# Patient Record
Sex: FEMALE | Race: WHITE | NOT HISPANIC OR LATINO | Employment: FULL TIME | URBAN - METROPOLITAN AREA
[De-identification: names, ages, dates, MRNs, and addresses within clinical notes are randomized per-mention and may not be internally consistent; named-entity substitution may affect disease eponyms.]

---

## 2017-03-07 ENCOUNTER — HOSPITAL ENCOUNTER (OUTPATIENT)
Dept: ULTRASOUND IMAGING | Facility: HOSPITAL | Age: 57
Discharge: HOME | End: 2017-03-07
Attending: OBSTETRICS & GYNECOLOGY
Payer: COMMERCIAL

## 2017-03-07 DIAGNOSIS — I10 ESSENTIAL (PRIMARY) HYPERTENSION: ICD-10-CM

## 2017-03-07 DIAGNOSIS — N95.0 POSTMENOPAUSAL BLEEDING: ICD-10-CM

## 2017-03-07 DIAGNOSIS — M19.90 OSTEOARTHRITIS: ICD-10-CM

## 2017-03-07 PROCEDURE — 76856 US EXAM PELVIC COMPLETE: CPT

## 2017-03-07 PROCEDURE — 76830 TRANSVAGINAL US NON-OB: CPT

## 2017-03-30 ENCOUNTER — ALLSCRIPTS OFFICE VISIT (OUTPATIENT)
Dept: OTHER | Facility: OTHER | Age: 57
End: 2017-03-30

## 2017-03-30 DIAGNOSIS — Z01.419 ENCOUNTER FOR GYNECOLOGICAL EXAMINATION WITHOUT ABNORMAL FINDING: ICD-10-CM

## 2017-04-03 LAB
HPV 18 (HISTORICAL): NOT DETECTED
HPV HIGH RISK 16/18 (HISTORICAL): NOT DETECTED
HPV16 (HISTORICAL): NOT DETECTED
PAP (HISTORICAL): NORMAL

## 2018-01-12 VITALS
WEIGHT: 293 LBS | DIASTOLIC BLOOD PRESSURE: 70 MMHG | HEIGHT: 68 IN | BODY MASS INDEX: 44.41 KG/M2 | SYSTOLIC BLOOD PRESSURE: 132 MMHG

## 2018-06-14 ENCOUNTER — ANNUAL EXAM (OUTPATIENT)
Dept: OBGYN CLINIC | Facility: CLINIC | Age: 58
End: 2018-06-14
Payer: COMMERCIAL

## 2018-06-14 VITALS
HEIGHT: 68 IN | BODY MASS INDEX: 41.52 KG/M2 | DIASTOLIC BLOOD PRESSURE: 76 MMHG | SYSTOLIC BLOOD PRESSURE: 148 MMHG | WEIGHT: 274 LBS

## 2018-06-14 DIAGNOSIS — Z12.39 SCREENING FOR MALIGNANT NEOPLASM OF BREAST: ICD-10-CM

## 2018-06-14 DIAGNOSIS — Z11.59 NEED FOR HEPATITIS C SCREENING TEST: ICD-10-CM

## 2018-06-14 DIAGNOSIS — Z12.4 PAP SMEAR FOR CERVICAL CANCER SCREENING: ICD-10-CM

## 2018-06-14 DIAGNOSIS — Z01.419 ENCOUNTER FOR ANNUAL ROUTINE GYNECOLOGICAL EXAMINATION: Primary | ICD-10-CM

## 2018-06-14 DIAGNOSIS — R93.89 THICKENED ENDOMETRIUM: ICD-10-CM

## 2018-06-14 PROCEDURE — S0610 ANNUAL GYNECOLOGICAL EXAMINA: HCPCS | Performed by: OBSTETRICS & GYNECOLOGY

## 2018-06-14 RX ORDER — BUSPIRONE HYDROCHLORIDE 5 MG/1
5 TABLET ORAL
COMMUNITY
End: 2019-03-01

## 2018-06-14 NOTE — PROGRESS NOTES
Assessment/Plan:    Normal APE  Due for mammogram  Repeat pelvic US  For endometrial stripe        Problem List Items Addressed This Visit     Encounter for annual routine gynecological examination - Primary    Thickened endometrium    Relevant Orders    US pelvis complete w transvaginal    Pap smear for cervical cancer screening      Other Visit Diagnoses     Need for hepatitis C screening test        Screening for malignant neoplasm of breast        Relevant Orders    Mammo screening bilateral w 3d & cad            Subjective:      Patient ID: Susan Smith is a 61 y o  female  Here for annual gyn exam-overall well-no vaginal bleeding or discharge-bowels and bladder normal, does have occasional urinary leakage but is usually just a small amount and due to increased abdominal pressure/Aelzgakz-sb-bt-date with colonoscopy but and she may be due this year, needs mammogram, has blood work planned with primary care physician  We did discuss repeating her pelvic ultrasound, she had had a thickened endometrial stripe last year and an endometrial sampling with D and C had showed hyperplasia with small focus of both simple and complex hyperplasia felt to be consistent with a polyp  We discussed at the time of proceeding with the further surgery but Laure Grady wanted to follow-up instead with ultrasound  She has had no bleeding or other complaints since that time        The following portions of the patient's history were reviewed and updated as appropriate:   She  has a past medical history of Arthritis; Depression; and Hypertension  She   Patient Active Problem List    Diagnosis Date Noted    Encounter for annual routine gynecological examination 2018    Thickened endometrium 2018    Pap smear for cervical cancer screening 2018    Hypertension 2016    Depression 2016    Osteoarthritis 2016     She  has a past surgical history that includes Joint replacement;   section; Neuroplasty / transposition median nerve at carpal tunnel; pr hysteroscopy,w/endo bx (N/A, 3/21/2016); and Replacement total knee (Right)  Her family history includes Heart failure in her father; Hypertension in her father and mother; Lymphoma in her father  She  reports that she has never smoked  She does not have any smokeless tobacco history on file  She reports that she does not drink alcohol or use drugs  Current Outpatient Prescriptions   Medication Sig Dispense Refill    busPIRone (BUSPAR) 5 mg tablet Take 5 mg by mouth daily at bedtime   citalopram (CeleXA) 20 mg tablet Take 20 mg by mouth daily at bedtime   metoprolol tartrate (LOPRESSOR) 25 mg tablet Take 25 mg by mouth daily at bedtime  Pt unsure of dose      busPIRone (BUSPAR) 5 mg tablet Take 5 mg by mouth       No current facility-administered medications for this visit  Current Outpatient Prescriptions on File Prior to Visit   Medication Sig    busPIRone (BUSPAR) 5 mg tablet Take 5 mg by mouth daily at bedtime   citalopram (CeleXA) 20 mg tablet Take 20 mg by mouth daily at bedtime   metoprolol tartrate (LOPRESSOR) 25 mg tablet Take 25 mg by mouth daily at bedtime  Pt unsure of dose     No current facility-administered medications on file prior to visit  She is allergic to penicillins; zithromax [azithromycin]; and griseofulvin       Review of Systems   Constitutional: Positive for fatigue  Negative for chills, fever and unexpected weight change  HENT: Negative for dental problem, sinus pain and sinus pressure  Eyes: Positive for visual disturbance  Respiratory: Negative for cough, shortness of breath and wheezing  Cardiovascular: Negative for chest pain and leg swelling  Gastrointestinal: Negative for constipation, diarrhea, nausea and vomiting  Endocrine: Positive for heat intolerance  Genitourinary: Negative for menstrual problem, pelvic pain and urgency     Musculoskeletal: Positive for joint swelling  Negative for back pain  Arthritis     Allergic/Immunologic: Positive for environmental allergies  Neurological: Negative for dizziness and headaches  Psychiatric/Behavioral: The patient is nervous/anxious  Objective:      LMP  (LMP Unknown)          Physical Exam   Constitutional: She is oriented to person, place, and time  She appears well-developed  No distress  Obese white female    HENT:   Head: Normocephalic and atraumatic  Neck: Normal range of motion  No thyromegaly present  Cardiovascular: Normal rate, regular rhythm and normal heart sounds  Pulmonary/Chest: Effort normal and breath sounds normal  Right breast exhibits no inverted nipple, no mass, no nipple discharge, no skin change and no tenderness  Left breast exhibits no inverted nipple, no mass, no nipple discharge, no skin change and no tenderness  Breasts are symmetrical    Abdominal: Soft  She exhibits no mass  There is no tenderness  There is no rebound and no guarding  Genitourinary: Rectum normal, vagina normal and uterus normal  Rectal exam shows no mass, no tenderness, anal tone normal and guaiac negative stool  No breast swelling, tenderness or discharge  Uterus is not enlarged and not tender  Cervix exhibits no discharge and no friability  Right adnexum displays no mass, no tenderness and no fullness  Left adnexum displays no mass, no tenderness and no fullness  Neurological: She is alert and oriented to person, place, and time  Psychiatric: She has a normal mood and affect   Her behavior is normal

## 2018-06-14 NOTE — PATIENT INSTRUCTIONS
Thank you for enrolling in [facility]  Please follow the instructions below to securely access your online medical record  [employer] allows you to send messages to your doctor, view your test results, renew your prescriptions, schedule appointments, and more  [facility] uses Single Sign on (SSO) Technology for you to log in and access our [address]  [email or web address], including [employer]  No more remembering multiple user names and passwords! We are going to guide you through, step by step, to help you set up your [employer] account which will provide access to your [employer] account  How Do I Sign Up? 1  In your Internet browser, go to [email or web address]       2  Click on the [facility] account and then click Dont have an                 Account? Create one now      3  Enter your demographic information and chose a user name (email address) and password  Think of one that is secure and easy to remember  Enter a Referral code if you have one (this is not your [employer] code ) Accept the Terms and Conditions and the Privacy Policy  4  Select your security questions that you will use to reset your password should you forget it  Click Submit  5  Enter your [employer] Activation Code exactly as it appears below  You will not need to use this code after you have completed the sign-up process  If you do not sign up before the expiration date, you must request a new code  [employer] Activation Code: [email or web address]  Expires: 6/28/2018  5:57 PM    6  Confirm your email address  An email confirmation was sent to you  Please open that email and click Confirm your Email   You should then be redirected to our [employer] Single sign on page, where you will log on with the user name and password you created! Proceed to the [employer] Icon to view your personal health information          Additional Information  If you have questions, you can e-mail [email or web address] or call [phone] to talk to our customer support staff  Remember, MyChart is NOT to be used for urgent needs  For medical emergencies, dial 911

## 2018-06-19 LAB
HPV HR 12 DNA CVX QL NAA+PROBE: NOT DETECTED
HPV16 DNA SPEC QL NAA+PROBE: NOT DETECTED
HPV18 DNA SPEC QL NAA+PROBE: NOT DETECTED
THIN PREP CVX: NORMAL

## 2018-10-11 ENCOUNTER — HOSPITAL ENCOUNTER (OUTPATIENT)
Dept: MAMMOGRAPHY | Facility: HOSPITAL | Age: 58
Discharge: HOME | End: 2018-10-11
Attending: OBSTETRICS & GYNECOLOGY
Payer: COMMERCIAL

## 2018-10-11 ENCOUNTER — HOSPITAL ENCOUNTER (OUTPATIENT)
Dept: ULTRASOUND IMAGING | Facility: HOSPITAL | Age: 58
Discharge: HOME | End: 2018-10-11
Attending: OBSTETRICS & GYNECOLOGY
Payer: COMMERCIAL

## 2018-10-11 DIAGNOSIS — Z12.39 SCREENING FOR MALIGNANT NEOPLASM OF BREAST: ICD-10-CM

## 2018-10-11 DIAGNOSIS — R93.89 THICKENED ENDOMETRIUM: ICD-10-CM

## 2018-10-11 PROCEDURE — 76830 TRANSVAGINAL US NON-OB: CPT

## 2018-10-11 PROCEDURE — 77067 SCR MAMMO BI INCL CAD: CPT

## 2018-10-11 PROCEDURE — 76856 US EXAM PELVIC COMPLETE: CPT

## 2018-10-15 ENCOUNTER — TELEPHONE (OUTPATIENT)
Dept: OBGYN CLINIC | Facility: CLINIC | Age: 58
End: 2018-10-15

## 2019-03-01 ENCOUNTER — HOSPITAL ENCOUNTER (OUTPATIENT)
Dept: ULTRASOUND IMAGING | Facility: HOSPITAL | Age: 59
Discharge: HOME | End: 2019-03-01
Attending: INTERNAL MEDICINE
Payer: COMMERCIAL

## 2019-03-01 ENCOUNTER — TRANSCRIBE ORDERS (OUTPATIENT)
Dept: ADMINISTRATIVE | Facility: HOSPITAL | Age: 59
End: 2019-03-01

## 2019-03-01 ENCOUNTER — HOSPITAL ENCOUNTER (INPATIENT)
Facility: HOSPITAL | Age: 59
LOS: 2 days | Discharge: HOME | DRG: 418 | End: 2019-03-03
Attending: EMERGENCY MEDICINE | Admitting: SURGERY
Payer: COMMERCIAL

## 2019-03-01 DIAGNOSIS — K85.10 GALLSTONE PANCREATITIS: Primary | ICD-10-CM

## 2019-03-01 DIAGNOSIS — R10.13 EPIGASTRIC PAIN: Primary | ICD-10-CM

## 2019-03-01 DIAGNOSIS — K85.90 PANCREATITIS: ICD-10-CM

## 2019-03-01 DIAGNOSIS — R10.13 EPIGASTRIC PAIN: ICD-10-CM

## 2019-03-01 DIAGNOSIS — K85.10 ACUTE GALLSTONE PANCREATITIS: ICD-10-CM

## 2019-03-01 DIAGNOSIS — R79.89 ELEVATED LFTS: ICD-10-CM

## 2019-03-01 DIAGNOSIS — K83.8 COMMON BILE DUCT DILATION: ICD-10-CM

## 2019-03-01 LAB
ALBUMIN SERPL BCP-MCNC: 3.7 G/DL (ref 3.5–5)
ALP SERPL-CCNC: 370 U/L (ref 46–116)
ALT SERPL W P-5'-P-CCNC: 1190 U/L (ref 12–78)
ANION GAP SERPL CALCULATED.3IONS-SCNC: 9 MMOL/L (ref 4–13)
AST SERPL W P-5'-P-CCNC: 533 U/L (ref 5–45)
BACTERIA UR QL AUTO: ABNORMAL /HPF
BASOPHILS # BLD AUTO: 0.03 THOUSANDS/ΜL (ref 0–0.1)
BASOPHILS NFR BLD AUTO: 1 % (ref 0–1)
BILIRUB SERPL-MCNC: 3.1 MG/DL (ref 0.2–1)
BILIRUB UR QL STRIP: NEGATIVE
BUN SERPL-MCNC: 13 MG/DL (ref 5–25)
CALCIUM SERPL-MCNC: 9.3 MG/DL (ref 8.3–10.1)
CHLORIDE SERPL-SCNC: 105 MMOL/L (ref 100–108)
CLARITY UR: CLEAR
CO2 SERPL-SCNC: 27 MMOL/L (ref 21–32)
COLOR UR: YELLOW
CREAT SERPL-MCNC: 0.95 MG/DL (ref 0.6–1.3)
EOSINOPHIL # BLD AUTO: 0.18 THOUSAND/ΜL (ref 0–0.61)
EOSINOPHIL NFR BLD AUTO: 3 % (ref 0–6)
ERYTHROCYTE [DISTWIDTH] IN BLOOD BY AUTOMATED COUNT: 14.4 % (ref 11.6–15.1)
GFR SERPL CREATININE-BSD FRML MDRD: 66 ML/MIN/1.73SQ M
GLUCOSE SERPL-MCNC: 99 MG/DL (ref 65–140)
GLUCOSE UR STRIP-MCNC: NEGATIVE MG/DL
HCT VFR BLD AUTO: 46.3 % (ref 34.8–46.1)
HGB BLD-MCNC: 14.2 G/DL (ref 11.5–15.4)
HGB UR QL STRIP.AUTO: ABNORMAL
IMM GRANULOCYTES # BLD AUTO: 0.02 THOUSAND/UL (ref 0–0.2)
IMM GRANULOCYTES NFR BLD AUTO: 0 % (ref 0–2)
KETONES UR STRIP-MCNC: NEGATIVE MG/DL
LEUKOCYTE ESTERASE UR QL STRIP: ABNORMAL
LIPASE SERPL-CCNC: ABNORMAL U/L (ref 73–393)
LYMPHOCYTES # BLD AUTO: 1.42 THOUSANDS/ΜL (ref 0.6–4.47)
LYMPHOCYTES NFR BLD AUTO: 26 % (ref 14–44)
MCH RBC QN AUTO: 27.3 PG (ref 26.8–34.3)
MCHC RBC AUTO-ENTMCNC: 30.7 G/DL (ref 31.4–37.4)
MCV RBC AUTO: 89 FL (ref 82–98)
MONOCYTES # BLD AUTO: 0.3 THOUSAND/ΜL (ref 0.17–1.22)
MONOCYTES NFR BLD AUTO: 5 % (ref 4–12)
NEUTROPHILS # BLD AUTO: 3.57 THOUSANDS/ΜL (ref 1.85–7.62)
NEUTS SEG NFR BLD AUTO: 65 % (ref 43–75)
NITRITE UR QL STRIP: NEGATIVE
NON-SQ EPI CELLS URNS QL MICRO: ABNORMAL /HPF
NRBC BLD AUTO-RTO: 0 /100 WBCS
PH UR STRIP.AUTO: 5 [PH] (ref 4.5–8)
PLATELET # BLD AUTO: 190 THOUSANDS/UL (ref 149–390)
PMV BLD AUTO: 11.1 FL (ref 8.9–12.7)
POTASSIUM SERPL-SCNC: 3.8 MMOL/L (ref 3.5–5.3)
PROT SERPL-MCNC: 7.2 G/DL (ref 6.4–8.2)
PROT UR STRIP-MCNC: NEGATIVE MG/DL
RBC # BLD AUTO: 5.21 MILLION/UL (ref 3.81–5.12)
RBC #/AREA URNS AUTO: ABNORMAL /HPF
SODIUM SERPL-SCNC: 141 MMOL/L (ref 136–145)
SP GR UR STRIP.AUTO: 1.02 (ref 1–1.03)
UROBILINOGEN UR QL STRIP.AUTO: 0.2 E.U./DL
WBC # BLD AUTO: 5.52 THOUSAND/UL (ref 4.31–10.16)
WBC #/AREA URNS AUTO: ABNORMAL /HPF

## 2019-03-01 PROCEDURE — 85025 COMPLETE CBC W/AUTO DIFF WBC: CPT | Performed by: EMERGENCY MEDICINE

## 2019-03-01 PROCEDURE — 99285 EMERGENCY DEPT VISIT HI MDM: CPT

## 2019-03-01 PROCEDURE — 96361 HYDRATE IV INFUSION ADD-ON: CPT

## 2019-03-01 PROCEDURE — 81003 URINALYSIS AUTO W/O SCOPE: CPT

## 2019-03-01 PROCEDURE — 36415 COLL VENOUS BLD VENIPUNCTURE: CPT | Performed by: EMERGENCY MEDICINE

## 2019-03-01 PROCEDURE — 81001 URINALYSIS AUTO W/SCOPE: CPT

## 2019-03-01 PROCEDURE — 83690 ASSAY OF LIPASE: CPT | Performed by: EMERGENCY MEDICINE

## 2019-03-01 PROCEDURE — 87086 URINE CULTURE/COLONY COUNT: CPT

## 2019-03-01 PROCEDURE — 76700 US EXAM ABDOM COMPLETE: CPT

## 2019-03-01 PROCEDURE — 96360 HYDRATION IV INFUSION INIT: CPT

## 2019-03-01 PROCEDURE — 80053 COMPREHEN METABOLIC PANEL: CPT | Performed by: EMERGENCY MEDICINE

## 2019-03-01 RX ORDER — ONDANSETRON 2 MG/ML
4 INJECTION INTRAMUSCULAR; INTRAVENOUS EVERY 6 HOURS PRN
Status: DISCONTINUED | OUTPATIENT
Start: 2019-03-01 | End: 2019-03-03 | Stop reason: HOSPADM

## 2019-03-01 RX ORDER — HYDROMORPHONE HCL/PF 1 MG/ML
1 SYRINGE (ML) INJECTION EVERY 4 HOURS PRN
Status: DISCONTINUED | OUTPATIENT
Start: 2019-03-01 | End: 2019-03-03 | Stop reason: HOSPADM

## 2019-03-01 RX ORDER — BUSPIRONE HYDROCHLORIDE 5 MG/1
5 TABLET ORAL
Status: DISCONTINUED | OUTPATIENT
Start: 2019-03-01 | End: 2019-03-03 | Stop reason: HOSPADM

## 2019-03-01 RX ORDER — CITALOPRAM 20 MG/1
20 TABLET ORAL
Status: DISCONTINUED | OUTPATIENT
Start: 2019-03-01 | End: 2019-03-03 | Stop reason: HOSPADM

## 2019-03-01 RX ORDER — SODIUM CHLORIDE, SODIUM LACTATE, POTASSIUM CHLORIDE, CALCIUM CHLORIDE 600; 310; 30; 20 MG/100ML; MG/100ML; MG/100ML; MG/100ML
125 INJECTION, SOLUTION INTRAVENOUS CONTINUOUS
Status: DISCONTINUED | OUTPATIENT
Start: 2019-03-01 | End: 2019-03-02

## 2019-03-01 RX ORDER — HYDROMORPHONE HCL/PF 1 MG/ML
0.5 SYRINGE (ML) INJECTION
Status: DISCONTINUED | OUTPATIENT
Start: 2019-03-01 | End: 2019-03-03 | Stop reason: HOSPADM

## 2019-03-01 RX ORDER — ACETAMINOPHEN 325 MG/1
650 TABLET ORAL EVERY 6 HOURS PRN
Status: DISCONTINUED | OUTPATIENT
Start: 2019-03-01 | End: 2019-03-03 | Stop reason: HOSPADM

## 2019-03-01 RX ORDER — HYDROMORPHONE HCL/PF 1 MG/ML
0.5 SYRINGE (ML) INJECTION EVERY 4 HOURS PRN
Status: DISCONTINUED | OUTPATIENT
Start: 2019-03-01 | End: 2019-03-03 | Stop reason: HOSPADM

## 2019-03-01 RX ADMIN — SODIUM CHLORIDE, SODIUM LACTATE, POTASSIUM CHLORIDE, AND CALCIUM CHLORIDE 125 ML/HR: .6; .31; .03; .02 INJECTION, SOLUTION INTRAVENOUS at 22:11

## 2019-03-01 RX ADMIN — CITALOPRAM HYDROBROMIDE 20 MG: 20 TABLET ORAL at 22:09

## 2019-03-01 RX ADMIN — SODIUM CHLORIDE 1000 ML: 0.9 INJECTION, SOLUTION INTRAVENOUS at 19:52

## 2019-03-01 RX ADMIN — BUSPIRONE HYDROCHLORIDE 5 MG: 5 TABLET ORAL at 22:09

## 2019-03-01 RX ADMIN — METOPROLOL TARTRATE 25 MG: 25 TABLET, FILM COATED ORAL at 22:09

## 2019-03-01 RX ADMIN — ACETAMINOPHEN 650 MG: 325 TABLET, FILM COATED ORAL at 20:53

## 2019-03-01 RX ADMIN — SODIUM CHLORIDE 1000 ML: 0.9 INJECTION, SOLUTION INTRAVENOUS at 18:43

## 2019-03-01 NOTE — ED PROVIDER NOTES
History  Chief Complaint   Patient presents with    Abdominal Pain     Pt  c/o abdominal pain with nausea and indigestion one week and had u/s performed today and was referred for futher evaluation  History provided by:  Patient   used: No    Abdominal Pain   Associated symptoms: nausea and vomiting    Associated symptoms: no chest pain, no chills, no cough, no diarrhea, no dysuria, no fever, no shortness of breath and no sore throat      Patient is a 50-year-old female presenting to emergency department with abdominal pain  Epigastric  Started a week ago which are generalized now localized to the epigastric area  Had outpatient ultrasound done which showed stones and sludge in gallbladder with enlarged common bile duct  Nausea vomiting  No fevers or chills  No chest pain  No shortness of breath  Having normal bowel movements  MDM will check LFTs, lipase, CBC, will likely need admission to hospital      Prior to Admission Medications   Prescriptions Last Dose Informant Patient Reported? Taking?   busPIRone (BUSPAR) 5 mg tablet   Yes Yes   Sig: Take 5 mg by mouth daily at bedtime  citalopram (CeleXA) 20 mg tablet   Yes Yes   Sig: Take 20 mg by mouth daily at bedtime  metoprolol tartrate (LOPRESSOR) 25 mg tablet   Yes Yes   Sig: Take 25 mg by mouth daily at bedtime   Pt unsure of dose      Facility-Administered Medications: None       Past Medical History:   Diagnosis Date    Arthritis     Depression     Hypertension        Past Surgical History:   Procedure Laterality Date     SECTION       SECTION      JOINT REPLACEMENT      NEUROPLASTY / TRANSPOSITION MEDIAN NERVE AT CARPAL TUNNEL      [facility] HYSTEROSCOPY,W/ENDO BX N/A 3/21/2016    Procedure: FRACTIONAL DILATATION AND CURETTAGE (D&C) WITH HYSTEROSOCPY;  Surgeon: Jossy Cunha MD;  Location: [facility];  Service: Gynecology    REPLACEMENT TOTAL KNEE Right        Family History   Problem Relation Age of Onset    Hypertension Mother     Hypertension Father     Heart failure Father     Lymphoma Father      I have reviewed and agree with the history as documented  Social History     Tobacco Use    Smoking status: Never Smoker   Substance Use Topics    Alcohol use: No    Drug use: No        Review of Systems   Constitutional: Negative for chills, diaphoresis and fever  HENT: Negative for congestion and sore throat  Respiratory: Negative for cough, shortness of breath, wheezing and stridor  Cardiovascular: Negative for chest pain, palpitations and leg swelling  Gastrointestinal: Positive for abdominal pain, nausea and vomiting  Negative for blood in stool and diarrhea  Genitourinary: Negative for dysuria, frequency and urgency  Musculoskeletal: Negative for neck pain and neck stiffness  Skin: Negative for pallor and rash  Neurological: Negative for dizziness, syncope, weakness, light-headedness and headaches  All other systems reviewed and are negative  Physical Exam  Physical Exam   Constitutional: She is oriented to person, place, and time  She appears well-developed and well-nourished  HENT:   Head: Normocephalic and atraumatic  Eyes: Pupils are equal, round, and reactive to light  Neck: Neck supple  Cardiovascular: Normal rate, regular rhythm, normal heart sounds and intact distal pulses  Pulmonary/Chest: Effort normal and breath sounds normal  No respiratory distress  Abdominal: Soft  Bowel sounds are normal  There is tenderness  Epigastric tenderness   Neurological: She is alert and oriented to person, place, and time  Skin: Skin is warm and dry  She is not diaphoretic  No cyanosis or erythema  Vitals reviewed        Vital Signs  ED Triage Vitals   Temperature Pulse Respirations Blood Pressure SpO2   03/01/19 1806 03/01/19 1806 03/01/19 1806 03/01/19 1806 03/01/19 1806   97 9 °F (36 6 °C) 75 (!) 28 (!) 187/109 97 %      Temp Source Heart Rate Source Patient Position - Orthostatic VS BP Location FiO2 (%)   03/01/19 1806 03/01/19 2029 03/01/19 2029 03/01/19 2029 --   Oral Monitor Sitting Left arm       Pain Score       03/01/19 1806       No Pain           Vitals:    03/02/19 0700 03/02/19 1443 03/02/19 2101 03/02/19 2159   BP: 148/78 132/78  167/82   Pulse: 79 79 82 72   Patient Position - Orthostatic VS: Lying Lying  Lying       Visual Acuity      ED Medications  Medications   ondansetron (ZOFRAN) injection 4 mg (has no administration in time range)   enoxaparin (LOVENOX) subcutaneous injection 40 mg (0 mg Subcutaneous Hold 3/2/19 0836)   HYDROmorphone (DILAUDID) injection 1 mg (has no administration in time range)   HYDROmorphone (DILAUDID) injection 0 5 mg (has no administration in time range)   HYDROmorphone (DILAUDID) injection 0 5 mg (has no administration in time range)   acetaminophen (TYLENOL) tablet 650 mg (650 mg Oral Given 3/2/19 2103)   citalopram (CeleXA) tablet 20 mg (20 mg Oral Given 3/2/19 2101)   metoprolol tartrate (LOPRESSOR) tablet 25 mg (25 mg Oral Given 3/2/19 2101)   busPIRone (BUSPAR) tablet 5 mg (5 mg Oral Given 3/2/19 2101)   dextrose 5 % and sodium chloride 0 45 % with KCl 20 mEq/L infusion (75 mL/hr Intravenous New Bag 3/2/19 1513)   sodium chloride 0 9 % bolus 1,000 mL (0 mL Intravenous Stopped 3/1/19 1951)   sodium chloride 0 9 % bolus 1,000 mL (1,000 mL Intravenous New Bag 3/1/19 1952)   ketorolac (TORADOL) injection 15 mg (15 mg Intravenous Given 3/2/19 1348)       Diagnostic Studies  Results Reviewed     Procedure Component Value Units Date/Time    Urine culture [[id number]] Collected:  03/01/19 1831    Lab Status:  Preliminary result Specimen:  Urine, Clean Catch Updated:  03/02/19 1136     Urine Culture Culture too young- will reincubate    Lipase [[id number]]  (Abnormal) Collected:  03/02/19 0449    Lab Status:  Final result Specimen:  Blood from Arm, Left Updated:  03/02/19 0609     Lipase 3,244 u/L     Comprehensive metabolic panel [[id number]]  (Abnormal) Collected:  03/02/19 0449    Lab Status:  Final result Specimen:  Blood from Arm, Left Updated:  03/02/19 8818     Sodium 142 mmol/L      Potassium 4 0 mmol/L      Chloride 109 mmol/L      CO2 25 mmol/L      ANION GAP 8 mmol/L      BUN 12 mg/dL      Creatinine 0 84 mg/dL      Glucose 89 mg/dL      Calcium 8 5 mg/dL       U/L       U/L      Alkaline Phosphatase 307 U/L      Total Protein 6 1 g/dL      Albumin 3 1 g/dL      Total Bilirubin 2 60 mg/dL      eGFR 76 ml/min/1 73sq m     Narrative:       National Kidney Disease Education Program recommendations are as follows:  GFR calculation is accurate only with a steady state creatinine  Chronic Kidney disease less than 60 ml/min/1 73 sq  meters  Kidney failure less than 15 ml/min/1 73 sq  meters      CBC and differential [[id number]]  (Abnormal) Collected:  03/02/19 0449    Lab Status:  Final result Specimen:  Blood from Arm, Left Updated:  03/02/19 0514     WBC 4 64 Thousand/uL      RBC 4 58 Million/uL      Hemoglobin 12 7 g/dL      Hematocrit 41 4 %      MCV 90 fL      MCH 27 7 pg      MCHC 30 7 g/dL      RDW 14 2 %      MPV 11 2 fL      Platelets 142 Thousands/uL      nRBC 0 /100 WBCs      Neutrophils Relative 53 %      Immat GRANS % 0 %      Lymphocytes Relative 35 %      Monocytes Relative 5 %      Eosinophils Relative 6 %      Basophils Relative 1 %      Neutrophils Absolute 2 46 Thousands/µL      Immature Grans Absolute 0 01 Thousand/uL      Lymphocytes Absolute 1 61 Thousands/µL      Monocytes Absolute 0 25 Thousand/µL      Eosinophils Absolute 0 28 Thousand/µL      Basophils Absolute 0 03 Thousands/µL     Comprehensive metabolic panel [[id number]]  (Abnormal) Collected:  03/01/19 1843    Lab Status:  Final result Specimen:  Blood from Arm, Right Updated:  03/01/19 1942     Sodium 141 mmol/L      Potassium 3 8 mmol/L      Chloride 105 mmol/L      CO2 27 mmol/L      ANION GAP 9 mmol/L      BUN 13 mg/dL      Creatinine 0 95 mg/dL      Glucose 99 mg/dL      Calcium 9 3 mg/dL       U/L      ALT 1,190 U/L      Alkaline Phosphatase 370 U/L      Total Protein 7 2 g/dL      Albumin 3 7 g/dL      Total Bilirubin 3 10 mg/dL      eGFR 66 ml/min/1 73sq m     Narrative:       National Kidney Disease Education Program recommendations are as follows:  GFR calculation is accurate only with a steady state creatinine  Chronic Kidney disease less than 60 ml/min/1 73 sq  meters  Kidney failure less than 15 ml/min/1 73 sq  meters      Lipase [[id number]]  (Abnormal) Collected:  03/01/19 1843    Lab Status:  Final result Specimen:  Blood from Arm, Right Updated:  03/01/19 1942     Lipase 12,308 u/L     Urine Microscopic [[id number]]  (Abnormal) Collected:  03/01/19 1831    Lab Status:  Final result Specimen:  Urine, Clean Catch Updated:  03/01/19 1857     RBC, UA 0-5 /hpf      WBC, UA 10-20 /hpf      Epithelial Cells Occasional /hpf      Bacteria, UA Occasional /hpf     CBC and differential [[id number]]  (Abnormal) Collected:  03/01/19 1842    Lab Status:  Final result Specimen:  Blood from Arm, Right Updated:  03/01/19 1851     WBC 5 52 Thousand/uL      RBC 5 21 Million/uL      Hemoglobin 14 2 g/dL      Hematocrit 46 3 %      MCV 89 fL      MCH 27 3 pg      MCHC 30 7 g/dL      RDW 14 4 %      MPV 11 1 fL      Platelets 546 Thousands/uL      nRBC 0 /100 WBCs      Neutrophils Relative 65 %      Immat GRANS % 0 %      Lymphocytes Relative 26 %      Monocytes Relative 5 %      Eosinophils Relative 3 %      Basophils Relative 1 %      Neutrophils Absolute 3 57 Thousands/µL      Immature Grans Absolute 0 02 Thousand/uL      Lymphocytes Absolute 1 42 Thousands/µL      Monocytes Absolute 0 30 Thousand/µL      Eosinophils Absolute 0 18 Thousand/µL      Basophils Absolute 0 03 Thousands/µL     ED Urine Macroscopic [[id number]]  (Abnormal) Collected:  03/01/19 1831    Lab Status:  Final result Specimen:  Urine Updated:  03/01/19 1828     Color, UA Yellow     Clarity, UA Clear     pH, UA 5 0     Leukocytes, UA Trace     Nitrite, UA Negative     Protein, UA Negative mg/dl      Glucose, UA Negative mg/dl      Ketones, UA Negative mg/dl      Urobilinogen, UA 0 2 E U /dl      Bilirubin, UA Negative     Blood, UA Trace     Specific [place], UA 1 020    Narrative:       CLINITEK RESULT                 MRI abdomen wo contrast and mrcp   Final Result by Davis Mercado MD (03/02 1235)      1  Cholelithiasis with gallbladder wall thickening  No pericholecystic inflammation  2   No biliary ductal dilatation or choledocholithiasis  3   Hepatomegaly  Workstation performed: [id number]                    Procedures  Procedures       Phone Contacts  ED Phone Contact    ED Course  ED Course as of Mar 02 2224   Fri Mar 01, 2019   2006 Elevated lipase, elevated LFTs, elevated T bili  Likely from a passed stone  Potential still stuck in duct  Will discuss with surgery  Will need admission to hospital   Will need likely MRCP or ERCP  MDM    Disposition  Final diagnoses:   Pancreatitis   Elevated LFTs   Common bile duct dilation     Time reflects when diagnosis was documented in both MDM as applicable and the Disposition within this note     Time User Action Codes Description Comment    3/1/2019  8:12 PM [address], [address] [K85 10] Gallstone pancreatitis     3/1/2019  8:18 PM Masha Ellison Add [K85 90] Pancreatitis     3/1/2019  8:18 PM Masha Ellison Add [R94 5] Elevated LFTs     3/1/2019  8:19 PM Masha Ellison Add [K83 8] Common bile duct dilation       ED Disposition     ED Disposition Condition Date/Time Comment    Admit Stable Fri Mar 1, 2019  8:18 PM Case was discussed with surgery AP and the patient's admission status was agreed to be Admission Status: inpatient status to the service of Dr Kait Llanos           Follow-up Information    None         Current Discharge Medication List      CONTINUE these medications which have NOT CHANGED Details   busPIRone (BUSPAR) 5 mg tablet Take 5 mg by mouth daily at bedtime  citalopram (CeleXA) 20 mg tablet Take 20 mg by mouth daily at bedtime  metoprolol tartrate (LOPRESSOR) 25 mg tablet Take 25 mg by mouth daily at bedtime  Pt unsure of dose           No discharge procedures on file      ED Provider  Electronically Signed by           Mp Leonard MD  03/02/19 6721

## 2019-03-02 ENCOUNTER — APPOINTMENT (INPATIENT)
Dept: MRI IMAGING | Facility: HOSPITAL | Age: 59
DRG: 418 | End: 2019-03-02
Payer: COMMERCIAL

## 2019-03-02 PROBLEM — K85.10 GALLSTONE PANCREATITIS: Status: ACTIVE | Noted: 2019-03-01

## 2019-03-02 PROBLEM — K85.10 ACUTE GALLSTONE PANCREATITIS: Status: ACTIVE | Noted: 2019-03-02

## 2019-03-02 LAB
ALBUMIN SERPL BCP-MCNC: 3.1 G/DL (ref 3.5–5)
ALP SERPL-CCNC: 307 U/L (ref 46–116)
ALT SERPL W P-5'-P-CCNC: 850 U/L (ref 12–78)
ANION GAP SERPL CALCULATED.3IONS-SCNC: 8 MMOL/L (ref 4–13)
AST SERPL W P-5'-P-CCNC: 326 U/L (ref 5–45)
BASOPHILS # BLD AUTO: 0.03 THOUSANDS/ΜL (ref 0–0.1)
BASOPHILS NFR BLD AUTO: 1 % (ref 0–1)
BILIRUB SERPL-MCNC: 2.6 MG/DL (ref 0.2–1)
BUN SERPL-MCNC: 12 MG/DL (ref 5–25)
CALCIUM SERPL-MCNC: 8.5 MG/DL (ref 8.3–10.1)
CHLORIDE SERPL-SCNC: 109 MMOL/L (ref 100–108)
CO2 SERPL-SCNC: 25 MMOL/L (ref 21–32)
CREAT SERPL-MCNC: 0.84 MG/DL (ref 0.6–1.3)
EOSINOPHIL # BLD AUTO: 0.28 THOUSAND/ΜL (ref 0–0.61)
EOSINOPHIL NFR BLD AUTO: 6 % (ref 0–6)
ERYTHROCYTE [DISTWIDTH] IN BLOOD BY AUTOMATED COUNT: 14.2 % (ref 11.6–15.1)
GFR SERPL CREATININE-BSD FRML MDRD: 76 ML/MIN/1.73SQ M
GLUCOSE SERPL-MCNC: 89 MG/DL (ref 65–140)
HCT VFR BLD AUTO: 41.4 % (ref 34.8–46.1)
HGB BLD-MCNC: 12.7 G/DL (ref 11.5–15.4)
IMM GRANULOCYTES # BLD AUTO: 0.01 THOUSAND/UL (ref 0–0.2)
IMM GRANULOCYTES NFR BLD AUTO: 0 % (ref 0–2)
LIPASE SERPL-CCNC: 3244 U/L (ref 73–393)
LYMPHOCYTES # BLD AUTO: 1.61 THOUSANDS/ΜL (ref 0.6–4.47)
LYMPHOCYTES NFR BLD AUTO: 35 % (ref 14–44)
MCH RBC QN AUTO: 27.7 PG (ref 26.8–34.3)
MCHC RBC AUTO-ENTMCNC: 30.7 G/DL (ref 31.4–37.4)
MCV RBC AUTO: 90 FL (ref 82–98)
MONOCYTES # BLD AUTO: 0.25 THOUSAND/ΜL (ref 0.17–1.22)
MONOCYTES NFR BLD AUTO: 5 % (ref 4–12)
NEUTROPHILS # BLD AUTO: 2.46 THOUSANDS/ΜL (ref 1.85–7.62)
NEUTS SEG NFR BLD AUTO: 53 % (ref 43–75)
NRBC BLD AUTO-RTO: 0 /100 WBCS
PLATELET # BLD AUTO: 154 THOUSANDS/UL (ref 149–390)
PMV BLD AUTO: 11.2 FL (ref 8.9–12.7)
POTASSIUM SERPL-SCNC: 4 MMOL/L (ref 3.5–5.3)
PROT SERPL-MCNC: 6.1 G/DL (ref 6.4–8.2)
RBC # BLD AUTO: 4.58 MILLION/UL (ref 3.81–5.12)
SODIUM SERPL-SCNC: 142 MMOL/L (ref 136–145)
WBC # BLD AUTO: 4.64 THOUSAND/UL (ref 4.31–10.16)

## 2019-03-02 PROCEDURE — 80053 COMPREHEN METABOLIC PANEL: CPT | Performed by: PHYSICIAN ASSISTANT

## 2019-03-02 PROCEDURE — 99254 IP/OBS CNSLTJ NEW/EST MOD 60: CPT | Performed by: INTERNAL MEDICINE

## 2019-03-02 PROCEDURE — 76377 3D RENDER W/INTRP POSTPROCES: CPT

## 2019-03-02 PROCEDURE — 83690 ASSAY OF LIPASE: CPT | Performed by: PHYSICIAN ASSISTANT

## 2019-03-02 PROCEDURE — 74181 MRI ABDOMEN W/O CONTRAST: CPT

## 2019-03-02 PROCEDURE — 85025 COMPLETE CBC W/AUTO DIFF WBC: CPT | Performed by: PHYSICIAN ASSISTANT

## 2019-03-02 RX ORDER — SODIUM CHLORIDE 9 MG/ML
125 INJECTION, SOLUTION INTRAVENOUS CONTINUOUS
Status: DISCONTINUED | OUTPATIENT
Start: 2019-03-03 | End: 2019-03-02

## 2019-03-02 RX ORDER — KETOROLAC TROMETHAMINE 30 MG/ML
15 INJECTION, SOLUTION INTRAMUSCULAR; INTRAVENOUS ONCE
Status: COMPLETED | OUTPATIENT
Start: 2019-03-02 | End: 2019-03-02

## 2019-03-02 RX ORDER — DEXTROSE, SODIUM CHLORIDE, AND POTASSIUM CHLORIDE 5; .45; .15 G/100ML; G/100ML; G/100ML
75 INJECTION INTRAVENOUS CONTINUOUS
Status: DISCONTINUED | OUTPATIENT
Start: 2019-03-02 | End: 2019-03-03 | Stop reason: HOSPADM

## 2019-03-02 RX ADMIN — BUSPIRONE HYDROCHLORIDE 5 MG: 5 TABLET ORAL at 21:01

## 2019-03-02 RX ADMIN — METOPROLOL TARTRATE 25 MG: 25 TABLET, FILM COATED ORAL at 21:01

## 2019-03-02 RX ADMIN — KETOROLAC TROMETHAMINE 15 MG: 30 INJECTION, SOLUTION INTRAMUSCULAR at 13:48

## 2019-03-02 RX ADMIN — DEXTROSE, SODIUM CHLORIDE, AND POTASSIUM CHLORIDE 75 ML/HR: 5; .45; .15 INJECTION INTRAVENOUS at 15:13

## 2019-03-02 RX ADMIN — CITALOPRAM HYDROBROMIDE 20 MG: 20 TABLET ORAL at 21:01

## 2019-03-02 RX ADMIN — SODIUM CHLORIDE, SODIUM LACTATE, POTASSIUM CHLORIDE, AND CALCIUM CHLORIDE 125 ML/HR: .6; .31; .03; .02 INJECTION, SOLUTION INTRAVENOUS at 08:30

## 2019-03-02 RX ADMIN — ACETAMINOPHEN 650 MG: 325 TABLET, FILM COATED ORAL at 12:26

## 2019-03-02 RX ADMIN — ACETAMINOPHEN 650 MG: 325 TABLET, FILM COATED ORAL at 21:03

## 2019-03-02 NOTE — H&P
History and Physical -General Surgery  Darron Thomason 60 y o  female MRN: [id number]  Unit/Bed#: ED 05 Encounter: [id number]        Reason for Consult / Principal Problem: abdominal pain    HPI: Bella Lozano is a 60y o  year old female who presents with  1 week of abdominal pain  She states the pain is mostly in her epigastric area and it got progressively worse since last night  She did not know she had gallstones  She had some associated nausea and vomiting  Upon coming to the emergency room she was found to have gallstone pancreatitis with a dilated common bile duct so we are being consulted for admission and treatment  Review of Systems   Constitutional: Positive for appetite change  HENT: Negative  Eyes: Negative  Respiratory: Negative  Cardiovascular: Negative  Gastrointestinal: Positive for abdominal pain and nausea  Endocrine: Negative  Genitourinary: Negative  Musculoskeletal: Negative  Skin: Negative  Allergic/Immunologic: Negative  Neurological: Negative  Hematological: Negative  Psychiatric/Behavioral: Negative          Historical Information   Past Medical History:   Diagnosis Date    Arthritis     Depression     Hypertension      Past Surgical History:   Procedure Laterality Date     SECTION      JOINT REPLACEMENT      NEUROPLASTY / TRANSPOSITION MEDIAN NERVE AT CARPAL TUNNEL      [place] HYSTEROSCOPY,W/ENDO BX N/A 3/21/2016    Procedure: FRACTIONAL DILATATION AND CURETTAGE (D&C) WITH HYSTEROSOCPY;  Surgeon: Mike Salas MD;  [facility];  Service: Gynecology    REPLACEMENT TOTAL KNEE Right      Social History   Social History     Substance and Sexual Activity   Alcohol Use No     Social History     Substance and Sexual Activity   Drug Use No     Social History     Tobacco Use   Smoking Status Never Smoker     Family History   Problem Relation Age of Onset    Hypertension Mother     Hypertension Father     Heart failure Father  Lymphoma Father        Meds/Allergies       (Not in a hospital admission)  Current Facility-Administered Medications   Medication Dose Route Frequency    acetaminophen (TYLENOL) tablet 650 mg  650 mg Oral Q6H PRN    busPIRone (BUSPAR) tablet 5 mg  5 mg Oral HS    citalopram (CeleXA) tablet 20 mg  20 mg Oral HS    [START ON 3/2/2019] enoxaparin (LOVENOX) subcutaneous injection 40 mg  40 mg Subcutaneous Daily    HYDROmorphone (DILAUDID) injection 0 5 mg  0 5 mg Intravenous Q4H PRN    HYDROmorphone (DILAUDID) injection 0 5 mg  0 5 mg Intravenous Q3H PRN    HYDROmorphone (DILAUDID) injection 1 mg  1 mg Intravenous Q4H PRN    lactated ringers infusion  125 mL/hr Intravenous Continuous    metoprolol tartrate (LOPRESSOR) tablet 25 mg  25 mg Oral HS    ondansetron (ZOFRAN) injection 4 mg  4 mg Intravenous Q6H PRN    sodium chloride 0 9 % bolus 1,000 mL  1,000 mL Intravenous Once       Allergies   Allergen Reactions    Penicillins      Obtained from pre-op orders sent to RX    Zithromax [Azithromycin]      Obtained from Pre-op orders sent to RX    Griseofulvin Rash       Objective     Blood pressure (!) 187/109, pulse 75, temperature 97 9 °F (36 6 °C), temperature source Oral, resp  rate (!) 28, weight 127 kg (280 lb), SpO2 97 %  Intake/Output Summary (Last 24 hours) at 3/1/2019 2019  Last data filed at 3/1/2019 1951  Gross per 24 hour   Intake 1000 ml   Output    Net 1000 ml       PHYSICAL EXAM  General appearance: alert and oriented, in no acute distress and alert  Skin: Skin color, texture, turgor normal  No rashes or lesions  Head: Normocephalic, without obvious abnormality, atraumatic  Heart: regular rate and rhythm, S1, S2 normal, no murmur, click, rub or gallop  Lungs: clear to auscultation bilaterally  Abdomen: epigastric tenderness to palp   No rebound tenderness or guarding   Back: negative  Rectal: deferred  Neurological: normal without focal findings, mental status, speech normal, alert and oriented x3, YUSEF and reflexes normal and symmetric    Lab Results:   Admission on 03/01/2019   Component Date Value    WBC 03/01/2019 5 52     RBC 03/01/2019 5 21*    Hemoglobin 03/01/2019 14 2     Hematocrit 03/01/2019 46 3*    MCV 03/01/2019 89     MCH 03/01/2019 27 3     MCHC 03/01/2019 30 7*    RDW 03/01/2019 14 4     MPV 03/01/2019 11 1     Platelets 77/92/4024 190     nRBC 03/01/2019 0     Neutrophils Relative 03/01/2019 65     Immat GRANS % 03/01/2019 0     Lymphocytes Relative 03/01/2019 26     Monocytes Relative 03/01/2019 5     Eosinophils Relative 03/01/2019 3     Basophils Relative 03/01/2019 1     Neutrophils Absolute 03/01/2019 3 57     Immature Grans Absolute 03/01/2019 0 02     Lymphocytes Absolute 03/01/2019 1 42     Monocytes Absolute 03/01/2019 0 30     Eosinophils Absolute 03/01/2019 0 18     Basophils Absolute 03/01/2019 0 03     Sodium 03/01/2019 141     Potassium 03/01/2019 3 8     Chloride 03/01/2019 105     CO2 03/01/2019 27     ANION GAP 03/01/2019 9     BUN 03/01/2019 13     Creatinine 03/01/2019 0 95     Glucose 03/01/2019 99     Calcium 03/01/2019 9 3     AST 03/01/2019 533*    ALT 03/01/2019 1,190*    Alkaline Phosphatase 03/01/2019 370*    Total Protein 03/01/2019 7 2     Albumin 03/01/2019 3 7     Total Bilirubin 03/01/2019 3 10*    eGFR 03/01/2019 66     Lipase 03/01/2019 12,308*    Color, UA 03/01/2019 Yellow     Clarity, UA 03/01/2019 Clear     pH, UA 03/01/2019 5 0     Leukocytes, UA 03/01/2019 Trace*    Nitrite, UA 03/01/2019 Negative     Protein, UA 03/01/2019 Negative     Glucose, UA 03/01/2019 Negative     Ketones, UA 03/01/2019 Negative     Urobilinogen, UA 03/01/2019 0 2     Bilirubin, UA 03/01/2019 Negative     Blood, UA 03/01/2019 Trace*    Specific [place], UA 03/01/2019 1 020     RBC, UA 03/01/2019 0-5     WBC, UA 03/01/2019 10-20*    Epithelial Cells 03/01/2019 Occasional     Bacteria, UA 03/01/2019 Occasional Imaging Studies: I have personally reviewed pertinent reports  Exam is somewhat suboptimal due to patient body habitus  1   Scattered sludge and stones in the gallbladder  Gallbladder is otherwise unremarkable  2  Common bile duct is prominent at 8 6 mm in diameter  No findings for choledocholithiasis  Recommend further evaluation with MRCP or ERCP      The study was marked in EPIC for significant notification  ASSESSMENT/PLAN:  Problem List     Hypertension    Depression    Osteoarthritis    Encounter for annual routine gynecological examination    Thickened endometrium    Pap smear for cervical cancer screening          58-year-old female with gallstone pancreatitis  Admit to surgery  IV hydration  MRCP  GI consult  Pain control  DVT prophylaxis  Check a m  Labs  Counseling / Coordination of Care  Total time spent today  30 minutes  Greater than 50% of total time was spent with the patient and / or family counseling and / or coordination of care

## 2019-03-02 NOTE — CONSULTS
Consultation -  [facility]  Janet Thomason 60 y o  female MRN: [id number]  Unit/Bed#: -01 Encounter: [id number]        Inpatient consult to gastroenterology  Consult performed by: Saurabh Kaur PA-C  Consult ordered by: Ingrid Soto PA-C      Reason for Consult / Principal Problem: pancreatitis elevated lfts    ASSESSMENT AND PLAN:      Acute biliary pancreatitis with elevated LFTs  -patient now asymptomatic with improvement in her LFTs and lipase  U/S shows stones and sludge in the gallbladder with cbd dilation  -she possibly passed a stone, follow up MRCP that is ordered  -continue aggressive IV hydration would recommend at least 200cc/hr  -okay to trial clear liquids from a GI standpoint  -eventual cholecystectomy  ______________________________________________________________________    HPI:  Rodrigo is a 59 y/o female with a history of knee/hip replacement who presents with upper abdominal pain, nausea x 1 week  Having regular bowel mvoements, no fever or chills  Found to have acute pancreatitis with elevated lipase > 12k with elevated lfts and ultrasound with scattered stones and sludge in the gallbladder, CBD dilation to 8 6mm no choledocholithiasis visualized  She denies alcohol consumption or new medications  She states she no longer has pain    REVIEW OF SYSTEMS:  CONSTITUTIONAL: Denies any fever, chills, rigors, and weight loss  HEENT: No earache or tinnitus  Denies hearing loss or visual disturbances  CARDIOVASCULAR: No chest pain or palpitations  RESPIRATORY: Denies any cough, hemoptysis, shortness of breath or dyspnea on exertion  GASTROINTESTINAL: As noted in the History of Present Illness  GENITOURINARY: No problems with urination  NEUROLOGIC: No dizziness or vertigo, denies headaches  MUSCULOSKELETAL: Denies any muscle or joint pain  SKIN: Denies skin rashes or itching  ENDOCRINE: Denies excessive thirst cold    PSYCHOSOCIAL: Denies depression or anxiety       Historical Information   Past Medical History:   Diagnosis Date    Arthritis     Depression     Hypertension      Past Surgical History:   Procedure Laterality Date     SECTION       SECTION      JOINT REPLACEMENT      NEUROPLASTY / TRANSPOSITION MEDIAN NERVE AT CARPAL TUNNEL      [place] HYSTEROSCOPY,W/ENDO BX N/A 3/21/2016    Procedure: FRACTIONAL DILATATION AND CURETTAGE (D&C) WITH HYSTEROSOCPY;  Surgeon: Adalberto Lees MD;  [facility];  Service: Gynecology    REPLACEMENT TOTAL KNEE Right      Social History   Social History     Substance and Sexual Activity   Alcohol Use No     Social History     Substance and Sexual Activity   Drug Use No     Social History     Tobacco Use   Smoking Status Never Smoker     Family History   Problem Relation Age of Onset    Hypertension Mother     Hypertension Father     Heart failure Father     Lymphoma Father        Meds/Allergies     Medications Prior to Admission   Medication    busPIRone (BUSPAR) 5 mg tablet    citalopram (CeleXA) 20 mg tablet    metoprolol tartrate (LOPRESSOR) 25 mg tablet     Current Facility-Administered Medications   Medication Dose Route Frequency    acetaminophen (TYLENOL) tablet 650 mg  650 mg Oral Q6H PRN    busPIRone (BUSPAR) tablet 5 mg  5 mg Oral HS    citalopram (CeleXA) tablet 20 mg  20 mg Oral HS    enoxaparin (LOVENOX) subcutaneous injection 40 mg  40 mg Subcutaneous Daily    HYDROmorphone (DILAUDID) injection 0 5 mg  0 5 mg Intravenous Q4H PRN    HYDROmorphone (DILAUDID) injection 0 5 mg  0 5 mg Intravenous Q3H PRN    HYDROmorphone (DILAUDID) injection 1 mg  1 mg Intravenous Q4H PRN    lactated ringers infusion  125 mL/hr Intravenous Continuous    metoprolol tartrate (LOPRESSOR) tablet 25 mg  25 mg Oral HS    ondansetron (ZOFRAN) injection 4 mg  4 mg Intravenous Q6H PRN       Allergies   Allergen Reactions    Penicillins      Obtained from pre-op orders sent to RX    Zithromax [Azithromycin]      Obtained from Pre-op orders sent to RX    Griseofulvin Rash     Objective     Blood pressure 148/78, pulse 79, temperature 98 2 °F (36 8 °C), temperature source Oral, resp  rate 18, height 5' 8" (1 727 m), weight 127 kg (281 lb 1 4 oz), SpO2 95 %  Body mass index is 42 74 kg/m²      Intake/Output Summary (Last 24 hours) at 3/2/2019 1134  Last data filed at 3/2/2019 0911  Gross per 24 hour   Intake 3000 ml   Output 1300 ml   Net 1700 ml     PHYSICAL EXAM:    General Appearance:   Alert, cooperative, no distress   HEENT:   Normocephalic, atraumatic, anicteric      Neck:  Supple, symmetrical, trachea midline   Lungs:   Clear to auscultation bilaterally   Heart[de-identified]   Regular rate and rhythm   Abdomen:   Soft, non-tender, non-distended; normal bowel sounds   Genitalia:   Deferred    Rectal:   Deferred    Extremities:  No cyanosis, clubbing or edema    Pulses:  2+ and symmetric all extremities    Skin:  No jaundice, rashes, or lesions    Lymph nodes:  No palpable cervical lymphadenopathy        Lab Results:   Admission on 03/01/2019   Component Date Value    WBC 03/01/2019 5 52     RBC 03/01/2019 5 21*    Hemoglobin 03/01/2019 14 2     Hematocrit 03/01/2019 46 3*    MCV 03/01/2019 89     MCH 03/01/2019 27 3     MCHC 03/01/2019 30 7*    RDW 03/01/2019 14 4     MPV 03/01/2019 11 1     Platelets 15/86/4042 190     nRBC 03/01/2019 0     Neutrophils Relative 03/01/2019 65     Immat GRANS % 03/01/2019 0     Lymphocytes Relative 03/01/2019 26     Monocytes Relative 03/01/2019 5     Eosinophils Relative 03/01/2019 3     Basophils Relative 03/01/2019 1     Neutrophils Absolute 03/01/2019 3 57     Immature Grans Absolute 03/01/2019 0 02     Lymphocytes Absolute 03/01/2019 1 42     Monocytes Absolute 03/01/2019 0 30     Eosinophils Absolute 03/01/2019 0 18     Basophils Absolute 03/01/2019 0 03     Sodium 03/01/2019 141     Potassium 03/01/2019 3 8     Chloride 03/01/2019 105     CO2 03/01/2019 27     ANION GAP 03/01/2019 9     BUN 03/01/2019 13     Creatinine 03/01/2019 0 95     Glucose 03/01/2019 99     Calcium 03/01/2019 9 3     AST 03/01/2019 533*    ALT 03/01/2019 1,190*    Alkaline Phosphatase 03/01/2019 370*    Total Protein 03/01/2019 7 2     Albumin 03/01/2019 3 7     Total Bilirubin 03/01/2019 3 10*    eGFR 03/01/2019 66     Lipase 03/01/2019 12,308*    Color, UA 03/01/2019 Yellow     Clarity, UA 03/01/2019 Clear     pH, UA 03/01/2019 5 0     Leukocytes, UA 03/01/2019 Trace*    Nitrite, UA 03/01/2019 Negative     Protein, UA 03/01/2019 Negative     Glucose, UA 03/01/2019 Negative     Ketones, UA 03/01/2019 Negative     Urobilinogen, UA 03/01/2019 0 2     Bilirubin, UA 03/01/2019 Negative     Blood, UA 03/01/2019 Trace*    Specific [place], UA 03/01/2019 1 020     RBC, UA 03/01/2019 0-5     WBC, UA 03/01/2019 10-20*    Epithelial Cells 03/01/2019 Occasional     Bacteria, UA 03/01/2019 Occasional     Sodium 03/02/2019 142     Potassium 03/02/2019 4 0     Chloride 03/02/2019 109*    CO2 03/02/2019 25     ANION GAP 03/02/2019 8     BUN 03/02/2019 12     Creatinine 03/02/2019 0 84     Glucose 03/02/2019 89     Calcium 03/02/2019 8 5     AST 03/02/2019 326*    ALT 03/02/2019 850*    Alkaline Phosphatase 03/02/2019 307*    Total Protein 03/02/2019 6 1*    Albumin 03/02/2019 3 1*    Total Bilirubin 03/02/2019 2 60*    eGFR 03/02/2019 76     WBC 03/02/2019 4 64     RBC 03/02/2019 4 58     Hemoglobin 03/02/2019 12 7     Hematocrit 03/02/2019 41 4     MCV 03/02/2019 90     MCH 03/02/2019 27 7     MCHC 03/02/2019 30 7*    RDW 03/02/2019 14 2     MPV 03/02/2019 11 2     Platelets 14/79/6952 154     nRBC 03/02/2019 0     Neutrophils Relative 03/02/2019 53     Immat GRANS % 03/02/2019 0     Lymphocytes Relative 03/02/2019 35     Monocytes Relative 03/02/2019 5     Eosinophils Relative 03/02/2019 6     Basophils Relative 03/02/2019 1     Neutrophils Absolute 03/02/2019 2 46     Immature Grans Absolute 03/02/2019 0 01     Lymphocytes Absolute 03/02/2019 1 61     Monocytes Absolute 03/02/2019 0 25     Eosinophils Absolute 03/02/2019 0 28     Basophils Absolute 03/02/2019 0 03     Lipase 03/02/2019 3,244*       Imaging Studies: I have personally reviewed pertinent imaging studies

## 2019-03-02 NOTE — PLAN OF CARE
Problem: GASTROINTESTINAL - ADULT  Goal: Minimal or absence of nausea and/or vomiting  Description  INTERVENTIONS:  - Administer IV fluids as ordered to ensure adequate hydration  - Maintain NPO status until nausea and vomiting are resolved  - Nasogastric tube as ordered  - Administer ordered antiemetic medications as needed  - Provide nonpharmacologic comfort measures as appropriate  - Advance diet as tolerated, if ordered  - Nutrition services referral to assist patient with adequate nutrition and appropriate food choices  Outcome: Progressing  Goal: Maintains or returns to baseline bowel function  Description  INTERVENTIONS:  - Assess bowel function  - Encourage oral fluids to ensure adequate hydration  - Administer IV fluids as ordered to ensure adequate hydration  - Administer ordered medications as needed  - Encourage mobilization and activity  - Nutrition services referral to assist patient with appropriate food choices  Outcome: Progressing  Goal: Maintains adequate nutritional intake  Description  INTERVENTIONS:  - Monitor percentage of each meal consumed  - Identify factors contributing to decreased intake, treat as appropriate  - Assist with meals as needed  - Monitor I&O, WT and lab values  - Obtain nutrition services referral as needed  Outcome: Progressing     Problem: METABOLIC, FLUID AND ELECTROLYTES - ADULT  Goal: Electrolytes maintained within normal limits  Description  INTERVENTIONS:  - Monitor labs and assess patient for signs and symptoms of electrolyte imbalances  - Administer electrolyte replacement as ordered  - Monitor response to electrolyte replacements, including repeat lab results as appropriate  - Instruct patient on fluid and nutrition as appropriate  Outcome: Progressing  Goal: Fluid balance maintained  Description  INTERVENTIONS:  - Monitor labs and assess for signs and symptoms of volume excess or deficit  - Monitor I/O and WT  - Instruct patient on fluid and nutrition as appropriate  Outcome: Progressing  Goal: Glucose maintained within target range  Description  INTERVENTIONS:  - Monitor Blood Glucose as ordered  - Assess for signs and symptoms of hyperglycemia and hypoglycemia  - Administer ordered medications to maintain glucose within target range  - Assess nutritional intake and initiate nutrition service referral as needed  Outcome: Progressing     Problem: PAIN - ADULT  Goal: Verbalizes/displays adequate comfort level or baseline comfort level  Description  Interventions:  - Encourage patient to monitor pain and request assistance  - Assess pain using appropriate pain scale  - Administer analgesics based on type and severity of pain and evaluate response  - Implement non-pharmacological measures as appropriate and evaluate response  - Consider cultural and social influences on pain and pain management  - Notify physician/advanced practitioner if interventions unsuccessful or patient reports new pain  Outcome: Progressing

## 2019-03-02 NOTE — PROGRESS NOTES
Progress Note - General Surgery   Sandhya Thomason 60 y o  female MRN: [id number]  Unit/Bed#: -[facility] Encounter: [id number]    Assessment:    57yoF with biliary pancreatitis  clnical picture consistent with passed stone     -asymptomatic today   -soft, non tender  -LFTs down trending      Plan:    -ok for clears today   -MRI ordered  If MRI not obtainable secondary to orthopaedic hardware, will do lap cholecystectomy with IOC likely tomorrow   -antibiotics not indicated     Subjective/Objective     Chief Complaint:     Subjective:       Objective:     Vitals: Blood pressure 148/78, pulse 79, temperature 98 2 °F (36 8 °C), temperature source Oral, resp  rate 18, height 5' 8" (1 727 m), weight 127 kg (281 lb 1 4 oz), SpO2 95 %  ,Body mass index is 42 74 kg/m²  I/O       02/28 0701 - 03/01 0700 03/01 0701 - 03/02 0700 03/02 0701 - 03/03 0700    P  O   0 0    I V  (mL/kg)  1000 (7 9) 1000 (7 9)    IV Piggyback  1000     Total Intake(mL/kg)  2000 (15 7) 1000 (7 9)    Urine (mL/kg/hr)  700 600 (1 6)    Total Output  700 600    Net  +1300 +400                 Physical Exam:     Alert  Soft, non tender  Non distended  Sinus rhythm  Breath sounds b/l     Lab, Imaging and other studies:   CBC with diff:   Lab Results   Component Value Date    WBC 4 64 03/02/2019    HGB 12 7 03/02/2019    HCT 41 4 03/02/2019    MCV 90 03/02/2019     03/02/2019    MCH 27 7 03/02/2019    MCHC 30 7 (L) 03/02/2019    RDW 14 2 03/02/2019    MPV 11 2 03/02/2019    NRBC 0 03/02/2019   , BMP/CMP:   Lab Results   Component Value Date    SODIUM 142 03/02/2019    K 4 0 03/02/2019     (H) 03/02/2019    CO2 25 03/02/2019    BUN 12 03/02/2019    CREATININE 0 84 03/02/2019    CALCIUM 8 5 03/02/2019     (H) 03/02/2019     (H) 03/02/2019    ALKPHOS 307 (H) 03/02/2019    EGFR 76 03/02/2019   , Magnesium: No components found for: MAG, Coags: No results found for: PT, PTT, INR, Blood Culture: No results found for: BLOODCX, Urine Culture: No results found for: URINECX, Wound Culure: No results found for: WOUNDCULT  VTE Pharmacologic Prophylaxis: Enoxaparin (Lovenox)  VTE Mechanical Prophylaxis: sequential compression device and foot pump applied

## 2019-03-02 NOTE — UTILIZATION REVIEW
Notification of Inpatient Admission/Inpatient Authorization Request  This is a Notification of Inpatient Admission/Request for Inpatient Authorization for our facility [address] [facility]  Be advised that this patient was admitted to our facility under Inpatient Status  Please contact the Utilization Review Department where the patient is receiving care services for additional admission information  Place of Service Code: 24   Place of Service Name: Inpatient Hospital  Presentation Date & Time: 3/1/2019  6:11 PM  Inpatient Admission Date & Time: 3/1/19 2012  Discharge Date & Time: No discharge date for patient encounter  Discharge Disposition (if discharged): Home/Self Care  Attending Physician: Md Ector Ramirez M D  Specialty- General Surgery  [facility] ID- [id number]  [address] [place] [address], [place] [address]  Phone 1: [phone]  Fax: [phone]    Admission Orders (From admission, onward)    Ordered        03/01/19 2016  Inpatient Admission  Once     Order ID Start Status   [id number] 03/01/19 2012 Completed                Facility: [facility]  Utilization Review Department  Phone: [phone]; Fax [phone]  [email or web address]  ATTENTION: Please call with any questions or concerns to [phone]  and carefully listen to the prompts so that you are directed to the right person  Send all requests for admission clinical reviews, approved or denied determinations and any other requests to fax [phone]   All voicemails are confidential

## 2019-03-03 ENCOUNTER — ANESTHESIA (INPATIENT)
Dept: PERIOP | Facility: HOSPITAL | Age: 59
DRG: 418 | End: 2019-03-03
Payer: COMMERCIAL

## 2019-03-03 ENCOUNTER — ANESTHESIA EVENT (INPATIENT)
Dept: PERIOP | Facility: HOSPITAL | Age: 59
DRG: 418 | End: 2019-03-03
Payer: COMMERCIAL

## 2019-03-03 VITALS
SYSTOLIC BLOOD PRESSURE: 152 MMHG | BODY MASS INDEX: 42.59 KG/M2 | WEIGHT: 281 LBS | HEIGHT: 68 IN | DIASTOLIC BLOOD PRESSURE: 82 MMHG | HEART RATE: 85 BPM | TEMPERATURE: 97.7 F | RESPIRATION RATE: 16 BRPM | OXYGEN SATURATION: 97 %

## 2019-03-03 LAB
ALBUMIN SERPL BCP-MCNC: 3.1 G/DL (ref 3.5–5)
ALP SERPL-CCNC: 269 U/L (ref 46–116)
ALT SERPL W P-5'-P-CCNC: 615 U/L (ref 12–78)
ANION GAP SERPL CALCULATED.3IONS-SCNC: 8 MMOL/L (ref 4–13)
AST SERPL W P-5'-P-CCNC: 169 U/L (ref 5–45)
ATRIAL RATE: 74 BPM
BACTERIA UR CULT: NORMAL
BASOPHILS # BLD AUTO: 0.03 THOUSANDS/ΜL (ref 0–0.1)
BASOPHILS NFR BLD AUTO: 1 % (ref 0–1)
BILIRUB SERPL-MCNC: 1.6 MG/DL (ref 0.2–1)
BUN SERPL-MCNC: 9 MG/DL (ref 5–25)
CALCIUM SERPL-MCNC: 8.6 MG/DL (ref 8.3–10.1)
CHLORIDE SERPL-SCNC: 108 MMOL/L (ref 100–108)
CO2 SERPL-SCNC: 26 MMOL/L (ref 21–32)
CREAT SERPL-MCNC: 0.78 MG/DL (ref 0.6–1.3)
EOSINOPHIL # BLD AUTO: 0.19 THOUSAND/ΜL (ref 0–0.61)
EOSINOPHIL NFR BLD AUTO: 5 % (ref 0–6)
ERYTHROCYTE [DISTWIDTH] IN BLOOD BY AUTOMATED COUNT: 14 % (ref 11.6–15.1)
GFR SERPL CREATININE-BSD FRML MDRD: 83 ML/MIN/1.73SQ M
GLUCOSE SERPL-MCNC: 114 MG/DL (ref 65–140)
HCT VFR BLD AUTO: 41 % (ref 34.8–46.1)
HGB BLD-MCNC: 12.7 G/DL (ref 11.5–15.4)
IMM GRANULOCYTES # BLD AUTO: 0.02 THOUSAND/UL (ref 0–0.2)
IMM GRANULOCYTES NFR BLD AUTO: 1 % (ref 0–2)
LYMPHOCYTES # BLD AUTO: 1.47 THOUSANDS/ΜL (ref 0.6–4.47)
LYMPHOCYTES NFR BLD AUTO: 36 % (ref 14–44)
MCH RBC QN AUTO: 27.7 PG (ref 26.8–34.3)
MCHC RBC AUTO-ENTMCNC: 31 G/DL (ref 31.4–37.4)
MCV RBC AUTO: 90 FL (ref 82–98)
MONOCYTES # BLD AUTO: 0.22 THOUSAND/ΜL (ref 0.17–1.22)
MONOCYTES NFR BLD AUTO: 5 % (ref 4–12)
NEUTROPHILS # BLD AUTO: 2.15 THOUSANDS/ΜL (ref 1.85–7.62)
NEUTS SEG NFR BLD AUTO: 52 % (ref 43–75)
NRBC BLD AUTO-RTO: 0 /100 WBCS
P AXIS: -2 DEGREES
PLATELET # BLD AUTO: 160 THOUSANDS/UL (ref 149–390)
PMV BLD AUTO: 11.3 FL (ref 8.9–12.7)
POTASSIUM SERPL-SCNC: 4.1 MMOL/L (ref 3.5–5.3)
PR INTERVAL: 146 MS
PROT SERPL-MCNC: 6.2 G/DL (ref 6.4–8.2)
QRS AXIS: 38 DEGREES
QRSD INTERVAL: 86 MS
QT INTERVAL: 394 MS
QTC INTERVAL: 437 MS
RBC # BLD AUTO: 4.58 MILLION/UL (ref 3.81–5.12)
SODIUM SERPL-SCNC: 142 MMOL/L (ref 136–145)
T WAVE AXIS: 61 DEGREES
VENTRICULAR RATE: 74 BPM
WBC # BLD AUTO: 4.08 THOUSAND/UL (ref 4.31–10.16)

## 2019-03-03 PROCEDURE — 0WQF4ZZ REPAIR ABDOMINAL WALL, PERCUTANEOUS ENDOSCOPIC APPROACH: ICD-10-PCS | Performed by: SURGERY

## 2019-03-03 PROCEDURE — 93005 ELECTROCARDIOGRAM TRACING: CPT

## 2019-03-03 PROCEDURE — 93010 ELECTROCARDIOGRAM REPORT: CPT | Performed by: INTERNAL MEDICINE

## 2019-03-03 PROCEDURE — 88304 TISSUE EXAM BY PATHOLOGIST: CPT | Performed by: PATHOLOGY

## 2019-03-03 PROCEDURE — 80053 COMPREHEN METABOLIC PANEL: CPT | Performed by: SURGERY

## 2019-03-03 PROCEDURE — 0FT44ZZ RESECTION OF GALLBLADDER, PERCUTANEOUS ENDOSCOPIC APPROACH: ICD-10-PCS | Performed by: SURGERY

## 2019-03-03 PROCEDURE — 85025 COMPLETE CBC W/AUTO DIFF WBC: CPT | Performed by: SURGERY

## 2019-03-03 RX ORDER — PROPOFOL 10 MG/ML
INJECTION, EMULSION INTRAVENOUS AS NEEDED
Status: DISCONTINUED | OUTPATIENT
Start: 2019-03-03 | End: 2019-03-03 | Stop reason: SURG

## 2019-03-03 RX ORDER — SODIUM CHLORIDE 9 MG/ML
125 INJECTION, SOLUTION INTRAVENOUS CONTINUOUS
Status: DISCONTINUED | OUTPATIENT
Start: 2019-03-03 | End: 2019-03-03 | Stop reason: HOSPADM

## 2019-03-03 RX ORDER — BUPIVACAINE HYDROCHLORIDE AND EPINEPHRINE 5; 5 MG/ML; UG/ML
INJECTION, SOLUTION PERINEURAL AS NEEDED
Status: DISCONTINUED | OUTPATIENT
Start: 2019-03-03 | End: 2019-03-03 | Stop reason: HOSPADM

## 2019-03-03 RX ORDER — MAGNESIUM HYDROXIDE 1200 MG/15ML
LIQUID ORAL AS NEEDED
Status: DISCONTINUED | OUTPATIENT
Start: 2019-03-03 | End: 2019-03-03 | Stop reason: HOSPADM

## 2019-03-03 RX ORDER — SUCCINYLCHOLINE/SOD CL,ISO/PF 100 MG/5ML
SYRINGE (ML) INTRAVENOUS AS NEEDED
Status: DISCONTINUED | OUTPATIENT
Start: 2019-03-03 | End: 2019-03-03 | Stop reason: SURG

## 2019-03-03 RX ORDER — SODIUM CHLORIDE 9 MG/ML
INJECTION, SOLUTION INTRAVENOUS AS NEEDED
Status: DISCONTINUED | OUTPATIENT
Start: 2019-03-03 | End: 2019-03-03 | Stop reason: HOSPADM

## 2019-03-03 RX ORDER — OXYCODONE HYDROCHLORIDE AND ACETAMINOPHEN 5; 325 MG/1; MG/1
1 TABLET ORAL EVERY 4 HOURS PRN
Qty: 15 TABLET | Refills: 0
Start: 2019-03-03 | End: 2019-03-13

## 2019-03-03 RX ORDER — FENTANYL CITRATE 50 UG/ML
INJECTION, SOLUTION INTRAMUSCULAR; INTRAVENOUS AS NEEDED
Status: DISCONTINUED | OUTPATIENT
Start: 2019-03-03 | End: 2019-03-03 | Stop reason: SURG

## 2019-03-03 RX ORDER — ONDANSETRON 2 MG/ML
4 INJECTION INTRAMUSCULAR; INTRAVENOUS ONCE AS NEEDED
Status: COMPLETED | OUTPATIENT
Start: 2019-03-03 | End: 2019-03-03

## 2019-03-03 RX ORDER — FENTANYL CITRATE/PF 50 MCG/ML
50 SYRINGE (ML) INJECTION
Status: DISCONTINUED | OUTPATIENT
Start: 2019-03-03 | End: 2019-03-03 | Stop reason: HOSPADM

## 2019-03-03 RX ORDER — METOCLOPRAMIDE HYDROCHLORIDE 5 MG/ML
10 INJECTION INTRAMUSCULAR; INTRAVENOUS ONCE
Status: COMPLETED | OUTPATIENT
Start: 2019-03-03 | End: 2019-03-03

## 2019-03-03 RX ORDER — CLINDAMYCIN PHOSPHATE 900 MG/50ML
900 INJECTION INTRAVENOUS
Status: DISCONTINUED | OUTPATIENT
Start: 2019-03-03 | End: 2019-03-03 | Stop reason: HOSPADM

## 2019-03-03 RX ORDER — SODIUM CHLORIDE 9 MG/ML
INJECTION, SOLUTION INTRAVENOUS CONTINUOUS PRN
Status: DISCONTINUED | OUTPATIENT
Start: 2019-03-03 | End: 2019-03-03 | Stop reason: SURG

## 2019-03-03 RX ADMIN — DEXAMETHASONE SODIUM PHOSPHATE 4 MG: 10 INJECTION INTRAMUSCULAR; INTRAVENOUS at 09:41

## 2019-03-03 RX ADMIN — Medication 140 MG: at 09:36

## 2019-03-03 RX ADMIN — PROPOFOL 200 MG: 10 INJECTION, EMULSION INTRAVENOUS at 09:36

## 2019-03-03 RX ADMIN — LIDOCAINE HYDROCHLORIDE 100 MG: 20 INJECTION, SOLUTION INTRAVENOUS at 09:36

## 2019-03-03 RX ADMIN — METOCLOPRAMIDE 10 MG: 5 INJECTION, SOLUTION INTRAMUSCULAR; INTRAVENOUS at 10:54

## 2019-03-03 RX ADMIN — SODIUM CHLORIDE: 0.9 INJECTION, SOLUTION INTRAVENOUS at 09:34

## 2019-03-03 RX ADMIN — FENTANYL CITRATE 100 MCG: 50 INJECTION INTRAMUSCULAR; INTRAVENOUS at 09:36

## 2019-03-03 RX ADMIN — CLINDAMYCIN PHOSPHATE 900 MG: 900 INJECTION, SOLUTION INTRAVENOUS at 09:28

## 2019-03-03 RX ADMIN — ONDANSETRON 4 MG: 2 INJECTION INTRAMUSCULAR; INTRAVENOUS at 10:43

## 2019-03-03 NOTE — ANESTHESIA PREPROCEDURE EVALUATION
Review of Systems/Medical History  Patient summary reviewed    No history of anesthetic complications     Cardiovascular  Exercise tolerance (METS): >4,  Hypertension controlled,    Pulmonary  Negative pulmonary ROS        GI/Hepatic       Negative  ROS        Endo/Other    Obesity  morbid obesity   GYN       Hematology  Negative hematology ROS      Musculoskeletal    Arthritis     Neurology  Negative neurology ROS      Psychology   Depression ,              Physical Exam    Airway    Mallampati score: I  TM Distance: >3 FB  Neck ROM: full     Dental   No notable dental hx     Cardiovascular  Rhythm: regular, Rate: normal,     Pulmonary  Breath sounds clear to auscultation,     Other Findings        Anesthesia Plan  ASA Score- 3 Emergent    Anesthesia Type- general with ASA Monitors  Additional Monitors:   Airway Plan: ETT  Plan Factors-  Patient did not smoke on day of surgery  Induction- intravenous  Postoperative Plan- Plan for postoperative opioid use  Planned trial extubation    Informed Consent- Anesthetic plan and risks discussed with patient  I personally reviewed this patient with the CRNA  Discussed and agreed on the Anesthesia Plan with the CRNA  Adam Campbell

## 2019-03-03 NOTE — DISCHARGE SUMMARY
Discharge Summary - General Surgery   [facility] Paddy 60 y o  female MRN: [id number]  Unit/Bed#: -01 Encounter: [id number]    Admission Date:   Admission Orders (From admission, onward)    Ordered        03/01/19 2016  Inpatient Admission  Once     Order ID Start Status   [id number] 03/01/19 2012 Completed                 Discharge Date: 3/3/19    Admitting Diagnosis: Pancreatitis [K85 90]  Elevated LFTs [R94 5]  Gallstone pancreatitis [K85 10]  Abnormal ultrasound [R93 89]  Common bile duct dilation [K83 8]    Discharge Diagnosis: gall stone pancreatitis     Resolved Problems  Date Reviewed: 6/14/2018    None          Attending: Laurita Herrera     Consulting Physician(s): gI    Procedures Performed: No orders of the defined types were placed in this encounter  3/3 Lap cholecystectomy     Pathology: pending     Hospital Course: admitted with abdominal pain, serologic evidence of pancreatitis with increased LFTs and gallstones  MRI performed which showed no stones in common bile duct  Taken for inpatient lap cholecystectomy on 3/3  Condition at Discharge: good     Discharge instructions/Information to patient and family:   See after visit summary for information provided to patient and family  Provisions for Follow-Up Care:  See after visit summary for information related to follow-up care and any pertinent home health orders  Disposition: Home        Planned Readmission: No    Discharge Medications:  See after visit summary for reconciled discharge medications provided to patient and family

## 2019-03-03 NOTE — ANESTHESIA POSTPROCEDURE EVALUATION
Post-Op Assessment Note    CV Status:  Stable    Pain management: adequate     Mental Status:  Alert and awake   Hydration Status:  Euvolemic   PONV Controlled:  Controlled   Airway Patency:  Patent   Post Op Vitals Reviewed: Yes      Staff: Anesthesiologist, CRNA           BP     Temp      Pulse     Resp      SpO2

## 2019-03-03 NOTE — UTILIZATION REVIEW
Initial Clinical Review    Admission: Date/Time/Statement: 3/1/19 @ 2012   Orders Placed This Encounter   Procedures    Inpatient Admission     Standing Status:   Standing     Number of Occurrences:   1     Order Specific Question:   Admitting Physician     Answer:   Karina South [[id number]]     Order Specific Question:   Level of Care     Answer:   Med Surg [[id number]]     Order Specific Question:   Estimated length of stay     Answer:   More than 2 Midnights     Order Specific Question:   Certification     Answer:   I certify that inpatient services are medically necessary for this patient for a duration of greater than two midnights  See H&P and MD Progress Notes for additional information about the patient's course of treatment  ED: Date/Time/Mode of Arrival:   ED Arrival Information     Expected Arrival Acuity Means of Arrival Escorted By Service Admission Type    - 3/1/2019 18:00 Urgent Walk-In Self Surgery-General Urgent    Arrival Complaint    ABNORMAL ULTRASOUND        Chief Complaint:   Chief Complaint   Patient presents with    Abdominal Pain     Pt  c/o abdominal pain with nausea and indigestion one week and had u/s performed today and was referred for futher evaluation  History of Illness: Petey Preciado is a 60y o  year old female who presents with  1 week of abdominal pain  She states the pain is mostly in her epigastric area and it got progressively worse since last night  She did not know she had gallstones  She had some associated nausea and vomiting  Upon coming to the emergency room she was found to have gallstone pancreatitis with a dilated common bile duct so we are being consulted for admission and treatment          ED Vital Signs:   ED Triage Vitals   Temperature Pulse Respirations Blood Pressure SpO2   03/01/19 1806 03/01/19 1806 03/01/19 1806 03/01/19 1806 03/01/19 1806   97 9 °F (36 6 °C) 75 (!) 28 (!) 187/109 97 %      Temp Source Heart Rate Source Patient Position - Orthostatic VS BP Location FiO2 (%)   03/01/19 1806 03/01/19 2029 03/01/19 2029 03/01/19 2029 --   Oral Monitor Sitting Left arm       Pain Score       03/01/19 1806       No Pain        Wt Readings from Last 1 Encounters:   03/03/19 127 kg (281 lb)     Vital Signs (abnormal):    above    Pertinent Labs/Diagnostic Test Results:    AST  533  ALT  1,190  Alk phos      370  Total  Bili    3 10  Lipase   12,308  MRI  Abd:    Cholelithiasis with gallbladder wall thickening   No pericholecystic inflammation     2   No biliary ductal dilatation or choledocholithiasis  3   Hepatomegaly  ED Treatment:   Medication Administration from 03/01/2019 1800 to 03/01/2019 2038       Date/Time Order Dose Route Action Action by Comments     03/01/2019 1951 sodium chloride 0 9 % bolus 1,000 mL 0 mL Intravenous Stopped Freddy Khalil RN      03/01/2019 1843 sodium chloride 0 9 % bolus 1,000 mL 1,000 mL Intravenous [address] Freddy Khalil RN      03/01/2019 1952 sodium chloride 0 9 % bolus 1,000 mL 1,000 mL Intravenous New Bag Freddy Khalil RN         Past Medical/Surgical History:    Active Ambulatory Problems     Diagnosis Date Noted    Hypertension 03/21/2016    Depression 03/21/2016    Osteoarthritis 03/21/2016    Encounter for annual routine gynecological examination 06/14/2018    Thickened endometrium 06/14/2018    Pap smear for cervical cancer screening 06/14/2018     Resolved Ambulatory Problems     Diagnosis Date Noted    S/P D&C (status post dilation and curettage) with hysteroscopy 03/21/2016     Past Medical History:   Diagnosis Date    Arthritis     Depression     Hypertension      Admitting Diagnosis: Pancreatitis [K85 90]  Elevated LFTs [R94 5]  Gallstone pancreatitis [K85 10]  Abnormal ultrasound [R93 89]  Common bile duct dilation [K83 8]  Age/Sex: 60 y o  female     Assessment/Plan: 58-year-old female with gallstone pancreatitis  Admit to surgery  IV hydration  MRCP  GI consult  Pain control  DVT prophylaxis  Check jorge luis thayer  Labs        Admission Orders:   IP  3/1  @     2012  Scheduled Meds:   Current Facility-Administered Medications:  acetaminophen 650 mg Oral Q6H PRN Dk Sevilla MD    busPIRone 5 mg Oral HS Dk Sevilla MD    citalopram 20 mg Oral HS Dk Sevilla MD    clindamycin 900 mg Intravenous 60 Min Pre-Op Dk Sevilla MD Last Rate: 900 mg (03/03/19 0928)   dextrose 5 % and sodium chloride 0 45 % with KCl 20 mEq/L 75 mL/hr Intravenous Continuous Dk Sevilla MD Last Rate: 75 mL/hr (03/02/19 1513)   enoxaparin 40 mg Subcutaneous Daily Dk Sevilla MD    HYDROmorphone 0 5 mg Intravenous Q4H PRN Dk Sevilla MD    HYDROmorphone 0 5 mg Intravenous Q3H PRN Dk Sevilla MD    HYDROmorphone 1 mg Intravenous Q4H PRN Dk Sevilla MD    metoprolol tartrate 25 mg Oral HS Dk Sevilla MD    ondansetron 4 mg Intravenous Q6H PRN Dk Sevilla MD    sodium chloride 125 mL/hr Intravenous Continuous Kandace Quinn MD      Continuous Infusions:   dextrose 5 % and sodium chloride 0 45 % with KCl 20 mEq/L 75 mL/hr Last Rate: 75 mL/hr (03/02/19 1513)   sodium chloride 125 mL/hr      PRN Meds:   acetaminophen    HYDROmorphone    HYDROmorphone    HYDROmorphone    ondansetron     NPO  Start  cl    Liq     3/2    PROGRESS  NOTE  3/2  58yoF with biliary pancreatitis  clnical picture consistent with passed stone      -asymptomatic today   -soft, non tender  -LFTs down trending    Per  Gi Consult:    Acute biliary pancreatitis with elevated LFTs  -patient now asymptomatic with improvement in her LFTs and lipase   U/S shows stones and sludge in the gallbladder with cbd dilation  -she possibly passed a stone, follow up MRCP that is ordered  -continue aggressive IV hydration would recommend at least 200cc/hr  -okay to trial clear liquids from a GI standpoint  -eventual cholecystectomy      SURGERY DATE: 3/3/2019  Preop Diagnosis:  Gallstone pancreatitis [K85 10]     Post-Op Diagnosis Codes:     * Gallstone pancreatitis [T24 89]         umbilical hernia     Procedure(s) (LRB):  CHOLECYSTECTOMY LAPAROSCOPIC (N/A)       repair of umbilical hernia without mesh    Network Utilization Review Department  Phone: [phone]; Fax [phone]  [email or web address]  ATTENTION: Please call with any questions or concerns to [phone]  and carefully listen to the prompts so that you are directed to the right person  Send all requests for admission clinical reviews, approved or denied determinations and any other requests to fax [phone]   All voicemails are confidential

## 2019-03-03 NOTE — OP NOTE
OPERATIVE REPORT  PATIENT NAME: Petey Preciado    :  1960  MRN: [id number]  Pt Location: [facility] RO[facility] 01    SURGERY DATE: 3/3/2019    Surgeon(s) and Role:     Marga Mancilla MD - Primary    Preop Diagnosis:  Gallstone pancreatitis [K85 10]    Post-Op Diagnosis Codes:     * Gallstone pancreatitis [R45 64]         umbilical hernia    Procedure(s) (LRB):  CHOLECYSTECTOMY LAPAROSCOPIC (N/A)       repair of umbilical hernia without mesh    Specimen(s):  ID Type Source Tests Collected by Time Destination   1 : gallbladder  Tissue Gallbladder TISSUE EXAM William Ness MD 3/3/2019 0957        Estimated Blood Loss:   Minimal    Drains:  * No LDAs found *    Anesthesia Type:   General    Operative Indications:  Gallstone pancreatitis [K85 10]      Operative Findings:  Independent, nonsmoker, ASA 2, wound class 2, BMI 43, weight 280, height 68    Cardiovascular  Exercise tolerance (METS): >4,  Hypertension controlled,     Pulmonary  Negative pulmonary ROS          GI/Hepatic         Negative  ROS          Endo/Other     Obesity  morbid obesity    GYN         Hematology  Negative hematology ROS        Musculoskeletal     Arthritis      Neurology  Negative neurology ROS        Psychology   Depression ,               Complications:   None    Procedure and Technique:  Patient was identified visually and via armband  Placed in the supine position  After general anesthesia the abdomen was prepped and draped in a sterile fashion  0 25% Marcaine with epinephrine was utilized  Incision made had a above the umbilicus  This carried down to skin subcutaneous tissue  At the umbilical stalk was the herniation noted consistent with an umbilical hernia  The hernia sac was dissected free and retracted  A 10 mm trocar was inserted followed by CO2 insufflation  Three additional trocars were placed in right upper quadrant the abdomen under direct vision  The gallbladder was distended    A dome down technique using electrocautery was utilized  Dena's pouch was visualized  This was scored  With blunt dissection the cystic duct and artery were dissected free  Cystic duct was somewhat widened  Was clipped at the gallbladder junction and partially transected revealing egress of clear bile  Was then clipped x3 and divided  Cystic artery was clipped x2 and divided  The gallbladder is placed in Endo-Catch bag and removed through the umbilical hernia defect  Fascia was closed with 0 Vicryl suture followed by 4 Monocryl suture and Dermabond  The patient tolerated this procedure well  Sponge instrument count correct     I was present for the entire procedure    Patient Disposition:  PACU     SIGNATURE: Katja Emmanuel MD  DATE: March 3, 2019  TIME: 10:37 AM

## 2020-08-27 ENCOUNTER — HOSPITAL ENCOUNTER (EMERGENCY)
Facility: HOSPITAL | Age: 60
Discharge: HOME | End: 2020-08-27
Attending: EMERGENCY MEDICINE | Admitting: EMERGENCY MEDICINE

## 2020-08-27 VITALS
RESPIRATION RATE: 16 BRPM | OXYGEN SATURATION: 97 % | HEIGHT: 68 IN | SYSTOLIC BLOOD PRESSURE: 155 MMHG | DIASTOLIC BLOOD PRESSURE: 77 MMHG | TEMPERATURE: 98.1 F | BODY MASS INDEX: 44.41 KG/M2 | HEART RATE: 83 BPM | WEIGHT: 293 LBS

## 2020-08-27 DIAGNOSIS — R89.9 ABNORMAL LABORATORY TEST: Primary | ICD-10-CM

## 2020-08-27 DIAGNOSIS — E87.5 SERUM POTASSIUM ELEVATED: ICD-10-CM

## 2020-08-27 LAB
ANION GAP SERPL CALCULATED.3IONS-SCNC: 9 MMOL/L (ref 4–13)
BUN SERPL-MCNC: 12 MG/DL (ref 5–25)
CALCIUM SERPL-MCNC: 8.9 MG/DL (ref 8.3–10.1)
CHLORIDE SERPL-SCNC: 105 MMOL/L (ref 100–108)
CO2 SERPL-SCNC: 27 MMOL/L (ref 21–32)
CREAT SERPL-MCNC: 1.05 MG/DL (ref 0.6–1.3)
GFR SERPL CREATININE-BSD FRML MDRD: 58 ML/MIN/1.73SQ M
GLUCOSE SERPL-MCNC: 134 MG/DL (ref 65–140)
MAGNESIUM SERPL-MCNC: 2.1 MG/DL (ref 1.6–2.6)
POTASSIUM SERPL-SCNC: 3.8 MMOL/L (ref 3.5–5.3)
SODIUM SERPL-SCNC: 141 MMOL/L (ref 136–145)

## 2020-08-27 PROCEDURE — 99285 EMERGENCY DEPT VISIT HI MDM: CPT | Performed by: EMERGENCY MEDICINE

## 2020-08-27 PROCEDURE — 83735 ASSAY OF MAGNESIUM: CPT | Performed by: EMERGENCY MEDICINE

## 2020-08-27 PROCEDURE — 93005 ELECTROCARDIOGRAM TRACING: CPT

## 2020-08-27 PROCEDURE — 80048 BASIC METABOLIC PNL TOTAL CA: CPT | Performed by: EMERGENCY MEDICINE

## 2020-08-27 PROCEDURE — 36415 COLL VENOUS BLD VENIPUNCTURE: CPT | Performed by: EMERGENCY MEDICINE

## 2020-08-27 PROCEDURE — 99284 EMERGENCY DEPT VISIT MOD MDM: CPT

## 2020-08-27 RX ORDER — MULTIVIT-MIN/IRON FUM/FOLIC AC 7.5 MG-4
1 TABLET ORAL DAILY
COMMUNITY

## 2020-08-27 RX ORDER — MULTIVIT WITH MINERALS/LUTEIN
1000 TABLET ORAL DAILY
COMMUNITY

## 2020-08-27 RX ORDER — FEXOFENADINE HCL 180 MG/1
180 TABLET ORAL AS NEEDED
COMMUNITY

## 2020-08-27 RX ORDER — OMEPRAZOLE 20 MG/1
20 CAPSULE, DELAYED RELEASE ORAL AS NEEDED
COMMUNITY

## 2020-08-27 RX ORDER — PHENOL 1.4 %
600 AEROSOL, SPRAY (ML) MUCOUS MEMBRANE DAILY
COMMUNITY

## 2020-08-27 RX ORDER — LANOLIN ALCOHOL/MO/W.PET/CERES
CREAM (GRAM) TOPICAL DAILY
COMMUNITY

## 2020-08-27 NOTE — DISCHARGE INSTRUCTIONS
Your potassium was identified to be normal on testing today at 3 8  The outpatient result of 7 7 appears to be a result of lab error

## 2020-08-27 NOTE — ED PROVIDER NOTES
History  Chief Complaint   Patient presents with    Abnormal Lab     per pt she had labs yesterday was called to come to ER for high Potassium     Patient is a 56-year-old female who presents to the emergency department after having outpatient blood work yesterday with resultant potassium of 7 7  She explains that she had not had blood work in a while due to loss of insurance 1 5y ago  She has felt fatigued for an extended amount of time though otherwise has felt fine  She has not experience any lightheadedness, dizziness, palpitations, dyspnea or any problems with urination  She does have edema in the left leg which is chronic  This is very gradually worsened over time  She does not have any associated pain along with that  She reports compliance with her medications  She is on metoprolol for hypertension  She is not on any diuretic medications and from a supplement standpoint only multivitamin and a shake once daily which includes 360 mg of potassium  Prior to Admission Medications   Prescriptions Last Dose Informant Patient Reported? Taking? Ascorbic Acid (vitamin C) 1000 MG tablet   Yes Yes   Sig: Take 1,000 mg by mouth daily   Multiple Vitamins-Minerals (multivitamin with minerals) tablet   Yes Yes   Sig: Take 1 tablet by mouth daily   busPIRone (BUSPAR) 5 mg tablet   Yes Yes   Sig: Take 5 mg by mouth daily at bedtime  calcium carbonate (OS-CAMDEN) 600 MG tablet   Yes Yes   Sig: Take 600 mg by mouth daily   citalopram (CeleXA) 20 mg tablet   Yes Yes   Sig: Take 20 mg by mouth daily at bedtime  fexofenadine (ALLEGRA) 180 MG tablet   Yes Yes   Sig: Take 180 mg by mouth as needed   metoprolol tartrate (LOPRESSOR) 25 mg tablet   Yes Yes   Sig: Take 25 mg by mouth daily at bedtime   Pt unsure of dose   omeprazole (PriLOSEC) 20 mg delayed release capsule   Yes Yes   Sig: Take 20 mg by mouth as needed   vitamin B-12 (VITAMIN B-12) 1,000 mcg tablet   Yes Yes   Sig: Take by mouth daily Facility-Administered Medications: None       Past Medical History:   Diagnosis Date    Arthritis     Depression     Hypertension        Past Surgical History:   Procedure Laterality Date     SECTION       SECTION      CHOLECYSTECTOMY LAPAROSCOPIC N/A 3/3/2019    Procedure: CHOLECYSTECTOMY LAPAROSCOPIC;  Surgeon: Asha Miranda MD;  Location: [facility];  Service: General    JOINT REPLACEMENT      R total HIp    NEUROPLASTY / TRANSPOSITION MEDIAN NERVE AT CARPAL TUNNEL      NE HYSTEROSCOPY,W/ENDO BX N/A 3/21/2016    Procedure: FRACTIONAL DILATATION AND CURETTAGE (D&C) WITH HYSTEROSOCPY;  Surgeon: Harish Briceño MD;  Location: [facility];  Service: Gynecology    REPLACEMENT TOTAL KNEE Right        Family History   Problem Relation Age of Onset    Hypertension Mother     Hypertension Father     Heart failure Father     Lymphoma Father      I have reviewed and agree with the history as documented  E-Cigarette/Vaping     E-Cigarette/Vaping Substances     Social History     Tobacco Use    Smoking status: Never Smoker    Smokeless tobacco: Never Used   Substance Use Topics    Alcohol use: No    Drug use: No       Review of Systems   All other systems reviewed and are negative  Physical Exam  Physical Exam  Vitals signs and nursing note reviewed  Constitutional:       Appearance: Normal appearance  She is obese  HENT:      Nose: Nose normal       Mouth/Throat:      Mouth: Mucous membranes are moist    Neck:      Musculoskeletal: Normal range of motion  Cardiovascular:      Rate and Rhythm: Normal rate and regular rhythm  Pulmonary:      Effort: Pulmonary effort is normal       Breath sounds: Normal breath sounds  Musculoskeletal:         General: No tenderness  Right lower leg: Edema ( 1+) present  Left lower leg: Edema (2+) present  Skin:     General: Skin is warm and dry  Neurological:      Mental Status: She is alert     Psychiatric:         Mood and Affect: Mood normal          Behavior: Behavior normal          Vital Signs  ED Triage Vitals   Temperature Pulse Respirations Blood Pressure SpO2   08/27/20 1036 08/27/20 0931 08/27/20 0931 08/27/20 0931 08/27/20 0931   98 1 °F (36 7 °C) 83 16 155/77 97 %      Temp Source Heart Rate Source Patient Position - Orthostatic VS BP Location FiO2 (%)   08/27/20 1036 -- -- -- --   Oral          Pain Score       08/27/20 0931       No Pain           Vitals:    08/27/20 0931   BP: 155/77   Pulse: 83         Visual Acuity      ED Medications  Medications - No data to display    Diagnostic Studies  Results Reviewed     Procedure Component Value Units Date/Time    Basic metabolic panel [[id number]] Collected:  08/27/20 0958    Lab Status:  Final result Specimen:  Blood from Arm, Left Updated:  08/27/20 1032     Sodium 141 mmol/L      Potassium 3 8 mmol/L      Chloride 105 mmol/L      CO2 27 mmol/L      ANION GAP 9 mmol/L      BUN 12 mg/dL      Creatinine 1 05 mg/dL      Glucose 134 mg/dL      Calcium 8 9 mg/dL      eGFR 58 ml/min/1 73sq m     Narrative:       [place] guidelines for Chronic Kidney Disease (CKD):     Stage 1 with normal or high GFR (GFR > 90 mL/min/1 73 square meters)    Stage 2 Mild CKD (GFR = 60-89 mL/min/1 73 square meters)    Stage 3A Moderate CKD (GFR = 45-59 mL/min/1 73 square meters)    Stage 3B Moderate CKD (GFR = 30-44 mL/min/1 73 square meters)    Stage 4 Severe CKD (GFR = 15-29 mL/min/1 73 square meters)    Stage 5 End Stage CKD (GFR <15 mL/min/1 73 square meters)  Note: GFR calculation is accurate only with a steady state creatinine    Magnesium [[id number]]  (Normal) Collected:  08/27/20 0958    Lab Status:  Final result Specimen:  Blood from Arm, Left Updated:  08/27/20 1032     Magnesium 2 1 mg/dL                  No orders to display              Procedures  ECG 12 Lead Documentation Only    Date/Time: 8/27/2020 10:23 AM  Performed by: Berenice Santiago MD  Authorized by: Jose Heredia MD     ECG reviewed by me, the ED Provider: yes    Patient location:  ED and bedside  Previous ECG:     Previous ECG:  Compared to current    Comparison ECG info:  March 3, 2019-T-waves upright on prior in aVL  Morphology otherwise unchanged  Rate:     ECG rate:  78    ECG rate assessment: normal    Rhythm:     Rhythm: sinus rhythm    Ectopy:     Ectopy: none    QRS:     QRS axis:  Normal    QRS intervals:  Normal  Conduction:     Conduction: normal    ST segments:     ST segments:  Normal  T waves:     T waves: inverted      Inverted:  AVL             ED Course  ED Course as of Aug 27 1531   Thu Aug 27, 2020   0953 Greatest suspicion for lab error given remainder of electrolytes and creatinine are normal   Patient is not on any high-dose potassium supplement  Rechecking chemistry and magnesium as well as EKG to assess for any changes which could be coming from hyperkalemia  Patient and  updated regarding study results with normal potassium  She will follow-up with her PCP as previously intended  US AUDIT      Most Recent Value   Initial Alcohol Screen: US AUDIT-C    1  How often do you have a drink containing alcohol? 2 Filed at: 08/27/2020 1036   2  How many drinks containing alcohol do you have on a typical day you are drinking? 0 Filed at: 08/27/2020 1036   3a  Male UNDER 65: How often do you have five or more drinks on one occasion? 0 Filed at: 08/27/2020 1036   3b  FEMALE Any Age, or MALE 65+: How often do you have 4 or more drinks on one occassion? 0 Filed at: 08/27/2020 1036   Audit-C Score  2 Filed at: 08/27/2020 1036                  AMBER/DAST-10      Most Recent Value   How many times in the past year have you    Used an illegal drug or used a prescription medication for non-medical reasons?   Never Filed at: 08/27/2020 0930                                MDM      Disposition  Final diagnoses:   Abnormal laboratory test   Serum potassium elevated - On outpatient testing  Normal potassium today  Time reflects when diagnosis was documented in both MDM as applicable and the Disposition within this note     Time User Action Codes Description Comment    8/27/2020 10:34 AM Jalil WOODALL Add [O62 9] Labor abnormality     8/27/2020 10:34 AM Jalil Lozada A Remove [O62 9] Labor abnormality     8/27/2020 10:34 AM Jalil WOODALL Add [R89 9] Abnormal laboratory test     8/27/2020 10:35 AM Jalil WOODALL Add [E87 5] Serum potassium elevated     8/27/2020 10:35 AM Jalil WOODALL Modify [E87 5] Serum potassium elevated On outpatient testing  Normal potassium today  ED Disposition     ED Disposition Condition Date/Time Comment    Discharge Stable Thu Aug 27, 2020 10:34 AM Travis Pack discharge to home/self care  Follow-up Information     Follow up With Specialties Details Why Contact Info    Sivakumar Campbell MD Internal Medicine Schedule an appointment as soon as possible for a visit  As needed Tre Weathers  [phone]            Discharge Medication List as of 8/27/2020 10:36 AM      CONTINUE these medications which have NOT CHANGED    Details   Ascorbic Acid (vitamin C) 1000 MG tablet Take 1,000 mg by mouth daily, Historical Med      busPIRone (BUSPAR) 5 mg tablet Take 5 mg by mouth daily at bedtime  , Until Discontinued, Historical Med      calcium carbonate (OS-CAMDEN) 600 MG tablet Take 600 mg by mouth daily, Historical Med      citalopram (CeleXA) 20 mg tablet Take 20 mg by mouth daily at bedtime  , Until Discontinued, Historical Med      fexofenadine (ALLEGRA) 180 MG tablet Take 180 mg by mouth as needed, Historical Med      metoprolol tartrate (LOPRESSOR) 25 mg tablet Take 25 mg by mouth daily at bedtime   Pt unsure of dose, Until Discontinued, Historical Med      Multiple Vitamins-Minerals (multivitamin with minerals) tablet Take 1 tablet by mouth daily, Historical Med      omeprazole (PriLOSEC) 20 mg delayed release capsule Take 20 mg by mouth as needed, Historical Med      vitamin B-12 (VITAMIN B-12) 1,000 mcg tablet Take by mouth daily, Historical Med           No discharge procedures on file      PDMP Review     None          ED Provider  Electronically Signed by           Alban Fothergill, MD  08/27/20 0022

## 2020-08-31 LAB
ATRIAL RATE: 78 BPM
P AXIS: 70 DEGREES
PR INTERVAL: 168 MS
QRS AXIS: 62 DEGREES
QRSD INTERVAL: 82 MS
QT INTERVAL: 366 MS
QTC INTERVAL: 417 MS
T WAVE AXIS: 78 DEGREES
VENTRICULAR RATE: 78 BPM

## 2020-08-31 PROCEDURE — 93010 ELECTROCARDIOGRAM REPORT: CPT | Performed by: INTERNAL MEDICINE

## 2021-01-23 PROBLEM — E55.9 VITAMIN D DEFICIENCY: Chronic | Status: ACTIVE | Noted: 2021-01-23

## 2021-01-23 PROBLEM — R73.9 HYPERGLYCEMIA: Chronic | Status: ACTIVE | Noted: 2021-01-23

## 2021-01-23 PROBLEM — R60.0 EDEMA OF BOTH LOWER LEGS: Chronic | Status: ACTIVE | Noted: 2021-01-23

## 2021-01-23 PROBLEM — F32.A ANXIETY AND DEPRESSION: Chronic | Status: ACTIVE | Noted: 2021-01-23

## 2021-01-23 PROBLEM — E78.2 MIXED HYPERLIPIDEMIA: Chronic | Status: ACTIVE | Noted: 2021-01-23

## 2021-01-23 PROBLEM — F41.9 ANXIETY AND DEPRESSION: Chronic | Status: ACTIVE | Noted: 2021-01-23

## 2021-03-30 ENCOUNTER — TELEPHONE (OUTPATIENT)
Dept: INTERNAL MEDICINE CLINIC | Facility: CLINIC | Age: 61
End: 2021-03-30

## 2021-04-07 RX ORDER — CITALOPRAM 20 MG/1
TABLET ORAL
Qty: 90 TABLET | OUTPATIENT
Start: 2021-04-07

## 2021-04-07 RX ORDER — METOPROLOL SUCCINATE 100 MG/1
TABLET, EXTENDED RELEASE ORAL
Qty: 135 TABLET | OUTPATIENT
Start: 2021-04-07

## 2021-04-07 RX ORDER — BUSPIRONE HYDROCHLORIDE 5 MG/1
TABLET ORAL
Qty: 90 TABLET | OUTPATIENT
Start: 2021-04-07

## 2021-05-24 ENCOUNTER — TELEPHONE (OUTPATIENT)
Dept: INTERNAL MEDICINE CLINIC | Facility: CLINIC | Age: 61
End: 2021-05-24

## 2021-05-24 DIAGNOSIS — F32.A ANXIETY AND DEPRESSION: Primary | Chronic | ICD-10-CM

## 2021-05-24 DIAGNOSIS — F41.9 ANXIETY AND DEPRESSION: Primary | Chronic | ICD-10-CM

## 2021-05-24 RX ORDER — CITALOPRAM 20 MG/1
20 TABLET ORAL
Qty: 90 TABLET | Refills: 0 | Status: SHIPPED | OUTPATIENT
Start: 2021-05-24 | End: 2021-05-25 | Stop reason: SDUPTHER

## 2021-05-24 NOTE — TELEPHONE ENCOUNTER
Discussed with the patient last time I saw her on  October 26, 2020 so advised to see me in couple weeks for follow-up  She will make an appointment    Will refill her citalopram 20 mg once a day 1 q day number 90 as  it is cheaper to get 90 day supply  Then 30 days per patient

## 2021-05-24 NOTE — TELEPHONE ENCOUNTER
citalopram (CeleXA) 20 mg tablet   QD #90    For now     Made the next available 4:30 pm apt - not til 8/26! which she already said that she gets out of work at 4:30 so won't be here until 4:45 pm    When I told her the next no 4:30 times until 8/26, she said "well if I die it's on you"  I explained that I can give her an apt tomorrow but it's not at 4:30 pm   She also cancelled her last 2 apt's       I also told her that I will keep her name on a waitlist for any near future 4:30 cancelations

## 2021-05-25 DIAGNOSIS — F41.9 ANXIETY AND DEPRESSION: Chronic | ICD-10-CM

## 2021-05-25 DIAGNOSIS — F32.A ANXIETY AND DEPRESSION: Chronic | ICD-10-CM

## 2021-05-25 RX ORDER — CITALOPRAM 20 MG/1
20 TABLET ORAL
Qty: 90 TABLET | Refills: 0 | Status: SHIPPED | OUTPATIENT
Start: 2021-05-25 | End: 2021-05-25 | Stop reason: SDUPTHER

## 2021-05-25 RX ORDER — CITALOPRAM 20 MG/1
20 TABLET ORAL
Qty: 90 TABLET | Refills: 0 | Status: SHIPPED | OUTPATIENT
Start: 2021-05-25 | End: 2021-08-17 | Stop reason: SDUPTHER

## 2021-06-01 ENCOUNTER — OFFICE VISIT (OUTPATIENT)
Dept: FAMILY MEDICINE CLINIC | Facility: CLINIC | Age: 61
End: 2021-06-01
Payer: COMMERCIAL

## 2021-06-01 VITALS
TEMPERATURE: 98.1 F | WEIGHT: 293 LBS | SYSTOLIC BLOOD PRESSURE: 156 MMHG | BODY MASS INDEX: 53.37 KG/M2 | DIASTOLIC BLOOD PRESSURE: 80 MMHG | HEART RATE: 80 BPM | OXYGEN SATURATION: 99 %

## 2021-06-01 DIAGNOSIS — K21.9 GASTROESOPHAGEAL REFLUX DISEASE WITHOUT ESOPHAGITIS: ICD-10-CM

## 2021-06-01 DIAGNOSIS — F32.A ANXIETY AND DEPRESSION: Chronic | ICD-10-CM

## 2021-06-01 DIAGNOSIS — E78.2 MIXED HYPERLIPIDEMIA: Chronic | ICD-10-CM

## 2021-06-01 DIAGNOSIS — F41.9 ANXIETY AND DEPRESSION: Chronic | ICD-10-CM

## 2021-06-01 DIAGNOSIS — I10 ESSENTIAL HYPERTENSION: Primary | ICD-10-CM

## 2021-06-01 DIAGNOSIS — E55.9 VITAMIN D DEFICIENCY: Chronic | ICD-10-CM

## 2021-06-01 DIAGNOSIS — R73.9 HYPERGLYCEMIA: Chronic | ICD-10-CM

## 2021-06-01 DIAGNOSIS — Z12.31 ENCOUNTER FOR SCREENING MAMMOGRAM FOR MALIGNANT NEOPLASM OF BREAST: ICD-10-CM

## 2021-06-01 PROCEDURE — 3725F SCREEN DEPRESSION PERFORMED: CPT | Performed by: INTERNAL MEDICINE

## 2021-06-01 PROCEDURE — 99214 OFFICE O/P EST MOD 30 MIN: CPT | Performed by: INTERNAL MEDICINE

## 2021-06-01 RX ORDER — BUSPIRONE HYDROCHLORIDE 5 MG/1
5 TABLET ORAL
Qty: 90 TABLET | Refills: 1 | Status: SHIPPED | OUTPATIENT
Start: 2021-06-01 | End: 2021-08-17 | Stop reason: SDUPTHER

## 2021-06-01 RX ORDER — METOPROLOL SUCCINATE 100 MG/1
100 TABLET, EXTENDED RELEASE ORAL DAILY
Qty: 90 TABLET | Refills: 1 | Status: SHIPPED | OUTPATIENT
Start: 2021-06-01 | End: 2021-08-17 | Stop reason: SDUPTHER

## 2021-06-01 RX ORDER — TRIAMTERENE AND HYDROCHLOROTHIAZIDE 37.5; 25 MG/1; MG/1
1 CAPSULE ORAL EVERY MORNING
Qty: 90 CAPSULE | Refills: 1 | Status: SHIPPED | OUTPATIENT
Start: 2021-06-01 | End: 2021-08-30

## 2021-06-01 NOTE — PATIENT INSTRUCTIONS
Patient advised to continue present medications discussed with the patient medications and laboratory data in detail  Follow-up with me in 3 months    If any blood test was ordered please do 1 week prior to next appointment  If you have any questions please call the office [phone]

## 2021-06-01 NOTE — PROGRESS NOTES
Assessment/Plan:    1  Essential hypertension  Assessment & Plan:  Her systolic blood pressure elevated  Will restart Dyazide  Advised to continue metoprolol  mg daily as well  Advised to lose weight and low-salt diet  Orders:  -     CBC and differential; Future  -     Comprehensive metabolic panel; Future  -     TSH, 3rd generation; Future  -     triamterene-hydrochlorothiazide (DYAZIDE) 37 5-25 mg per capsule; Take 1 capsule by mouth every morning  -     metoprolol succinate (TOPROL-XL) 100 mg 24 hr tablet; Take 1 tablet (100 mg total) by mouth daily    2  Anxiety and depression  Assessment & Plan:  Her symptoms are controlled on citalopram and BuSpar as above  Patient would like to continue both medication as it helps to control her symptoms and has no side effect  Will continue both medications  Orders:  -     TSH, 3rd generation; Future  -     busPIRone (BUSPAR) 5 mg tablet; Take 1 tablet (5 mg total) by mouth daily at bedtime    3  Mixed hyperlipidemia  Assessment & Plan:  Present as he is on diet  Her lipid panel on August 2020 revealed cholesterol 208, triglyceride 168, , HDL 48  Patient states she has been watching low-cholesterol low saturated fat diet  Will follow lipid panel and advise accordingly  Orders:  -     Lipid panel; Future    4  Vitamin D deficiency  Assessment & Plan:  Patient states he does not take her vitamin-D lately  Will check vitamin-D level and advise accordingly  Orders:  -     Vitamin D 25 hydroxy; Future    5  BMI 50 0-59 9, adult (HCC)  -     TSH, 3rd generation; Future    6  Hyperglycemia  Assessment & Plan:  Patient states she has been watching diet for sugar and carbs intake  Her last hemoglobin A1c was 5 3 August 2020  Will follow A1c fasting blood sugar  Orders:  -     Comprehensive metabolic panel; Future  -     Hemoglobin A1C; Future    7   Encounter for screening mammogram for malignant neoplasm of breast  -     Mammo screening bilateral w cad; Future; Expected date: 2021    8  Gastroesophageal reflux disease without esophagitis  Assessment & Plan:  She takes omeprazole 20 mg  p o  q day p r n     Discussed with the patient diet and GE reflux precautions  BMI Counseling: Body mass index is 53 37 kg/m²  The BMI is above normal  Nutrition recommendations include decreasing portion sizes, decreasing fast food intake, consuming healthier snacks, limiting drinks that contain sugar, moderation in carbohydrate intake, reducing intake of saturated and trans fat and reducing intake of cholesterol  Exercise recommendations include exercising 3-5 times per week and strength training exercises  No pharmacotherapy was ordered  Subjective:  Patient present for follow-up      Patient ID: Judy Lacey is a 63 y o  female  HPI   18-year-old white male patient presents for follow-up of her medical problems  She denies any chest pain shortness or breath while walking(but sometimes she feels shortness of breath like she is not able to get enough air when she lies down without any orthopnea or PND)  or pain abdomen  She denies any cough, fever, chills  She denies any nausea, vomiting, diarrhea  The following portions of the patient's history were reviewed and updated as appropriate:     Past Medical History:  She has a past medical history of Anxiety and depression (2021), Arthritis, BMI 50 0-59 9, adult ([address] ) (2021), Depression, Edema of both lower legs (2021), GE reflux (2021), Hyperglycemia (2021), Hyperlipidemia, Hypertension, Mixed hyperlipidemia (2021), Numbness and tingling in left arm, Pruritus, Shortness of breath, Skin lesion, and Vitamin D deficiency (2021)  ,  _______________________________________________________________________  Past Surgical History:   has a past surgical history that includes  section;  Neuroplasty / transposition median nerve at carpal tunnel (Bilateral); pr hysteroscopy,w/endo bx (N/A, 3/21/2016); Replacement total knee (Right);  section; CHOLECYSTECTOMY LAPAROSCOPIC (N/A, 3/3/2019); Joint replacement (2016); Mammo (historical) (10/11/2018); and Colonoscopy (2006)  ,  _______________________________________________________________________  Family History:  family history includes Diabetes in her father; Heart failure in her father; Hypertension in her father and mother; Lymphoma in her father; Prostate cancer in her father ,  _______________________________________________________________________  Social History:   reports that she has never smoked  She has never used smokeless tobacco  She reports that she does not drink alcohol or use drugs  ,  _______________________________________________________________________  Allergies:  is allergic to penicillins; zithromax [azithromycin]; and griseofulvin     _______________________________________________________________________  Current Outpatient Medications   Medication Sig Dispense Refill    Ascorbic Acid (vitamin C) 1000 MG tablet Take 1,000 mg by mouth daily      busPIRone (BUSPAR) 5 mg tablet Take 1 tablet (5 mg total) by mouth daily at bedtime 90 tablet 1    calcium carbonate (OS-CAMDEN) 600 MG tablet Take 600 mg by mouth daily      citalopram (CeleXA) 20 mg tablet Take 1 tablet (20 mg total) by mouth daily at bedtime 90 tablet 0    fexofenadine (ALLEGRA) 180 MG tablet Take 180 mg by mouth as needed      metoprolol succinate (TOPROL-XL) 100 mg 24 hr tablet Take 1 tablet (100 mg total) by mouth daily 90 tablet 1    Multiple Vitamins-Minerals (multivitamin with minerals) tablet Take 1 tablet by mouth daily      omeprazole (PriLOSEC) 20 mg delayed release capsule Take 20 mg by mouth as needed      vitamin B-12 (VITAMIN B-12) 1,000 mcg tablet Take by mouth daily      triamterene-hydrochlorothiazide (DYAZIDE) 37 5-25 mg per capsule Take 1 capsule by mouth every morning 90 capsule 1 No current facility-administered medications for this visit       _______________________________________________________________________  Review of Systems   Constitutional: Negative for chills and fever  HENT: Negative for congestion, ear pain, hearing loss, nosebleeds, sinus pain, sore throat and trouble swallowing  Eyes: Negative for discharge, redness and visual disturbance  Respiratory: Negative for cough, chest tightness and shortness of breath  Cardiovascular: Negative for chest pain and palpitations  Gastrointestinal: Negative for abdominal pain, blood in stool, constipation, diarrhea, nausea and vomiting  Genitourinary: Negative for flank pain and hematuria  Musculoskeletal: Positive for arthralgias (She gets sometime pain in her knees on ambulation  She had right knee replacement  Had seen orthopedic in the past  )  Negative for myalgias and neck pain  Skin: Negative for color change and rash  Neurological: Negative for dizziness, speech difficulty, weakness and headaches  Hematological: Does not bruise/bleed easily  Psychiatric/Behavioral: Negative for agitation and behavioral problems  Objective:  Vitals:    06/01/21 1632   BP: 156/80   BP Location: Left arm   Cuff Size: Adult   Pulse: 80   Temp: 98 1 °F (36 7 °C)   TempSrc: Tympanic   SpO2: 99%   Weight: (!) 159 kg (351 lb)     Body mass index is 53 37 kg/m²  Physical Exam  Vitals signs and nursing note reviewed  Constitutional:       General: She is not in acute distress  Appearance: Normal appearance  HENT:      Head: Normocephalic and atraumatic  Right Ear: Ear canal and external ear normal       Left Ear: Ear canal and external ear normal       Mouth/Throat:      Comments: Patient has a face mask on  Eyes:      General: No scleral icterus  Right eye: No discharge  Left eye: No discharge  Extraocular Movements: Extraocular movements intact        Conjunctiva/sclera: Conjunctivae normal    Neck:      Musculoskeletal: Normal range of motion and neck supple  No muscular tenderness  Cardiovascular:      Rate and Rhythm: Normal rate and regular rhythm  Pulses: Normal pulses  Heart sounds: No murmur  Pulmonary:      Effort: Pulmonary effort is normal       Breath sounds: Normal breath sounds  Abdominal:      General: Bowel sounds are normal       Palpations: Abdomen is soft  Tenderness: There is no abdominal tenderness  Musculoskeletal: Normal range of motion  Right lower leg: No edema  Left lower leg: No edema  Skin:     General: Skin is warm  Findings: No rash  Neurological:      General: No focal deficit present  Mental Status: She is alert and oriented to person, place, and time  Motor: No weakness  Coordination: Coordination normal       Gait: Gait normal    Psychiatric:         Mood and Affect: Mood normal          Behavior: Behavior normal         She was advised for colonoscopy but she refused and she would like to wait  I spent 30 minutes with the patient today    More than 50% time spent for reviewing of external notes, reviewing of the results of diagnostics test, management of care, patient education and ordering of test

## 2021-06-02 NOTE — ASSESSMENT & PLAN NOTE
Present as he is on diet  Her lipid panel on August 2020 revealed cholesterol 208, triglyceride 168, , HDL 48  Patient states she has been watching low-cholesterol low saturated fat diet  Will follow lipid panel and advise accordingly

## 2021-06-02 NOTE — ASSESSMENT & PLAN NOTE
Patient states he does not take her vitamin-D lately  Will check vitamin-D level and advise accordingly

## 2021-06-02 NOTE — ASSESSMENT & PLAN NOTE
Her systolic blood pressure elevated  Will restart Dyazide  Advised to continue metoprolol  mg daily as well  Advised to lose weight and low-salt diet

## 2021-06-02 NOTE — ASSESSMENT & PLAN NOTE
Her symptoms are controlled on citalopram and BuSpar as above  Patient would like to continue both medication as it helps to control her symptoms and has no side effect  Will continue both medications

## 2021-06-02 NOTE — ASSESSMENT & PLAN NOTE
Patient states she has been watching diet for sugar and carbs intake  Her last hemoglobin A1c was 5 3 August 2020  Will follow A1c fasting blood sugar

## 2021-06-02 NOTE — ASSESSMENT & PLAN NOTE
She takes omeprazole 20 mg  p o  q day p r n     Discussed with the patient diet and GE reflux precautions

## 2021-06-04 ENCOUNTER — TELEPHONE (OUTPATIENT)
Dept: FAMILY MEDICINE CLINIC | Facility: CLINIC | Age: 61
End: 2021-06-04

## 2021-06-04 DIAGNOSIS — R21 SKIN RASH: Primary | ICD-10-CM

## 2021-06-04 RX ORDER — CLOTRIMAZOLE AND BETAMETHASONE DIPROPIONATE 10; .64 MG/G; MG/G
CREAM TOPICAL 2 TIMES DAILY
Qty: 45 G | Refills: 0 | Status: SHIPPED | OUTPATIENT
Start: 2021-06-04 | End: 2021-08-17 | Stop reason: ALTCHOICE

## 2021-06-04 NOTE — TELEPHONE ENCOUNTER
Chinedu Champagne please when you take message please ask about pharmacy name and location and ask them to spell name of her medication if you are not sure    I call the patient and message has been  taken care of

## 2021-06-05 ENCOUNTER — APPOINTMENT (OUTPATIENT)
Dept: LAB | Facility: CLINIC | Age: 61
End: 2021-06-05
Payer: COMMERCIAL

## 2021-06-05 DIAGNOSIS — F41.9 ANXIETY AND DEPRESSION: Chronic | ICD-10-CM

## 2021-06-05 DIAGNOSIS — E55.9 VITAMIN D DEFICIENCY: Chronic | ICD-10-CM

## 2021-06-05 DIAGNOSIS — I10 ESSENTIAL HYPERTENSION: ICD-10-CM

## 2021-06-05 DIAGNOSIS — R73.9 HYPERGLYCEMIA: Chronic | ICD-10-CM

## 2021-06-05 DIAGNOSIS — F32.A ANXIETY AND DEPRESSION: Chronic | ICD-10-CM

## 2021-06-05 DIAGNOSIS — E78.2 MIXED HYPERLIPIDEMIA: Chronic | ICD-10-CM

## 2021-06-05 LAB
25(OH)D3 SERPL-MCNC: 25.8 NG/ML (ref 30–100)
ALBUMIN SERPL BCP-MCNC: 3.7 G/DL (ref 3.5–5)
ALP SERPL-CCNC: 89 U/L (ref 46–116)
ALT SERPL W P-5'-P-CCNC: 40 U/L (ref 12–78)
ANION GAP SERPL CALCULATED.3IONS-SCNC: 7 MMOL/L (ref 4–13)
AST SERPL W P-5'-P-CCNC: 26 U/L (ref 5–45)
BASOPHILS # BLD AUTO: 0.06 THOUSANDS/ΜL (ref 0–0.1)
BASOPHILS NFR BLD AUTO: 1 % (ref 0–1)
BILIRUB SERPL-MCNC: 1.15 MG/DL (ref 0.2–1)
BUN SERPL-MCNC: 15 MG/DL (ref 5–25)
CALCIUM SERPL-MCNC: 9.1 MG/DL (ref 8.3–10.1)
CHLORIDE SERPL-SCNC: 105 MMOL/L (ref 100–108)
CHOLEST SERPL-MCNC: 201 MG/DL (ref 50–200)
CO2 SERPL-SCNC: 30 MMOL/L (ref 21–32)
CREAT SERPL-MCNC: 0.94 MG/DL (ref 0.6–1.3)
EOSINOPHIL # BLD AUTO: 0.24 THOUSAND/ΜL (ref 0–0.61)
EOSINOPHIL NFR BLD AUTO: 4 % (ref 0–6)
ERYTHROCYTE [DISTWIDTH] IN BLOOD BY AUTOMATED COUNT: 14.3 % (ref 11.6–15.1)
EST. AVERAGE GLUCOSE BLD GHB EST-MCNC: 114 MG/DL
GFR SERPL CREATININE-BSD FRML MDRD: 66 ML/MIN/1.73SQ M
GLUCOSE P FAST SERPL-MCNC: 112 MG/DL (ref 65–99)
HBA1C MFR BLD: 5.6 %
HCT VFR BLD AUTO: 45.2 % (ref 34.8–46.1)
HDLC SERPL-MCNC: 53 MG/DL
HGB BLD-MCNC: 14.2 G/DL (ref 11.5–15.4)
IMM GRANULOCYTES # BLD AUTO: 0.02 THOUSAND/UL (ref 0–0.2)
IMM GRANULOCYTES NFR BLD AUTO: 0 % (ref 0–2)
LDLC SERPL CALC-MCNC: 119 MG/DL (ref 0–100)
LYMPHOCYTES # BLD AUTO: 2.03 THOUSANDS/ΜL (ref 0.6–4.47)
LYMPHOCYTES NFR BLD AUTO: 34 % (ref 14–44)
MCH RBC QN AUTO: 27.5 PG (ref 26.8–34.3)
MCHC RBC AUTO-ENTMCNC: 31.4 G/DL (ref 31.4–37.4)
MCV RBC AUTO: 88 FL (ref 82–98)
MONOCYTES # BLD AUTO: 0.35 THOUSAND/ΜL (ref 0.17–1.22)
MONOCYTES NFR BLD AUTO: 6 % (ref 4–12)
NEUTROPHILS # BLD AUTO: 3.28 THOUSANDS/ΜL (ref 1.85–7.62)
NEUTS SEG NFR BLD AUTO: 55 % (ref 43–75)
NONHDLC SERPL-MCNC: 148 MG/DL
NRBC BLD AUTO-RTO: 0 /100 WBCS
PLATELET # BLD AUTO: 228 THOUSANDS/UL (ref 149–390)
PMV BLD AUTO: 11 FL (ref 8.9–12.7)
POTASSIUM SERPL-SCNC: 4.6 MMOL/L (ref 3.5–5.3)
PROT SERPL-MCNC: 7.1 G/DL (ref 6.4–8.2)
RBC # BLD AUTO: 5.16 MILLION/UL (ref 3.81–5.12)
SODIUM SERPL-SCNC: 142 MMOL/L (ref 136–145)
TRIGL SERPL-MCNC: 143 MG/DL
TSH SERPL DL<=0.05 MIU/L-ACNC: 3.67 UIU/ML (ref 0.36–3.74)
WBC # BLD AUTO: 5.98 THOUSAND/UL (ref 4.31–10.16)

## 2021-06-05 PROCEDURE — 84443 ASSAY THYROID STIM HORMONE: CPT

## 2021-06-05 PROCEDURE — 83036 HEMOGLOBIN GLYCOSYLATED A1C: CPT

## 2021-06-05 PROCEDURE — 36415 COLL VENOUS BLD VENIPUNCTURE: CPT

## 2021-06-05 PROCEDURE — 80061 LIPID PANEL: CPT

## 2021-06-05 PROCEDURE — 80053 COMPREHEN METABOLIC PANEL: CPT

## 2021-06-05 PROCEDURE — 82306 VITAMIN D 25 HYDROXY: CPT

## 2021-06-05 PROCEDURE — 85025 COMPLETE CBC W/AUTO DIFF WBC: CPT

## 2021-06-07 NOTE — TELEPHONE ENCOUNTER
Refill message left on office Vm attempted to call patient back to clarify and ok all understood Thank you!

## 2021-06-13 ENCOUNTER — DOCUMENTATION (OUTPATIENT)
Dept: FAMILY MEDICINE CLINIC | Facility: CLINIC | Age: 61
End: 2021-06-13

## 2021-06-13 NOTE — PROGRESS NOTES
Discussed with the patient blood test result  Vitamin-D level 25 8  Presently she is not taking vitamin-D supplement  Advised to start vitamin-D 2000 IU daily  Fasting blood sugar 112 and hemoglobin A1c 5 6  Advised to watch diet for sugar and carbs intake  Cholesterol 201 triglyceride 143 and   Advised for low-cholesterol diet exercise and lose weight  Otherwise other blood tests unremarkable

## 2021-08-17 ENCOUNTER — OFFICE VISIT (OUTPATIENT)
Dept: INTERNAL MEDICINE CLINIC | Facility: CLINIC | Age: 61
End: 2021-08-17
Payer: COMMERCIAL

## 2021-08-17 VITALS
SYSTOLIC BLOOD PRESSURE: 148 MMHG | DIASTOLIC BLOOD PRESSURE: 82 MMHG | WEIGHT: 293 LBS | HEIGHT: 68 IN | TEMPERATURE: 99.1 F | BODY MASS INDEX: 44.41 KG/M2 | HEART RATE: 83 BPM | OXYGEN SATURATION: 98 %

## 2021-08-17 DIAGNOSIS — R60.0 EDEMA OF BOTH LOWER LEGS: Chronic | ICD-10-CM

## 2021-08-17 DIAGNOSIS — F41.9 ANXIETY AND DEPRESSION: Chronic | ICD-10-CM

## 2021-08-17 DIAGNOSIS — E78.2 MIXED HYPERLIPIDEMIA: Chronic | ICD-10-CM

## 2021-08-17 DIAGNOSIS — L03.116 CELLULITIS OF LEFT LEG: Primary | ICD-10-CM

## 2021-08-17 DIAGNOSIS — F32.A ANXIETY AND DEPRESSION: Chronic | ICD-10-CM

## 2021-08-17 DIAGNOSIS — E55.9 VITAMIN D DEFICIENCY: Chronic | ICD-10-CM

## 2021-08-17 DIAGNOSIS — I10 ESSENTIAL HYPERTENSION: ICD-10-CM

## 2021-08-17 DIAGNOSIS — R73.9 HYPERGLYCEMIA: Chronic | ICD-10-CM

## 2021-08-17 PROCEDURE — 1036F TOBACCO NON-USER: CPT | Performed by: INTERNAL MEDICINE

## 2021-08-17 PROCEDURE — 99214 OFFICE O/P EST MOD 30 MIN: CPT | Performed by: INTERNAL MEDICINE

## 2021-08-17 RX ORDER — CITALOPRAM 20 MG/1
20 TABLET ORAL
Qty: 90 TABLET | Refills: 0 | Status: SHIPPED | OUTPATIENT
Start: 2021-08-17 | End: 2021-11-11

## 2021-08-17 RX ORDER — CHOLECALCIFEROL (VITAMIN D3) 50 MCG
2000 TABLET ORAL DAILY
COMMUNITY

## 2021-08-17 RX ORDER — METOPROLOL SUCCINATE 100 MG/1
100 TABLET, EXTENDED RELEASE ORAL DAILY
Qty: 90 TABLET | Refills: 1 | Status: SHIPPED | OUTPATIENT
Start: 2021-08-17 | End: 2021-11-08

## 2021-08-17 RX ORDER — BUSPIRONE HYDROCHLORIDE 5 MG/1
5 TABLET ORAL
Qty: 90 TABLET | Refills: 0 | Status: SHIPPED | OUTPATIENT
Start: 2021-08-17 | End: 2021-08-30

## 2021-08-17 RX ORDER — CEPHALEXIN 500 MG/1
500 CAPSULE ORAL EVERY 8 HOURS SCHEDULED
Qty: 30 CAPSULE | Refills: 0 | Status: SHIPPED | OUTPATIENT
Start: 2021-08-17 | End: 2021-08-27

## 2021-08-17 NOTE — PATIENT INSTRUCTIONS
Patient advised to continue present medications discussed with the patient medications and laboratory data in detail  Follow-up with me in 3 months    If any blood test was ordered please do 1 week prior to next appointment  If you have any questions please call the office [phone]

## 2021-08-17 NOTE — PROGRESS NOTES
Assessment/Plan:    1  Cellulitis of left leg  Assessment & Plan: Will start cephalexin  Advised if not better or get worse to call me    Orders:  -     cephalexin (KEFLEX) 500 mg capsule; Take 1 capsule (500 mg total) by mouth every 8 (eight) hours for 10 days    2  Anxiety and depression  Assessment & Plan:    Her symptoms are well controlled on citalopram and BuSpar  She would like to continue present medications  Orders:  -     busPIRone (BUSPAR) 5 mg tablet; Take 1 tablet (5 mg total) by mouth daily at bedtime  -     citalopram (CeleXA) 20 mg tablet; Take 1 tablet (20 mg total) by mouth daily at bedtime    3  Essential hypertension  Assessment & Plan:   Systolic blood pressure elevated  Patient advised to start Dyazide during  Last office visit but she has not started yet  Advised to start her Dyazide 1 daily  Advised for low-salt diet  Orders:  -     metoprolol succinate (TOPROL-XL) 100 mg 24 hr tablet; Take 1 tablet (100 mg total) by mouth daily    4  Hyperglycemia  Assessment & Plan:    Fasting blood sugar 112, hemoglobin A1c 5 6  Advised to watch diet for sugar and carbs intake  5  Mixed hyperlipidemia  Assessment & Plan:    Cholesterol 201, triglyceride 143,   Advised to watch diet for  Low-cholesterol low saturated fat diet      6  Vitamin D deficiency  Assessment & Plan:    Her last vitamin-D level was 25  Patient advised to take vitamin-D 2000 IU daily  Will follow vitamin-D level  7  BMI 50 0-59 9, adult [facility])  Assessment & Plan:    Advised to decrease portion size  Advised to decrease sugar carbs intake  Advised to  Exercise 3-5 times per week  Advised to lose weight  8  Edema of both lower legs  Assessment & Plan:   As mentioned above she has not started her a diuretic Dyazide  Advised to start Dyazide  Discussed with the patient that possible  lymphedema if not clay  Will refer to vascular surgery  Advised for low-salt diet              Subjective: Patient presents for follow-up of medical problems and left lower leg red  Patient ID: Roxann Vincent is a 63 y o  female  HPI       The following portions of the patient's history were reviewed and updated as appropriate:     Past Medical History:  She has a past medical history of Anxiety and depression (2021), Arthritis, BMI 50 0-59 9, adult ([address] ) (2021), Cellulitis of left leg (2021), Depression, Edema of both lower legs (2021), GE reflux (2021), Hyperglycemia (2021), Hyperlipidemia, Hypertension, Mixed hyperlipidemia (2021), Numbness and tingling in left arm, Pruritus, Shortness of breath, Skin lesion, and Vitamin D deficiency (2021)  ,  _______________________________________________________________________  Past Surgical History:   has a past surgical history that includes  section; Neuroplasty / transposition median nerve at carpal tunnel (Bilateral); pr hysteroscopy,w/endo bx (N/A, 3/21/2016); Replacement total knee (Right);  section; CHOLECYSTECTOMY LAPAROSCOPIC (N/A, 3/3/2019); Joint replacement (2016); Mammo (historical) (10/11/2018); and Colonoscopy (2006)  ,  _______________________________________________________________________  Family History:  family history includes Diabetes in her father; Heart failure in her father; Hypertension in her father and mother; Lymphoma in her father; Prostate cancer in her father ,  _______________________________________________________________________  Social History:   reports that she has never smoked  She has never used smokeless tobacco  She reports that she does not drink alcohol and does not use drugs  ,  _______________________________________________________________________  Allergies:  is allergic to penicillins, zithromax [azithromycin], and griseofulvin     _______________________________________________________________________  Current Outpatient Medications   Medication Sig Dispense Refill    Ascorbic Acid (vitamin C) 1000 MG tablet Take 1,000 mg by mouth daily      busPIRone (BUSPAR) 5 mg tablet Take 1 tablet (5 mg total) by mouth daily at bedtime 90 tablet 0    calcium carbonate (OS-CAMDEN) 600 MG tablet Take 600 mg by mouth daily      Cholecalciferol (Vitamin D) 50 MCG (2000 UT) tablet Take 2,000 Units by mouth daily      citalopram (CeleXA) 20 mg tablet Take 1 tablet (20 mg total) by mouth daily at bedtime 90 tablet 0    fexofenadine (ALLEGRA) 180 MG tablet Take 180 mg by mouth as needed      metoprolol succinate (TOPROL-XL) 100 mg 24 hr tablet Take 1 tablet (100 mg total) by mouth daily 90 tablet 1    Multiple Vitamins-Minerals (multivitamin with minerals) tablet Take 1 tablet by mouth daily      omeprazole (PriLOSEC) 20 mg delayed release capsule Take 20 mg by mouth as needed      triamterene-hydrochlorothiazide (DYAZIDE) 37 5-25 mg per capsule Take 1 capsule by mouth every morning 90 capsule 1    vitamin B-12 (VITAMIN B-12) 1,000 mcg tablet Take by mouth daily      cephalexin (KEFLEX) 500 mg capsule Take 1 capsule (500 mg total) by mouth every 8 (eight) hours for 10 days 30 capsule 0     No current facility-administered medications for this visit      _______________________________________________________________________  Review of Systems   Constitutional: Negative for chills and fever  HENT: Negative for congestion, ear pain, hearing loss, nosebleeds, sinus pain, sore throat and trouble swallowing  Eyes: Negative for discharge, redness and visual disturbance  Respiratory: Negative for cough, chest tightness and shortness of breath  Cardiovascular: Positive for leg swelling  Negative for chest pain and palpitations  Gastrointestinal: Negative for abdominal pain, blood in stool, constipation, diarrhea, nausea and vomiting  Genitourinary: Negative for dysuria, flank pain, frequency and hematuria  Musculoskeletal: Positive for arthralgias ( she gets pain knees)  Negative for myalgias and neck pain  Skin: Positive for color change (left lower leg red)  Negative for rash  Neurological: Negative for dizziness, speech difficulty, weakness and headaches  Hematological: Does not bruise/bleed easily  Psychiatric/Behavioral: Negative for agitation, behavioral problems, self-injury and suicidal ideas  Objective:  Vitals:    08/17/21 1435   BP: 148/82   BP Location: Right arm   Patient Position: Sitting   Cuff Size: Adult   Pulse: 83   Temp: 99 1 °F (37 3 °C)   TempSrc: Tympanic   SpO2: 98%   Weight: (!) 157 kg (346 lb)   Height: 5' 8" (1 727 m)     Body mass index is 52 61 kg/m²  Physical Exam  Vitals and nursing note reviewed  Constitutional:       General: She is not in acute distress  Appearance: Normal appearance  She is obese  HENT:      Head: Normocephalic and atraumatic  Right Ear: Ear canal and external ear normal       Left Ear: Ear canal and external ear normal       Mouth/Throat:      Comments: Pt  Has face mask on  Eyes:      General: No scleral icterus  Right eye: No discharge  Left eye: No discharge  Extraocular Movements: Extraocular movements intact  Conjunctiva/sclera: Conjunctivae normal    Cardiovascular:      Rate and Rhythm: Normal rate and regular rhythm  Pulses: Normal pulses  Heart sounds: No murmur heard  Pulmonary:      Effort: Pulmonary effort is normal       Breath sounds: Normal breath sounds  Abdominal:      General: Bowel sounds are normal       Palpations: Abdomen is soft  Tenderness: There is no abdominal tenderness  Musculoskeletal:         General: Normal range of motion  Cervical back: Normal range of motion and neck supple  No muscular tenderness  Right lower leg: Edema (1 to 2plus pedal edema  homans sign neg ) present  Left lower leg: Edema (1 to 2 plus pedal edema of leg  homans sign neg ) present  Skin:     General: Skin is warm        Findings: No rash  Comments: Left lower leg red,warm to touch from upper one third to foot  No open skin area and no discharge  Neurological:      General: No focal deficit present  Mental Status: She is alert and oriented to person, place, and time  Psychiatric:         Mood and Affect: Mood normal          Behavior: Behavior normal          I spent 30 minutes with the patient today    More than 50% time spent for reviewing of external notes, reviewing of the results of diagnostics test, management of care, patient education and ordering of test

## 2021-08-18 NOTE — ASSESSMENT & PLAN NOTE
Her symptoms are well controlled on citalopram and BuSpar  She would like to continue present medications

## 2021-08-18 NOTE — ASSESSMENT & PLAN NOTE
Cholesterol 201, triglyceride 143,     Advised to watch diet for  Low-cholesterol low saturated fat diet

## 2021-08-18 NOTE — ASSESSMENT & PLAN NOTE
Systolic blood pressure elevated  Patient advised to start Dyazide during  Last office visit but she has not started yet  Advised to start her Dyazide 1 daily  Advised for low-salt diet

## 2021-08-18 NOTE — ASSESSMENT & PLAN NOTE
Her last vitamin-D level was 25  Patient advised to take vitamin-D 2000 IU daily  Will follow vitamin-D level

## 2021-08-18 NOTE — ASSESSMENT & PLAN NOTE
Advised to decrease portion size  Advised to decrease sugar carbs intake  Advised to  Exercise 3-5 times per week  Advised to lose weight

## 2021-08-18 NOTE — ASSESSMENT & PLAN NOTE
As mentioned above she has not started her a diuretic Dyazide  Advised to start Dyazide  Discussed with the patient that possible  lymphedema if not clay  Will refer to vascular surgery  Advised for low-salt diet

## 2021-08-20 ENCOUNTER — TELEPHONE (OUTPATIENT)
Dept: INTERNAL MEDICINE CLINIC | Facility: CLINIC | Age: 61
End: 2021-08-20

## 2021-08-20 NOTE — TELEPHONE ENCOUNTER
UPDATE:    Leg is looking a little better, doesn't hurt as bad  Slowing getting better not as sensitive as it was - when will redness go away?     Also wants to know if she can go swimming  (pool)

## 2021-08-27 ENCOUNTER — TELEPHONE (OUTPATIENT)
Dept: OBGYN CLINIC | Facility: CLINIC | Age: 61
End: 2021-08-27

## 2021-08-27 NOTE — TELEPHONE ENCOUNTER
Pt states yesterday she noticed bright red bleeding when going to the bathroom when wiping  Denies accumulation to need a pad or liner, denies clots or cramping  Denies any bleeding noticed this AM  Pt states LMP over 9 years ago  Last pap 6/2018 WNL, neg HPV  Pt denies bleeding after intercourse or after a bowel movement  Just got her insurance back about two months ago  Scheduled apt for possible endo biopsy for 8/31/21, did review taking ibuprofen 30-60 minutes prior to apt

## 2021-08-28 DIAGNOSIS — F32.A ANXIETY AND DEPRESSION: Chronic | ICD-10-CM

## 2021-08-28 DIAGNOSIS — I10 ESSENTIAL HYPERTENSION: ICD-10-CM

## 2021-08-28 DIAGNOSIS — F41.9 ANXIETY AND DEPRESSION: Chronic | ICD-10-CM

## 2021-08-30 RX ORDER — BUSPIRONE HYDROCHLORIDE 5 MG/1
5 TABLET ORAL
Qty: 90 TABLET | Refills: 0 | Status: SHIPPED | OUTPATIENT
Start: 2021-08-30 | End: 2021-11-11

## 2021-08-30 RX ORDER — TRIAMTERENE AND HYDROCHLOROTHIAZIDE 37.5; 25 MG/1; MG/1
1 CAPSULE ORAL EVERY MORNING
Qty: 90 CAPSULE | Refills: 1 | Status: SHIPPED | OUTPATIENT
Start: 2021-08-30 | End: 2022-02-28 | Stop reason: SDUPTHER

## 2021-08-31 ENCOUNTER — OFFICE VISIT (OUTPATIENT)
Dept: OBGYN CLINIC | Facility: CLINIC | Age: 61
End: 2021-08-31
Payer: COMMERCIAL

## 2021-08-31 VITALS
WEIGHT: 293 LBS | BODY MASS INDEX: 44.41 KG/M2 | DIASTOLIC BLOOD PRESSURE: 92 MMHG | HEIGHT: 68 IN | SYSTOLIC BLOOD PRESSURE: 166 MMHG

## 2021-08-31 DIAGNOSIS — N95.0 POSTMENOPAUSAL BLEEDING: Primary | ICD-10-CM

## 2021-08-31 PROBLEM — F41.9 ANXIETY: Status: ACTIVE | Noted: 2021-08-31

## 2021-08-31 PROCEDURE — 3008F BODY MASS INDEX DOCD: CPT | Performed by: INTERNAL MEDICINE

## 2021-08-31 PROCEDURE — 88305 TISSUE EXAM BY PATHOLOGIST: CPT | Performed by: PATHOLOGY

## 2021-08-31 PROCEDURE — 58100 BIOPSY OF UTERUS LINING: CPT | Performed by: PHYSICIAN ASSISTANT

## 2021-08-31 NOTE — PROGRESS NOTES
Assessment/Plan:    No problem-specific Assessment & Plan notes found for this encounter  Problem List Items Addressed This Visit     None      Visit Diagnoses     Postmenopausal bleeding    -  Primary    Relevant Orders    Tissue Exam          Will f/u as needed based on results  We did review BP in office today  Pt states average is 140s/90s  Will plan to f/u with PCP given elevated BP today  Subjective:      Patient ID: Sandra Edwrads is a 63 y o  female  Pt presents today with another episode of  bleeding  She has had a few episodes previously, with her most recent endo biopsy being done in 2016 with results as follows:  A  Endocervical, ECC:  -Predominantly mucin and few fragment of benign endocervical mucosa  -Few strips of benign/ unremarkable endocervical epithelium      B  Endometrium, EMC:  -Fragments of endometrium with features suggestive of a polyp   -Focus of simple and focal complex endometrial hyperplasia with eosinophilic and tubal metaplasia   -Incidental fragments of benign squamous and endocervical epithelium  It has been approx 9 years since her LMP  Pt states Sunday she noticed bright red bleeding when going to the bathroom when wiping  Denies heavy accumulation but does use a pad, denies clots or cramping  Denies any bleeding noticed since  Pt denies any recurrent vaginal bleeding since  bleeding episode 2016  Had [address] with EL of 5 mm in 3/2017, then f/u 10/2018 with EL of 3 mm  Does endorse some minimal cramping                 The following portions of the patient's history were reviewed and updated as appropriate: allergies, current medications, past family history, past medical history, past social history, past surgical history and problem list     Review of Systems   Constitutional: Negative  Respiratory: Negative  Gastrointestinal: Negative  Genitourinary: Negative  Psychiatric/Behavioral: Negative            Objective:      BP 166/92 (BP Location: Left arm, Patient Position: Sitting, Cuff Size: Large)   Ht 5' 8" (1 727 m)   Wt (!) 156 kg (345 lb)   LMP  (LMP Unknown)   BMI 52 46 kg/m²          Physical Exam  Vitals reviewed  Constitutional:       Appearance: She is obese  HENT:      Head: Normocephalic and atraumatic  Skin:     General: Skin is warm and dry  Neurological:      Mental Status: She is alert  Psychiatric:         Mood and Affect: Mood normal          Behavior: Behavior normal          Thought Content: Thought content normal          Judgment: Judgment normal              Endometrial biopsy    Date/Time: 8/31/2021 2:07 PM  Performed by: Ubaldo Hurtado PA-C  Authorized by: Ubaldo Hurtado PA-C   Universal Protocol:  Consent: Verbal consent obtained  Consent given by: patient  Patient understanding: patient states understanding of the procedure being performed  Patient consent: the patient's understanding of the procedure matches consent given  Test results: test results available and properly labeled  Patient identity confirmed: verbally with patient      Indication:     Indications: Post-menopausal bleeding      Chronicity of post-menopausal bleeding:  Recurrent  Procedure:     Procedure: endometrial biopsy with Pip[place]      A bivalve speculum was placed in the vagina: yes      Cervix cleaned and prepped: yes      A paracervical block was performed: no      An intracervical block was performed: no      The cervix was dilated: no      Uterus sounded: yes      Uterus sound depth (cm):  8    Specimen collected: specimen collected and sent to pathology      Patient tolerated procedure well with no complications: yes    Comments:     Procedure comments:    Pt counseled on need for endometrial biopsy  Aware of risk of risk of infection, uterine perforation and scarring, inability to obtain a sufficient sample and possible need for additional procedures   Pt aware I will call her and review results once available and determine follow up plan as needed  Pt agrees to procedure  All questions answered  Will plan to f/u based on results  Reviewed w pt possible etiologies including hormonal surge and normal endometrial proliferation, hyperplasia and carcinoma  We reviewed risk factors for endometrial carcinoma as well

## 2021-09-07 ENCOUNTER — TELEPHONE (OUTPATIENT)
Dept: HEMATOLOGY ONCOLOGY | Facility: CLINIC | Age: 61
End: 2021-09-07

## 2021-09-07 NOTE — TELEPHONE ENCOUNTER
New Patient Encounter    New Patient Intake Form   Patient Details:  Simona Miller  1960  [id number]    Background Information:  [address] starts by opening a telephone encounter and gathering the following information   Who is calling to schedule? If not self, relationship to patient? Patient   Referring Provider Carlos Eduardo   What is the diagnosis? Endometrial caner   Is this Cancer or Non-Cancer? Cancer   Is this diagnosis confirmed? Yes   When was the diagnosis? 9/2021   Is there a confirmed diagnosis from a biopsy/tissue reviewed by pathology? Yes   Were outside slides requested? No   Is patient aware of diagnosis? Yes   Is there a personal history and what kind? No   Is there a family history and what kind? Lymphoma, brain cancer, testicular cancer   Reason for visit? New Diagnosis   Have you had any imaging or labs done? If so: when, where? no     Are records in [facility]? yes   Are records needed form an outside facility? No   If yes, name, city, state where facility is located  If patient has a prior history of cancer were old records obtained? NA   Was the patient told to bring a disk? No   Does the patient smoke or Vape? No   If yes, how many packs or cartridges per day? Scheduling Information:   Preferred [place]: [facility]     Are there any dates/time the patient cannot be seen?  no   Miscellaneous: pt willing to travel for 1st visit

## 2021-09-08 ENCOUNTER — CONSULT (OUTPATIENT)
Dept: GYNECOLOGIC ONCOLOGY | Facility: CLINIC | Age: 61
End: 2021-09-08
Payer: COMMERCIAL

## 2021-09-08 ENCOUNTER — TELEPHONE (OUTPATIENT)
Dept: OBGYN CLINIC | Facility: CLINIC | Age: 61
End: 2021-09-08

## 2021-09-08 VITALS
BODY MASS INDEX: 44.41 KG/M2 | DIASTOLIC BLOOD PRESSURE: 92 MMHG | HEART RATE: 89 BPM | HEIGHT: 68 IN | WEIGHT: 293 LBS | TEMPERATURE: 99.1 F | RESPIRATION RATE: 17 BRPM | SYSTOLIC BLOOD PRESSURE: 134 MMHG | OXYGEN SATURATION: 98 %

## 2021-09-08 DIAGNOSIS — I89.0 LYMPHEDEMA OF LEFT LEG: ICD-10-CM

## 2021-09-08 DIAGNOSIS — C54.1 ENDOMETRIAL CARCINOMA (HCC): ICD-10-CM

## 2021-09-08 DIAGNOSIS — Z12.31 BREAST CANCER SCREENING BY MAMMOGRAM: ICD-10-CM

## 2021-09-08 DIAGNOSIS — C54.1 ENDOMETRIAL CANCER (HCC): Primary | ICD-10-CM

## 2021-09-08 DIAGNOSIS — Z12.11 COLON CANCER SCREENING: ICD-10-CM

## 2021-09-08 DIAGNOSIS — Z80.9 FAMILY HISTORY OF CANCER: ICD-10-CM

## 2021-09-08 PROBLEM — Z12.4 PAP SMEAR FOR CERVICAL CANCER SCREENING: Status: RESOLVED | Noted: 2018-06-14 | Resolved: 2021-09-08

## 2021-09-08 PROBLEM — K85.10 GALLSTONE PANCREATITIS: Status: RESOLVED | Noted: 2019-03-01 | Resolved: 2021-09-08

## 2021-09-08 PROBLEM — R93.89 THICKENED ENDOMETRIUM: Status: RESOLVED | Noted: 2018-06-14 | Resolved: 2021-09-08

## 2021-09-08 PROBLEM — L03.116 CELLULITIS OF LEFT LEG: Status: RESOLVED | Noted: 2021-08-17 | Resolved: 2021-09-08

## 2021-09-08 PROBLEM — K85.10 ACUTE GALLSTONE PANCREATITIS: Status: RESOLVED | Noted: 2019-03-02 | Resolved: 2021-09-08

## 2021-09-08 PROBLEM — R60.0 EDEMA OF BOTH LOWER LEGS: Chronic | Status: RESOLVED | Noted: 2021-01-23 | Resolved: 2021-09-08

## 2021-09-08 PROBLEM — Z01.419 ENCOUNTER FOR ANNUAL ROUTINE GYNECOLOGICAL EXAMINATION: Status: RESOLVED | Noted: 2018-06-14 | Resolved: 2021-09-08

## 2021-09-08 PROCEDURE — 3008F BODY MASS INDEX DOCD: CPT | Performed by: OBSTETRICS & GYNECOLOGY

## 2021-09-08 PROCEDURE — 1036F TOBACCO NON-USER: CPT | Performed by: OBSTETRICS & GYNECOLOGY

## 2021-09-08 PROCEDURE — 99205 OFFICE O/P NEW HI 60 MIN: CPT | Performed by: OBSTETRICS & GYNECOLOGY

## 2021-09-08 PROCEDURE — 3080F DIAST BP >= 90 MM HG: CPT | Performed by: OBSTETRICS & GYNECOLOGY

## 2021-09-08 PROCEDURE — 3075F SYST BP GE 130 - 139MM HG: CPT | Performed by: OBSTETRICS & GYNECOLOGY

## 2021-09-08 RX ORDER — HEPARIN SODIUM 5000 [USP'U]/ML
7500 INJECTION, SOLUTION INTRAVENOUS; SUBCUTANEOUS
Status: CANCELLED | OUTPATIENT
Start: 2021-10-08 | End: 2021-10-09

## 2021-09-08 RX ORDER — ACETAMINOPHEN 325 MG/1
975 TABLET ORAL ONCE
Status: CANCELLED | OUTPATIENT
Start: 2021-10-08 | End: 2021-09-08

## 2021-09-08 RX ORDER — CELECOXIB 200 MG/1
200 CAPSULE ORAL ONCE
Status: CANCELLED | OUTPATIENT
Start: 2021-10-08 | End: 2021-09-08

## 2021-09-08 RX ORDER — GABAPENTIN 100 MG/1
200 CAPSULE ORAL ONCE
Status: CANCELLED | OUTPATIENT
Start: 2021-10-08 | End: 2021-09-08

## 2021-09-08 NOTE — TELEPHONE ENCOUNTER
Pt called me back & sd donita her boss checked & it shows active    pt is going to call the ins company to find out what is going on & she will call me back

## 2021-09-08 NOTE — ASSESSMENT & PLAN NOTE
Patient with concerning history including personal history of endometrial cancer, son with testicular cancer at age 22, father with lymphoma, also with several second-degree relatives with malignancies  Will refer her to genetic counseling for evaluation consideration of genetic testing

## 2021-09-08 NOTE — LETTER
September 8, 2021     Denice Johnston PA-C  [address] [facility] [address]    Patient: Wade Leija   YOB: 1960   Date of Visit: 9/8/2021       Dear Dr Pascual Abraham: Thank you for referring Diana Woodward to me for evaluation  Below are my notes for this consultation  If you have questions, please do not hesitate to call me  I look forward to following your patient along with you  Sincerely,        Elle Young MD        CC: MD Jessica Killian MD Anastasia Stalling, MD Shoshana Ball, MD  9/8/2021  2:44 PM  Sign when Signing Visit  Assessment/Plan:    Problem List Items Addressed This Visit        Genitourinary    Endometrial cancer [facility])       Patient with newly diagnosed FIGO grade 1 endometrial cancer  Interestingly, she had non atypical endometrial hyperplasia diagnosed in 2016  I hysterectomy was discussed at that time and then approximately 2 years later  She declined  Despite that, she has not been treated medically  Therefore, it is possible that she has had malignant pathology for quite some time  On those grounds, I will obtain baseline CT scan of the chest, abdomen and pelvis to rule out the possibility of metastatic disease  In the absence of unresectable disease or other unexpected findings I have recommended and she has consented to proceed with robotic hysterectomy, bilateral salpingo-oophorectomy, pelvic sentinel lymph node biopsies versus lymphadenectomy  Given history of chronic lymphedema, I would strongly consider for going lymphadenectomy to minimize added morbidity  She has acceptable exercise tolerance certainly greater than 4 METS  No additional cardiovascular workup is indicated  Will obtain preoperative testing as per procedure pass  She is overdue for mammogram and colonoscopy and will obtain those tests prior to upcoming surgery       I discussed with patient indication, risks, benefits and alternatives of surgical exploration  We discussed possibility of bleeding requiring blood transfusion, life-threatening infections requiring additional procedures, injuries to surrounding organs such as bladder, ureters, gastrointestinal tract and or neurovascular structures  Additionally, we discussed general risks associated to stress of surgery such as venous thromboembolism, acute myocardial events and stroke  All questions answered to patient's satisfaction  We discussed potential need to convert to laparotomy  She agrees and wants to proceed  Informed consent form signed  Our schedulers will contact patient with potential scheduling options and she will have the opportunity to have this procedure scheduled with either myself or my partner Dr Khushboo Lovett  She would have the opportunity to meet her and be Samson Fast counseled /consented by her prior to surgery  All questions were answered to patient and 's satisfaction  Other    BMI 50 0-59 9, adult ([facility] [address] )    Lymphedema of left leg - Primary     Patient has chronic left lower extremity lymphedema  In fact, recently was treated for lymphangitis/ cellulitis  It will be in her best interest to try to avoid extensive lymphadenectomy  Hence, added value of sentinel lymph node biopsy process which I discussed with her today  If no lymph node mapping is identified on sentinel protocol, I would strongly consider for going lymphadenectomy given grade 1 and current history  Family history of cancer       Patient with concerning history including personal history of endometrial cancer, son with testicular cancer at age 22, father with lymphoma, also with several second-degree relatives with malignancies  Will refer her to genetic counseling for evaluation consideration of genetic testing           Encounter for screening mammogram for malignant neoplasm of breast      Last mammogram 3 years ago, reportedly normal   Due for mammogram at this time  Patient will be referred/scheduled  Colon cancer screening       Last colonoscopy 10-12 years ago  Overdue for colon cancer screening  Given personal and family history I have recommended repeat colonoscopy to be obtained prior to surgery  She will be referred to Colorectal for preoperative screening colonoscopy  Other Visit Diagnoses     Endometrial carcinoma ([facility] [address] )                  CHIEF COMPLAINT:     Here for consultation / management for newly diagnosed endometrial cancer  Patient ID: Amy Gamez is a 63 y o  female  HPI    59-year-old  with 1 prior vaginal delivery and 3 prior  sections  She has morbid obesity, hypertension, anxiety / depression, chronic lower extremity edema and lymphedema of the left lower extremity, gastroesophageal reflux disease  She has known history of postmenopausal bleeding  In 2016 she underwent fractional dilatation and curettage with hysteroscopy which demonstrated complex endometrial hyperplasia without atypia  At that time, she was counseled for hysterectomy which she declined  She was then counseled approximately 2 years later for hysterectomy again and she was more interested in conservative management  She has not received hormonal therapy  Now, reports 6-8 week history of worsening postmenopausal bleeding  Describes bleeding as light with occasional small blood clots  Denies pelvic or abdominal pain  Denies changes in bowel or bladder function  Recent office endometrial biopsy demonstrated FIGO grade 1 endometrioid endometrial adenocarcinoma  She was referred for evaluation and treatment  Last mammogram approximately 3 years ago, reportedly normal   Last colonoscopy 10-12 years ago, reportedly normal     Review of Systems    As per HPI  Osteoarthritis of hips and knees with intermittent musculoskeletal pain  Twelve point review of systems otherwise noncontributory    Current Outpatient Medications Medication Sig Dispense Refill    Ascorbic Acid (vitamin C) 1000 MG tablet Take 1,000 mg by mouth daily      busPIRone (BUSPAR) 5 mg tablet TAKE 1 TABLET (5 MG TOTAL) BY MOUTH DAILY AT BEDTIME 90 tablet 0    calcium carbonate (OS-CAMDEN) 600 MG tablet Take 600 mg by mouth daily      Cholecalciferol (Vitamin D) 50 MCG (2000 UT) tablet Take 2,000 Units by mouth daily      citalopram (CeleXA) 20 mg tablet Take 1 tablet (20 mg total) by mouth daily at bedtime 90 tablet 0    fexofenadine (ALLEGRA) 180 MG tablet Take 180 mg by mouth as needed      metoprolol succinate (TOPROL-XL) 100 mg 24 hr tablet Take 1 tablet (100 mg total) by mouth daily 90 tablet 1    Multiple Vitamins-Minerals (multivitamin with minerals) tablet Take 1 tablet by mouth daily      omeprazole (PriLOSEC) 20 mg delayed release capsule Take 20 mg by mouth as needed      triamterene-hydrochlorothiazide (DYAZIDE) 37 5-25 mg per capsule TAKE 1 CAPSULE BY MOUTH EVERY MORNING 90 capsule 1    vitamin B-12 (VITAMIN B-12) 1,000 mcg tablet Take by mouth daily       No current facility-administered medications for this visit         Allergies   Allergen Reactions    Penicillins Hives     She can take cephalosporin without any side effect    Zithromax [Azithromycin]      Obtained from Pre-op orders sent to RX    Griseofulvin Rash       Past Medical History:   Diagnosis Date    Anxiety and depression 2021    Arthritis     BMI 50 0-59 9, adult ([facility] [address] ) 2021    Cellulitis of left leg 2021    Depression     Edema of both lower legs 2021    GE reflux 2021    Hyperglycemia 2021    Hyperlipidemia     Hypertension     Mixed hyperlipidemia 2021    Numbness and tingling in left arm     Pruritus     Shortness of breath     Skin lesion     Vitamin D deficiency 2021       Past Surgical History:   Procedure Laterality Date     SECTION      x3     SECTION      CHOLECYSTECTOMY LAPAROSCOPIC N/A 3/3/2019    Procedure: CHOLECYSTECTOMY LAPAROSCOPIC;  Surgeon: Vadim Gasca MD;  [facility];  Service: General    COLONOSCOPY  2006    advise for f/u colonoscopy    JOINT REPLACEMENT  2016    R total HIp    MAMMO (HISTORICAL)  10/11/2018    Advise for yearly mammo screening    NEUROPLASTY / TRANSPOSITION MEDIAN NERVE AT CARPAL TUNNEL Bilateral     VA HYSTEROSCOPY,W/ENDO BX N/A 3/21/2016    Procedure: FRACTIONAL DILATATION AND CURETTAGE (D&C) WITH HYSTEROSOCPY;  Surgeon: Sharla Sharp MD;  [facility]: [facility];  Service: Gynecology    REPLACEMENT TOTAL KNEE Right        OB History        5    Para        Term                AB        Living   4       SAB        TAB        Ectopic        Multiple        Live Births                     Family History   Problem Relation Age of Onset    Hypertension Mother     Hypertension Father     Heart failure Father     Lymphoma Father     Prostate cancer Father     Diabetes Father         at old age       The following portions of the patient's history were reviewed and updated as appropriate: allergies, current medications, past family history, past medical history, past social history, past surgical history and problem list       Objective:    Blood pressure 134/92, pulse 89, temperature 99 1 °F (37 3 °C), temperature source Temporal, resp  rate 17, height 5' 8" (1 727 m), weight (!) 157 kg (347 lb), SpO2 98 %  Body mass index is 52 76 kg/m²  Physical Exam  Vitals reviewed  Exam conducted with a chaperone present  Constitutional:       Appearance: Normal appearance  She is not toxic-appearing or diaphoretic  Eyes:      General: No scleral icterus  Right eye: No discharge  Left eye: No discharge  Conjunctiva/sclera: Conjunctivae normal    Cardiovascular:      Rate and Rhythm: Normal rate and regular rhythm  Heart sounds: Normal heart sounds  No murmur heard       Pulmonary:      Effort: Pulmonary effort is normal  No respiratory distress  Breath sounds: Normal breath sounds  No stridor  No wheezing  Abdominal:      General: Abdomen is flat  There is no distension  Palpations: Abdomen is soft  Tenderness: There is no abdominal tenderness  There is no guarding  Hernia: No hernia is present  Genitourinary:     Comments: Normal external female genitalia  Normal Barth[place]'s and [place]'s glands  Normal urethral meatus and no evidence of urethral discharge or masses  Bladder without fullness mass or tenderness  Vagina without lesion or discharge  No significant pelvic organ prolapse noted  Cervix grossly normal appearing without visible lesions  No overt pelvic masses  Exam limited by body habitus  Anus without fissure of lesion  Musculoskeletal:      Cervical back: Neck supple  Right lower leg: No edema  Left lower leg: Edema present  Lymphadenopathy:      Cervical: No cervical adenopathy  Neurological:      Mental Status: She is alert         No results found for:   Lab Results   Component Value Date    WBC 5 98 06/05/2021    HGB 14 2 06/05/2021    HCT 45 2 06/05/2021    MCV 88 06/05/2021     06/05/2021     Lab Results   Component Value Date    K 4 6 06/05/2021     06/05/2021    CO2 30 06/05/2021    BUN 15 06/05/2021    CREATININE 0 94 06/05/2021    GLUF 112 (H) 06/05/2021    CALCIUM 9 1 06/05/2021    AST 26 06/05/2021    ALT 40 06/05/2021    ALKPHOS 89 06/05/2021    EGFR 66 06/05/2021     Fatimah Escobedo MD, [address] [facility], [facility]  9/8/2021  2:44 PM

## 2021-09-08 NOTE — ASSESSMENT & PLAN NOTE
Last mammogram 3 years ago, reportedly normal   Due for mammogram at this time  Patient will be referred/scheduled

## 2021-09-08 NOTE — ASSESSMENT & PLAN NOTE
Patient has chronic left lower extremity lymphedema  In fact, recently was treated for lymphangitis/ cellulitis  It will be in her best interest to try to avoid extensive lymphadenectomy  Hence, added value of sentinel lymph node biopsy process which I discussed with her today  If no lymph node mapping is identified on sentinel protocol, I would strongly consider for going lymphadenectomy given grade 1 and current history

## 2021-09-08 NOTE — TELEPHONE ENCOUNTER
Pt went to gyno oncology today and was seen by Dr Krystle Wallace  She was supposed to see Dr Pamela Acosta and he filled in for her because she couldn't be there today  Dr Krystle Wallace told her she could schedule surgery with either of them  Pt would like Dr You Bernard opinion on whom she would recommend she have surgery  Pt is requesting phone call early Thursday because scheduling is calling her Thursday to schedule  Pt is aware Dr Gonzalo Croft is not in the office until Thursday 9/9/21

## 2021-09-08 NOTE — PROGRESS NOTES
Assessment/Plan:    Problem List Items Addressed This Visit        Genitourinary    Endometrial cancer [facility]) - Primary       Patient with newly diagnosed FIGO grade 1 endometrial cancer  Interestingly, she had non atypical endometrial hyperplasia diagnosed in 2016  I hysterectomy was discussed at that time and then approximately 2 years later  She declined  Despite that, she has not been treated medically  Therefore, it is possible that she has had malignant pathology for quite some time  On those grounds, I will obtain baseline CT scan of the chest, abdomen and pelvis to rule out the possibility of metastatic disease  In the absence of unresectable disease or other unexpected findings I have recommended and she has consented to proceed with robotic hysterectomy, bilateral salpingo-oophorectomy, pelvic sentinel lymph node biopsies versus lymphadenectomy  Given history of chronic lymphedema, I would strongly consider for going lymphadenectomy to minimize added morbidity  She has acceptable exercise tolerance certainly greater than 4 METS  No additional cardiovascular workup is indicated  Will obtain preoperative testing as per procedure pass  She is overdue for mammogram and colonoscopy and will obtain those tests prior to upcoming surgery  I discussed with patient indication, risks, benefits and alternatives of surgical exploration  We discussed possibility of bleeding requiring blood transfusion, life-threatening infections requiring additional procedures, injuries to surrounding organs such as bladder, ureters, gastrointestinal tract and or neurovascular structures  Additionally, we discussed general risks associated to stress of surgery such as venous thromboembolism, acute myocardial events and stroke  All questions answered to patient's satisfaction  We discussed potential need to convert to laparotomy  She agrees and wants to proceed  Informed consent form signed           Our schedulers will contact patient with potential scheduling options and she will have the opportunity to have this procedure scheduled with either myself or my partner Dr Angela Noel  She would have the opportunity to meet her and be Florencio Lincoln counseled /consented by her prior to surgery  All questions were answered to patient and 's satisfaction  Relevant Orders    CT chest abdomen pelvis wo contrast    Case request operating room: ROBOTIC HYSTERECTOMY, BILATERAL SALPINGO-OOPHORECTOMY, PELVIC SENTINEL LYMPH NODE BIOPSIES VERSUS LYMPHADENECTOMY AND ALL OTHER INDICATED PROCEDURES (Completed)    CBC and differential    EKG 12 lead    HEMOGLOBIN A1C W/ EAG ESTIMATION    Basic metabolic panel    Type and screen    Ambulatory referral to Colorectal Surgery    Mammo screening bilateral w 3d & cad       Other    BMI 50 0-59 9, adult (HCC)    Lymphedema of left leg     Patient has chronic left lower extremity lymphedema  In fact, recently was treated for lymphangitis/ cellulitis  It will be in her best interest to try to avoid extensive lymphadenectomy  Hence, added value of sentinel lymph node biopsy process which I discussed with her today  If no lymph node mapping is identified on sentinel protocol, I would strongly consider for going lymphadenectomy given grade 1 and current history  Family history of cancer       Patient with concerning history including personal history of endometrial cancer, son with testicular cancer at age 22, father with lymphoma, also with several second-degree relatives with malignancies  Will refer her to genetic counseling for evaluation consideration of genetic testing  Encounter for screening mammogram for malignant neoplasm of breast      Last mammogram 3 years ago, reportedly normal   Due for mammogram at this time  Patient will be referred/scheduled  Colon cancer screening       Last colonoscopy 10-12 years ago  Overdue for colon cancer screening  Given personal and family history I have recommended repeat colonoscopy to be obtained prior to surgery  She will be referred to Colorectal for preoperative screening colonoscopy  Relevant Orders    Ambulatory referral to Colorectal Surgery      Other Visit Diagnoses     Endometrial carcinoma ([facility] [address] )        Relevant Orders    Case request operating room: ROBOTIC HYSTERECTOMY, BILATERAL SALPINGO-OOPHORECTOMY, PELVIC SENTINEL LYMPH NODE BIOPSIES VERSUS LYMPHADENECTOMY AND ALL OTHER INDICATED PROCEDURES (Completed)    CBC and differential    EKG 12 lead    HEMOGLOBIN A1C W/ EAG ESTIMATION    Basic metabolic panel    Type and screen    Ambulatory referral to Colorectal Surgery    Mammo screening bilateral w 3d & cad              CHIEF COMPLAINT:     Here for consultation / management for newly diagnosed endometrial cancer  Patient ID: Yannick Hudson is a 63 y o  female  HPI    18-year-old  with 1 prior vaginal delivery and 3 prior  sections  She has morbid obesity, hypertension, anxiety / depression, chronic lower extremity edema and lymphedema of the left lower extremity, gastroesophageal reflux disease  She has known history of postmenopausal bleeding  In 2016 she underwent fractional dilatation and curettage with hysteroscopy which demonstrated complex endometrial hyperplasia without atypia  At that time, she was counseled for hysterectomy which she declined  She was then counseled approximately 2 years later for hysterectomy again and she was more interested in conservative management  She has not received hormonal therapy  Now, reports 6-8 week history of worsening postmenopausal bleeding  Describes bleeding as light with occasional small blood clots  Denies pelvic or abdominal pain  Denies changes in bowel or bladder function  Recent office endometrial biopsy demonstrated FIGO grade 1 endometrioid endometrial adenocarcinoma  She was referred for evaluation and treatment  Last mammogram approximately 3 years ago, reportedly normal   Last colonoscopy 10-12 years ago, reportedly normal     Review of Systems    As per HPI  Osteoarthritis of hips and knees with intermittent musculoskeletal pain  Twelve point review of systems otherwise noncontributory  Current Outpatient Medications   Medication Sig Dispense Refill    Ascorbic Acid (vitamin C) 1000 MG tablet Take 1,000 mg by mouth daily      busPIRone (BUSPAR) 5 mg tablet TAKE 1 TABLET (5 MG TOTAL) BY MOUTH DAILY AT BEDTIME 90 tablet 0    calcium carbonate (OS-CAMDEN) 600 MG tablet Take 600 mg by mouth daily      Cholecalciferol (Vitamin D) 50 MCG (2000 UT) tablet Take 2,000 Units by mouth daily      citalopram (CeleXA) 20 mg tablet Take 1 tablet (20 mg total) by mouth daily at bedtime 90 tablet 0    fexofenadine (ALLEGRA) 180 MG tablet Take 180 mg by mouth as needed      metoprolol succinate (TOPROL-XL) 100 mg 24 hr tablet Take 1 tablet (100 mg total) by mouth daily 90 tablet 1    Multiple Vitamins-Minerals (multivitamin with minerals) tablet Take 1 tablet by mouth daily      omeprazole (PriLOSEC) 20 mg delayed release capsule Take 20 mg by mouth as needed      triamterene-hydrochlorothiazide (DYAZIDE) 37 5-25 mg per capsule TAKE 1 CAPSULE BY MOUTH EVERY MORNING 90 capsule 1    vitamin B-12 (VITAMIN B-12) 1,000 mcg tablet Take by mouth daily       No current facility-administered medications for this visit         Allergies   Allergen Reactions    Penicillins Hives     She can take cephalosporin without any side effect    Zithromax [Azithromycin]      Obtained from Pre-op orders sent to RX    Michelleofsangeetha Rash       Past Medical History:   Diagnosis Date    Anxiety and depression 1/23/2021    Arthritis     BMI 50 0-59 9, adult ([facility] [place] [address] ) 6/1/2021    Cellulitis of left leg 8/17/2021    Depression     Edema of both lower legs 1/23/2021    GE reflux 6/1/2021    Hyperglycemia 1/23/2021    Hyperlipidemia     Hypertension  Mixed hyperlipidemia 2021    Numbness and tingling in left arm     Pruritus     Shortness of breath     Skin lesion     Vitamin D deficiency 2021       Past Surgical History:   Procedure Laterality Date     SECTION      x3     SECTION      CHOLECYSTECTOMY LAPAROSCOPIC N/A 3/3/2019    Procedure: CHOLECYSTECTOMY LAPAROSCOPIC;  Surgeon: Robert Luevano MD;  [facility]: [facility];  Service: General    COLONOSCOPY  2006    advise for f/u colonoscopy    JOINT REPLACEMENT  2016    R total HIp    MAMMO (HISTORICAL)  10/11/2018    Advise for yearly mammo screening    NEUROPLASTY / TRANSPOSITION MEDIAN NERVE AT CARPAL TUNNEL Bilateral     ND HYSTEROSCOPY,W/ENDO BX N/A 3/21/2016    Procedure: FRACTIONAL DILATATION AND CURETTAGE (D&C) WITH HYSTEROSOCPY;  Surgeon: Marques Gamino MD;  [facility]: [facility];  Service: Gynecology    REPLACEMENT TOTAL KNEE Right        OB History        5    Para        Term                AB        Living   4       SAB        TAB        Ectopic        Multiple        Live Births                     Family History   Problem Relation Age of Onset    Hypertension Mother     Hypertension Father     Heart failure Father     Lymphoma Father     Prostate cancer Father     Diabetes Father         at old age       The following portions of the patient's history were reviewed and updated as appropriate: allergies, current medications, past family history, past medical history, past social history, past surgical history and problem list       Objective:    Blood pressure 134/92, pulse 89, temperature 99 1 °F (37 3 °C), temperature source Temporal, resp  rate 17, height 5' 8" (1 727 m), weight (!) 157 kg (347 lb), SpO2 98 %  Body mass index is 52 76 kg/m²  Physical Exam  Vitals reviewed  Exam conducted with a chaperone present  Constitutional:       Appearance: Normal appearance  She is not toxic-appearing or diaphoretic     Eyes: General: No scleral icterus  Right eye: No discharge  Left eye: No discharge  Conjunctiva/sclera: Conjunctivae normal    Cardiovascular:      Rate and Rhythm: Normal rate and regular rhythm  Heart sounds: Normal heart sounds  No murmur heard  Pulmonary:      Effort: Pulmonary effort is normal  No respiratory distress  Breath sounds: Normal breath sounds  No stridor  No wheezing  Abdominal:      General: Abdomen is flat  There is no distension  Palpations: Abdomen is soft  Tenderness: There is no abdominal tenderness  There is no guarding  Hernia: No hernia is present  Genitourinary:     Comments: Normal external female genitalia  Normal Bartholin's and Naschitti's glands  Normal urethral meatus and no evidence of urethral discharge or masses  Bladder without fullness mass or tenderness  Vagina without lesion or discharge  No significant pelvic organ prolapse noted  Cervix grossly normal appearing without visible lesions  No overt pelvic masses  Exam limited by body habitus  Anus without fissure of lesion  Musculoskeletal:      Cervical back: Neck supple  Right lower leg: No edema  Left lower leg: Edema present  Lymphadenopathy:      Cervical: No cervical adenopathy  Neurological:      Mental Status: She is alert         No results found for:   Lab Results   Component Value Date    WBC 5 98 06/05/2021    HGB 14 2 06/05/2021    HCT 45 2 06/05/2021    MCV 88 06/05/2021     06/05/2021     Lab Results   Component Value Date    K 4 6 06/05/2021     06/05/2021    CO2 30 06/05/2021    BUN 15 06/05/2021    CREATININE 0 94 06/05/2021    GLUF 112 (H) 06/05/2021    CALCIUM 9 1 06/05/2021    AST 26 06/05/2021    ALT 40 06/05/2021    ALKPHOS 89 06/05/2021    EGFR 66 06/05/2021     Mckayla Meza MD, [place], [facility]  9/8/2021  2:49 PM

## 2021-09-08 NOTE — TELEPHONE ENCOUNTER
Pt aware of dr calhoun recommendations   Cancelled annual as patient will be undergoing lavh with gyn onc

## 2021-09-08 NOTE — TELEPHONE ENCOUNTER
Intake received  Insurance reviewed    Per RTE pt is not eligible for coverage  [facility] & spoke to rae call ref# [id number] se newman that the policy termd 44/57/69 sd it looks like she has another policy w/[employer]  but the dept that handles that is navigate & she transfrd me there    then she came back on lime & sd that they are not answering the call & I should call back later  Called the pt & she sd that as far as she knows her coverage is still active   She is going to call her boss & find out what is going on & call me back

## 2021-09-08 NOTE — ASSESSMENT & PLAN NOTE
Last colonoscopy 10-12 years ago  Overdue for colon cancer screening  Given personal and family history I have recommended repeat colonoscopy to be obtained prior to surgery  She will be referred to Colorectal for preoperative screening colonoscopy

## 2021-09-10 DIAGNOSIS — C54.1 ENDOMETRIAL CANCER (HCC): Primary | ICD-10-CM

## 2021-09-13 ENCOUNTER — TELEPHONE (OUTPATIENT)
Dept: GYNECOLOGIC ONCOLOGY | Facility: CLINIC | Age: 61
End: 2021-09-13

## 2021-09-13 ENCOUNTER — TELEPHONE (OUTPATIENT)
Dept: SURGICAL ONCOLOGY | Facility: CLINIC | Age: 61
End: 2021-09-13

## 2021-09-13 NOTE — TELEPHONE ENCOUNTER
Spoke with patient regarding scheduling of her CT and Mammo appts  Also helped patient with rescheduling of CT to a more accommodating time that worked better for the patients schedule

## 2021-09-13 NOTE — TELEPHONE ENCOUNTER
Patient called to begin scheduling her surgery with GYN  When she was in office on 9/8 a  was not available  Please give her a call when able  She also has not had any of her scans/testing scheduled

## 2021-09-14 ENCOUNTER — TELEPHONE (OUTPATIENT)
Dept: HEMATOLOGY ONCOLOGY | Facility: CLINIC | Age: 61
End: 2021-09-14

## 2021-09-14 ENCOUNTER — TELEPHONE (OUTPATIENT)
Dept: GYNECOLOGIC ONCOLOGY | Facility: CLINIC | Age: 61
End: 2021-09-14

## 2021-09-14 NOTE — TELEPHONE ENCOUNTER
SCHEDULED PATIENT FOR SURGERY  SPOKE TO PATIENT AND SHE IS ALL SET UP WITH ALL APPOINTMENTS THAT WERE NEEDED PRIOR TO SURGERY

## 2021-09-14 NOTE — TELEPHONE ENCOUNTER
Patient is calling in requesting a call back from [facility] regarding a few appointments she was to be scheduled for, she can be reached back [phone]

## 2021-09-16 ENCOUNTER — TELEPHONE (OUTPATIENT)
Dept: GENETICS | Facility: CLINIC | Age: 61
End: 2021-09-16

## 2021-09-16 NOTE — TELEPHONE ENCOUNTER
I called Grayson Dillard to schedule a new patient appointment with the [facility]       Outcome:   Patient is not interested to schedule at this time, she was provided our office number and encouraged to call us if interested  Follow-up:   At this time the referral will be closed and we will wait to hear back from the patient regarding scheduling this appointment

## 2021-09-17 ENCOUNTER — TELEPHONE (OUTPATIENT)
Dept: HEMATOLOGY ONCOLOGY | Facility: CLINIC | Age: 61
End: 2021-09-17

## 2021-09-17 NOTE — TELEPHONE ENCOUNTER
Patient states that she received a phone call from her insurance company with a preliminary denial for her CT Scan scheduled for Tuesday 9/21/2021  Additional information is needed for the ordering provider  Ms Vy Montejo from the insurance company provided a number for verbal re- consideration :      Tel: [phone], option # 3      Fax : [phone] ( level of review re-consideration)

## 2021-09-20 ENCOUNTER — TELEPHONE (OUTPATIENT)
Dept: SURGICAL ONCOLOGY | Facility: CLINIC | Age: 61
End: 2021-09-20

## 2021-09-20 ENCOUNTER — HOSPITAL ENCOUNTER (OUTPATIENT)
Dept: MAMMOGRAPHY | Facility: MEDICAL CENTER | Age: 61
Discharge: HOME | End: 2021-09-20
Payer: COMMERCIAL

## 2021-09-20 VITALS — HEIGHT: 68 IN | WEIGHT: 293 LBS | BODY MASS INDEX: 44.41 KG/M2

## 2021-09-20 DIAGNOSIS — C54.1 ENDOMETRIAL CANCER (HCC): ICD-10-CM

## 2021-09-20 DIAGNOSIS — C54.1 ENDOMETRIAL CARCINOMA (HCC): ICD-10-CM

## 2021-09-20 DIAGNOSIS — Z12.31 BREAST CANCER SCREENING BY MAMMOGRAM: ICD-10-CM

## 2021-09-20 PROCEDURE — 77063 BREAST TOMOSYNTHESIS BI: CPT

## 2021-09-20 PROCEDURE — 77067 SCR MAMMO BI INCL CAD: CPT

## 2021-09-20 NOTE — TELEPHONE ENCOUNTER
Patient called in this morning and would like to know the status of her CT scan scheduled for tomorrow, she would like to know if this has been approved   Patient can be reached at [phone]

## 2021-09-21 ENCOUNTER — OFFICE VISIT (OUTPATIENT)
Dept: LAB | Facility: HOSPITAL | Age: 61
End: 2021-09-21
Payer: COMMERCIAL

## 2021-09-21 ENCOUNTER — APPOINTMENT (OUTPATIENT)
Dept: LAB | Facility: HOSPITAL | Age: 61
End: 2021-09-21
Payer: COMMERCIAL

## 2021-09-21 ENCOUNTER — HOSPITAL ENCOUNTER (OUTPATIENT)
Dept: CT IMAGING | Facility: HOSPITAL | Age: 61
Discharge: HOME | End: 2021-09-21
Payer: COMMERCIAL

## 2021-09-21 DIAGNOSIS — C54.1 ENDOMETRIAL CARCINOMA (HCC): ICD-10-CM

## 2021-09-21 DIAGNOSIS — Z01.818 OTHER SPECIFIED PRE-OPERATIVE EXAMINATION: ICD-10-CM

## 2021-09-21 DIAGNOSIS — C54.1 ENDOMETRIAL CANCER (HCC): ICD-10-CM

## 2021-09-21 LAB
ABO GROUP BLD: NORMAL
ANION GAP SERPL CALCULATED.3IONS-SCNC: 5 MMOL/L (ref 4–13)
BASOPHILS # BLD AUTO: 0.03 THOUSANDS/ΜL (ref 0–0.1)
BASOPHILS NFR BLD AUTO: 1 % (ref 0–1)
BLD GP AB SCN SERPL QL: NEGATIVE
BUN SERPL-MCNC: 13 MG/DL (ref 6–20)
CALCIUM SERPL-MCNC: 9.2 MG/DL (ref 8.4–10.2)
CHLORIDE SERPL-SCNC: 102 MMOL/L (ref 96–108)
CO2 SERPL-SCNC: 33 MMOL/L (ref 22–33)
CREAT SERPL-MCNC: 1.02 MG/DL (ref 0.4–1.1)
EOSINOPHIL # BLD AUTO: 0.22 THOUSAND/ΜL (ref 0–0.61)
EOSINOPHIL NFR BLD AUTO: 5 % (ref 0–6)
ERYTHROCYTE [DISTWIDTH] IN BLOOD BY AUTOMATED COUNT: 14.9 % (ref 11.6–15.1)
EST. AVERAGE GLUCOSE BLD GHB EST-MCNC: 105 MG/DL
GFR SERPL CREATININE-BSD FRML MDRD: 60 ML/MIN/1.73SQ M
GLUCOSE P FAST SERPL-MCNC: 108 MG/DL (ref 70–105)
HBA1C MFR BLD: 5.3 %
HCT VFR BLD AUTO: 43.8 % (ref 34.8–46.1)
HGB BLD-MCNC: 13.8 G/DL (ref 11.5–15.4)
IMM GRANULOCYTES # BLD AUTO: 0.01 THOUSAND/UL (ref 0–0.2)
IMM GRANULOCYTES NFR BLD AUTO: 0 % (ref 0–2)
LYMPHOCYTES # BLD AUTO: 1.44 THOUSANDS/ΜL (ref 0.6–4.47)
LYMPHOCYTES NFR BLD AUTO: 31 % (ref 14–44)
MCH RBC QN AUTO: 27.3 PG (ref 26.8–34.3)
MCHC RBC AUTO-ENTMCNC: 31.5 G/DL (ref 31.4–37.4)
MCV RBC AUTO: 87 FL (ref 82–98)
MONOCYTES # BLD AUTO: 0.3 THOUSAND/ΜL (ref 0.17–1.22)
MONOCYTES NFR BLD AUTO: 7 % (ref 4–12)
NEUTROPHILS # BLD AUTO: 2.59 THOUSANDS/ΜL (ref 1.85–7.62)
NEUTS SEG NFR BLD AUTO: 56 % (ref 43–75)
PLATELET # BLD AUTO: 197 THOUSANDS/UL (ref 149–390)
PMV BLD AUTO: 11.6 FL (ref 8.9–12.7)
POTASSIUM SERPL-SCNC: 4.5 MMOL/L (ref 3.5–5)
RBC # BLD AUTO: 5.05 MILLION/UL (ref 3.81–5.12)
RH BLD: POSITIVE
SODIUM SERPL-SCNC: 140 MMOL/L (ref 133–145)
SPECIMEN EXPIRATION DATE: NORMAL
WBC # BLD AUTO: 4.59 THOUSAND/UL (ref 4.31–10.16)

## 2021-09-21 PROCEDURE — 86900 BLOOD TYPING SEROLOGIC ABO: CPT

## 2021-09-21 PROCEDURE — 85025 COMPLETE CBC W/AUTO DIFF WBC: CPT

## 2021-09-21 PROCEDURE — 86850 RBC ANTIBODY SCREEN: CPT

## 2021-09-21 PROCEDURE — 71260 CT THORAX DX C+: CPT

## 2021-09-21 PROCEDURE — 93005 ELECTROCARDIOGRAM TRACING: CPT

## 2021-09-21 PROCEDURE — 86901 BLOOD TYPING SEROLOGIC RH(D): CPT

## 2021-09-21 PROCEDURE — 74177 CT ABD & PELVIS W/CONTRAST: CPT

## 2021-09-21 PROCEDURE — G1004 CDSM NDSC: HCPCS

## 2021-09-21 PROCEDURE — 83036 HEMOGLOBIN GLYCOSYLATED A1C: CPT

## 2021-09-21 PROCEDURE — 80048 BASIC METABOLIC PNL TOTAL CA: CPT

## 2021-09-21 PROCEDURE — 36415 COLL VENOUS BLD VENIPUNCTURE: CPT

## 2021-09-21 RX ADMIN — IOHEXOL 100 ML: 350 INJECTION, SOLUTION INTRAVENOUS at 11:00

## 2021-09-22 LAB
ATRIAL RATE: 75 BPM
P AXIS: 42 DEGREES
PR INTERVAL: 162 MS
QRS AXIS: 34 DEGREES
QRSD INTERVAL: 86 MS
QT INTERVAL: 384 MS
QTC INTERVAL: 428 MS
T WAVE AXIS: 57 DEGREES
VENTRICULAR RATE: 75 BPM

## 2021-09-22 PROCEDURE — 93010 ELECTROCARDIOGRAM REPORT: CPT | Performed by: INTERNAL MEDICINE

## 2021-09-24 ENCOUNTER — HOSPITAL ENCOUNTER (OUTPATIENT)
Dept: ULTRASOUND IMAGING | Facility: CLINIC | Age: 61
Discharge: HOME | End: 2021-09-24
Payer: COMMERCIAL

## 2021-09-24 ENCOUNTER — HOSPITAL ENCOUNTER (OUTPATIENT)
Dept: MAMMOGRAPHY | Facility: CLINIC | Age: 61
Discharge: HOME | End: 2021-09-24
Payer: COMMERCIAL

## 2021-09-24 VITALS — WEIGHT: 293 LBS | BODY MASS INDEX: 44.41 KG/M2 | HEIGHT: 68 IN

## 2021-09-24 DIAGNOSIS — R92.8 ABNORMAL SCREENING MAMMOGRAM: ICD-10-CM

## 2021-09-24 PROCEDURE — 77065 DX MAMMO INCL CAD UNI: CPT

## 2021-09-24 PROCEDURE — G0279 TOMOSYNTHESIS, MAMMO: HCPCS

## 2021-09-24 PROCEDURE — 76642 ULTRASOUND BREAST LIMITED: CPT

## 2021-09-28 ENCOUNTER — TELEPHONE (OUTPATIENT)
Dept: GASTROENTEROLOGY | Facility: HOSPITAL | Age: 61
End: 2021-09-28

## 2021-09-29 ENCOUNTER — HOSPITAL ENCOUNTER (OUTPATIENT)
Dept: GASTROENTEROLOGY | Facility: HOSPITAL | Age: 61
Setting detail: OUTPATIENT SURGERY
Discharge: HOME | End: 2021-09-29
Attending: COLON & RECTAL SURGERY | Admitting: COLON & RECTAL SURGERY
Payer: COMMERCIAL

## 2021-09-29 ENCOUNTER — ANESTHESIA (OUTPATIENT)
Dept: GASTROENTEROLOGY | Facility: HOSPITAL | Age: 61
End: 2021-09-29

## 2021-09-29 ENCOUNTER — ANESTHESIA EVENT (OUTPATIENT)
Dept: GASTROENTEROLOGY | Facility: HOSPITAL | Age: 61
End: 2021-09-29

## 2021-09-29 VITALS
DIASTOLIC BLOOD PRESSURE: 88 MMHG | SYSTOLIC BLOOD PRESSURE: 143 MMHG | OXYGEN SATURATION: 96 % | RESPIRATION RATE: 16 BRPM | TEMPERATURE: 97.7 F | HEART RATE: 75 BPM

## 2021-09-29 DIAGNOSIS — C54.1 ENDOMETRIAL CANCER (HCC): ICD-10-CM

## 2021-09-29 PROCEDURE — 45378 DIAGNOSTIC COLONOSCOPY: CPT | Performed by: COLON & RECTAL SURGERY

## 2021-09-29 PROCEDURE — 99203 OFFICE O/P NEW LOW 30 MIN: CPT | Performed by: COLON & RECTAL SURGERY

## 2021-09-29 RX ORDER — SODIUM CHLORIDE 9 MG/ML
INJECTION, SOLUTION INTRAVENOUS CONTINUOUS PRN
Status: DISCONTINUED | OUTPATIENT
Start: 2021-09-29 | End: 2021-09-29

## 2021-09-29 RX ORDER — LIDOCAINE HYDROCHLORIDE 20 MG/ML
INJECTION, SOLUTION EPIDURAL; INFILTRATION; INTRACAUDAL; PERINEURAL AS NEEDED
Status: DISCONTINUED | OUTPATIENT
Start: 2021-09-29 | End: 2021-09-29

## 2021-09-29 RX ORDER — PROPOFOL 10 MG/ML
INJECTION, EMULSION INTRAVENOUS CONTINUOUS PRN
Status: DISCONTINUED | OUTPATIENT
Start: 2021-09-29 | End: 2021-09-29

## 2021-09-29 RX ORDER — PROPOFOL 10 MG/ML
INJECTION, EMULSION INTRAVENOUS AS NEEDED
Status: DISCONTINUED | OUTPATIENT
Start: 2021-09-29 | End: 2021-09-29

## 2021-09-29 RX ADMIN — PROPOFOL 120 MCG/KG/MIN: 10 INJECTION, EMULSION INTRAVENOUS at 12:05

## 2021-09-29 RX ADMIN — PROPOFOL 30 MG: 10 INJECTION, EMULSION INTRAVENOUS at 12:08

## 2021-09-29 RX ADMIN — PROPOFOL 120 MG: 10 INJECTION, EMULSION INTRAVENOUS at 12:05

## 2021-09-29 RX ADMIN — LIDOCAINE HYDROCHLORIDE 50 MG: 20 INJECTION, SOLUTION EPIDURAL; INFILTRATION; INTRACAUDAL; PERINEURAL at 12:05

## 2021-09-29 RX ADMIN — SODIUM CHLORIDE: 0.9 INJECTION, SOLUTION INTRAVENOUS at 11:42

## 2021-09-29 NOTE — ANESTHESIA PREPROCEDURE EVALUATION
Procedure:  COLONOSCOPY    Relevant Problems   CARDIO   (+) Hypertension   (+) Mixed hyperlipidemia      GI/HEPATIC   (+) GE reflux      GYN   (+) Endometrial cancer (HCC)      MUSCULOSKELETAL   (+) Osteoarthritis      NEURO/PSYCH   (+) Anxiety   (+) Anxiety and depression   (+) Depression      Adequately NPO for procedure  No prior anesthesia complications  Physical Exam    Airway    Mallampati score: II  TM Distance: >3 FB  Neck ROM: full     Dental   No notable dental hx     Cardiovascular  Rhythm: regular, Rate: normal,     Pulmonary  Pulmonary exam normal Breath sounds clear to auscultation,     Other Findings        Anesthesia Plan  ASA Score- 3     Anesthesia Type- IV sedation with anesthesia with ASA Monitors  Additional Monitors:   Airway Plan:     Comment: Spontaneous with supplemental O2  Plan Factors-Exercise tolerance (METS): >4 METS  Chart reviewed  Imaging results reviewed  Existing labs reviewed  Patient summary reviewed  Induction- intravenous  Postoperative Plan-     Informed Consent- Anesthetic plan and risks discussed with patient  I personally reviewed this patient with the CRNA  Discussed and agreed on the Anesthesia Plan with the CRNA  Andre Sandra

## 2021-09-29 NOTE — ANESTHESIA POSTPROCEDURE EVALUATION
Post-Op Assessment Note    CV Status:  Stable  Pain Score: 0    Pain management: adequate     Mental Status:  Alert and awake   Hydration Status:  Euvolemic   PONV Controlled:  Controlled   Airway Patency:  Patent      Post Op Vitals Reviewed: Yes      Staff: CRNA         No complications documented      /88 (09/29/21 1222)    Temp 97 7 °F (36 5 °C) (09/29/21 1222)    Pulse 75 (09/29/21 1222)   Resp 16 (09/29/21 1222)    SpO2 96 % (09/29/21 1222)

## 2021-09-30 ENCOUNTER — APPOINTMENT (OUTPATIENT)
Dept: PREADMISSION TESTING | Facility: HOSPITAL | Age: 61
End: 2021-09-30
Payer: COMMERCIAL

## 2021-10-05 ENCOUNTER — ANESTHESIA EVENT (OUTPATIENT)
Dept: PERIOP | Facility: HOSPITAL | Age: 61
End: 2021-10-05
Payer: COMMERCIAL

## 2021-10-08 ENCOUNTER — ANESTHESIA (OUTPATIENT)
Dept: PERIOP | Facility: HOSPITAL | Age: 61
End: 2021-10-08
Payer: COMMERCIAL

## 2021-10-08 ENCOUNTER — TELEPHONE (OUTPATIENT)
Dept: OTHER | Facility: OTHER | Age: 61
End: 2021-10-08

## 2021-10-08 ENCOUNTER — HOSPITAL ENCOUNTER (OUTPATIENT)
Facility: HOSPITAL | Age: 61
Setting detail: OUTPATIENT SURGERY
Discharge: HOME | End: 2021-10-08
Attending: OBSTETRICS & GYNECOLOGY | Admitting: OBSTETRICS & GYNECOLOGY
Payer: COMMERCIAL

## 2021-10-08 VITALS
HEART RATE: 80 BPM | RESPIRATION RATE: 16 BRPM | OXYGEN SATURATION: 95 % | BODY MASS INDEX: 44.41 KG/M2 | SYSTOLIC BLOOD PRESSURE: 138 MMHG | TEMPERATURE: 97.9 F | DIASTOLIC BLOOD PRESSURE: 66 MMHG | HEIGHT: 68 IN | WEIGHT: 293 LBS

## 2021-10-08 DIAGNOSIS — C54.1 ENDOMETRIAL CARCINOMA (HCC): ICD-10-CM

## 2021-10-08 DIAGNOSIS — C54.1 ENDOMETRIAL CANCER (HCC): ICD-10-CM

## 2021-10-08 PROBLEM — N73.6 PELVIC ADHESIVE DISEASE: Status: ACTIVE | Noted: 2021-10-08

## 2021-10-08 LAB
HBV SURFACE AG SER QL: NORMAL
HCV AB SER QL: NORMAL
HIV 1+2 AB+HIV1 P24 AG SERPL QL IA: NORMAL
HIV1 P24 AG SER QL: NORMAL

## 2021-10-08 PROCEDURE — 88309 TISSUE EXAM BY PATHOLOGIST: CPT | Performed by: PATHOLOGY

## 2021-10-08 PROCEDURE — 88112 CYTOPATH CELL ENHANCE TECH: CPT | Performed by: PATHOLOGY

## 2021-10-08 PROCEDURE — 88307 TISSUE EXAM BY PATHOLOGIST: CPT | Performed by: PATHOLOGY

## 2021-10-08 PROCEDURE — S2900 ROBOTIC SURGICAL SYSTEM: HCPCS | Performed by: OBSTETRICS & GYNECOLOGY

## 2021-10-08 PROCEDURE — 86803 HEPATITIS C AB TEST: CPT | Performed by: OBSTETRICS & GYNECOLOGY

## 2021-10-08 PROCEDURE — 88342 IMHCHEM/IMCYTCHM 1ST ANTB: CPT | Performed by: PATHOLOGY

## 2021-10-08 PROCEDURE — 58571 TLH W/T/O 250 G OR LESS: CPT | Performed by: OBSTETRICS & GYNECOLOGY

## 2021-10-08 PROCEDURE — 38900 IO MAP OF SENT LYMPH NODE: CPT | Performed by: OBSTETRICS & GYNECOLOGY

## 2021-10-08 PROCEDURE — 87806 HIV AG W/HIV1&2 ANTB W/OPTIC: CPT | Performed by: OBSTETRICS & GYNECOLOGY

## 2021-10-08 PROCEDURE — 38571 LAPAROSCOPY LYMPHADENECTOMY: CPT | Performed by: OBSTETRICS & GYNECOLOGY

## 2021-10-08 PROCEDURE — 87340 HEPATITIS B SURFACE AG IA: CPT | Performed by: OBSTETRICS & GYNECOLOGY

## 2021-10-08 PROCEDURE — 88341 IMHCHEM/IMCYTCHM EA ADD ANTB: CPT | Performed by: PATHOLOGY

## 2021-10-08 RX ORDER — CELECOXIB 200 MG/1
200 CAPSULE ORAL ONCE
Status: COMPLETED | OUTPATIENT
Start: 2021-10-08 | End: 2021-10-08

## 2021-10-08 RX ORDER — KETOROLAC TROMETHAMINE 30 MG/ML
INJECTION, SOLUTION INTRAMUSCULAR; INTRAVENOUS AS NEEDED
Status: DISCONTINUED | OUTPATIENT
Start: 2021-10-08 | End: 2021-10-08

## 2021-10-08 RX ORDER — MIDAZOLAM HYDROCHLORIDE 2 MG/2ML
INJECTION, SOLUTION INTRAMUSCULAR; INTRAVENOUS AS NEEDED
Status: DISCONTINUED | OUTPATIENT
Start: 2021-10-08 | End: 2021-10-08

## 2021-10-08 RX ORDER — HEPARIN SODIUM 5000 [USP'U]/ML
7500 INJECTION, SOLUTION INTRAVENOUS; SUBCUTANEOUS
Status: COMPLETED | OUTPATIENT
Start: 2021-10-08 | End: 2021-10-08

## 2021-10-08 RX ORDER — OXYCODONE HYDROCHLORIDE AND ACETAMINOPHEN 5; 325 MG/1; MG/1
1 TABLET ORAL EVERY 4 HOURS PRN
Status: DISCONTINUED | OUTPATIENT
Start: 2021-10-08 | End: 2021-10-08 | Stop reason: HOSPADM

## 2021-10-08 RX ORDER — MAGNESIUM HYDROXIDE 1200 MG/15ML
LIQUID ORAL AS NEEDED
Status: DISCONTINUED | OUTPATIENT
Start: 2021-10-08 | End: 2021-10-08 | Stop reason: HOSPADM

## 2021-10-08 RX ORDER — ACETAMINOPHEN 325 MG/1
975 TABLET ORAL ONCE
Status: COMPLETED | OUTPATIENT
Start: 2021-10-08 | End: 2021-10-08

## 2021-10-08 RX ORDER — GABAPENTIN 100 MG/1
200 CAPSULE ORAL ONCE
Status: COMPLETED | OUTPATIENT
Start: 2021-10-08 | End: 2021-10-08

## 2021-10-08 RX ORDER — FENTANYL CITRATE/PF 50 MCG/ML
25 SYRINGE (ML) INJECTION
Status: DISCONTINUED | OUTPATIENT
Start: 2021-10-08 | End: 2021-10-08 | Stop reason: HOSPADM

## 2021-10-08 RX ORDER — SODIUM CHLORIDE 9 MG/ML
INJECTION, SOLUTION INTRAVENOUS CONTINUOUS PRN
Status: DISCONTINUED | OUTPATIENT
Start: 2021-10-08 | End: 2021-10-08

## 2021-10-08 RX ORDER — HYDROMORPHONE HCL/PF 1 MG/ML
0.5 SYRINGE (ML) INJECTION
Status: DISCONTINUED | OUTPATIENT
Start: 2021-10-08 | End: 2021-10-08 | Stop reason: HOSPADM

## 2021-10-08 RX ORDER — DEXMEDETOMIDINE HYDROCHLORIDE 100 UG/ML
INJECTION, SOLUTION INTRAVENOUS AS NEEDED
Status: DISCONTINUED | OUTPATIENT
Start: 2021-10-08 | End: 2021-10-08

## 2021-10-08 RX ORDER — SODIUM CHLORIDE, SODIUM LACTATE, POTASSIUM CHLORIDE, CALCIUM CHLORIDE 600; 310; 30; 20 MG/100ML; MG/100ML; MG/100ML; MG/100ML
125 INJECTION, SOLUTION INTRAVENOUS CONTINUOUS
Status: DISCONTINUED | OUTPATIENT
Start: 2021-10-08 | End: 2021-10-08 | Stop reason: HOSPADM

## 2021-10-08 RX ORDER — LIDOCAINE HYDROCHLORIDE 20 MG/ML
INJECTION, SOLUTION EPIDURAL; INFILTRATION; INTRACAUDAL; PERINEURAL AS NEEDED
Status: DISCONTINUED | OUTPATIENT
Start: 2021-10-08 | End: 2021-10-08

## 2021-10-08 RX ORDER — FENTANYL CITRATE 50 UG/ML
INJECTION, SOLUTION INTRAMUSCULAR; INTRAVENOUS AS NEEDED
Status: DISCONTINUED | OUTPATIENT
Start: 2021-10-08 | End: 2021-10-08

## 2021-10-08 RX ORDER — EPHEDRINE SULFATE 50 MG/ML
INJECTION INTRAVENOUS AS NEEDED
Status: DISCONTINUED | OUTPATIENT
Start: 2021-10-08 | End: 2021-10-08

## 2021-10-08 RX ORDER — MEPERIDINE HYDROCHLORIDE 25 MG/ML
12.5 INJECTION INTRAMUSCULAR; INTRAVENOUS; SUBCUTANEOUS
Status: DISCONTINUED | OUTPATIENT
Start: 2021-10-08 | End: 2021-10-08 | Stop reason: HOSPADM

## 2021-10-08 RX ORDER — DEXAMETHASONE SODIUM PHOSPHATE 4 MG/ML
INJECTION, SOLUTION INTRA-ARTICULAR; INTRALESIONAL; INTRAMUSCULAR; INTRAVENOUS; SOFT TISSUE AS NEEDED
Status: DISCONTINUED | OUTPATIENT
Start: 2021-10-08 | End: 2021-10-08

## 2021-10-08 RX ORDER — INDOCYANINE GREEN AND WATER 25 MG
KIT INJECTION AS NEEDED
Status: DISCONTINUED | OUTPATIENT
Start: 2021-10-08 | End: 2021-10-08 | Stop reason: HOSPADM

## 2021-10-08 RX ORDER — ONDANSETRON 2 MG/ML
4 INJECTION INTRAMUSCULAR; INTRAVENOUS ONCE AS NEEDED
Status: DISCONTINUED | OUTPATIENT
Start: 2021-10-08 | End: 2021-10-08 | Stop reason: HOSPADM

## 2021-10-08 RX ORDER — ONDANSETRON 2 MG/ML
INJECTION INTRAMUSCULAR; INTRAVENOUS AS NEEDED
Status: DISCONTINUED | OUTPATIENT
Start: 2021-10-08 | End: 2021-10-08

## 2021-10-08 RX ORDER — BUPIVACAINE HYDROCHLORIDE 2.5 MG/ML
INJECTION, SOLUTION EPIDURAL; INFILTRATION; INTRACAUDAL AS NEEDED
Status: DISCONTINUED | OUTPATIENT
Start: 2021-10-08 | End: 2021-10-08 | Stop reason: HOSPADM

## 2021-10-08 RX ORDER — PROPOFOL 10 MG/ML
INJECTION, EMULSION INTRAVENOUS AS NEEDED
Status: DISCONTINUED | OUTPATIENT
Start: 2021-10-08 | End: 2021-10-08

## 2021-10-08 RX ORDER — ROCURONIUM BROMIDE 10 MG/ML
INJECTION, SOLUTION INTRAVENOUS AS NEEDED
Status: DISCONTINUED | OUTPATIENT
Start: 2021-10-08 | End: 2021-10-08

## 2021-10-08 RX ADMIN — DEXAMETHASONE SODIUM PHOSPHATE 8 MG: 4 INJECTION INTRA-ARTICULAR; INTRALESIONAL; INTRAMUSCULAR; INTRAVENOUS; SOFT TISSUE at 08:32

## 2021-10-08 RX ADMIN — FENTANYL CITRATE 100 MCG: 50 INJECTION INTRAMUSCULAR; INTRAVENOUS at 08:32

## 2021-10-08 RX ADMIN — DEXMEDETOMIDINE HCL 8 MCG: 100 INJECTION INTRAVENOUS at 09:16

## 2021-10-08 RX ADMIN — FENTANYL CITRATE 50 MCG: 50 INJECTION INTRAMUSCULAR; INTRAVENOUS at 09:27

## 2021-10-08 RX ADMIN — SODIUM CHLORIDE, SODIUM LACTATE, POTASSIUM CHLORIDE, AND CALCIUM CHLORIDE 125 ML/HR: .6; .31; .03; .02 INJECTION, SOLUTION INTRAVENOUS at 07:20

## 2021-10-08 RX ADMIN — DEXMEDETOMIDINE HCL 12 MCG: 100 INJECTION INTRAVENOUS at 09:04

## 2021-10-08 RX ADMIN — SUGAMMADEX 400 MG: 100 INJECTION, SOLUTION INTRAVENOUS at 10:40

## 2021-10-08 RX ADMIN — SODIUM CHLORIDE: 0.9 INJECTION, SOLUTION INTRAVENOUS at 08:36

## 2021-10-08 RX ADMIN — LIDOCAINE HYDROCHLORIDE 100 MG: 20 INJECTION, SOLUTION EPIDURAL; INFILTRATION; INTRACAUDAL; PERINEURAL at 08:32

## 2021-10-08 RX ADMIN — KETOROLAC TROMETHAMINE 15 MG: 30 INJECTION, SOLUTION INTRAMUSCULAR at 10:40

## 2021-10-08 RX ADMIN — ACETAMINOPHEN 975 MG: 325 TABLET, FILM COATED ORAL at 07:18

## 2021-10-08 RX ADMIN — Medication 3000 MG: at 08:22

## 2021-10-08 RX ADMIN — MIDAZOLAM 2 MG: 1 INJECTION INTRAMUSCULAR; INTRAVENOUS at 08:22

## 2021-10-08 RX ADMIN — HEPARIN SODIUM 7500 UNITS: 5000 INJECTION INTRAVENOUS; SUBCUTANEOUS at 07:51

## 2021-10-08 RX ADMIN — ROCURONIUM BROMIDE 20 MG: 50 INJECTION, SOLUTION INTRAVENOUS at 09:51

## 2021-10-08 RX ADMIN — ROCURONIUM BROMIDE 50 MG: 50 INJECTION, SOLUTION INTRAVENOUS at 09:14

## 2021-10-08 RX ADMIN — DEXMEDETOMIDINE HCL 8 MCG: 100 INJECTION INTRAVENOUS at 09:30

## 2021-10-08 RX ADMIN — DEXMEDETOMIDINE HCL 10 MCG: 100 INJECTION INTRAVENOUS at 09:57

## 2021-10-08 RX ADMIN — DEXMEDETOMIDINE HCL 12 MCG: 100 INJECTION INTRAVENOUS at 09:51

## 2021-10-08 RX ADMIN — PROPOFOL 30 MG: 10 INJECTION, EMULSION INTRAVENOUS at 10:40

## 2021-10-08 RX ADMIN — SODIUM CHLORIDE, SODIUM LACTATE, POTASSIUM CHLORIDE, AND CALCIUM CHLORIDE: .6; .31; .03; .02 INJECTION, SOLUTION INTRAVENOUS at 09:22

## 2021-10-08 RX ADMIN — EPHEDRINE SULFATE 10 MG: 50 INJECTION, SOLUTION INTRAVENOUS at 09:40

## 2021-10-08 RX ADMIN — ROCURONIUM BROMIDE 50 MG: 50 INJECTION, SOLUTION INTRAVENOUS at 08:33

## 2021-10-08 RX ADMIN — PROPOFOL 300 MG: 10 INJECTION, EMULSION INTRAVENOUS at 08:32

## 2021-10-08 RX ADMIN — ONDANSETRON 4 MG: 2 INJECTION INTRAMUSCULAR; INTRAVENOUS at 10:07

## 2021-10-08 RX ADMIN — FENTANYL CITRATE 50 MCG: 50 INJECTION INTRAMUSCULAR; INTRAVENOUS at 10:02

## 2021-10-08 RX ADMIN — CELECOXIB 200 MG: 200 CAPSULE ORAL at 07:19

## 2021-10-08 RX ADMIN — GABAPENTIN 200 MG: 100 CAPSULE ORAL at 07:18

## 2021-10-11 ENCOUNTER — TELEPHONE (OUTPATIENT)
Dept: SURGICAL ONCOLOGY | Facility: CLINIC | Age: 61
End: 2021-10-11

## 2021-11-01 ENCOUNTER — DOCUMENTATION (OUTPATIENT)
Dept: GYNECOLOGIC ONCOLOGY | Facility: CLINIC | Age: 61
End: 2021-11-01

## 2021-11-03 ENCOUNTER — OFFICE VISIT (OUTPATIENT)
Dept: GYNECOLOGIC ONCOLOGY | Facility: CLINIC | Age: 61
End: 2021-11-03

## 2021-11-03 VITALS
WEIGHT: 293 LBS | HEIGHT: 68 IN | TEMPERATURE: 97 F | RESPIRATION RATE: 16 BRPM | BODY MASS INDEX: 44.41 KG/M2 | OXYGEN SATURATION: 96 % | SYSTOLIC BLOOD PRESSURE: 152 MMHG | HEART RATE: 86 BPM | DIASTOLIC BLOOD PRESSURE: 80 MMHG

## 2021-11-03 DIAGNOSIS — C54.1 ENDOMETRIAL CANCER (HCC): Primary | ICD-10-CM

## 2021-11-03 PROBLEM — N73.6 PELVIC ADHESIVE DISEASE: Status: RESOLVED | Noted: 2021-10-08 | Resolved: 2021-11-03

## 2021-11-03 PROCEDURE — 99024 POSTOP FOLLOW-UP VISIT: CPT | Performed by: OBSTETRICS & GYNECOLOGY

## 2021-11-08 DIAGNOSIS — I10 ESSENTIAL HYPERTENSION: ICD-10-CM

## 2021-11-08 RX ORDER — METOPROLOL SUCCINATE 100 MG/1
100 TABLET, EXTENDED RELEASE ORAL
Qty: 30 TABLET | Refills: 0 | Status: SHIPPED | OUTPATIENT
Start: 2021-11-08 | End: 2022-02-28 | Stop reason: SDUPTHER

## 2021-11-10 DIAGNOSIS — F41.9 ANXIETY AND DEPRESSION: Chronic | ICD-10-CM

## 2021-11-10 DIAGNOSIS — F32.A ANXIETY AND DEPRESSION: Chronic | ICD-10-CM

## 2021-11-11 RX ORDER — BUSPIRONE HYDROCHLORIDE 5 MG/1
5 TABLET ORAL
Qty: 90 TABLET | Refills: 0 | Status: SHIPPED | OUTPATIENT
Start: 2021-11-11 | End: 2022-02-28 | Stop reason: SDUPTHER

## 2021-11-11 RX ORDER — CITALOPRAM 20 MG/1
20 TABLET ORAL
Qty: 90 TABLET | Refills: 0 | Status: SHIPPED | OUTPATIENT
Start: 2021-11-11 | End: 2022-02-28 | Stop reason: SDUPTHER

## 2021-11-17 ENCOUNTER — APPOINTMENT (EMERGENCY)
Dept: CT IMAGING | Facility: HOSPITAL | Age: 61
End: 2021-11-17
Payer: COMMERCIAL

## 2021-11-17 ENCOUNTER — HOSPITAL ENCOUNTER (EMERGENCY)
Facility: HOSPITAL | Age: 61
Discharge: HOME | End: 2021-11-17
Attending: EMERGENCY MEDICINE
Payer: COMMERCIAL

## 2021-11-17 VITALS
RESPIRATION RATE: 18 BRPM | DIASTOLIC BLOOD PRESSURE: 75 MMHG | SYSTOLIC BLOOD PRESSURE: 170 MMHG | TEMPERATURE: 98.4 F | HEART RATE: 96 BPM | OXYGEN SATURATION: 96 %

## 2021-11-17 DIAGNOSIS — R10.9 ABDOMINAL PAIN: Primary | ICD-10-CM

## 2021-11-17 LAB
ALBUMIN SERPL BCP-MCNC: 4 G/DL (ref 3.5–5)
ALP SERPL-CCNC: 91 U/L (ref 46–116)
ALT SERPL W P-5'-P-CCNC: 40 U/L (ref 12–78)
ANION GAP SERPL CALCULATED.3IONS-SCNC: 7 MMOL/L (ref 4–13)
AST SERPL W P-5'-P-CCNC: 25 U/L (ref 5–45)
BASOPHILS # BLD AUTO: 0.03 THOUSANDS/ΜL (ref 0–0.1)
BASOPHILS NFR BLD AUTO: 0 % (ref 0–1)
BILIRUB SERPL-MCNC: 1.07 MG/DL (ref 0.2–1)
BILIRUB UR QL STRIP: NEGATIVE
BUN SERPL-MCNC: 18 MG/DL (ref 5–25)
CALCIUM SERPL-MCNC: 8.8 MG/DL (ref 8.3–10.1)
CHLORIDE SERPL-SCNC: 101 MMOL/L (ref 100–108)
CLARITY UR: CLEAR
CO2 SERPL-SCNC: 31 MMOL/L (ref 21–32)
COLOR UR: YELLOW
CREAT SERPL-MCNC: 1.09 MG/DL (ref 0.6–1.3)
EOSINOPHIL # BLD AUTO: 0.23 THOUSAND/ΜL (ref 0–0.61)
EOSINOPHIL NFR BLD AUTO: 3 % (ref 0–6)
ERYTHROCYTE [DISTWIDTH] IN BLOOD BY AUTOMATED COUNT: 14.6 % (ref 11.6–15.1)
GFR SERPL CREATININE-BSD FRML MDRD: 55 ML/MIN/1.73SQ M
GLUCOSE SERPL-MCNC: 133 MG/DL (ref 65–140)
GLUCOSE UR STRIP-MCNC: NEGATIVE MG/DL
HCT VFR BLD AUTO: 46.2 % (ref 34.8–46.1)
HGB BLD-MCNC: 14.6 G/DL (ref 11.5–15.4)
HGB UR QL STRIP.AUTO: NEGATIVE
IMM GRANULOCYTES # BLD AUTO: 0.12 THOUSAND/UL (ref 0–0.2)
IMM GRANULOCYTES NFR BLD AUTO: 2 % (ref 0–2)
KETONES UR STRIP-MCNC: NEGATIVE MG/DL
LEUKOCYTE ESTERASE UR QL STRIP: NEGATIVE
LIPASE SERPL-CCNC: 118 U/L (ref 73–393)
LYMPHOCYTES # BLD AUTO: 1.57 THOUSANDS/ΜL (ref 0.6–4.47)
LYMPHOCYTES NFR BLD AUTO: 22 % (ref 14–44)
MCH RBC QN AUTO: 28.4 PG (ref 26.8–34.3)
MCHC RBC AUTO-ENTMCNC: 31.6 G/DL (ref 31.4–37.4)
MCV RBC AUTO: 90 FL (ref 82–98)
MONOCYTES # BLD AUTO: 0.3 THOUSAND/ΜL (ref 0.17–1.22)
MONOCYTES NFR BLD AUTO: 4 % (ref 4–12)
NEUTROPHILS # BLD AUTO: 4.9 THOUSANDS/ΜL (ref 1.85–7.62)
NEUTS SEG NFR BLD AUTO: 69 % (ref 43–75)
NITRITE UR QL STRIP: NEGATIVE
NRBC BLD AUTO-RTO: 0 /100 WBCS
PH UR STRIP.AUTO: 6 [PH]
PLATELET # BLD AUTO: 220 THOUSANDS/UL (ref 149–390)
PMV BLD AUTO: 11.3 FL (ref 8.9–12.7)
POTASSIUM SERPL-SCNC: 4 MMOL/L (ref 3.5–5.3)
PROT SERPL-MCNC: 7.2 G/DL (ref 6.4–8.2)
PROT UR STRIP-MCNC: NEGATIVE MG/DL
RBC # BLD AUTO: 5.14 MILLION/UL (ref 3.81–5.12)
SODIUM SERPL-SCNC: 139 MMOL/L (ref 136–145)
SP GR UR STRIP.AUTO: 1.02 (ref 1–1.03)
UROBILINOGEN UR QL STRIP.AUTO: 0.2 E.U./DL
WBC # BLD AUTO: 7.15 THOUSAND/UL (ref 4.31–10.16)

## 2021-11-17 PROCEDURE — G1004 CDSM NDSC: HCPCS

## 2021-11-17 PROCEDURE — 96361 HYDRATE IV INFUSION ADD-ON: CPT

## 2021-11-17 PROCEDURE — 80053 COMPREHEN METABOLIC PANEL: CPT | Performed by: EMERGENCY MEDICINE

## 2021-11-17 PROCEDURE — 96375 TX/PRO/DX INJ NEW DRUG ADDON: CPT

## 2021-11-17 PROCEDURE — 99284 EMERGENCY DEPT VISIT MOD MDM: CPT

## 2021-11-17 PROCEDURE — 85025 COMPLETE CBC W/AUTO DIFF WBC: CPT | Performed by: EMERGENCY MEDICINE

## 2021-11-17 PROCEDURE — 81003 URINALYSIS AUTO W/O SCOPE: CPT | Performed by: PHYSICIAN ASSISTANT

## 2021-11-17 PROCEDURE — 36415 COLL VENOUS BLD VENIPUNCTURE: CPT

## 2021-11-17 PROCEDURE — 96374 THER/PROPH/DIAG INJ IV PUSH: CPT

## 2021-11-17 PROCEDURE — NC001 PR NO CHARGE: Performed by: OBSTETRICS & GYNECOLOGY

## 2021-11-17 PROCEDURE — 99285 EMERGENCY DEPT VISIT HI MDM: CPT | Performed by: PHYSICIAN ASSISTANT

## 2021-11-17 PROCEDURE — 83690 ASSAY OF LIPASE: CPT | Performed by: EMERGENCY MEDICINE

## 2021-11-17 PROCEDURE — 74177 CT ABD & PELVIS W/CONTRAST: CPT

## 2021-11-17 RX ORDER — SIMETHICONE 20 MG/.3ML
40 EMULSION ORAL EVERY 6 HOURS PRN
Status: DISCONTINUED | OUTPATIENT
Start: 2021-11-17 | End: 2021-11-17 | Stop reason: HOSPADM

## 2021-11-17 RX ORDER — KETOROLAC TROMETHAMINE 30 MG/ML
15 INJECTION, SOLUTION INTRAMUSCULAR; INTRAVENOUS ONCE
Status: COMPLETED | OUTPATIENT
Start: 2021-11-17 | End: 2021-11-17

## 2021-11-17 RX ORDER — ONDANSETRON 2 MG/ML
4 INJECTION INTRAMUSCULAR; INTRAVENOUS ONCE
Status: COMPLETED | OUTPATIENT
Start: 2021-11-17 | End: 2021-11-17

## 2021-11-17 RX ORDER — DICYCLOMINE HCL 20 MG
20 TABLET ORAL ONCE
Status: COMPLETED | OUTPATIENT
Start: 2021-11-17 | End: 2021-11-17

## 2021-11-17 RX ORDER — MORPHINE SULFATE 4 MG/ML
4 INJECTION, SOLUTION INTRAMUSCULAR; INTRAVENOUS ONCE
Status: COMPLETED | OUTPATIENT
Start: 2021-11-17 | End: 2021-11-17

## 2021-11-17 RX ADMIN — DICYCLOMINE HYDROCHLORIDE 20 MG: 20 TABLET ORAL at 19:59

## 2021-11-17 RX ADMIN — ONDANSETRON 4 MG: 2 INJECTION INTRAMUSCULAR; INTRAVENOUS at 16:21

## 2021-11-17 RX ADMIN — IOHEXOL 100 ML: 350 INJECTION, SOLUTION INTRAVENOUS at 17:14

## 2021-11-17 RX ADMIN — KETOROLAC TROMETHAMINE 15 MG: 30 INJECTION, SOLUTION INTRAMUSCULAR at 18:11

## 2021-11-17 RX ADMIN — SODIUM CHLORIDE 1000 ML: 0.9 INJECTION, SOLUTION INTRAVENOUS at 16:21

## 2021-11-17 RX ADMIN — SIMETHICONE 40 MG: 20 EMULSION ORAL at 20:16

## 2021-11-17 RX ADMIN — MORPHINE SULFATE 4 MG: 4 INJECTION INTRAVENOUS at 19:17

## 2021-11-23 ENCOUNTER — OFFICE VISIT (OUTPATIENT)
Dept: INTERNAL MEDICINE CLINIC | Facility: CLINIC | Age: 61
End: 2021-11-23
Payer: COMMERCIAL

## 2021-11-23 VITALS
WEIGHT: 293 LBS | HEIGHT: 68 IN | DIASTOLIC BLOOD PRESSURE: 82 MMHG | OXYGEN SATURATION: 97 % | TEMPERATURE: 98.6 F | SYSTOLIC BLOOD PRESSURE: 148 MMHG | BODY MASS INDEX: 44.41 KG/M2 | HEART RATE: 90 BPM

## 2021-11-23 DIAGNOSIS — F32.A ANXIETY AND DEPRESSION: Chronic | ICD-10-CM

## 2021-11-23 DIAGNOSIS — R60.0 EDEMA OF BOTH LOWER LEGS: ICD-10-CM

## 2021-11-23 DIAGNOSIS — C54.1 ENDOMETRIAL CANCER (HCC): ICD-10-CM

## 2021-11-23 DIAGNOSIS — E78.2 MIXED HYPERLIPIDEMIA: Chronic | ICD-10-CM

## 2021-11-23 DIAGNOSIS — Z00.00 ANNUAL PHYSICAL EXAM: Primary | ICD-10-CM

## 2021-11-23 DIAGNOSIS — I10 PRIMARY HYPERTENSION: ICD-10-CM

## 2021-11-23 DIAGNOSIS — F41.9 ANXIETY AND DEPRESSION: Chronic | ICD-10-CM

## 2021-11-23 DIAGNOSIS — R10.30 LOWER ABDOMINAL PAIN: ICD-10-CM

## 2021-11-23 DIAGNOSIS — E55.9 VITAMIN D DEFICIENCY: Chronic | ICD-10-CM

## 2021-11-23 PROCEDURE — 99396 PREV VISIT EST AGE 40-64: CPT | Performed by: INTERNAL MEDICINE

## 2021-11-23 PROCEDURE — 3008F BODY MASS INDEX DOCD: CPT | Performed by: INTERNAL MEDICINE

## 2021-11-23 PROCEDURE — 99213 OFFICE O/P EST LOW 20 MIN: CPT | Performed by: INTERNAL MEDICINE

## 2021-11-23 PROCEDURE — 1036F TOBACCO NON-USER: CPT | Performed by: INTERNAL MEDICINE

## 2022-02-28 ENCOUNTER — OFFICE VISIT (OUTPATIENT)
Dept: INTERNAL MEDICINE CLINIC | Facility: CLINIC | Age: 62
End: 2022-02-28
Payer: COMMERCIAL

## 2022-02-28 VITALS
OXYGEN SATURATION: 98 % | WEIGHT: 293 LBS | DIASTOLIC BLOOD PRESSURE: 76 MMHG | BODY MASS INDEX: 52.31 KG/M2 | SYSTOLIC BLOOD PRESSURE: 134 MMHG | HEART RATE: 80 BPM | TEMPERATURE: 98.1 F

## 2022-02-28 DIAGNOSIS — C54.1 ENDOMETRIAL CANCER (HCC): ICD-10-CM

## 2022-02-28 DIAGNOSIS — K21.9 GASTROESOPHAGEAL REFLUX DISEASE WITHOUT ESOPHAGITIS: ICD-10-CM

## 2022-02-28 DIAGNOSIS — E55.9 VITAMIN D DEFICIENCY: Chronic | ICD-10-CM

## 2022-02-28 DIAGNOSIS — E78.2 MIXED HYPERLIPIDEMIA: Chronic | ICD-10-CM

## 2022-02-28 DIAGNOSIS — R21 RASH OF HAND: ICD-10-CM

## 2022-02-28 DIAGNOSIS — B35.3 TINEA PEDIS OF BOTH FEET: ICD-10-CM

## 2022-02-28 DIAGNOSIS — F41.9 ANXIETY AND DEPRESSION: Chronic | ICD-10-CM

## 2022-02-28 DIAGNOSIS — R60.0 EDEMA OF BOTH LOWER LEGS: ICD-10-CM

## 2022-02-28 DIAGNOSIS — I10 ESSENTIAL HYPERTENSION: Primary | ICD-10-CM

## 2022-02-28 DIAGNOSIS — R73.9 HYPERGLYCEMIA: Chronic | ICD-10-CM

## 2022-02-28 DIAGNOSIS — F32.A ANXIETY AND DEPRESSION: Chronic | ICD-10-CM

## 2022-02-28 PROCEDURE — 99214 OFFICE O/P EST MOD 30 MIN: CPT | Performed by: INTERNAL MEDICINE

## 2022-02-28 RX ORDER — METOPROLOL SUCCINATE 100 MG/1
100 TABLET, EXTENDED RELEASE ORAL
Qty: 90 TABLET | Refills: 1 | Status: SHIPPED | OUTPATIENT
Start: 2022-02-28 | End: 2022-06-02 | Stop reason: SDUPTHER

## 2022-02-28 RX ORDER — BUSPIRONE HYDROCHLORIDE 5 MG/1
5 TABLET ORAL
Qty: 90 TABLET | Refills: 0 | Status: SHIPPED | OUTPATIENT
Start: 2022-02-28 | End: 2022-06-02 | Stop reason: SDUPTHER

## 2022-02-28 RX ORDER — CITALOPRAM 20 MG/1
20 TABLET ORAL
Qty: 90 TABLET | Refills: 0 | Status: SHIPPED | OUTPATIENT
Start: 2022-02-28 | End: 2022-06-02 | Stop reason: SDUPTHER

## 2022-02-28 RX ORDER — TRIAMTERENE AND HYDROCHLOROTHIAZIDE 37.5; 25 MG/1; MG/1
1 CAPSULE ORAL EVERY MORNING
Qty: 90 CAPSULE | Refills: 1 | Status: SHIPPED | OUTPATIENT
Start: 2022-02-28

## 2022-02-28 RX ORDER — CLOTRIMAZOLE AND BETAMETHASONE DIPROPIONATE 10; .64 MG/G; MG/G
CREAM TOPICAL 2 TIMES DAILY
Qty: 30 G | Refills: 0 | Status: SHIPPED | OUTPATIENT
Start: 2022-02-28 | End: 2022-06-02 | Stop reason: SDUPTHER

## 2022-02-28 NOTE — PATIENT INSTRUCTIONS
Patient was advised to continue present medications  discussed with the patient medications and laboratory data in detail  Follow-up with me in 3 months or as advised  If any blood test was ordered please do 1 week prior to next appointment unless advise to get earlier    If you have any questions please call the office [phone]

## 2022-02-28 NOTE — PROGRESS NOTES
Assessment/Plan:    1  Essential hypertension  Assessment & Plan:  Blood pressure well controlled  Advised to continue present medication  Advised for low-salt diet  Orders:  -     Basic metabolic panel; Future  -     metoprolol succinate (TOPROL-XL) 100 mg 24 hr tablet; Take 1 tablet (100 mg total) by mouth daily at bedtime  -     triamterene-hydrochlorothiazide (DYAZIDE) 37 5-25 mg per capsule; Take 1 capsule by mouth every morning    2  Anxiety and depression  Assessment & Plan:  Her symptoms are presently controlled on citalopram and buspirone as prescribed  Patient does not want to taper down this medications yet  So will continue same dose for now  Orders:  -     citalopram (CeleXA) 20 mg tablet; Take 1 tablet (20 mg total) by mouth daily at bedtime  -     busPIRone (BUSPAR) 5 mg tablet; Take 1 tablet (5 mg total) by mouth daily at bedtime    3  Gastroesophageal reflux disease without esophagitis  Assessment & Plan:  Symptoms are well controlled on omeprazole as needed  Patient has been watching diet and taking reflux precautions  4  Mixed hyperlipidemia  Assessment & Plan:  Her last cholesterol 201, triglyceride 143,   Patient advised for low-cholesterol low saturated fat diet  Will follow lipid panel  Orders:  -     Lipid panel; Future    5  Vitamin D deficiency  Assessment & Plan:  Her last vitamin-D level was 25 8  Patient has been taking vitamin-D 2000 International Units daily  Will follow vitamin-D level  Orders:  -     Vitamin D 25 hydroxy; Future    6  Edema of both lower legs  Assessment & Plan:  She has nonpitting edema  Most likely lymphedema  Will refer to vascular surgery  Orders:  -     Basic metabolic panel; Future  -     Ambulatory Referral to Vascular Surgery; Future    7  Hyperglycemia  Assessment & Plan:  Her last blood sugar was 133 November 2021  Her last hemoglobin A1c was 5 4  Will follow her blood sugar and hemoglobin A1c    Patient advised for low sugar low carbs diet  Orders:  -     Basic metabolic panel; Future    8  BMI 50 0-59 9, adult [facility])  Assessment & Plan:  Patient  was advised to decrease portion size  Advised to decrease carb, sugar, cholesterol intake  Advised to exercise 3-5 times per week  Advised to lose weight  9  Rash of hand  Assessment & Plan:  Rash in the right hand most likely contact dermatitis and/or eczema  She uses a p r n  Triamcinolone ointment which is effective  Orders:  -     triamcinolone (KENALOG) 0 1 % ointment; Apply topically 2 (two) times a day    10  Tinea pedis of both feet  Assessment & Plan:  Sometimes she gets rash between toes most likely tinea pedis for which she uses Lotrisone cream which is also effective as well  Orders:  -     clotrimazole-betamethasone (LOTRISONE) 1-0 05 % cream; Apply topically 2 (two) times a day    11  Endometrial cancer [facility])  Assessment & Plan:  Patient has been followed by gyn  Subjective:  Patient presents for follow-up  Patient ID: Teena Joiner is a 57 y o  female  HPI  79-year-old white female patient presents for follow-up her medical problems  She denies any chest pain, shortness of breath, pain abdomen  Denies any cough, fever, chills denies any nausea vomiting diarrhea  She gets rash in her right hand and between toes on and off      The following portions of the patient's history were reviewed and updated as appropriate:     Past Medical History:  She has a past medical history of Anxiety, Arthritis, BMI 50 0-59 9, adult ([address] ) (6/1/2021), Cellulitis of left leg (8/17/2021), Depression, Edema of both lower legs (1/23/2021), Edema of both lower legs (11/23/2021), Endometrial cancer ([address] ) (09/14/2021), Heart murmur, History of COVID-19 (11/2020), Hyperglycemia (1/23/2021), Hyperlipidemia, Hypertension, Lower abdominal pain (11/23/2021), Mixed hyperlipidemia (1/23/2021), Numbness and tingling in left arm, Pruritus, Rash of hand (2/28/2022), Shortness of breath, Skin lesion, Tinea pedis of both feet (2022), and Vitamin D deficiency (2021)  ,  _______________________________________________________________________  Past Surgical History:   has a past surgical history that includes  section; pr hysteroscopy,w/endo bx (N/A, 3/21/2016); Replacement total knee (Right);  section; CHOLECYSTECTOMY LAPAROSCOPIC (N/A, 3/3/2019); Joint replacement (2016); Mammo (historical) (10/11/2018); Colonoscopy; Carpal tunnel release (Bilateral); Cholecystectomy; pr laparoscopy w tot hysterectuterus <=250 gram  w tube/ovary (N/A, 10/8/2021); and CYSTOSCOPY (N/A, 10/8/2021)  ,  _______________________________________________________________________  Family History:  family history includes Brain cancer (age of onset: 50) in her maternal aunt; Breast cancer (age of onset: 48) in her maternal aunt; Diabetes in her father; Heart failure in her father; Hemophilia in her brother; Hypertension in her father and mother; Lymphoma (age of onset: 58) in her father; No Known Problems in her daughter, maternal aunt, maternal grandfather, maternal grandmother, paternal grandfather, paternal grandmother, and sister  ,  _______________________________________________________________________  Social History:   reports that she has never smoked  She has never used smokeless tobacco  She reports that she does not drink alcohol and does not use drugs  ,  _______________________________________________________________________  Allergies:  is allergic to griseofulvin, penicillins, and zithromax [azithromycin]     _______________________________________________________________________  Current Outpatient Medications   Medication Sig Dispense Refill    Ascorbic Acid (vitamin C) 1000 MG tablet Take 1,000 mg by mouth daily      busPIRone (BUSPAR) 5 mg tablet Take 1 tablet (5 mg total) by mouth daily at bedtime 90 tablet 0    calcium carbonate (OS-CAMDEN) 600 MG tablet Take 600 mg by mouth daily      Cholecalciferol (Vitamin D) 50 MCG (2000 UT) tablet Take 2,000 Units by mouth daily      citalopram (CeleXA) 20 mg tablet Take 1 tablet (20 mg total) by mouth daily at bedtime 90 tablet 0    fexofenadine (ALLEGRA) 180 MG tablet Take 180 mg by mouth as needed      metoprolol succinate (TOPROL-XL) 100 mg 24 hr tablet Take 1 tablet (100 mg total) by mouth daily at bedtime 90 tablet 1    Multiple Vitamins-Minerals (multivitamin with minerals) tablet Take 1 tablet by mouth daily      omeprazole (PriLOSEC) 20 mg delayed release capsule Take 20 mg by mouth as needed      triamterene-hydrochlorothiazide (DYAZIDE) 37 5-25 mg per capsule Take 1 capsule by mouth every morning 90 capsule 1    vitamin B-12 (VITAMIN B-12) 1,000 mcg tablet Take by mouth daily      clotrimazole-betamethasone (LOTRISONE) 1-0 05 % cream Apply topically 2 (two) times a day 30 g 0    triamcinolone (KENALOG) 0 1 % ointment Apply topically 2 (two) times a day 80 g 0     No current facility-administered medications for this visit      _______________________________________________________________________  Review of Systems   Constitutional: Negative for chills and fever  HENT: Negative for congestion, ear pain, hearing loss, nosebleeds, sinus pain, sore throat and trouble swallowing  Eyes: Negative for discharge, redness and visual disturbance  Respiratory: Negative for cough, chest tightness and shortness of breath  Cardiovascular: Positive for leg swelling  Negative for chest pain and palpitations  Gastrointestinal: Negative for abdominal pain, blood in stool, constipation, diarrhea, nausea and vomiting  Genitourinary: Negative for dysuria, flank pain and hematuria  Musculoskeletal: Negative for arthralgias, myalgias and neck pain  Skin: Positive for rash (Between toes and right hand  )  Negative for color change  Neurological: Negative for dizziness, speech difficulty, weakness and headaches  Hematological: Does not bruise/bleed easily  Psychiatric/Behavioral: Negative for agitation and behavioral problems  Objective:  Vitals:    02/28/22 1636   BP: 134/76   BP Location: Right arm   Patient Position: Sitting   Cuff Size: Adult   Pulse: 80   Temp: 98 1 °F (36 7 °C)   TempSrc: Tympanic   SpO2: 98%   Weight: (!) 156 kg (344 lb)     Body mass index is 52 31 kg/m²  Physical Exam  Vitals and nursing note reviewed  Constitutional:       General: She is not in acute distress  Appearance: Normal appearance  She is normal weight  HENT:      Head: Normocephalic and atraumatic  Right Ear: Ear canal and external ear normal       Left Ear: Ear canal and external ear normal       Nose:      Comments: Patient has a face mask on     Mouth/Throat:      Comments: Patient has a face mask on  Eyes:      General: No scleral icterus  Right eye: No discharge  Left eye: No discharge  Extraocular Movements: Extraocular movements intact  Conjunctiva/sclera: Conjunctivae normal    Cardiovascular:      Rate and Rhythm: Normal rate and regular rhythm  Pulses: Normal pulses  Heart sounds: No murmur heard  Pulmonary:      Effort: Pulmonary effort is normal  No respiratory distress  Breath sounds: Normal breath sounds  Abdominal:      General: Bowel sounds are normal       Palpations: Abdomen is soft  Tenderness: There is no abdominal tenderness  Musculoskeletal:         General: Normal range of motion  Cervical back: Normal range of motion and neck supple  Right lower leg: Edema (Has nonpitting edema) present  Left lower leg: Edema ( has nonpitting edema) present  Skin:     General: Skin is warm  Findings: Rash ( has a patch of dry scaly skin palmar aspect of her right hand ) present  Neurological:      General: No focal deficit present  Mental Status: She is alert and oriented to person, place, and time        Motor: No weakness  Coordination: Coordination normal    Psychiatric:         Mood and Affect: Mood normal          Behavior: Behavior normal        I spent 30 minutes with the patient today    More than 50% time spent for reviewing of external notes, reviewing of the results of diagnostics test, management of care, patient education and ordering of test

## 2022-03-02 PROBLEM — I10 ESSENTIAL HYPERTENSION: Status: ACTIVE | Noted: 2022-03-02

## 2022-03-02 NOTE — ASSESSMENT & PLAN NOTE
Her last blood sugar was 133 November 2021  Her last hemoglobin A1c was 5 4  Will follow her blood sugar and hemoglobin A1c  Patient advised for low sugar low carbs diet

## 2022-03-02 NOTE — ASSESSMENT & PLAN NOTE
Her last cholesterol 201, triglyceride 143,   Patient advised for low-cholesterol low saturated fat diet  Will follow lipid panel

## 2022-03-02 NOTE — ASSESSMENT & PLAN NOTE
Her symptoms are presently controlled on citalopram and buspirone as prescribed  Patient does not want to taper down this medications yet  So will continue same dose for now

## 2022-03-02 NOTE — ASSESSMENT & PLAN NOTE
Symptoms are well controlled on omeprazole as needed  Patient has been watching diet and taking reflux precautions

## 2022-03-02 NOTE — ASSESSMENT & PLAN NOTE
Sometimes she gets rash between toes most likely tinea pedis for which she uses Lotrisone cream which is also effective as well

## 2022-03-02 NOTE — ASSESSMENT & PLAN NOTE
Rash in the right hand most likely contact dermatitis and/or eczema  She uses a p r n  Triamcinolone ointment which is effective

## 2022-03-02 NOTE — ASSESSMENT & PLAN NOTE
Her last vitamin-D level was 25 8  Patient has been taking vitamin-D 2000 International Units daily  Will follow vitamin-D level

## 2022-05-27 ENCOUNTER — TELEPHONE (OUTPATIENT)
Dept: INTERNAL MEDICINE CLINIC | Facility: CLINIC | Age: 62
End: 2022-05-27

## 2022-05-27 NOTE — TELEPHONE ENCOUNTER
Patient having pain in left hip and groin area that is worsening x 1 week  Patient taking advil for pain and is requesting to see you asap  Patient also requesting a placard for parking as well  Do you want her on the schedule for today or next week?      [phone]

## 2022-05-27 NOTE — TELEPHONE ENCOUNTER
Rick Cordero please call her if her pain is localized to  only  left hip and groin most likely osteoarthritis so she can see her orthopedic specialist as soon as possible   if she has a generalized joint pain in other joints then I can  see her to see whether something else causing her pain in the joints

## 2022-05-27 NOTE — TELEPHONE ENCOUNTER
Pt did schedule with ortho - but cannot get in until 6/15 - is using cane due to pain - taking 600 mg of ibuprofen  Also wants a handicapped placard - did give her an apt with you next week and told her I would call her with any cancellations  Will not go to ER or UC

## 2022-05-27 NOTE — TELEPHONE ENCOUNTER
Been having hip pain would like apt today - she said her  mentioned this to you yesterday at his apt      Can you fit her in?

## 2022-06-02 ENCOUNTER — OFFICE VISIT (OUTPATIENT)
Dept: INTERNAL MEDICINE CLINIC | Facility: CLINIC | Age: 62
End: 2022-06-02
Payer: COMMERCIAL

## 2022-06-02 ENCOUNTER — HOSPITAL ENCOUNTER (OUTPATIENT)
Dept: RADIOLOGY | Facility: HOSPITAL | Age: 62
Discharge: HOME | End: 2022-06-02
Attending: INTERNAL MEDICINE
Payer: COMMERCIAL

## 2022-06-02 VITALS
TEMPERATURE: 98.6 F | WEIGHT: 293 LBS | SYSTOLIC BLOOD PRESSURE: 128 MMHG | OXYGEN SATURATION: 97 % | DIASTOLIC BLOOD PRESSURE: 80 MMHG | BODY MASS INDEX: 44.41 KG/M2 | HEART RATE: 74 BPM | RESPIRATION RATE: 14 BRPM | HEIGHT: 68 IN

## 2022-06-02 DIAGNOSIS — E78.2 MIXED HYPERLIPIDEMIA: Chronic | ICD-10-CM

## 2022-06-02 DIAGNOSIS — L30.8 OTHER ECZEMA: ICD-10-CM

## 2022-06-02 DIAGNOSIS — M25.552 LEFT HIP PAIN: ICD-10-CM

## 2022-06-02 DIAGNOSIS — K21.9 GASTROESOPHAGEAL REFLUX DISEASE WITHOUT ESOPHAGITIS: ICD-10-CM

## 2022-06-02 DIAGNOSIS — E55.9 VITAMIN D DEFICIENCY: Chronic | ICD-10-CM

## 2022-06-02 DIAGNOSIS — M25.552 LEFT HIP PAIN: Primary | ICD-10-CM

## 2022-06-02 DIAGNOSIS — R60.0 EDEMA OF BOTH LOWER LEGS: ICD-10-CM

## 2022-06-02 DIAGNOSIS — M79.652 ACUTE PAIN OF LEFT THIGH: ICD-10-CM

## 2022-06-02 DIAGNOSIS — I10 ESSENTIAL HYPERTENSION: ICD-10-CM

## 2022-06-02 DIAGNOSIS — F41.9 ANXIETY AND DEPRESSION: Chronic | ICD-10-CM

## 2022-06-02 DIAGNOSIS — R73.9 HYPERGLYCEMIA: Chronic | ICD-10-CM

## 2022-06-02 DIAGNOSIS — F32.A ANXIETY AND DEPRESSION: Chronic | ICD-10-CM

## 2022-06-02 PROBLEM — L30.9 ECZEMA: Status: ACTIVE | Noted: 2022-06-02

## 2022-06-02 PROCEDURE — 73502 X-RAY EXAM HIP UNI 2-3 VIEWS: CPT

## 2022-06-02 PROCEDURE — 99214 OFFICE O/P EST MOD 30 MIN: CPT | Performed by: INTERNAL MEDICINE

## 2022-06-02 RX ORDER — METOPROLOL SUCCINATE 100 MG/1
100 TABLET, EXTENDED RELEASE ORAL
Qty: 90 TABLET | Refills: 1 | Status: SHIPPED | OUTPATIENT
Start: 2022-06-02

## 2022-06-02 RX ORDER — NAPROXEN 500 MG/1
500 TABLET ORAL 2 TIMES DAILY WITH MEALS
Qty: 20 TABLET | Refills: 0 | Status: SHIPPED | OUTPATIENT
Start: 2022-06-02 | End: 2022-07-07 | Stop reason: ALTCHOICE

## 2022-06-02 RX ORDER — METHYLPREDNISOLONE 4 MG/1
TABLET ORAL
Qty: 21 EACH | Refills: 0 | Status: SHIPPED | OUTPATIENT
Start: 2022-06-02 | End: 2022-07-07 | Stop reason: ALTCHOICE

## 2022-06-02 RX ORDER — CLOBETASOL PROPIONATE 0.5 MG/G
OINTMENT TOPICAL 2 TIMES DAILY
Qty: 45 G | Refills: 0 | Status: SHIPPED | OUTPATIENT
Start: 2022-06-02 | End: 2022-06-29 | Stop reason: SDUPTHER

## 2022-06-02 RX ORDER — BIOTIN 10000 MCG
CAPSULE ORAL
COMMUNITY

## 2022-06-02 RX ORDER — CITALOPRAM 20 MG/1
20 TABLET ORAL
Qty: 90 TABLET | Refills: 0 | Status: SHIPPED | OUTPATIENT
Start: 2022-06-02

## 2022-06-02 RX ORDER — BUSPIRONE HYDROCHLORIDE 5 MG/1
5 TABLET ORAL
Qty: 90 TABLET | Refills: 0 | Status: SHIPPED | OUTPATIENT
Start: 2022-06-02 | End: 2022-07-14

## 2022-06-02 NOTE — PROGRESS NOTES
Assessment/Plan:    1  Left hip pain  Assessment & Plan:  She has a pain in the left buttock area and back of the left upper thigh and left groin area for last 2 weeks without any injury or fall  Initially she tried ibuprofen which was not effective  But she took  2 Aleve which was given by somebody from her work and it was more effective than ibuprofen  She called her orthopedic specialist got appointment to see  after few days  She has been walking with cane  Possible arthritis versus  radiculopathy  AS sometimes she gets low back pain  Although presently she does not have low back pain  Will order x-ray of the left hip  Advised to follow-up with orthopedic specialist   Will start Naprosyn 500 mg p o  b i d  p c  and Medrol Dosepak  Advised not to take any Aleve or ibuprofen with Naprosyn  Advised take omeprazole daily  Orders:  -     naproxen (NAPROSYN) 500 mg tablet; Take 1 tablet (500 mg total) by mouth 2 (two) times a day with meals  -     methylPREDNISolone 4 MG tablet therapy pack; Use as directed on package  -     XR hip/pelv 2-3 vws left if performed; Future; Expected date: 06/02/2022    2  Essential hypertension  Assessment & Plan:  Blood pressure well controlled  Advised to continue present medication  Advised for low-salt diet  Orders:  -     metoprolol succinate (TOPROL-XL) 100 mg 24 hr tablet; Take 1 tablet (100 mg total) by mouth daily at bedtime    3  Anxiety and depression  Assessment & Plan:  Her symptoms are controlled on BuSpar 5 mg daily and citalopram 20 mg daily patient does not want to taper down the dose of her medications  Will continue same medications  No side effect  Orders:  -     busPIRone (BUSPAR) 5 mg tablet; Take 1 tablet (5 mg total) by mouth daily at bedtime  -     citalopram (CeleXA) 20 mg tablet; Take 1 tablet (20 mg total) by mouth daily at bedtime    4  Mixed hyperlipidemia  Assessment & Plan:  Last cholesterol 201, triglyceride 143,     Patient was advised for low-cholesterol low saturated diet  Advised to go for blood test lipid panel  5  Hyperglycemia  Assessment & Plan:  Her last fasting blood sugar 133 and hemoglobin A1c was 5 4  Patient was advised for low sugar low carbs diet  Will check patient's blood sugar  6  Vitamin D deficiency  Assessment & Plan:  Last vitamin-D level was 25 8  Patient has been taking vitamin-D supplement 2000 International Units daily  Will check vitamin-D level      7  Acute pain of left thigh  Assessment & Plan:  Has a pain behind left thigh mainly upper thigh area as mentioned above possibly from her hip arthritis and/or radiculopathy  Orders:  -     naproxen (NAPROSYN) 500 mg tablet; Take 1 tablet (500 mg total) by mouth 2 (two) times a day with meals  -     methylPREDNISolone 4 MG tablet therapy pack; Use as directed on package    8  Gastroesophageal reflux disease without esophagitis  Assessment & Plan:  She takes omeprazole 20 mg p r n     Discussed with the patient since she will be on Naprosyn and prednisone she should take omeprazole 20 mg daily at least for next 2 weeks and then p r n     Continue to watch diet very closely  9  BMI 50 0-59 9, adult [facility])  Assessment & Plan:  Patient  was advised to decrease portion size  Advised to decrease carb, sugar, cholesterol intake  Advised to exercise 3-5 times per week  Advised to lose weight  10  Edema of both lower legs  Assessment & Plan:  She has a nonpitting kind of edema both lower extremities worse on the left than right  Possible lymphedema  Patient to see vascular surgery as scheduled  11  Other eczema  Assessment & Plan:  Patient has a dry scaly rash on right hand marcum aspect  possible contact dermatitis and/or a eczema  Initially triamcinolone was effective but now it does not work so will try clobetasol ointment  Discussed with the patient to see dermatologist   She would like to wait      Orders:  -     clobetasol (TEMOVATE) 0 05 % ointment; Apply topically 2 (two) times a day          Subjective:  Patient presents for follow-up her medical problems and left hip pain  Patient ID: Mir Sanders is a 57 y o  female  HPI   74-year-old white female patient presents for follow-up her medical problems and complain of left hip pain for last 2 weeks  She does not recall any fall or injury  Pain localized to mainly in the left buttock area, left groin, left upper thigh posteriorly  She has been taking ibuprofen but not effective  She tried to only yesterday was somewhat better  She denies any tingling numbness in the leg  She denies any chest pain or shortness of breath or pain in abdomen  Denies any cough fever chills  She has been ambulating with cane due to left hip pain  The following portions of the patient's history were reviewed and updated as appropriate:     Past Medical History:  She has a past medical history of Anxiety, Arthritis, BMI 50 0-59 9, adult ([facility] ) (2021), Cellulitis of left leg (2021), Depression, Eczema (2022), Edema of both lower legs (2021), Edema of both lower legs (2021), Endometrial cancer ([facility] ) (2021), Heart murmur, History of COVID-19 (2020), Hyperglycemia (2021), Hyperlipidemia, Hypertension, Left hip pain (2022), Lower abdominal pain (2021), Mixed hyperlipidemia (2021), Numbness and tingling in left arm, Pruritus, Rash of hand (2022), Shortness of breath, Skin lesion, Tinea pedis of both feet (2022), and Vitamin D deficiency (2021)  ,  _______________________________________________________________________  Past Surgical History:   has a past surgical history that includes  section; pr hysteroscopy,w/endo bx (N/A, 3/21/2016); Replacement total knee (Right);  section; CHOLECYSTECTOMY LAPAROSCOPIC (N/A, 3/3/2019); Joint replacement (2016); Mammo (historical) (10/11/2018); Colonoscopy;  Carpal tunnel release (Bilateral); Cholecystectomy; pr laparoscopy w tot hysterectuterus <=250 gram  w tube/ovary (N/A, 10/8/2021); and CYSTOSCOPY (N/A, 10/8/2021)  ,  _______________________________________________________________________  Family History:  family history includes Brain cancer (age of onset: 50) in her maternal aunt; Breast cancer (age of onset: 48) in her maternal aunt; Diabetes in her father; Heart failure in her father; Hemophilia in her brother; Hypertension in her father and mother; Lymphoma (age of onset: 58) in her father; No Known Problems in her daughter, maternal aunt, maternal grandfather, maternal grandmother, paternal grandfather, paternal grandmother, and sister  ,  _______________________________________________________________________  Social History:   reports that she has never smoked  She has never used smokeless tobacco  She reports that she does not drink alcohol and does not use drugs  ,  _______________________________________________________________________  Allergies:  is allergic to griseofulvin, penicillins, and zithromax [azithromycin]     _______________________________________________________________________  Current Outpatient Medications   Medication Sig Dispense Refill    Ascorbic Acid (vitamin C) 1000 MG tablet Take 1,000 mg by mouth daily      Biotin 10 MG CAPS Take by mouth      busPIRone (BUSPAR) 5 mg tablet Take 1 tablet (5 mg total) by mouth daily at bedtime 90 tablet 0    calcium carbonate (OS-CAMDEN) 600 MG tablet Take 600 mg by mouth daily      Cholecalciferol (Vitamin D) 50 MCG (2000 UT) tablet Take 2,000 Units by mouth daily      citalopram (CeleXA) 20 mg tablet Take 1 tablet (20 mg total) by mouth daily at bedtime 90 tablet 0    clobetasol (TEMOVATE) 0 05 % ointment Apply topically 2 (two) times a day 45 g 0    fexofenadine (ALLEGRA) 180 MG tablet Take 180 mg by mouth as needed      methylPREDNISolone 4 MG tablet therapy pack Use as directed on package 21 each 0    metoprolol succinate (TOPROL-XL) 100 mg 24 hr tablet Take 1 tablet (100 mg total) by mouth daily at bedtime 90 tablet 1    Multiple Vitamins-Minerals (multivitamin with minerals) tablet Take 1 tablet by mouth daily      naproxen (NAPROSYN) 500 mg tablet Take 1 tablet (500 mg total) by mouth 2 (two) times a day with meals 20 tablet 0    omeprazole (PriLOSEC) 20 mg delayed release capsule Take 20 mg by mouth as needed      triamterene-hydrochlorothiazide (DYAZIDE) 37 5-25 mg per capsule Take 1 capsule by mouth every morning 90 capsule 1    vitamin B-12 (VITAMIN B-12) 1,000 mcg tablet Take by mouth daily       No current facility-administered medications for this visit      _______________________________________________________________________  Review of Systems   Constitutional: Negative for chills and fever  HENT: Negative for congestion, ear pain, hearing loss, nosebleeds, sinus pain, sore throat and trouble swallowing  Eyes: Negative for discharge, redness and visual disturbance  Respiratory: Negative for cough, chest tightness and shortness of breath  Cardiovascular: Negative for chest pain and palpitations  Gastrointestinal: Negative for abdominal pain, blood in stool, constipation, diarrhea, nausea and vomiting  Genitourinary: Negative for dysuria, flank pain and hematuria  Musculoskeletal: Positive for arthralgias (Left hip pain)  Negative for myalgias and neck pain  Skin: Negative for color change and rash  Neurological: Negative for dizziness, speech difficulty, weakness, numbness and headaches  Hematological: Does not bruise/bleed easily  Psychiatric/Behavioral: Negative for agitation and behavioral problems             Objective:  Vitals:    06/02/22 1452   BP: 128/80   BP Location: Left arm   Patient Position: Sitting   Cuff Size: Adult   Pulse: 74   Resp: 14   Temp: 98 6 °F (37 °C)   TempSrc: Tympanic   SpO2: 97%   Weight: (!) 152 kg (335 lb)   Height: 5' 8" (1 727 m) Body mass index is 50 94 kg/m²  Physical Exam  Vitals and nursing note reviewed  Constitutional:       General: She is not in acute distress  Appearance: Normal appearance  HENT:      Head: Normocephalic and atraumatic  Right Ear: Ear canal and external ear normal       Left Ear: Ear canal and external ear normal       Nose: Nose normal       Mouth/Throat:      Mouth: Mucous membranes are moist    Eyes:      General: No scleral icterus  Right eye: No discharge  Left eye: No discharge  Extraocular Movements: Extraocular movements intact  Conjunctiva/sclera: Conjunctivae normal    Cardiovascular:      Rate and Rhythm: Normal rate and regular rhythm  Pulses: Normal pulses  Heart sounds: No murmur heard  Pulmonary:      Effort: Pulmonary effort is normal  No respiratory distress  Breath sounds: Normal breath sounds  Abdominal:      General: Bowel sounds are normal       Palpations: Abdomen is soft  Tenderness: There is no abdominal tenderness  Musculoskeletal:         General: Tenderness (Left buttock area, left groin area, left upper thigh area posteriorly  No skin rash noted ) present  Normal range of motion  Cervical back: Normal range of motion and neck supple  No muscular tenderness  Right lower leg: Edema ( nonpitting edema) present  Left lower leg: Edema ( nonpitting edema more on the left than right side) present  Comments: Patient ambulates with cane  Skin:     General: Skin is warm  Findings: No rash  Neurological:      General: No focal deficit present  Mental Status: She is alert and oriented to person, place, and time  Motor: No weakness  Coordination: Coordination normal    Psychiatric:         Mood and Affect: Mood normal          Behavior: Behavior normal          I spent 30 minutes with the patient today    More than 50% time spent for reviewing of external notes, reviewing of the results of diagnostics test, management of care, patient education and ordering of test

## 2022-06-02 NOTE — PATIENT INSTRUCTIONS
Patient was advised to continue present medications  discussed with the patient medications and laboratory data in detail  Follow-up with me in  as advised  If any blood test was ordered please do 1 week prior to next appointment unless advise to get earlier    If you have any questions please call the office [phone]

## 2022-06-03 ENCOUNTER — TELEPHONE (OUTPATIENT)
Dept: INTERNAL MEDICINE CLINIC | Facility: CLINIC | Age: 62
End: 2022-06-03

## 2022-06-03 NOTE — ASSESSMENT & PLAN NOTE
She takes omeprazole 20 mg p r n     Discussed with the patient since she will be on Naprosyn and prednisone she should take omeprazole 20 mg daily at least for next 2 weeks and then p r n     Continue to watch diet very closely

## 2022-06-03 NOTE — ASSESSMENT & PLAN NOTE
Has a pain behind left thigh mainly upper thigh area as mentioned above possibly from her hip arthritis and/or radiculopathy

## 2022-06-03 NOTE — ASSESSMENT & PLAN NOTE
Her last fasting blood sugar 133 and hemoglobin A1c was 5 4  Patient was advised for low sugar low carbs diet  Will check patient's blood sugar

## 2022-06-03 NOTE — ASSESSMENT & PLAN NOTE
Her symptoms are controlled on BuSpar 5 mg daily and citalopram 20 mg daily patient does not want to taper down the dose of her medications  Will continue same medications  No side effect

## 2022-06-03 NOTE — ASSESSMENT & PLAN NOTE
Last cholesterol 201, triglyceride 143,   Patient was advised for low-cholesterol low saturated diet  Advised to go for blood test lipid panel

## 2022-06-03 NOTE — ASSESSMENT & PLAN NOTE
She has a pain in the left buttock area and back of the left upper thigh and left groin area for last 2 weeks without any injury or fall  Initially she tried ibuprofen which was not effective  But she took  2 Aleve which was given by somebody from her work and it was more effective than ibuprofen  She called her orthopedic specialist got appointment to see  after few days  She has been walking with cane  Possible arthritis versus  radiculopathy  AS sometimes she gets low back pain  Although presently she does not have low back pain  Will order x-ray of the left hip  Advised to follow-up with orthopedic specialist   Will start Naprosyn 500 mg p o  b i d  p c  and Medrol Dosepak  Advised not to take any Aleve or ibuprofen with Naprosyn  Advised take omeprazole daily

## 2022-06-03 NOTE — ASSESSMENT & PLAN NOTE
She has a nonpitting kind of edema both lower extremities worse on the left than right  Possible lymphedema  Patient to see vascular surgery as scheduled

## 2022-06-03 NOTE — ASSESSMENT & PLAN NOTE
Patient has a dry scaly rash on right hand marcum aspect  possible contact dermatitis and/or a eczema  Initially triamcinolone was effective but now it does not work so will try clobetasol ointment  Discussed with the patient to see dermatologist   She would like to wait

## 2022-06-03 NOTE — ASSESSMENT & PLAN NOTE
Last vitamin-D level was 25 8  Patient has been taking vitamin-D supplement 2000 International Units daily    Will check vitamin-D level

## 2022-06-03 NOTE — TELEPHONE ENCOUNTER
Patient forgot to ask for a handicap placard when she was in the office yesterday and is requesting that the form be filled out so she can pick it up  She is requesting the form due to hip pain  Is this possible?

## 2022-06-08 ENCOUNTER — TELEPHONE (OUTPATIENT)
Dept: INTERNAL MEDICINE CLINIC | Facility: CLINIC | Age: 62
End: 2022-06-08

## 2022-06-08 NOTE — TELEPHONE ENCOUNTER
Please call [facility] radiology department to get x-ray result of her hip and pelvis

## 2022-06-09 ENCOUNTER — CONSULT (OUTPATIENT)
Dept: VASCULAR SURGERY | Facility: CLINIC | Age: 62
End: 2022-06-09
Payer: COMMERCIAL

## 2022-06-09 VITALS
HEIGHT: 68 IN | WEIGHT: 293 LBS | SYSTOLIC BLOOD PRESSURE: 126 MMHG | HEART RATE: 77 BPM | BODY MASS INDEX: 44.41 KG/M2 | DIASTOLIC BLOOD PRESSURE: 82 MMHG

## 2022-06-09 DIAGNOSIS — R60.0 EDEMA OF BOTH LOWER LEGS: ICD-10-CM

## 2022-06-09 DIAGNOSIS — I89.0 LYMPHEDEMA: Primary | ICD-10-CM

## 2022-06-09 DIAGNOSIS — R09.89 DECREASED PEDAL PULSES: ICD-10-CM

## 2022-06-09 PROCEDURE — 99243 OFF/OP CNSLTJ NEW/EST LOW 30: CPT | Performed by: NURSE PRACTITIONER

## 2022-06-09 NOTE — LETTER
June 9, 2022     [facility], [address]    Patient: Trung Fonseca   YOB: 1960   Date of Visit: 6/9/2022       Dear Dr Candis Montero: Thank you for referring Danuta Garcia to me for evaluation  Below are my notes for this consultation  If you have questions, please do not hesitate to call me  I look forward to following your patient along with you           Sincerely,        JACQUES Smith        CC: No Recipients

## 2022-06-09 NOTE — ASSESSMENT & PLAN NOTE
58-year-old female with HTN, morbid obesity, BMI 51, anxiety depression, ovarian CA s/p hysterectomy with lymphadenectomy 10/8/21, OAD s/p R hip and knee repleacements '16, chronic bilateral lower extremity lymphedema, L>R w/ LLE lymphangitis x2  Patient referred for vascular evaluation   -Stage II lymphedema  -Patient will benefit from pneumatic compression pumps use b i d  for 1 hour each  -Start Tubigrip compression and will formally fitted for prescription compression socks  -Rx 20-30 mmHg compression given   Anticipate difficulty with donning compression due to severe left hip pain/arthristis  -Counseled on weight loss  -Periodic leg elevation  -Moisturize skin daily to maintain skin integrity  -Will check WALTER for baseline perfusion  -Follow up in 1 month to check lymphedema with conservative measures

## 2022-06-09 NOTE — PROGRESS NOTES
Assessment/Plan:    Lymphedema  58-year-old female with HTN, morbid obesity, BMI 51, anxiety depression, ovarian CA s/p hysterectomy with lymphadenectomy 10/8/21, OAD s/p R hip and knee repleacements '16, chronic bilateral lower extremity lymphedema, L>R w/ LLE lymphangitis x2  Patient referred for vascular evaluation   -Stage II lymphedema  -Patient will benefit from pneumatic compression pumps use b i d  for 1 hour each  -Start Tubigrip compression and will formally fitted for prescription compression socks  -Rx 20-30 mmHg compression given  Anticipate difficulty with donning compression due to severe left hip pain/arthristis  -Counseled on weight loss  -Periodic leg elevation  -Moisturize skin daily to maintain skin integrity  -Will check WALTER for baseline perfusion  -Follow up in 1 month to check lymphedema with conservative measures     Decreased pedal pulses  Will check limited arterial duplex for baseline perfusion        Diagnoses and all orders for this visit:    Lymphedema  -     Pneumatic compression pumps  -     Compression Stocking    Edema of both lower legs  -     Ambulatory Referral to Vascular Surgery    Decreased pedal pulses  -     VAS WALTER & waveform analysis, multiple levels; Future          Subjective:      Patient ID: Olga Roth is a 57 y o  female  Pt is new and was referred by Harpreet Rizo MD for lymphedema  Pt c/o kwasi swelling in calf's and ankles  LL>RL  [facility]   Phone: [phone]  Fax: [phone]   E-mail: [email or web address]    Ordering Provider:  Amilcar Cordon (NPI: [id number])  [facility]  [address] [place] [address]  Phone: [phone]  Fax: [phone]      Patient Information  MRN: [id number]   Olga Roth  1960  [address] [id number] [phone]     Insurance Information  Payor: Pascal Blizzard / Plan: MARCO A [facility] EPO / Product Type: Blue Fee for Service /      [id number] - (Blues)     Patient Height and Weight 5' 8" (1 727 m)    Wt Readings from Last 1 Encounters:   06/09/22 (!) 152 kg (335 lb 12 8 oz)       Compression Lymphedema Pump Prescription Form      [x] Patient utilized Compression Garment ?  30 mmHg distally OR Gradient Wrap System    Appliance:     Legs:    [x] Right    [x] Left   Arms:    [] Right    [] Left     []Chest garment    []Abdominal garment     Length of need: 99 months    Protocol:  [x] Std: 40 mmHg, TID/ BID,  60 minutes  [] Other:   Pressures: __ mmHg    Frequency: __ / Day   Minutes/ Session: __ minutes    Patient History and Prognosis:  [x] Patient was diagnosed for the chronic disorder with reported on-set 3-4 years ago  [x] Attempts at elevation have not improved patients' condition and is now at risk of lymphatic disorder progressing to the next stage/ grade  [x] Patient's physical condition/ range of motion limited for exercise  [x] Patient/ Caregiver experienced difficulty applying 30 mmHg distal compression garments  [x] Patient compression stocking/ wrap tolerance limited due to pain/ osteoarthritis left hip   [x] Patient advised to reduce salt intake and adhere to a daily regiment of elevation, compression, and lymphatic exercises  [x] Patient's severe condition will not improve without further treatment interventions  [x] Patient has noted skin changes such as marked hyperkeratosis with hyperplasia and hyperpigmentation, papillomatosis cutis lymphostatica, elephantiasis, and/ or skin breakdown with persisting lymphorrhea    Symptoms/ Observation/ Evaluation/ Plan of Care for Lymphedema / Venous Compression Pumps     Conservative Care ? 4 weeks for severe lymphedema (check all that apply):  Patient instructions for DAILY use of conservative therapies;  [x] Elevate extremities daily and nightly to reduce swelling  [x] Exercise and ambulate / range of motion (ROM) daily to increase fluid flow and reduce swelling  [x] Wear 30-mmHg distal compression garments / wraps daily to reduce / control swelling  [x] Manual lymph drainage (MLD)  [x] On-set date of lymphedema : 2018      Initial Measurements      Body Part Right Left   Ankle / Forearm 30 cm 33 cm   Calf / Elbow  47 cm 49 cm   Knee / Bicep  54 cm 48 cm   Mid-Thigh / Axilla  57 cm 52 cm               HPI  79-year-old female with HTN, morbid obesity, BMI 51, anxiety depression, ovarian CA s/p hysterectomy with lymphadenectomy 10/8/21, OAD s/p R hip and knee repleacements '16, chronic bilateral lower extremity lymphedema, L>R w/ LLE lymphangitis x2  Patient referred for vascular evaluation   Longstanding history of bilateral lower extremity edema, left greater than right for several years, progressively worsening  History of 3 C-sections and more recently hysterectomy with lymphadenectomy  History of left lower extremity lymphangitis/cellulitis twice in the past, last episode was a year ago  Currently no signs of lymphangitis  No weeping wounds or ulcerations  Left hip pain and limited mobility, difficult to apply stockings  Will need left hip replacement  Discoloration at left distal anterior lower leg, mild chronic appearing redness, no concerns for cellulitis or lymphangitis  No history of CHF  The following portions of the patient's history were reviewed and updated as appropriate: allergies, current medications, past family history, past medical history, past social history, past surgical history and problem list   ROS reviewed     Review of Systems   Constitutional: Negative  HENT: Negative  Eyes: Negative  Respiratory: Negative  Cardiovascular: Positive for leg swelling  Gastrointestinal: Negative  Endocrine: Negative  Genitourinary: Negative  Musculoskeletal: Negative  Skin: Negative  Allergic/Immunologic: Negative  Neurological: Negative  Hematological: Negative  Psychiatric/Behavioral: Negative  Objective:    I have reviewed and made appropriate changes to the review of systems input by the medical assistant      Vitals:    22 1147   BP: 126/82   BP Location: Left arm   Patient Position: Sitting   Cuff Size: Large   Pulse: 77   Weight: (!) 152 kg (335 lb 12 8 oz)   Height: 5' 8" (1 727 m)       Patient Active Problem List   Diagnosis    Depression    Osteoarthritis    Anxiety and depression    Mixed hyperlipidemia    Hyperglycemia    Vitamin D deficiency    BMI 50 0-59 9, adult ([facility] )    GE reflux    Anxiety    Lymphedema    Endometrial cancer ([facility] )    Family history of cancer    Encounter for screening mammogram for malignant neoplasm of breast    Colon cancer screening    Edema of both lower legs    Lower abdominal pain    Annual physical exam    Rash of hand    Tinea pedis of both feet    Essential hypertension    Left hip pain    Eczema    Acute pain of left thigh    Decreased pedal pulses       Past Surgical History:   Procedure Laterality Date    CARPAL TUNNEL RELEASE Bilateral      SECTION      x3     SECTION      CHOLECYSTECTOMY      CHOLECYSTECTOMY LAPAROSCOPIC N/A 3/3/2019    Procedure: CHOLECYSTECTOMY LAPAROSCOPIC;  Surgeon: Loli Pizarro MD;  [facility];  Service: General    COLONOSCOPY      CYSTOSCOPY N/A 10/8/2021    Procedure: Cystoscopy;  Surgeon: Stefany Craft MD;  [facility];  Service: Gynecology Oncology    JOINT REPLACEMENT  2016    R total HIp    MAMMO (HISTORICAL)  10/11/2018    Advise for yearly mammo screening    FL HYSTEROSCOPY,W/ENDO BX N/A 3/21/2016    Procedure: FRACTIONAL DILATATION AND CURETTAGE (D&C) WITH HYSTEROSOCPY;  Surgeon: Jarrett Pozo MD;  [facility];  Service: Gynecology    FL LAPAROSCOPY W TOT HYSTERECTUTERUS <=250 GRAM  W TUBE/OVARY N/A 10/8/2021    Procedure: ROBOTIC HYSTERECTOMY, BILATERAL SALPINGOOPHERECTOMY; LEFT EXTERNAL ILIAC SENTINEL LYMPH NODE BIOPSY, RIGHT PELVIC LYMPHADENECTOMY;  Surgeon: Fabiola Aguillon MD;  [facility];  Service: Gynecology Oncology    REPLACEMENT TOTAL KNEE Right        Family History   Problem Relation Age of Onset    Hypertension Mother     Hypertension Father     Heart failure Father     Lymphoma Father 58    Diabetes Father         at old age   Reena Vasquez No Known Problems Sister     No Known Problems Daughter     No Known Problems Maternal Grandmother     No Known Problems Maternal Grandfather     No Known Problems Paternal Grandmother     No Known Problems Paternal Grandfather     Breast cancer Maternal Aunt 48    Brain cancer Maternal Aunt 50    No Known Problems Maternal Aunt     Hemophilia Brother        Social History     Socioeconomic History    Marital status: /Civil Union     Spouse name: Not on file    Number of children: Not on file    Years of education: Not on file    Highest education level: Not on file   Occupational History    Not on file   Tobacco Use    Smoking status: Never Smoker    Smokeless tobacco: Never Used   Vaping Use    Vaping Use: Never used   Substance and Sexual Activity    Alcohol use: No    Drug use: No    Sexual activity: Never     Partners: Male     Comment: pt declines hiv std testing   Other Topics Concern    Not on file   Social History Narrative    Current work/study status: Full-time    Single-family home    Lives with spouse    Private household service  [facility] 2/19/2019     Annual dental checkup: sees q6months    Annual eye exam: Sees as needed    Pap smear: By her gyn, Dr Carrie Amaya    - As per [employer]     Social Determinants of Health     Financial Resource Strain: Not on file   Food Insecurity: Not on file   Transportation Needs: Not on file   Physical Activity: Not on file   Stress: Not on file   Social Connections: Not on file   Intimate Partner Violence: Not on file   Housing Stability: Not on file Allergies   Allergen Reactions    Griseofulvin Hives    Penicillins Hives     She can take cephalosporin without any side effect    Zithromax [Azithromycin] Headache         Current Outpatient Medications:     Ascorbic Acid (vitamin C) 1000 MG tablet, Take 1,000 mg by mouth daily, Disp: , Rfl:     Biotin 10 MG CAPS, Take by mouth, Disp: , Rfl:     busPIRone (BUSPAR) 5 mg tablet, Take 1 tablet (5 mg total) by mouth daily at bedtime, Disp: 90 tablet, Rfl: 0    calcium carbonate (OS-CAMDEN) 600 MG tablet, Take 600 mg by mouth daily, Disp: , Rfl:     Cholecalciferol (Vitamin D) 50 MCG (2000 UT) tablet, Take 2,000 Units by mouth daily, Disp: , Rfl:     citalopram (CeleXA) 20 mg tablet, Take 1 tablet (20 mg total) by mouth daily at bedtime, Disp: 90 tablet, Rfl: 0    clobetasol (TEMOVATE) 0 05 % ointment, Apply topically 2 (two) times a day, Disp: 45 g, Rfl: 0    fexofenadine (ALLEGRA) 180 MG tablet, Take 180 mg by mouth as needed, Disp: , Rfl:     methylPREDNISolone 4 MG tablet therapy pack, Use as directed on package, Disp: 21 each, Rfl: 0    metoprolol succinate (TOPROL-XL) 100 mg 24 hr tablet, Take 1 tablet (100 mg total) by mouth daily at bedtime, Disp: 90 tablet, Rfl: 1    Multiple Vitamins-Minerals (multivitamin with minerals) tablet, Take 1 tablet by mouth daily, Disp: , Rfl:     naproxen (NAPROSYN) 500 mg tablet, Take 1 tablet (500 mg total) by mouth 2 (two) times a day with meals, Disp: 20 tablet, Rfl: 0    omeprazole (PriLOSEC) 20 mg delayed release capsule, Take 20 mg by mouth as needed, Disp: , Rfl:     triamterene-hydrochlorothiazide (DYAZIDE) 37 5-25 mg per capsule, Take 1 capsule by mouth every morning, Disp: 90 capsule, Rfl: 1    vitamin B-12 (VITAMIN B-12) 1,000 mcg tablet, Take by mouth daily, Disp: , Rfl:     /82 (BP Location: Left arm, Patient Position: Sitting, Cuff Size: Large)   Pulse 77   Ht 5' 8" (1 727 m)   Wt (!) 152 kg (335 lb 12 8 oz)   LMP  (LMP Unknown)   BMI 51 06 kg/m²          Physical Exam  Vitals and nursing note reviewed  Constitutional:       Appearance: Normal appearance  Eyes:      Extraocular Movements: Extraocular movements intact  Cardiovascular:      Heart sounds: Normal heart sounds  Pulmonary:      Effort: Pulmonary effort is normal       Breath sounds: Normal breath sounds  Abdominal:      General: Bowel sounds are normal       Palpations: Abdomen is soft  Musculoskeletal:         General: Swelling present  Comments: Stage II bilateral lower extremity lymphedema with skin fibrosis and thickening  No weeping or wounds  Ambulates with cane   Skin:     General: Skin is warm  Neurological:      General: No focal deficit present  Mental Status: She is alert and oriented to person, place, and time     Psychiatric:         Mood and Affect: Mood normal          Behavior: Behavior normal

## 2022-06-09 NOTE — PATIENT INSTRUCTIONS
Lymphedema   AMBULATORY CARE:   The lymphatic system  contains fluid, vessels, tissue, and organs  This system removes and carries fluid throughout the body  It also helps the body fight infection  Lymphedema is the buildup of lymph fluid in fat tissue under your skin  The buildup causes the area to swell  Lymphedema can happen any time that lymphatic vessels are blocked or damaged  Damage to lymphatic vessels may be caused by surgery, infection, injury, cancer, radiation, or scar tissue     Common signs and symptoms include the following:  Signs and symptoms may happen where lymph nodes were removed, or in the arm, leg, chest, or underarm  Swelling or itching    Pain, burning, or aching    Tight, hard, or red skin    Hair loss    Heaviness or fullness    Numbness or tingling    Stiffness    Contact your healthcare provider or lymphedema specialist if:   You have a fever or chills  You have an open area of skin that looks red or swollen, or drains pus  Your symptoms, such as swelling or pain, get worse  Your arms or legs feel heavy, or you cannot move them  Your skin becomes hard, thick, or rough  You have a skin wound that will not heal      Your shoes, clothes, or jewelry feel tighter  You have questions or concerns about your condition or care  Treatment for lymphedema  can relieve symptoms and prevent lymphedema from getting worse  You may need therapeutic massage, physical therapy, or compression devices to help decrease swelling and pain  Surgery may be needed if other treatments do not work  Self-care:   Elevate  your arm or leg above the level of your heart as often as you can  This will help decrease swelling and pain  Prop your arm or leg on pillows or blankets to keep it elevated comfortably  Wear compression socks, sleeves, or bandages  as directed  Compression devices must be fitted by a healthcare provider   Compression devices may need to be replaced every 3 to 6 months  Exercise  can help you maintain or regain function of your arm or leg  Ask your healthcare provider what type of exercise to do and how often to do it  Start slow, take breaks, and gradually do more each day  Do not do vigorous, repeated exercises  Watch for changes in your arm or leg during exercise  Stop and rest if you have swelling, increased pain, or heaviness  Elevate your arm or leg above the level of your heart  Change your position often  to help move lymphatic fluid through your body  Do not sit or  one position for more than 30 minutes  Do not cross your legs when you sit  These actions can cause lymphatic fluid to buildup  Maintain a healthy weight  Ask your healthcare provider what you should weigh  Weight loss may improve your symptoms  If you need to lose weight, your healthcare provider can help you create a weight loss program     Prevent infection with proper skin care:  A skin infection can make lymphedema worse  Do the following to decrease your risk for a skin infection in your arm or leg with lymphedema:  Wash your skin gently and dry it well  Use a mild soap to wash your skin  Gently pat your skin dry after you bathe  Apply a mild cream or lotion to moisturize your skin and prevent dryness or cracking  Keep your feet clean and dry  Protect your skin from injury  Wear gloves when you garden and wash dishes  Cut your nails straight across to prevent injury to your fingers and toes  Use sunscreen and insect repellant to avoid burns and punctures  Wear slippers in the house  Wear shoes when you go outside  Check your skin every day for signs of infection  Signs of infection include redness, swelling, increased heat, or pus  You may also have a fever or chills  Care for cuts, scratches or burns  Apply antibiotic ointment to cuts and other small breaks in the skin  Apply a cold pack or cold water to a burn for 15 minutes  Then wash it with soap and water  Cover scratches, cuts, or burns with a clean, dry gauze or bandage as directed  Keep the area clean and dry  Tell healthcare providers that you have lymphedema  Tell them not to do, IVs, blood draws, and blood pressure readings in the arm or leg with lymphedema  If there is lymphedema in both arms, ask them to take your blood pressure on your leg  Do not allow flu shots or vaccinations in your arm with lymphedema  Follow up with your healthcare provider or lymphedema specialist as directed: You will need regular visits so healthcare providers can examine the affected areas  You may also be referred to a clinic that specializes in lymphedema treatment  Write down your questions so you remember to ask them during your visits  © Copyright [employer] 2022 Information is for End User's use only and may not be sold, redistributed or otherwise used for commercial purposes  All illustrations and images included in CareNotes® are the copyrighted property of A D A M , Inc  or [facility] Rita Meeks   The above information is an  only  It is not intended as medical advice for individual conditions or treatments  Talk to your doctor, nurse or pharmacist before following any medical regimen to see if it is safe and effective for you

## 2022-06-21 ENCOUNTER — RA CDI HCC (OUTPATIENT)
Dept: OTHER | Facility: HOSPITAL | Age: 62
End: 2022-06-21

## 2022-06-21 NOTE — PROGRESS NOTES
Please review if the following dx  is applicable to the patient's condition and assess and document, if applicable in next visit on 07/07/2022    E66 01: Morbid (severe) obesity due to excess calories ([address] ) -     Per CMS/ICD 10 coding guidelines, BMI of 40 or higher; Class 3 obesity is sometimes categorized as "morbid," "extreme," or "severe" obesity      [address]  coding opportunities          Chart Reviewed number of suggestions sent to Provider: 1     Patients Insurance        Commercial Insurance: [address] [place] [address]

## 2022-06-28 ENCOUNTER — APPOINTMENT (OUTPATIENT)
Dept: LAB | Facility: HOSPITAL | Age: 62
End: 2022-06-28
Attending: INTERNAL MEDICINE
Payer: COMMERCIAL

## 2022-06-28 ENCOUNTER — TELEPHONE (OUTPATIENT)
Dept: GYNECOLOGIC ONCOLOGY | Facility: CLINIC | Age: 62
End: 2022-06-28

## 2022-06-28 ENCOUNTER — OFFICE VISIT (OUTPATIENT)
Dept: OBGYN CLINIC | Facility: HOSPITAL | Age: 62
End: 2022-06-28
Payer: COMMERCIAL

## 2022-06-28 VITALS
HEART RATE: 74 BPM | WEIGHT: 293 LBS | DIASTOLIC BLOOD PRESSURE: 82 MMHG | BODY MASS INDEX: 44.41 KG/M2 | SYSTOLIC BLOOD PRESSURE: 157 MMHG | HEIGHT: 68 IN

## 2022-06-28 DIAGNOSIS — E78.2 MIXED HYPERLIPIDEMIA: Chronic | ICD-10-CM

## 2022-06-28 DIAGNOSIS — M70.62 TROCHANTERIC BURSITIS OF LEFT HIP: ICD-10-CM

## 2022-06-28 DIAGNOSIS — M16.12 PRIMARY OSTEOARTHRITIS OF ONE HIP, LEFT: Primary | ICD-10-CM

## 2022-06-28 DIAGNOSIS — E55.9 VITAMIN D DEFICIENCY: Chronic | ICD-10-CM

## 2022-06-28 DIAGNOSIS — R73.9 HYPERGLYCEMIA: Chronic | ICD-10-CM

## 2022-06-28 DIAGNOSIS — I10 ESSENTIAL HYPERTENSION: ICD-10-CM

## 2022-06-28 DIAGNOSIS — R60.0 EDEMA OF BOTH LOWER LEGS: ICD-10-CM

## 2022-06-28 LAB
25(OH)D3 SERPL-MCNC: 44 NG/ML (ref 30–100)
ANION GAP SERPL CALCULATED.3IONS-SCNC: 7 MMOL/L (ref 4–13)
BUN SERPL-MCNC: 19 MG/DL (ref 5–25)
CALCIUM SERPL-MCNC: 9.7 MG/DL (ref 8.4–10.2)
CHLORIDE SERPL-SCNC: 102 MMOL/L (ref 96–108)
CHOLEST SERPL-MCNC: 191 MG/DL
CO2 SERPL-SCNC: 30 MMOL/L (ref 21–32)
CREAT SERPL-MCNC: 0.92 MG/DL (ref 0.6–1.3)
GFR SERPL CREATININE-BSD FRML MDRD: 66 ML/MIN/1.73SQ M
GLUCOSE P FAST SERPL-MCNC: 109 MG/DL (ref 65–99)
HDLC SERPL-MCNC: 45 MG/DL
LDLC SERPL CALC-MCNC: 114 MG/DL (ref 0–100)
NONHDLC SERPL-MCNC: 146 MG/DL
POTASSIUM SERPL-SCNC: 4.6 MMOL/L (ref 3.5–5.3)
SODIUM SERPL-SCNC: 139 MMOL/L (ref 135–147)
TRIGL SERPL-MCNC: 159 MG/DL

## 2022-06-28 PROCEDURE — 3008F BODY MASS INDEX DOCD: CPT | Performed by: ORTHOPAEDIC SURGERY

## 2022-06-28 PROCEDURE — 99204 OFFICE O/P NEW MOD 45 MIN: CPT | Performed by: ORTHOPAEDIC SURGERY

## 2022-06-28 PROCEDURE — 82306 VITAMIN D 25 HYDROXY: CPT

## 2022-06-28 PROCEDURE — 80061 LIPID PANEL: CPT

## 2022-06-28 PROCEDURE — 36415 COLL VENOUS BLD VENIPUNCTURE: CPT

## 2022-06-28 PROCEDURE — 20610 DRAIN/INJ JOINT/BURSA W/O US: CPT | Performed by: ORTHOPAEDIC SURGERY

## 2022-06-28 PROCEDURE — 80048 BASIC METABOLIC PNL TOTAL CA: CPT

## 2022-06-28 PROCEDURE — 1036F TOBACCO NON-USER: CPT | Performed by: ORTHOPAEDIC SURGERY

## 2022-06-28 RX ORDER — LIDOCAINE HYDROCHLORIDE 10 MG/ML
2 INJECTION, SOLUTION INFILTRATION; PERINEURAL
Status: COMPLETED | OUTPATIENT
Start: 2022-06-28 | End: 2022-06-28

## 2022-06-28 RX ORDER — BUPIVACAINE HYDROCHLORIDE 2.5 MG/ML
2 INJECTION, SOLUTION INFILTRATION; PERINEURAL
Status: COMPLETED | OUTPATIENT
Start: 2022-06-28 | End: 2022-06-28

## 2022-06-28 RX ORDER — BETAMETHASONE SODIUM PHOSPHATE AND BETAMETHASONE ACETATE 3; 3 MG/ML; MG/ML
12 INJECTION, SUSPENSION INTRA-ARTICULAR; INTRALESIONAL; INTRAMUSCULAR; SOFT TISSUE
Status: COMPLETED | OUTPATIENT
Start: 2022-06-28 | End: 2022-06-28

## 2022-06-28 RX ADMIN — LIDOCAINE HYDROCHLORIDE 2 ML: 10 INJECTION, SOLUTION INFILTRATION; PERINEURAL at 08:31

## 2022-06-28 RX ADMIN — BETAMETHASONE SODIUM PHOSPHATE AND BETAMETHASONE ACETATE 12 MG: 3; 3 INJECTION, SUSPENSION INTRA-ARTICULAR; INTRALESIONAL; INTRAMUSCULAR; SOFT TISSUE at 08:31

## 2022-06-28 RX ADMIN — BUPIVACAINE HYDROCHLORIDE 2 ML: 2.5 INJECTION, SOLUTION INFILTRATION; PERINEURAL at 08:31

## 2022-06-28 NOTE — PROGRESS NOTES
Assessment:  1  Primary osteoarthritis of one hip, left  FL injection left hip (non arthrogram)   2  Trochanteric bursitis of left hip         Plan:  Left hip osteoarthritis with left trochanteric bursitis  The patient was provided with left trochanteric bursa steroid injection  The patient tolerated the procedure well  She will schedule left IA hip steroid injection under imaging  The patient is a candidate for left total hip arthroplasty once she would achieve a 40 BMI yet she would need to loose nearly 70lbs to attain 265 body weight  This is discussed with patient  The patient should follow up in 3 months  To do next visit:  Return in about 3 months (around 9/28/2022)  The above stated was discussed in layman's terms and the patient expressed understanding  All questions were answered to the patient's satisfaction  Scribe Attestation    I,:  Mehnaz Goncalves am acting as a scribe while in the presence of the attending physician :       I,:  Stas Ocampo MD personally performed the services described in this documentation    as scribed in my presence :             Subjective:   Amna Augustin is a 57 y o  female who presents for initial evaluation of left hip with history of right total hip and knee  Today she complains of anterior groin pain with greater lateral left hip pain  Weightbearing and prolonged walking aggravates while rest alleviates  She has used Naproxen with benefit  She does use a cane for ambulation          Review of systems negative unless otherwise specified in HPI    Past Medical History:   Diagnosis Date    Anxiety     Arthritis     BMI 50 0-59 9, adult ([facility] ) 6/1/2021    Cellulitis of left leg 8/17/2021    Depression     Eczema 6/2/2022    Edema of both lower legs 1/23/2021    Edema of both lower legs 11/23/2021    Endometrial cancer ([facility] ) 09/14/2021    Heart murmur     History of COVID-19 11/2020    Mild sypmtoms Cough,Tired,diarrhea,sweats, Loss taste and smell    Hyperglycemia 2021    Hyperlipidemia     Hypertension     Left hip pain 2022    Lower abdominal pain 2021    Mixed hyperlipidemia 2021    Numbness and tingling in left arm     Pruritus     Rash of hand 2022    Shortness of breath     Skin lesion     Tinea pedis of both feet 2022    Vitamin D deficiency 2021       Past Surgical History:   Procedure Laterality Date    CARPAL TUNNEL RELEASE Bilateral      SECTION      x3     SECTION      CHOLECYSTECTOMY      CHOLECYSTECTOMY LAPAROSCOPIC N/A 3/3/2019    Procedure: CHOLECYSTECTOMY LAPAROSCOPIC;  Surgeon: Phyllis Pelaez MD;  [facility];  Service: General    COLONOSCOPY      CYSTOSCOPY N/A 10/8/2021    Procedure: Cystoscopy;  Surgeon: Ana Leal MD;  [facility];  Service: Gynecology Oncology    JOINT REPLACEMENT  2016    R total HIp    MAMMO (HISTORICAL)  10/11/2018    Advise for yearly mammo screening    NM HYSTEROSCOPY,W/ENDO BX N/A 3/21/2016    Procedure: FRACTIONAL DILATATION AND CURETTAGE (D&C) WITH HYSTEROSOCPY;  Surgeon: Marcia Moreno MD;  [facility];  Service: Gynecology    NM LAPAROSCOPY W TOT HYSTERECTUTERUS <=250 GRAM  W TUBE/OVARY N/A 10/8/2021    Procedure: ROBOTIC HYSTERECTOMY, BILATERAL SALPINGOOPHERECTOMY; LEFT EXTERNAL ILIAC SENTINEL LYMPH NODE BIOPSY, RIGHT PELVIC LYMPHADENECTOMY;  Surgeon: Ana Leal MD;  [facility];  Service: Gynecology Oncology    REPLACEMENT TOTAL KNEE Right        Family History   Problem Relation Age of Onset    Hypertension Mother     Hypertension Father     Heart failure Father     Lymphoma Father 58    Diabetes Father         at old age   [facility] No Known Problems Sister     No Known Problems Daughter     No Known Problems Maternal Grandmother     No Known Problems Maternal Grandfather     No Known Problems Paternal Grandmother     No Known Problems Paternal Grandfather  Breast cancer Maternal Aunt 48    Brain cancer Maternal Aunt 50    No Known Problems Maternal Aunt     Hemophilia Brother        Social History     Occupational History    Not on file   Tobacco Use    Smoking status: Never Smoker    Smokeless tobacco: Never Used   Vaping Use    Vaping Use: Never used   Substance and Sexual Activity    Alcohol use: No    Drug use: No    Sexual activity: Never     Partners: Male     Comment: pt declines hiv std testing         Current Outpatient Medications:     Ascorbic Acid (vitamin C) 1000 MG tablet, Take 1,000 mg by mouth daily, Disp: , Rfl:     Biotin 10 MG CAPS, Take by mouth, Disp: , Rfl:     busPIRone (BUSPAR) 5 mg tablet, Take 1 tablet (5 mg total) by mouth daily at bedtime, Disp: 90 tablet, Rfl: 0    calcium carbonate (OS-CAMDEN) 600 MG tablet, Take 600 mg by mouth daily, Disp: , Rfl:     Cholecalciferol (Vitamin D) 50 MCG (2000 UT) tablet, Take 2,000 Units by mouth daily, Disp: , Rfl:     citalopram (CeleXA) 20 mg tablet, Take 1 tablet (20 mg total) by mouth daily at bedtime, Disp: 90 tablet, Rfl: 0    clobetasol (TEMOVATE) 0 05 % ointment, Apply topically 2 (two) times a day, Disp: 45 g, Rfl: 0    fexofenadine (ALLEGRA) 180 MG tablet, Take 180 mg by mouth as needed, Disp: , Rfl:     methylPREDNISolone 4 MG tablet therapy pack, Use as directed on package, Disp: 21 each, Rfl: 0    metoprolol succinate (TOPROL-XL) 100 mg 24 hr tablet, Take 1 tablet (100 mg total) by mouth daily at bedtime, Disp: 90 tablet, Rfl: 1    Multiple Vitamins-Minerals (multivitamin with minerals) tablet, Take 1 tablet by mouth daily, Disp: , Rfl:     naproxen (NAPROSYN) 500 mg tablet, Take 1 tablet (500 mg total) by mouth 2 (two) times a day with meals, Disp: 20 tablet, Rfl: 0    omeprazole (PriLOSEC) 20 mg delayed release capsule, Take 20 mg by mouth as needed, Disp: , Rfl:     triamterene-hydrochlorothiazide (DYAZIDE) 37 5-25 mg per capsule, Take 1 capsule by mouth every morning, Disp: 90 capsule, Rfl: 1    vitamin B-12 (VITAMIN B-12) 1,000 mcg tablet, Take by mouth daily, Disp: , Rfl:     Allergies   Allergen Reactions    Griseofulvin Hives    Penicillins Hives     She can take cephalosporin without any side effect    Zithromax [Azithromycin] Headache            Vitals:    06/28/22 0756   BP: 157/82   Pulse: 74       Objective:  Physical exam  · General: Awake, Alert, Oriented  · Eyes: Pupils equal, round and reactive to light  · Heart: regular rate and rhythm  · Lungs: No audible wheezing  · Abdomen: soft                    Ortho Exam  Left hip:  TTP over greater trochanter  Groin pain with IR  Patient sits comfortably in chair with hip flexed at 90 degrees  Patient stands from seated position without assistance  Calf compartments soft and supple  Sensation intact  Toes are warm sensate and mobile      Diagnostics, reviewed and taken today if performed as documented: The attending physician has personally reviewed the pertinent films in PACS and interpretation is as follows:  Left hip x-ray: Moderate left hip osteoarthritis and stable right toal hip arthroplasty  Procedures, if performed today:    Large joint arthrocentesis: L greater trochanteric bursa  Universal Protocol:  Consent: Verbal consent obtained  Risks and benefits: risks, benefits and alternatives were discussed  Consent given by: patient  Time out: Immediately prior to procedure a "time out" was called to verify the correct patient, procedure, equipment, support staff and site/side marked as required    Timeout called at: 6/28/2022 8:29 AM   Patient understanding: patient states understanding of the procedure being performed  Site marked: the operative site was marked  Patient identity confirmed: verbally with patient    Supporting Documentation  Indications: pain   Procedure Details  Location: hip - L greater trochanteric bursa  Needle size: 22 G  Ultrasound guidance: no  Approach: lateral  Medications administered: 12 mg betamethasone acetate-betamethasone sodium phosphate 6 (3-3) mg/mL; 2 mL bupivacaine 0 25 %; 2 mL lidocaine 1 %    Patient tolerance: patient tolerated the procedure well with no immediate complications  Dressing:  Sterile dressing applied              Portions of the record may have been created with voice recognition software  Occasional wrong word or "sound a like" substitutions may have occurred due to the inherent limitations of voice recognition software  Read the chart carefully and recognize, using context, where substitutions have occurred

## 2022-06-28 NOTE — TELEPHONE ENCOUNTER
Patient called to R/S her appointment w/ Lazaro Alvarez as she is having issues with her hip  She is R/S until 9/29/22

## 2022-06-29 DIAGNOSIS — L30.8 OTHER ECZEMA: ICD-10-CM

## 2022-06-29 RX ORDER — CLOBETASOL PROPIONATE 0.5 MG/G
OINTMENT TOPICAL 2 TIMES DAILY
Qty: 45 G | Refills: 0 | Status: SHIPPED | OUTPATIENT
Start: 2022-06-29

## 2022-07-06 NOTE — NURSING NOTE
Call placed to patient to discuss upcoming fluoro guided left hip at [address] [place] [address] Radiology  Allergies reviewed and verified patient does not currently take any anticoagulant medications  Pre procedure instructions including diet and taking own medications discussed with patient  Patient instructed that she may eat normally and take medications as usual before the procedure  Procedure and post procedure expectations and instructions reviewed with the patient  Patient verbalizes understanding and denies any questions at this time  Reminded of the location, date and time of the expected procedure  Name and contact number provided in case patient has any further questions

## 2022-07-07 ENCOUNTER — OFFICE VISIT (OUTPATIENT)
Dept: INTERNAL MEDICINE CLINIC | Facility: CLINIC | Age: 62
End: 2022-07-07
Payer: COMMERCIAL

## 2022-07-07 VITALS
SYSTOLIC BLOOD PRESSURE: 132 MMHG | DIASTOLIC BLOOD PRESSURE: 75 MMHG | HEIGHT: 68 IN | OXYGEN SATURATION: 96 % | RESPIRATION RATE: 12 BRPM | HEART RATE: 90 BPM | BODY MASS INDEX: 44.41 KG/M2 | WEIGHT: 293 LBS | TEMPERATURE: 98.6 F

## 2022-07-07 DIAGNOSIS — R73.9 HYPERGLYCEMIA: Chronic | ICD-10-CM

## 2022-07-07 DIAGNOSIS — J30.89 SEASONAL ALLERGIC RHINITIS DUE TO OTHER ALLERGIC TRIGGER: ICD-10-CM

## 2022-07-07 DIAGNOSIS — E78.2 MIXED HYPERLIPIDEMIA: Chronic | ICD-10-CM

## 2022-07-07 DIAGNOSIS — K21.9 GASTROESOPHAGEAL REFLUX DISEASE WITHOUT ESOPHAGITIS: ICD-10-CM

## 2022-07-07 DIAGNOSIS — F41.9 ANXIETY AND DEPRESSION: Chronic | ICD-10-CM

## 2022-07-07 DIAGNOSIS — I10 ESSENTIAL HYPERTENSION: Primary | ICD-10-CM

## 2022-07-07 DIAGNOSIS — E55.9 VITAMIN D DEFICIENCY: Chronic | ICD-10-CM

## 2022-07-07 DIAGNOSIS — M25.552 LEFT HIP PAIN: ICD-10-CM

## 2022-07-07 DIAGNOSIS — F32.A ANXIETY AND DEPRESSION: Chronic | ICD-10-CM

## 2022-07-07 DIAGNOSIS — I89.0 LYMPHEDEMA: ICD-10-CM

## 2022-07-07 PROBLEM — J30.9 ALLERGIC RHINITIS: Status: ACTIVE | Noted: 2022-07-07

## 2022-07-07 PROCEDURE — 99214 OFFICE O/P EST MOD 30 MIN: CPT | Performed by: INTERNAL MEDICINE

## 2022-07-07 NOTE — ASSESSMENT & PLAN NOTE
Cholesterol decreased from 201-191 triglyceride increased from 143-159 LDL decreased from 190-114 HDL decreased from 53-45  Patient advised to continue low-cholesterol, low saturated fat diet  Advised to lose weight  Patient cannot exercise due to her left hip and left groin pain

## 2022-07-07 NOTE — ASSESSMENT & PLAN NOTE
Her fast blood sugar 109  Her hemoglobin A1c was 5 3 September 2021  Advised to watch diet for sugar and carbs intake

## 2022-07-07 NOTE — PROGRESS NOTES
Assessment/Plan:    1  Essential hypertension  Assessment & Plan:  Blood pressure well controlled  Advised to continue present medication  Advised for low-salt diet  2  Anxiety and depression  Assessment & Plan:  Her symptoms are controlled on buspirone and citalopram   Patient would like to continue same dose of both of her medications as it is effective and has no side effects  She is not ready to taper down medications yet  So will continue same medications  3  Mixed hyperlipidemia  Assessment & Plan:  Cholesterol decreased from 201-191 triglyceride increased from 143-159 LDL decreased from 190-114 HDL decreased from 53-45  Patient advised to continue low-cholesterol, low saturated fat diet  Advised to lose weight  Patient cannot exercise due to her left hip and left groin pain  4  Vitamin D deficiency  Assessment & Plan:  Vitamin-D deficiency resolved after being on vitamin-D supplement  Vitamin-D level increased from 25 8-44 so advised to continue same dose of vitamin-D daily  5  Hyperglycemia  Assessment & Plan:  Her fast blood sugar 109  Her hemoglobin A1c was 5 3 September 2021  Advised to watch diet for sugar and carbs intake  6  Lymphedema  Assessment & Plan:  She was seen by vascular surgery  She was advised to start pneumatic compression pump b i d  7  BMI 50 0-59 9, adult ([facility] )    8  Gastroesophageal reflux disease without esophagitis  Assessment & Plan:  She takes only p r n  omeprazole depending on the foods he eats  Advised to watch diet for spicy foods, caffeinated beverages, alcoholic beverages, soda, citrus fluids and foods  Advised for reflux precautions  9  Left hip pain  Assessment & Plan:  She was seen by orthopedic specialist had injection in the left hip  She will be going for another injection in left groin area per patient  Patient has been ambulating with cane    Pain is improving but still persist       10  Seasonal allergic rhinitis due to other allergic trigger  Assessment & Plan:  She takes Allegra p r n  which is effective  BMI Counseling: Body mass index is 50 48 kg/m²  The BMI is above normal  Nutrition recommendations include decreasing portion sizes, decreasing fast food intake, consuming healthier snacks, limiting drinks that contain sugar, moderation in carbohydrate intake, reducing intake of saturated and trans fat and reducing intake of cholesterol  Exercise recommendations include exercising 3-5 times per week  No pharmacotherapy was ordered  Rationale for BMI follow-up plan is due to patient being overweight or obese  presently she cannot exercise due to pain in her left hip and left groin area pain  Subjective:  Patient presents for follow-up her medical problems  Patient ID: Donzell Runner is a 57 y o  female  HPI   79-year-old white female patient presents for follow-up her medical problems  She denies any chest pain, shortness a of breath, pain in abdomen  Denies any cough, fever, chills  Denies any nausea vomiting, diarrhea  She has left hip and left groin pain for which she was seen by orthopedic specialist   She got injection in her left hip  She will be going for injection at her left groin area per patient  She requires cane for ambulation due to pain in the left hip/ groin area      The following portions of the patient's history were reviewed and updated as appropriate:     Past Medical History:  She has a past medical history of Allergic rhinitis (7/7/2022), Anxiety, Arthritis, BMI 50 0-59 9, adult ([facility] ) (6/1/2021), Cellulitis of left leg (8/17/2021), Depression, Eczema (6/2/2022), Edema of both lower legs (1/23/2021), Edema of both lower legs (11/23/2021), Endometrial cancer ([facility] ) (09/14/2021), Heart murmur, History of COVID-19 (11/2020), Hyperglycemia (1/23/2021), Hyperlipidemia, Hypertension, Left hip pain (6/2/2022), Lower abdominal pain (11/23/2021), Mixed hyperlipidemia (1/23/2021), Numbness and tingling in left arm, Pruritus, Rash of hand (2022), Shortness of breath, Skin lesion, Tinea pedis of both feet (2022), and Vitamin D deficiency (2021)  ,  _______________________________________________________________________  Past Surgical History:   has a past surgical history that includes  section; pr hysteroscopy,w/endo bx (N/A, 3/21/2016); Replacement total knee (Right);  section; CHOLECYSTECTOMY LAPAROSCOPIC (N/A, 3/3/2019); Joint replacement (2016); Mammo (historical) (10/11/2018); Colonoscopy; Carpal tunnel release (Bilateral); Cholecystectomy; pr laparoscopy w tot hysterectuterus <=250 gram  w tube/ovary (N/A, 10/8/2021); and CYSTOSCOPY (N/A, 10/8/2021)  ,  _______________________________________________________________________  Family History:  family history includes Brain cancer (age of onset: 50) in her maternal aunt; Breast cancer (age of onset: 48) in her maternal aunt; Diabetes in her father; Heart failure in her father; Hemophilia in her brother; Hypertension in her father and mother; Lymphoma (age of onset: 58) in her father; No Known Problems in her daughter, maternal aunt, maternal grandfather, maternal grandmother, paternal grandfather, paternal grandmother, and sister  ,  _______________________________________________________________________  Social History:   reports that she has never smoked  She has never used smokeless tobacco  She reports that she does not drink alcohol and does not use drugs  ,  _______________________________________________________________________  Allergies:  is allergic to griseofulvin, penicillins, and zithromax [azithromycin]     _______________________________________________________________________  Current Outpatient Medications   Medication Sig Dispense Refill    Ascorbic Acid (vitamin C) 1000 MG tablet Take 1,000 mg by mouth daily      Biotin 10 MG CAPS Take by mouth      busPIRone (BUSPAR) 5 mg tablet Take 1 tablet (5 mg total) by mouth daily at bedtime 90 tablet 0    calcium carbonate (OS-CAMDEN) 600 MG tablet Take 600 mg by mouth daily      Cholecalciferol (Vitamin D) 50 MCG (2000 UT) tablet Take 2,000 Units by mouth daily      citalopram (CeleXA) 20 mg tablet Take 1 tablet (20 mg total) by mouth daily at bedtime 90 tablet 0    clobetasol (TEMOVATE) 0 05 % ointment Apply topically 2 (two) times a day 45 g 0    fexofenadine (ALLEGRA) 180 MG tablet Take 180 mg by mouth as needed      metoprolol succinate (TOPROL-XL) 100 mg 24 hr tablet Take 1 tablet (100 mg total) by mouth daily at bedtime 90 tablet 1    Multiple Vitamins-Minerals (multivitamin with minerals) tablet Take 1 tablet by mouth daily      omeprazole (PriLOSEC) 20 mg delayed release capsule Take 20 mg by mouth as needed      triamterene-hydrochlorothiazide (DYAZIDE) 37 5-25 mg per capsule Take 1 capsule by mouth every morning 90 capsule 1    vitamin B-12 (VITAMIN B-12) 1,000 mcg tablet Take by mouth daily       No current facility-administered medications for this visit      _______________________________________________________________________  Review of Systems   Constitutional: Negative for chills and fever  HENT: Negative for congestion, ear pain, hearing loss, nosebleeds, sinus pain, sore throat and trouble swallowing  Eyes: Negative for discharge, redness and visual disturbance  Respiratory: Negative for cough, chest tightness and shortness of breath  Cardiovascular: Negative for chest pain and palpitations  Gastrointestinal: Negative for abdominal pain, blood in stool, constipation, diarrhea, nausea and vomiting  Genitourinary: Negative for dysuria, flank pain, frequency and hematuria  Musculoskeletal: Positive for arthralgias (Left hip and left groin area)  Negative for myalgias and neck pain  Skin: Negative for color change and rash  Neurological: Negative for dizziness, speech difficulty, weakness and headaches     Hematological: Does not bruise/bleed easily  Psychiatric/Behavioral: Negative for agitation and behavioral problems  Objective:  Vitals:    07/07/22 1641   BP: 132/75   BP Location: Left arm   Patient Position: Sitting   Cuff Size: Adult   Pulse: 90   Resp: 12   Temp: 98 6 °F (37 °C)   TempSrc: Tympanic   SpO2: 96%   Weight: (!) 151 kg (332 lb)   Height: 5' 8" (1 727 m)     Body mass index is 50 48 kg/m²  Physical Exam  Vitals and nursing note reviewed  Constitutional:       General: She is not in acute distress  Appearance: Normal appearance  HENT:      Head: Normocephalic and atraumatic  Right Ear: Ear canal and external ear normal       Left Ear: Ear canal and external ear normal       Nose: Nose normal       Mouth/Throat:      Mouth: Mucous membranes are moist    Eyes:      General: No scleral icterus  Right eye: No discharge  Left eye: No discharge  Extraocular Movements: Extraocular movements intact  Conjunctiva/sclera: Conjunctivae normal    Cardiovascular:      Rate and Rhythm: Normal rate and regular rhythm  Pulses: Normal pulses  Heart sounds: No murmur heard  Pulmonary:      Effort: Pulmonary effort is normal  No respiratory distress  Breath sounds: Normal breath sounds  Abdominal:      General: Bowel sounds are normal       Palpations: Abdomen is soft  Tenderness: There is no abdominal tenderness  Musculoskeletal:         General: Tenderness ( left groin area) present  Normal range of motion  Cervical back: Normal range of motion and neck supple  No muscular tenderness  Right lower leg: Edema (Has a nonpitting edema) present  Left lower leg: Edema ( has a nonpitting edema) present  Comments: She is ambulating with cane  Skin:     General: Skin is warm  Findings: No rash  Neurological:      General: No focal deficit present  Mental Status: She is alert and oriented to person, place, and time        Motor: No weakness  Coordination: Coordination normal    Psychiatric:         Mood and Affect: Mood normal          Behavior: Behavior normal          I spent 30 minutes with the patient today    More than 50% time spent for reviewing of external notes, reviewing of the results of diagnostics test, management of care, patient education and ordering of test

## 2022-07-07 NOTE — ASSESSMENT & PLAN NOTE
Vitamin-D deficiency resolved after being on vitamin-D supplement  Vitamin-D level increased from 25 8-44 so advised to continue same dose of vitamin-D daily

## 2022-07-07 NOTE — ASSESSMENT & PLAN NOTE
She takes only p r n  omeprazole depending on the foods he eats  Advised to watch diet for spicy foods, caffeinated beverages, alcoholic beverages, soda, citrus fluids and foods  Advised for reflux precautions

## 2022-07-07 NOTE — ASSESSMENT & PLAN NOTE
She was seen by orthopedic specialist had injection in the left hip  She will be going for another injection in left groin area per patient  Patient has been ambulating with cane    Pain is improving but still persist

## 2022-07-07 NOTE — PATIENT INSTRUCTIONS
Patient was advised to continue present medications  discussed with the patient medications and laboratory data in detail  Follow-up with me in 3 months or as advised  If any blood test was ordered please do 1 week prior to next appointment unless advise to get earlier    If you have any questions please call the office [phone]

## 2022-07-07 NOTE — ASSESSMENT & PLAN NOTE
Her symptoms are controlled on buspirone and citalopram   Patient would like to continue same dose of both of her medications as it is effective and has no side effects  She is not ready to taper down medications yet  So will continue same medications

## 2022-07-11 ENCOUNTER — HOSPITAL ENCOUNTER (OUTPATIENT)
Dept: RADIOLOGY | Facility: HOSPITAL | Age: 62
Discharge: HOME | End: 2022-07-11
Attending: ORTHOPAEDIC SURGERY
Payer: COMMERCIAL

## 2022-07-11 DIAGNOSIS — M16.12 PRIMARY OSTEOARTHRITIS OF ONE HIP, LEFT: ICD-10-CM

## 2022-07-11 PROCEDURE — 77002 NEEDLE LOCALIZATION BY XRAY: CPT

## 2022-07-11 PROCEDURE — 20610 DRAIN/INJ JOINT/BURSA W/O US: CPT

## 2022-07-11 RX ORDER — BUPIVACAINE HYDROCHLORIDE 5 MG/ML
3 INJECTION, SOLUTION PERINEURAL ONCE
Status: COMPLETED | OUTPATIENT
Start: 2022-07-11 | End: 2022-07-11

## 2022-07-11 RX ORDER — BETAMETHASONE SODIUM PHOSPHATE AND BETAMETHASONE ACETATE 3; 3 MG/ML; MG/ML
12 INJECTION, SUSPENSION INTRA-ARTICULAR; INTRALESIONAL; INTRAMUSCULAR; SOFT TISSUE ONCE
Status: COMPLETED | OUTPATIENT
Start: 2022-07-11 | End: 2022-07-11

## 2022-07-11 RX ORDER — LIDOCAINE HYDROCHLORIDE 10 MG/ML
7 INJECTION, SOLUTION EPIDURAL; INFILTRATION; INTRACAUDAL; PERINEURAL ONCE
Status: COMPLETED | OUTPATIENT
Start: 2022-07-11 | End: 2022-07-11

## 2022-07-11 RX ADMIN — IOHEXOL 1 ML: 240 INJECTION, SOLUTION INTRATHECAL; INTRAVASCULAR; INTRAVENOUS; ORAL at 10:50

## 2022-07-11 RX ADMIN — LIDOCAINE HYDROCHLORIDE 7 ML: 10 INJECTION, SOLUTION EPIDURAL; INFILTRATION; INTRACAUDAL at 10:50

## 2022-07-11 RX ADMIN — BUPIVACAINE HYDROCHLORIDE 3 ML: 5 INJECTION, SOLUTION PERINEURAL at 10:50

## 2022-07-11 RX ADMIN — BETAMETHASONE SODIUM PHOSPHATE AND BETAMETHASONE ACETATE 12 MG: 3; 3 INJECTION, SUSPENSION INTRA-ARTICULAR; INTRALESIONAL; INTRAMUSCULAR at 10:50

## 2022-07-12 ENCOUNTER — TELEPHONE (OUTPATIENT)
Dept: INTERNAL MEDICINE CLINIC | Facility: CLINIC | Age: 62
End: 2022-07-12

## 2022-07-12 NOTE — TELEPHONE ENCOUNTER
Received a [employer]    Can you write a letter for her to be excused due to her medical conditions?

## 2022-07-14 DIAGNOSIS — F41.9 ANXIETY AND DEPRESSION: Chronic | ICD-10-CM

## 2022-07-14 DIAGNOSIS — F32.A ANXIETY AND DEPRESSION: Chronic | ICD-10-CM

## 2022-07-14 RX ORDER — BUSPIRONE HYDROCHLORIDE 5 MG/1
5 TABLET ORAL
Qty: 90 TABLET | Refills: 0 | Status: SHIPPED | OUTPATIENT
Start: 2022-07-14 | End: 2022-09-16 | Stop reason: SDUPTHER

## 2022-07-26 ENCOUNTER — TELEPHONE (OUTPATIENT)
Dept: VASCULAR SURGERY | Facility: CLINIC | Age: 62
End: 2022-07-26

## 2022-07-26 NOTE — TELEPHONE ENCOUNTER
Pt called stating she has L hip pain that has progressively worsened since seeing JADA HANNA 6/9/22, she saw ortho and was told she will eventually require hip replacement  She ? If hip pain could be caused by her lymphedema  Advised it typically does not however pt cx her WALTER doppler and f/u ov w/ Tiff  Advised her she should r/s WALTER's and ov for f/u and re-eval   Pt agrees to r/s, transf to call center to arrange

## 2022-08-05 ENCOUNTER — TELEPHONE (OUTPATIENT)
Dept: INTERNAL MEDICINE CLINIC | Facility: CLINIC | Age: 62
End: 2022-08-05

## 2022-08-05 DIAGNOSIS — B35.3 TINEA PEDIS OF BOTH FEET: Primary | ICD-10-CM

## 2022-08-05 RX ORDER — CLOTRIMAZOLE AND BETAMETHASONE DIPROPIONATE 10; .64 MG/G; MG/G
CREAM TOPICAL 2 TIMES DAILY
Qty: 45 G | Refills: 1 | Status: SHIPPED | OUTPATIENT
Start: 2022-08-05

## 2022-08-05 NOTE — TELEPHONE ENCOUNTER
Patient called requesting the following ointment that she uses for her feet      please refill if appropriate to [facility]      Medication  clotrimazole-betamethasone (LOTRISONE) 1-0 05 % cream [[id number]]    clotrimazole-betamethasone (LOTRISONE) 1-0 05 % cream [[id number]]  DISCONTINUED    Order Details  Dose: -- Route: Topical Frequency: 2 times daily   Dispense Quantity: 30 g Refills: 0          Sig: Apply topically 2 (two) times a day         Start Date: 02/28/22 End Date: 06/02/22   Discontinued by: Belinda Stanley MD on 6/2/2022 62:10   Reason: Duplicate order         Written Date: 02/28/22 Expiration Date: 02/28/23

## 2022-08-16 ENCOUNTER — HOSPITAL ENCOUNTER (OUTPATIENT)
Dept: NON INVASIVE DIAGNOSTICS | Facility: CLINIC | Age: 62
Discharge: HOME | End: 2022-08-16
Payer: COMMERCIAL

## 2022-08-16 DIAGNOSIS — R09.89 DECREASED PEDAL PULSES: ICD-10-CM

## 2022-08-16 PROCEDURE — 93923 UPR/LXTR ART STDY 3+ LVLS: CPT | Performed by: SURGERY

## 2022-08-16 PROCEDURE — 93923 UPR/LXTR ART STDY 3+ LVLS: CPT

## 2022-09-01 ENCOUNTER — OFFICE VISIT (OUTPATIENT)
Dept: VASCULAR SURGERY | Facility: CLINIC | Age: 62
End: 2022-09-01
Payer: COMMERCIAL

## 2022-09-01 VITALS
BODY MASS INDEX: 44.41 KG/M2 | WEIGHT: 293 LBS | SYSTOLIC BLOOD PRESSURE: 126 MMHG | HEART RATE: 80 BPM | DIASTOLIC BLOOD PRESSURE: 70 MMHG | HEIGHT: 68 IN

## 2022-09-01 DIAGNOSIS — R09.89 DECREASED PEDAL PULSES: ICD-10-CM

## 2022-09-01 DIAGNOSIS — I89.0 LYMPHEDEMA: Primary | ICD-10-CM

## 2022-09-01 PROCEDURE — 99213 OFFICE O/P EST LOW 20 MIN: CPT | Performed by: NURSE PRACTITIONER

## 2022-09-01 NOTE — LETTER
September 12, 2022     [facility]  [address] [facility]    Patient: Isma Yepez   YOB: 1960   Date of Visit: 9/1/2022       Dear Dr Quentin Yoon: Thank you for referring Jayde Hoyos to me for evaluation  Below are my notes for this consultation  If you have questions, please do not hesitate to call me  I look forward to following your patient along with you  Sincerely,        JACQUES Bradshaw        CC: No Recipients  JACQUES Bradshaw  9/12/2022 10:54 AM  Incomplete  Assessment/Plan:    Lymphedema  21-year-old female with HTN, morbid obesity, BMI 46, anxiety depression, ovarian CA s/p hysterectomy with lymphadenectomy 10/8/21, OA s/p R hip and knee repleacements '16, chronic bilateral lower extremity lymphedema, L>R w/ history of LLE lymphangitis x2  Patient returns to the office for lymphedema follow up and to review LEAD 8/16/22  -Stage II BLE lymphedema  -Patient will benefit from pneumatic compression pumps use b i d  for 1 hour each  -No improvement with compression  -Additional Rx 20-30 mmHg compression given  Anticipate difficulty with donning compression due to severe left hip pain/arthristis  -Counseled on weight loss  -Periodic leg elevation  -Moisturize skin daily to maintain skin integrity  -Follow up in 1 month to check lymphedema with conservative measures     Decreased pedal pulses  -Evaluated with limited arterial study for baseline arterial perfusion showed WALTER noncompressible, triphasic arterial waveforms and within healing range digital pressures       Diagnoses and all orders for this visit:    Lymphedema  -     Compression Stocking    Decreased pedal pulses          Subjective:      Patient ID: Isma Yepez is a 57 y o  female  Pt is here for 4 week f/u and for second measurements for lymphedema pumps  Pt had WALTER and waveform 8/16/22  Pt c/o kwasi calf and ankle swelling and heaviness   Pt wears  Compression socks  LL <RL  [facility]   Phone: [phone]  Fax: [phone]   E-mail: [email or web address]    Ordering Provider:  Mary Carter (NPI: [id number])  [facility]  [address]   [address] [facility], [address] [place] [address]  Phone: [phone]  Fax: [phone]      Patient Information  MRN: [id number]   Michkoby Deiters  1960  [address] [id number] [phone]     Insurance Information  Payor: Favian Monroy / Plan: [facility] [employer] [place] [employer] EPO / Product Type: Blue Fee for Service /      [id number] - (Blues)     Patient Height and Weight 5' 8" (1 727 m)    Wt Readings from Last 1 Encounters:   09/01/22 (!) 153 kg (337 lb)         Post 4-week Measurements      Body Part Right Left   Ankle / Forearm 29 cm 34 cm   Calf / Elbow  46 cm 50 cm   Knee / Bicep  44 cm 46 cm   Mid-Thigh / Axilla  53 cm 60 cm     Patient outcome after 4-weeks of conservative therapy:   [x] Patient's condition has NOT improved          HPI  48-year-old female with HTN, morbid obesity, BMI 51, anxiety depression, ovarian CA s/p hysterectomy with lymphadenectomy 10/8/21, OA s/p R hip and knee repleacements '16, chronic bilateral lower extremity lymphedema, L>R w/ history of LLE lymphangitis x2  Patient returns to the office for lymphedema follow up and to review lower extremity arterial duplex 8/16/22   Limited arterial study showed WALTER was noncompressible, triphasic arterial waveforms and digital pressures well within the healing range  No improvement in lower extremity heaviness and swelling with the use of compression socks   Patient would benefit from pneumatic compression socks   Counseled on the importance of weight loss  She has previously had success with weight watchers    Encouraged to try program again  The following portions of the patient's history were reviewed and updated as appropriate: allergies, current medications, past family history, past medical history, past social history, past surgical history and problem list   ROS reviewed     Review of Systems   Constitutional: Negative  HENT: Negative  Eyes: Negative  Respiratory: Negative  Cardiovascular: Positive for leg swelling  Gastrointestinal: Negative  Endocrine: Negative  Genitourinary: Negative  Musculoskeletal: Negative  Skin: Negative  Allergic/Immunologic: Negative  Neurological: Negative  Hematological: Negative  Psychiatric/Behavioral: Negative  Objective:  I have reviewed and made appropriate changes to the review of systems input by the medical assistant      Vitals:    22 1543   BP: 126/70   BP Location: Right arm   Patient Position: Sitting   Cuff Size: Large   Pulse: 80   Weight: (!) 153 kg (337 lb)   Height: 5' 8" (1 727 m)       Patient Active Problem List   Diagnosis    Depression    Osteoarthritis    Anxiety and depression    Mixed hyperlipidemia    Hyperglycemia    Vitamin D deficiency    BMI 50 0-59 9, adult ([facility] [address] )    GE reflux    Anxiety    Lymphedema    Endometrial cancer ([facility] )    Family history of cancer    Encounter for screening mammogram for malignant neoplasm of breast    Colon cancer screening    Edema of both lower legs    Lower abdominal pain    Annual physical exam    Rash of hand    Tinea pedis of both feet    Essential hypertension    Left hip pain    Eczema    Acute pain of left thigh    Decreased pedal pulses    Allergic rhinitis       Past Surgical History:   Procedure Laterality Date    CARPAL TUNNEL RELEASE Bilateral      SECTION      x3     SECTION      CHOLECYSTECTOMY      CHOLECYSTECTOMY LAPAROSCOPIC N/A 3/3/2019    Procedure: CHOLECYSTECTOMY LAPAROSCOPIC;  Surgeon: Pascale Nicholas MD;  [facility];  Service: General    COLONOSCOPY      CYSTOSCOPY N/A 10/8/2021    Procedure: Cystoscopy;  Surgeon: Antoinette MD Pete;  [facility];  Service: Gynecology Oncology    FL INJECTION LEFT HIP (NON ARTHROGRAM)  7/11/2022    JOINT REPLACEMENT  08/19/2016    R total HIp    MAMMO (HISTORICAL)  10/11/2018    Advise for yearly mammo screening    MT HYSTEROSCOPY,W/ENDO BX N/A 3/21/2016    Procedure: FRACTIONAL DILATATION AND CURETTAGE (D&C) WITH HYSTEROSOCPY;  Surgeon: Tj Hernandez MD;  [facility];  Service: Gynecology    MT LAPAROSCOPY W TOT HYSTERECTUTERUS <=250 GRAM  W TUBE/OVARY N/A 10/8/2021    Procedure: ROBOTIC HYSTERECTOMY, BILATERAL SALPINGOOPHERECTOMY; LEFT EXTERNAL ILIAC SENTINEL LYMPH NODE BIOPSY, RIGHT PELVIC LYMPHADENECTOMY;  Surgeon: Iris Rock MD;  [facility];  Service: Gynecology Oncology    REPLACEMENT TOTAL KNEE Right        Family History   Problem Relation Age of Onset    Hypertension Mother     Hypertension Father     Heart failure Father     Lymphoma Father 58    Diabetes Father         at old age   Agnes Slava No Known Problems Sister     No Known Problems Daughter     No Known Problems Maternal Grandmother     No Known Problems Maternal Grandfather     No Known Problems Paternal Grandmother     No Known Problems Paternal Grandfather     Breast cancer Maternal Aunt 48    Brain cancer Maternal Aunt 50    No Known Problems Maternal Aunt     Hemophilia Brother        Social History     Socioeconomic History    Marital status: /Civil Union     Spouse name: Not on file    Number of children: Not on file    Years of education: Not on file    Highest education level: Not on file   Occupational History    Not on file   Tobacco Use    Smoking status: Never Smoker    Smokeless tobacco: Never Used   Vaping Use    Vaping Use: Never used   Substance and Sexual Activity    Alcohol use: No    Drug use: No    Sexual activity: Never     Partners: Male     Comment: pt declines hiv std testing   Other Topics Concern    Not on file   Social History Narrative Current work/study status: Full-time    Single-family home    Lives with spouse    Private household service  [employer] 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/2019     Annual dental checkup: sees q6months    Annual eye exam: Sees as needed    Pap smear: By her gyn, Dr Pau Sellers    - As per [employer]     Social Determinants of Health     Financial Resource Strain: Not on file   Food Insecurity: Not on file   Transportation Needs: Not on file   Physical Activity: Not on file   Stress: Not on file   Social Connections: Not on file   Intimate Partner Violence: Not on file   Housing Stability: Not on file       Allergies   Allergen Reactions    Griseofulvin Hives    Penicillins Hives     She can take cephalosporin without any side effect    Zithromax [Azithromycin] Headache         Current Outpatient Medications:     Ascorbic Acid (vitamin C) 1000 MG tablet, Take 1,000 mg by mouth daily, Disp: , Rfl:     busPIRone (BUSPAR) 5 mg tablet, TAKE 1 TABLET (5 MG TOTAL) BY MOUTH DAILY AT BEDTIME, Disp: 90 tablet, Rfl: 0    calcium carbonate (OS-CAMDEN) 600 MG tablet, Take 600 mg by mouth daily, Disp: , Rfl:     Cholecalciferol (Vitamin D) 50 MCG (2000 UT) tablet, Take 2,000 Units by mouth daily, Disp: , Rfl:     citalopram (CeleXA) 20 mg tablet, Take 1 tablet (20 mg total) by mouth daily at bedtime, Disp: 90 tablet, Rfl: 0    clotrimazole-betamethasone (LOTRISONE) 1-0 05 % cream, Apply topically 2 (two) times a day Apply to feet b i d  (Patient not taking: Reported on 9/1/2022), Disp: 45 g, Rfl: 1    metoprolol succinate (TOPROL-XL) 100 mg 24 hr tablet, Take 1 tablet (100 mg total) by mouth daily at bedtime, Disp: 90 tablet, Rfl: 1    Multiple Vitamins-Minerals (multivitamin with minerals) tablet, Take 1 tablet by mouth daily, Disp: , Rfl:     triamterene-hydrochlorothiazide (DYAZIDE) 37 5-25 mg per capsule, Take 1 capsule by mouth every morning, Disp: 90 capsule, Rfl: 1    vitamin B-12 (VITAMIN B-12) 1,000 mcg tablet, Take by mouth daily, Disp: , Rfl:     Biotin 10 MG CAPS, Take by mouth, Disp: , Rfl:     clobetasol (TEMOVATE) 0 05 % ointment, Apply topically 2 (two) times a day (Patient not taking: Reported on 9/1/2022), Disp: 45 g, Rfl: 0    fexofenadine (ALLEGRA) 180 MG tablet, Take 180 mg by mouth as needed (Patient not taking: Reported on 9/1/2022), Disp: , Rfl:     omeprazole (PriLOSEC) 20 mg delayed release capsule, Take 20 mg by mouth as needed (Patient not taking: Reported on 9/1/2022), Disp: , Rfl:       /70 (BP Location: Right arm, Patient Position: Sitting, Cuff Size: Large)   Pulse 80   Ht 5' 8" (1 727 m)   Wt (!) 153 kg (337 lb)   LMP  (LMP Unknown)   BMI 51 24 kg/m²          Physical Exam  Vitals and nursing note reviewed  Constitutional:       Appearance: Normal appearance  She is obese  HENT:      Head: Normocephalic and atraumatic  Eyes:      Extraocular Movements: Extraocular movements intact  Pulmonary:      Effort: Pulmonary effort is normal    Musculoskeletal:         General: Swelling present  Comments: Stage II bilateral lower extremity lymphedema   Neurological:      General: No focal deficit present  Mental Status: She is alert and oriented to person, place, and time     Psychiatric:         Mood and Affect: Mood normal

## 2022-09-01 NOTE — PROGRESS NOTES
Assessment/Plan:    Lymphedema  78-year-old female with HTN, morbid obesity, BMI 51, anxiety depression, ovarian CA s/p hysterectomy with lymphadenectomy 10/8/21, OA s/p R hip and knee repleacements '16, chronic bilateral lower extremity lymphedema, L>R w/ history of LLE lymphangitis x2  Patient returns to the office for lymphedema follow up and to review LEAD 8/16/22  -Stage II BLE lymphedema  -Patient will benefit from pneumatic compression pumps use b i d  for 1 hour each  -No improvement with compression  -Additional Rx 20-30 mmHg compression given  Anticipate difficulty with donning compression due to severe left hip pain/arthristis  -Counseled on weight loss  -Periodic leg elevation  -Moisturize skin daily to maintain skin integrity  -Follow up in 1 month to check lymphedema with conservative measures     Decreased pedal pulses  -Evaluated with limited arterial study for baseline arterial perfusion showed WALTER noncompressible, triphasic arterial waveforms and within healing range digital pressures       Diagnoses and all orders for this visit:    Lymphedema  -     Compression Stocking    Decreased pedal pulses          Subjective:      Patient ID: Beckie Perez is a 57 y o  female  Pt is here for 4 week f/u and for second measurements for lymphedema pumps  Pt had WALTER and waveform 8/16/22  Pt c/o kwasi calf and ankle swelling and heaviness  Pt wears  Compression socks  LL <RL  [address] [facility]   Phone: [phone]  Fax: [phone]   E-mail: [email or web address]    Ordering Provider:  Francena Kanner (NPI: [id number])  [facility]  [address] [place] [address]  Phone: [phone]  Fax: [phone]      Patient Information  MRN: [id number]   Beckie Perez  1960  [address] [id number] [phone]     Insurance Information  Payor: Breanne Cevallos / Plan: [employer] EPO / Product Type: Blue Fee for Service /      [id number] - (Blues)     Patient Height and Weight 5' 8" (1 727 m)    Wt Readings from Last 1 Encounters:   09/01/22 (!) 153 kg (337 lb)         Post 4-week Measurements      Body Part Right Left   Ankle / Forearm 29 cm 34 cm   Calf / Elbow  46 cm 50 cm   Knee / Bicep  44 cm 46 cm   Mid-Thigh / Axilla  53 cm 60 cm     Patient outcome after 4-weeks of conservative therapy:   [x] Patient's condition has NOT improved          HPI  21-year-old female with HTN, morbid obesity, BMI 51, anxiety depression, ovarian CA s/p hysterectomy with lymphadenectomy 10/8/21, OA s/p R hip and knee repleacements '16, chronic bilateral lower extremity lymphedema, L>R w/ history of LLE lymphangitis x2  Patient returns to the office for lymphedema follow up and to review lower extremity arterial duplex 8/16/22   Limited arterial study showed WALTER was noncompressible, triphasic arterial waveforms and digital pressures well within the healing range  No improvement in lower extremity heaviness and swelling with the use of compression socks   Patient would benefit from pneumatic compression socks   Counseled on the importance of weight loss  She has previously had success with weight watchers  Encouraged to try program again  The following portions of the patient's history were reviewed and updated as appropriate: allergies, current medications, past family history, past medical history, past social history, past surgical history and problem list   ROS reviewed     Review of Systems   Constitutional: Negative  HENT: Negative  Eyes: Negative  Respiratory: Negative  Cardiovascular: Positive for leg swelling  Gastrointestinal: Negative  Endocrine: Negative  Genitourinary: Negative  Musculoskeletal: Negative  Skin: Negative  Allergic/Immunologic: Negative  Neurological: Negative  Hematological: Negative  Psychiatric/Behavioral: Negative  Objective:  I have reviewed and made appropriate changes to the review of systems input by the medical assistant      Vitals:    22 1543   BP: 126/70   BP Location: Right arm   Patient Position: Sitting   Cuff Size: Large   Pulse: 80   Weight: (!) 153 kg (337 lb)   Height: 5' 8" (1 727 m)       Patient Active Problem List   Diagnosis    Depression    Osteoarthritis    Anxiety and depression    Mixed hyperlipidemia    Hyperglycemia    Vitamin D deficiency    BMI 50 0-59 9, adult ([facility] [address] )    GE reflux    Anxiety    Lymphedema    Endometrial cancer ([facility] )    Family history of cancer    Encounter for screening mammogram for malignant neoplasm of breast    Colon cancer screening    Edema of both lower legs    Lower abdominal pain    Annual physical exam    Rash of hand    Tinea pedis of both feet    Essential hypertension    Left hip pain    Eczema    Acute pain of left thigh    Decreased pedal pulses    Allergic rhinitis       Past Surgical History:   Procedure Laterality Date    CARPAL TUNNEL RELEASE Bilateral      SECTION      x3     SECTION      CHOLECYSTECTOMY      CHOLECYSTECTOMY LAPAROSCOPIC N/A 3/3/2019    Procedure: CHOLECYSTECTOMY LAPAROSCOPIC;  Surgeon: Prasanna Lew MD;  [facility];  Service: General    COLONOSCOPY      CYSTOSCOPY N/A 10/8/2021    Procedure: Cystoscopy;  Surgeon: Kathy Fleming MD;  [facility];  Service: Gynecology Oncology    FL INJECTION LEFT HIP (NON ARTHROGRAM)  2022    JOINT REPLACEMENT  2016    R total HIp    MAMMO (HISTORICAL)  10/11/2018    Advise for yearly mammo screening    GA HYSTEROSCOPY,W/ENDO BX N/A 3/21/2016    Procedure: FRACTIONAL DILATATION AND CURETTAGE (D&C) WITH HYSTEROSOCPY;  Surgeon: Torey Rodrigues MD;  [facility];  Service: Gynecology    GA LAPAROSCOPY W TOT HYSTERECTUTERUS <=250 GRAM  W TUBE/OVARY N/A 10/8/2021    Procedure: ROBOTIC HYSTERECTOMY, BILATERAL SALPINGOOPHERECTOMY; LEFT EXTERNAL ILIAC SENTINEL LYMPH NODE BIOPSY, RIGHT PELVIC LYMPHADENECTOMY;  Surgeon: Cheryle Mehta MD;  [facility];  Service: Gynecology Oncology    REPLACEMENT TOTAL KNEE Right        Family History   Problem Relation Age of Onset    Hypertension Mother     Hypertension Father     Heart failure Father     Lymphoma Father 58    Diabetes Father         at old age   Salome Mare No Known Problems Sister     No Known Problems Daughter     No Known Problems Maternal Grandmother     No Known Problems Maternal Grandfather     No Known Problems Paternal Grandmother     No Known Problems Paternal Grandfather     Breast cancer Maternal Aunt 48    Brain cancer Maternal Aunt 50    No Known Problems Maternal Aunt     Hemophilia Brother        Social History     Socioeconomic History    Marital status: /Civil Union     Spouse name: Not on file    Number of children: Not on file    Years of education: Not on file    Highest education level: Not on file   Occupational History    Not on file   Tobacco Use    Smoking status: Never Smoker    Smokeless tobacco: Never Used   Vaping Use    Vaping Use: Never used   Substance and Sexual Activity    Alcohol use: No    Drug use: No    Sexual activity: Never     Partners: Male     Comment: pt declines hiv std testing   Other Topics Concern    Not on file   Social History Narrative    Current work/study status: Full-time    Single-family home    Lives with spouse    Private household service  [employer] 2/19/2019     Annual dental checkup: sees q6months    Annual eye exam: Sees as needed    Pap smear: By her gyn, Dr Ena Liao    - [facility]     Social Determinants of Health     Financial Resource Strain: Not on file   Food Insecurity: Not on file   Transportation Needs: Not on file   Physical Activity: Not on file   Stress: Not on file   Social Connections: Not on file   Intimate Partner Violence: Not on file Housing Stability: Not on file       Allergies   Allergen Reactions    Griseofulvin Hives    Penicillins Hives     She can take cephalosporin without any side effect    Zithromax [Azithromycin] Headache         Current Outpatient Medications:     Ascorbic Acid (vitamin C) 1000 MG tablet, Take 1,000 mg by mouth daily, Disp: , Rfl:     busPIRone (BUSPAR) 5 mg tablet, TAKE 1 TABLET (5 MG TOTAL) BY MOUTH DAILY AT BEDTIME, Disp: 90 tablet, Rfl: 0    calcium carbonate (OS-CAMDEN) 600 MG tablet, Take 600 mg by mouth daily, Disp: , Rfl:     Cholecalciferol (Vitamin D) 50 MCG (2000 UT) tablet, Take 2,000 Units by mouth daily, Disp: , Rfl:     citalopram (CeleXA) 20 mg tablet, Take 1 tablet (20 mg total) by mouth daily at bedtime, Disp: 90 tablet, Rfl: 0    clotrimazole-betamethasone (LOTRISONE) 1-0 05 % cream, Apply topically 2 (two) times a day Apply to feet b i d  (Patient not taking: Reported on 9/1/2022), Disp: 45 g, Rfl: 1    metoprolol succinate (TOPROL-XL) 100 mg 24 hr tablet, Take 1 tablet (100 mg total) by mouth daily at bedtime, Disp: 90 tablet, Rfl: 1    Multiple Vitamins-Minerals (multivitamin with minerals) tablet, Take 1 tablet by mouth daily, Disp: , Rfl:     triamterene-hydrochlorothiazide (DYAZIDE) 37 5-25 mg per capsule, Take 1 capsule by mouth every morning, Disp: 90 capsule, Rfl: 1    vitamin B-12 (VITAMIN B-12) 1,000 mcg tablet, Take by mouth daily, Disp: , Rfl:     Biotin 10 MG CAPS, Take by mouth, Disp: , Rfl:     clobetasol (TEMOVATE) 0 05 % ointment, Apply topically 2 (two) times a day (Patient not taking: Reported on 9/1/2022), Disp: 45 g, Rfl: 0    fexofenadine (ALLEGRA) 180 MG tablet, Take 180 mg by mouth as needed (Patient not taking: Reported on 9/1/2022), Disp: , Rfl:     omeprazole (PriLOSEC) 20 mg delayed release capsule, Take 20 mg by mouth as needed (Patient not taking: Reported on 9/1/2022), Disp: , Rfl:       /70 (BP Location: Right arm, Patient Position: Sitting, Cuff Size: Large)   Pulse 80   Ht 5' 8" (1 727 m)   Wt (!) 153 kg (337 lb)   LMP  (LMP Unknown)   BMI 51 24 kg/m²          Physical Exam  Vitals and nursing note reviewed  Constitutional:       Appearance: Normal appearance  She is obese  HENT:      Head: Normocephalic and atraumatic  Eyes:      Extraocular Movements: Extraocular movements intact  Pulmonary:      Effort: Pulmonary effort is normal    Musculoskeletal:         General: Swelling present  Comments: Stage II bilateral lower extremity lymphedema   Neurological:      General: No focal deficit present  Mental Status: She is alert and oriented to person, place, and time     Psychiatric:         Mood and Affect: Mood normal

## 2022-09-01 NOTE — PATIENT INSTRUCTIONS
Lymphedema   AMBULATORY CARE:   The lymphatic system  contains fluid, vessels, tissue, and organs  This system removes and carries fluid throughout the body  It also helps the body fight infection  Lymphedema is the buildup of lymph fluid in fat tissue under your skin  The buildup causes the area to swell  Lymphedema can happen any time that lymphatic vessels are blocked or damaged  Damage to lymphatic vessels may be caused by surgery, infection, injury, cancer, radiation, or scar tissue     Common signs and symptoms include the following:  Signs and symptoms may happen where lymph nodes were removed, or in the arm, leg, chest, or underarm  Swelling or itching    Pain, burning, or aching    Tight, hard, or red skin    Hair loss    Heaviness or fullness    Numbness or tingling    Stiffness    Contact your healthcare provider or lymphedema specialist if:   You have a fever or chills  You have an open area of skin that looks red or swollen, or drains pus  Your symptoms, such as swelling or pain, get worse  Your arms or legs feel heavy, or you cannot move them  Your skin becomes hard, thick, or rough  You have a skin wound that will not heal      Your shoes, clothes, or jewelry feel tighter  You have questions or concerns about your condition or care  Treatment for lymphedema  can relieve symptoms and prevent lymphedema from getting worse  You may need therapeutic massage, physical therapy, or compression devices to help decrease swelling and pain  Surgery may be needed if other treatments do not work  Self-care:   Elevate  your arm or leg above the level of your heart as often as you can  This will help decrease swelling and pain  Prop your arm or leg on pillows or blankets to keep it elevated comfortably  Wear compression socks, sleeves, or bandages  as directed  Compression devices must be fitted by a healthcare provider   Compression devices may need to be replaced every 3 to 6 months  Exercise  can help you maintain or regain function of your arm or leg  Ask your healthcare provider what type of exercise to do and how often to do it  Start slow, take breaks, and gradually do more each day  Do not do vigorous, repeated exercises  Watch for changes in your arm or leg during exercise  Stop and rest if you have swelling, increased pain, or heaviness  Elevate your arm or leg above the level of your heart  Change your position often  to help move lymphatic fluid through your body  Do not sit or  one position for more than 30 minutes  Do not cross your legs when you sit  These actions can cause lymphatic fluid to buildup  Maintain a healthy weight  Ask your healthcare provider what you should weigh  Weight loss may improve your symptoms  If you need to lose weight, your healthcare provider can help you create a weight loss program     Prevent infection with proper skin care:  A skin infection can make lymphedema worse  Do the following to decrease your risk for a skin infection in your arm or leg with lymphedema:  Wash your skin gently and dry it well  Use a mild soap to wash your skin  Gently pat your skin dry after you bathe  Apply a mild cream or lotion to moisturize your skin and prevent dryness or cracking  Keep your feet clean and dry  Protect your skin from injury  Wear gloves when you garden and wash dishes  Cut your nails straight across to prevent injury to your fingers and toes  Use sunscreen and insect repellant to avoid burns and punctures  Wear slippers in the house  Wear shoes when you go outside  Check your skin every day for signs of infection  Signs of infection include redness, swelling, increased heat, or pus  You may also have a fever or chills  Care for cuts, scratches or burns  Apply antibiotic ointment to cuts and other small breaks in the skin  Apply a cold pack or cold water to a burn for 15 minutes  Then wash it with soap and water  Cover scratches, cuts, or burns with a clean, dry gauze or bandage as directed  Keep the area clean and dry  Tell healthcare providers that you have lymphedema  Tell them not to do, IVs, blood draws, and blood pressure readings in the arm or leg with lymphedema  If there is lymphedema in both arms, ask them to take your blood pressure on your leg  Do not allow flu shots or vaccinations in your arm with lymphedema  Follow up with your healthcare provider or lymphedema specialist as directed: You will need regular visits so healthcare providers can examine the affected areas  You may also be referred to a clinic that specializes in lymphedema treatment  Write down your questions so you remember to ask them during your visits  © Copyright [employer] 2022 Information is for End User's use only and may not be sold, redistributed or otherwise used for commercial purposes  All illustrations and images included in CareNotes® are the copyrighted property of A D A M , Inc  or [facility] Rita Meeks   The above information is an  only  It is not intended as medical advice for individual conditions or treatments  Talk to your doctor, nurse or pharmacist before following any medical regimen to see if it is safe and effective for you

## 2022-09-12 NOTE — ASSESSMENT & PLAN NOTE
50-year-old female with HTN, morbid obesity, BMI 51, anxiety depression, ovarian CA s/p hysterectomy with lymphadenectomy 10/8/21, OA s/p R hip and knee repleacements '16, chronic bilateral lower extremity lymphedema, L>R w/ history of LLE lymphangitis x2  Patient returns to the office for lymphedema follow up and to review LEAD 8/16/22  -Stage II BLE lymphedema  -Patient will benefit from pneumatic compression pumps use b i d  for 1 hour each  -No improvement with compression  -Additional Rx 20-30 mmHg compression given   Anticipate difficulty with donning compression due to severe left hip pain/arthristis  -Counseled on weight loss  -Periodic leg elevation  -Moisturize skin daily to maintain skin integrity  -Follow up in 1 month to check lymphedema with conservative measures

## 2022-09-12 NOTE — ASSESSMENT & PLAN NOTE
-Evaluated with limited arterial study for baseline arterial perfusion showed WALTER noncompressible, triphasic arterial waveforms and within healing range digital pressures

## 2022-09-16 DIAGNOSIS — F32.A ANXIETY AND DEPRESSION: Chronic | ICD-10-CM

## 2022-09-16 DIAGNOSIS — F41.9 ANXIETY AND DEPRESSION: Chronic | ICD-10-CM

## 2022-09-16 DIAGNOSIS — I10 ESSENTIAL HYPERTENSION: ICD-10-CM

## 2022-09-16 RX ORDER — CITALOPRAM 20 MG/1
20 TABLET ORAL
Qty: 90 TABLET | Refills: 0 | Status: SHIPPED | OUTPATIENT
Start: 2022-09-16

## 2022-09-16 RX ORDER — METOPROLOL SUCCINATE 100 MG/1
100 TABLET, EXTENDED RELEASE ORAL
Qty: 90 TABLET | Refills: 0 | Status: SHIPPED | OUTPATIENT
Start: 2022-09-16

## 2022-09-16 RX ORDER — BUSPIRONE HYDROCHLORIDE 5 MG/1
5 TABLET ORAL
Qty: 90 TABLET | Refills: 0 | Status: SHIPPED | OUTPATIENT
Start: 2022-09-16

## 2022-09-27 ENCOUNTER — OFFICE VISIT (OUTPATIENT)
Dept: OBGYN CLINIC | Facility: HOSPITAL | Age: 62
End: 2022-09-27
Payer: COMMERCIAL

## 2022-09-27 VITALS
DIASTOLIC BLOOD PRESSURE: 70 MMHG | SYSTOLIC BLOOD PRESSURE: 170 MMHG | WEIGHT: 293 LBS | HEIGHT: 68 IN | HEART RATE: 93 BPM | BODY MASS INDEX: 44.41 KG/M2

## 2022-09-27 DIAGNOSIS — M16.12 ARTHRITIS OF LEFT HIP: Primary | ICD-10-CM

## 2022-09-27 PROCEDURE — 99213 OFFICE O/P EST LOW 20 MIN: CPT | Performed by: ORTHOPAEDIC SURGERY

## 2022-09-27 NOTE — PROGRESS NOTES
Assessment:   Diagnosis ICD-10-CM Associated Orders   1  Arthritis of left hip  M16 12 Ambulatory Referral to Weight Management       Plan:    Due to the fact the patient has not yet been able to reach the obtainable weight necessary for elective total hip surgery to proceed, the patient is currently not a candidate for the surgery  As her prior image guided intra-articular hip injection did not provide her with any substantial benefit, she does not need to undergo this procedure again  Patient has  Been referred to weight management to help her with obtaining this goal, and will follow up on an as-needed basis when she may be candidate for surgery  To do next visit:  Return if symptoms worsen or fail to improve  The above stated was discussed in layman's terms and the patient expressed understanding  All questions were answered to the patient's satisfaction  Subjective:   Kraig Branch is a 57 y o  female who presents   For follow-up of her left hip arthritis  Patient was last seen 3 months ago, and was given a corticosteroid injection to her greater trochanteric bursa of the left hip  Patient was also advised that she would need to lose weight prior to becoming a candidate for elective left total hip surgery  Patient states that she has had significant release of her pain since this injection  She also underwent image guided intra-articular hip injection in July, which provided her with minimal relief  Patient would like to know what her options are for treatment of her left hip pain        Review of systems negative unless otherwise specified in HPI    Past Medical History:   Diagnosis Date    Allergic rhinitis 7/7/2022    Anxiety     Arthritis     BMI 50 0-59 9, adult ([facility] ) 6/1/2021    Cellulitis of left leg 8/17/2021    Depression     Eczema 6/2/2022    Edema of both lower legs 1/23/2021    Edema of both lower legs 11/23/2021    Endometrial cancer ([facility] ) 09/14/2021    Heart murmur     History of COVID-19 2020    Mild sypmtoms Cough,Tired,diarrhea,sweats, Loss taste and smell    Hyperglycemia 2021    Hyperlipidemia     Hypertension     Left hip pain 2022    Lower abdominal pain 2021    Mixed hyperlipidemia 2021    Numbness and tingling in left arm     Pruritus     Rash of hand 2022    Shortness of breath     Skin lesion     Tinea pedis of both feet 2022    Vitamin D deficiency 2021       Past Surgical History:   Procedure Laterality Date    CARPAL TUNNEL RELEASE Bilateral      SECTION      x3     SECTION      CHOLECYSTECTOMY      CHOLECYSTECTOMY LAPAROSCOPIC N/A 3/3/2019    Procedure: CHOLECYSTECTOMY LAPAROSCOPIC;  Surgeon: Konstantin Stauffer MD;  [facility];  Service: General    COLONOSCOPY      CYSTOSCOPY N/A 10/8/2021    Procedure: Cystoscopy;  Surgeon: Maud Carrel, MD;  [facility];  Service: Gynecology Oncology    FL INJECTION LEFT HIP (NON ARTHROGRAM)  2022    JOINT REPLACEMENT  2016    R total HIp    MAMMO (HISTORICAL)  10/11/2018    Advise for yearly mammo screening    HI HYSTEROSCOPY,W/ENDO BX N/A 3/21/2016    Procedure: FRACTIONAL DILATATION AND CURETTAGE (D&C) WITH HYSTEROSOCPY;  Surgeon: Alistair Castellon MD;  [facility];  Service: Gynecology    HI LAPAROSCOPY W TOT HYSTERECTUTERUS <=250 GRAM  W TUBE/OVARY N/A 10/8/2021    Procedure: ROBOTIC HYSTERECTOMY, BILATERAL SALPINGOOPHERECTOMY; LEFT EXTERNAL ILIAC SENTINEL LYMPH NODE BIOPSY, RIGHT PELVIC LYMPHADENECTOMY;  Surgeon: Maud Carrel, MD;  [facility];  Service: Gynecology Oncology    REPLACEMENT TOTAL KNEE Right        Family History   Problem Relation Age of Onset    Hypertension Mother     Hypertension Father     Heart failure Father     Lymphoma Father 58    Diabetes Father         at old age   [place] No Known Problems Sister     No Known Problems Daughter     No Known Problems Maternal Grandmother     No Known Problems Maternal Grandfather     No Known Problems Paternal Grandmother     No Known Problems Paternal Grandfather     Breast cancer Maternal Aunt 48    Brain cancer Maternal Aunt 50    No Known Problems Maternal Aunt     Hemophilia Brother        Social History     Occupational History    Not on file   Tobacco Use    Smoking status: Never Smoker    Smokeless tobacco: Never Used   Vaping Use    Vaping Use: Never used   Substance and Sexual Activity    Alcohol use: No    Drug use: No    Sexual activity: Never     Partners: Male     Comment: pt declines hiv std testing         Current Outpatient Medications:     clotrimazole-betamethasone (LOTRISONE) 1-0 05 % cream, Apply topically 2 (two) times a day Apply to feet b i d  (Patient not taking: Reported on 9/1/2022), Disp: 45 g, Rfl: 1    Ascorbic Acid (vitamin C) 1000 MG tablet, Take 1,000 mg by mouth daily, Disp: , Rfl:     busPIRone (BUSPAR) 5 mg tablet, Take 1 tablet (5 mg total) by mouth daily at bedtime, Disp: 90 tablet, Rfl: 0    calcium carbonate (OS-CAMDEN) 600 MG tablet, Take 600 mg by mouth daily, Disp: , Rfl:     Cholecalciferol (Vitamin D) 50 MCG (2000 UT) tablet, Take 2,000 Units by mouth daily, Disp: , Rfl:     citalopram (CeleXA) 20 mg tablet, Take 1 tablet (20 mg total) by mouth daily at bedtime, Disp: 90 tablet, Rfl: 0    clobetasol (TEMOVATE) 0 05 % ointment, Apply topically 2 (two) times a day (Patient not taking: Reported on 9/1/2022), Disp: 45 g, Rfl: 0    fexofenadine (ALLEGRA) 180 MG tablet, Take 180 mg by mouth as needed (Patient not taking: Reported on 9/1/2022), Disp: , Rfl:     metoprolol succinate (TOPROL-XL) 100 mg 24 hr tablet, Take 1 tablet (100 mg total) by mouth daily at bedtime, Disp: 90 tablet, Rfl: 0    Multiple Vitamins-Minerals (multivitamin with minerals) tablet, Take 1 tablet by mouth daily, Disp: , Rfl:     omeprazole (PriLOSEC) 20 mg delayed release capsule, Take 20 mg by mouth as needed (Patient not taking: Reported on 9/1/2022), Disp: , Rfl:     triamterene-hydrochlorothiazide (DYAZIDE) 37 5-25 mg per capsule, Take 1 capsule by mouth every morning, Disp: 90 capsule, Rfl: 1    vitamin B-12 (VITAMIN B-12) 1,000 mcg tablet, Take by mouth daily, Disp: , Rfl:     Allergies   Allergen Reactions    Griseofulvin Hives    Penicillins Hives     She can take cephalosporin without any side effect    Zithromax [Azithromycin] Headache            Vitals:    09/27/22 1559   BP: 170/70   Pulse: 93       Objective:  Left Hip Exam     Tenderness   Left hip tenderness location: Tenderness with Internal Rotation  Range of Motion   Abduction: 15   Adduction: normal   Extension: 10   Flexion: 60   External rotation: abnormal   Internal rotation: abnormal     Muscle Strength   Abduction: 4/5   Adduction: 4/5   Flexion: 4/5     Other   Erythema: absent  Sensation: normal  Pulse: present              Diagnostics, reviewed and taken today if performed as documented: The attending physician has personally reviewed the pertinent films in PACS and interpretation is as follows:    X-ray of left hip demonstrates degenerative changes in the femoroacetabular joint  No evidence of fracture or dislocation  Procedures, if performed today:  None performed      Portions of the record may have been created with voice recognition software  Occasional wrong word or "sound a like" substitutions may have occurred due to the inherent limitations of voice recognition software  Read the chart carefully and recognize, using context, where substitutions have occurred

## 2022-09-28 ENCOUNTER — TELEPHONE (OUTPATIENT)
Dept: GYNECOLOGIC ONCOLOGY | Facility: CLINIC | Age: 62
End: 2022-09-28

## 2022-09-28 NOTE — TELEPHONE ENCOUNTER
Patient called in to r/s her 6 month f/u survivorship appt with Emperatriz Morris  Patient requested a later date  Patient was offered sooner appt but declined  Patient is now scheduled for Oct 28th

## 2022-10-11 ENCOUNTER — RA CDI HCC (OUTPATIENT)
Dept: OTHER | Facility: HOSPITAL | Age: 62
End: 2022-10-11

## 2022-10-11 ENCOUNTER — VBI (OUTPATIENT)
Dept: ADMINISTRATIVE | Facility: OTHER | Age: 62
End: 2022-10-11

## 2022-10-11 NOTE — PROGRESS NOTES
Please review if the following dx  is applicable to the patient's condition and assess and document, if applicable in next visit      E66 01: Morbid (severe) obesity due to excess calories ([address] ) -      Per CMS/ICD 10 coding guidelines, BMI of 40 or higher; Class 3 obesity is sometimes categorized as "morbid," "extreme," or "severe" obesity      [address]  coding opportunities          Chart Reviewed number of suggestions sent to Provider: 1     Patients Insurance        Commercial Insurance: [address] [place] [address]

## 2022-10-12 PROBLEM — Z12.11 COLON CANCER SCREENING: Status: RESOLVED | Noted: 2021-09-08 | Resolved: 2022-10-12

## 2022-10-13 ENCOUNTER — OFFICE VISIT (OUTPATIENT)
Dept: INTERNAL MEDICINE CLINIC | Facility: CLINIC | Age: 62
End: 2022-10-13
Payer: COMMERCIAL

## 2022-10-13 VITALS
BODY MASS INDEX: 44.41 KG/M2 | RESPIRATION RATE: 14 BRPM | HEIGHT: 68 IN | DIASTOLIC BLOOD PRESSURE: 92 MMHG | TEMPERATURE: 98.2 F | SYSTOLIC BLOOD PRESSURE: 152 MMHG | HEART RATE: 80 BPM | WEIGHT: 293 LBS | OXYGEN SATURATION: 98 %

## 2022-10-13 DIAGNOSIS — K21.9 GASTROESOPHAGEAL REFLUX DISEASE WITHOUT ESOPHAGITIS: ICD-10-CM

## 2022-10-13 DIAGNOSIS — F32.A ANXIETY AND DEPRESSION: Chronic | ICD-10-CM

## 2022-10-13 DIAGNOSIS — I89.0 LYMPHEDEMA: ICD-10-CM

## 2022-10-13 DIAGNOSIS — R73.9 HYPERGLYCEMIA: Chronic | ICD-10-CM

## 2022-10-13 DIAGNOSIS — M25.552 LEFT HIP PAIN: ICD-10-CM

## 2022-10-13 DIAGNOSIS — Z12.31 ENCOUNTER FOR SCREENING MAMMOGRAM FOR MALIGNANT NEOPLASM OF BREAST: ICD-10-CM

## 2022-10-13 DIAGNOSIS — F41.9 ANXIETY AND DEPRESSION: Chronic | ICD-10-CM

## 2022-10-13 DIAGNOSIS — I10 ESSENTIAL HYPERTENSION: Primary | ICD-10-CM

## 2022-10-13 DIAGNOSIS — E78.2 MIXED HYPERLIPIDEMIA: Chronic | ICD-10-CM

## 2022-10-13 DIAGNOSIS — E55.9 VITAMIN D DEFICIENCY: Chronic | ICD-10-CM

## 2022-10-13 PROCEDURE — 99214 OFFICE O/P EST MOD 30 MIN: CPT | Performed by: INTERNAL MEDICINE

## 2022-10-13 RX ORDER — AMLODIPINE BESYLATE 5 MG/1
5 TABLET ORAL DAILY
Qty: 30 TABLET | Refills: 3 | Status: SHIPPED | OUTPATIENT
Start: 2022-10-13

## 2022-10-13 RX ORDER — OMEPRAZOLE 20 MG/1
20 CAPSULE, DELAYED RELEASE ORAL AS NEEDED
COMMUNITY

## 2022-10-13 RX ORDER — AMLODIPINE BESYLATE 5 MG/1
5 TABLET ORAL DAILY
COMMUNITY
End: 2022-10-13 | Stop reason: SDUPTHER

## 2022-10-13 NOTE — PATIENT INSTRUCTIONS
Patient was advised to continue present medications  discussed with the patient medications and laboratory data in detail  Follow-up with me in 3 months or as advised  If any blood test was ordered please do 1 week prior to next appointment unless advise to get earlier    If you have any questions please call the office [phone]

## 2022-10-13 NOTE — PROGRESS NOTES
Assessment/Plan:    1  Essential hypertension  Assessment & Plan:  Blood pressure elevated  She is on metoprolol as well as Dyazide  Will add amlodipine 5 mg daily  Advised for low-salt diet and lose weight  Orders:  -     amLODIPine (NORVASC) 5 mg tablet; Take 1 tablet (5 mg total) by mouth daily  -     CBC and differential; Future  -     Comprehensive metabolic panel; Future    2  Anxiety and depression  Assessment & Plan:  Her symptoms are controlled on citalopram as well as buspirone  She would like to continue both medications as it is effective and has no side effect  Orders:  -     CBC and differential; Future  -     Comprehensive metabolic panel; Future    3  Mixed hyperlipidemia  Assessment & Plan:  Diet controlled  Cholesterol 191, triglyceride 159, HDL 45,   Advised for low-cholesterol, low saturated, low carbs diet  Orders:  -     Ambulatory Referral to Nutrition Services; Future    4  Lymphedema  Assessment & Plan:  She was seen by vascular surgery  Was advised compression stockings  Was also requested pneumatic compression pumps but patient states she has gotten yet  Orders:  -     Comprehensive metabolic panel; Future    5  BMI 50 0-59 9, adult [facility])  Assessment & Plan:  Patient had tried diet in the past   Patient state unable to lose weight  Will refer to Nutrition therapy  Discussed with the patient about bariatric surgery evaluation but she would like to wait  Due to her left hip pain she is not able to exercise  Advised to decrease portion size as well as decrease carbs and cholesterol intake  Orders:  -     Ambulatory Referral to Nutrition Services; Future    6  Gastroesophageal reflux disease without esophagitis  Assessment & Plan:  She takes omeprazole p r n  about couple times per week depending on the foods she eats  Advised to watch diet and reflux precautions      7   Encounter for screening mammogram for malignant neoplasm of breast  -     Mammo screening bilateral w 3d & cad; Future    8  Hyperglycemia  Assessment & Plan:  Her fasting blood sugar was 109 so advised to watch diet for sugar and carbs intake  Will check fasting blood sugar as well as a hemoglobin A1c next time    Orders:  -     Ambulatory Referral to Nutrition Services; Future  -     Comprehensive metabolic panel; Future  -     Hemoglobin A1C; Future    9  Left hip pain  Assessment & Plan:  She was seen by orthopedic specialist had injection but did not got better  She is ambulating with cane  She gets pain in the left hip  She is not surgical candidate at present due to obesity  10  Vitamin D deficiency  Assessment & Plan:  Vitamin-D deficiency resolved after being on vitamin-D supplement vitamin-D level increased from 25-44 so advised to continue vitamin-D 2000 International Units daily  Subjective:  Patient presents for follow-up  Patient ID: Kraig Branch is a 57 y o  female  HPI   76-year-old white female patient presents for follow-up her medical problems  She denies any chest pain, shortness of breath, pain in abdomen  Denies nausea vomiting, diarrhea  Denies fever chills or cough patient has a left hip pain  She was seen by orthopedic specialist   She had a injection in the left hip but did not got better  She is requiring to use cane for ambulation due to left hip pain  She is not surgical candidate due to weight at present  She needs to lose weight      The following portions of the patient's history were reviewed and updated as appropriate:     Past Medical History:  She has a past medical history of Allergic rhinitis (7/7/2022), Anxiety, Arthritis, BMI 50 0-59 9, adult ([facility] [address] ) (6/1/2021), Cellulitis of left leg (8/17/2021), Depression, Eczema (6/2/2022), Edema of both lower legs (1/23/2021), Edema of both lower legs (11/23/2021), Endometrial cancer ([facility] [address] ) (09/14/2021), Heart murmur, History of COVID-19 (11/2020), Hyperglycemia (1/23/2021), Hyperlipidemia, Hypertension, Left hip pain (2022), Lower abdominal pain (2021), Mixed hyperlipidemia (2021), Numbness and tingling in left arm, Pruritus, Rash of hand (2022), Shortness of breath, Skin lesion, Tinea pedis of both feet (2022), and Vitamin D deficiency (2021)  ,  _______________________________________________________________________  Past Surgical History:   has a past surgical history that includes  section; pr hysteroscopy,w/endo bx (N/A, 3/21/2016); Replacement total knee (Right);  section; CHOLECYSTECTOMY LAPAROSCOPIC (N/A, 3/3/2019); Joint replacement (2016); Mammo (historical) (10/11/2018); Colonoscopy; Carpal tunnel release (Bilateral); Cholecystectomy; pr laparoscopy w tot hysterectuterus <=250 gram  w tube/ovary (N/A, 10/8/2021); CYSTOSCOPY (N/A, 10/8/2021); and FL injection left hip (non arthrogram) (2022)  ,  _______________________________________________________________________  Family History:  family history includes Brain cancer (age of onset: 50) in her maternal aunt; Breast cancer (age of onset: 48) in her maternal aunt; Diabetes in her father; Heart failure in her father; Hemophilia in her brother; Hypertension in her father and mother; Lymphoma (age of onset: 58) in her father; No Known Problems in her daughter, maternal aunt, maternal grandfather, maternal grandmother, paternal grandfather, paternal grandmother, and sister  ,  _______________________________________________________________________  Social History:   reports that she has never smoked  She has never used smokeless tobacco  She reports that she does not drink alcohol and does not use drugs  ,  _______________________________________________________________________  Allergies:  is allergic to griseofulvin, penicillins, and zithromax [azithromycin]     _______________________________________________________________________  Current Outpatient Medications   Medication Sig Dispense Refill   • amLODIPine (NORVASC) 5 mg tablet Take 1 tablet (5 mg total) by mouth daily 30 tablet 3   • Ascorbic Acid (vitamin C) 1000 MG tablet Take 1,000 mg by mouth daily     • busPIRone (BUSPAR) 5 mg tablet Take 1 tablet (5 mg total) by mouth daily at bedtime 90 tablet 0   • calcium carbonate (OS-CAMDEN) 600 MG tablet Take 600 mg by mouth daily     • Cholecalciferol (Vitamin D) 50 MCG (2000 UT) tablet Take 2,000 Units by mouth daily     • citalopram (CeleXA) 20 mg tablet Take 1 tablet (20 mg total) by mouth daily at bedtime 90 tablet 0   • clotrimazole-betamethasone (LOTRISONE) 1-0 05 % cream Apply topically 2 (two) times a day Apply to feet b i d  (Patient not taking: No sig reported) 45 g 1   • fexofenadine (ALLEGRA) 180 MG tablet Take 180 mg by mouth as needed     • metoprolol succinate (TOPROL-XL) 100 mg 24 hr tablet Take 1 tablet (100 mg total) by mouth daily at bedtime 90 tablet 0   • Multiple Vitamins-Minerals (multivitamin with minerals) tablet Take 1 tablet by mouth daily     • omeprazole (PriLOSEC) 20 mg delayed release capsule Take 20 mg by mouth as needed     • triamterene-hydrochlorothiazide (DYAZIDE) 37 5-25 mg per capsule Take 1 capsule by mouth every morning 90 capsule 1   • vitamin B-12 (VITAMIN B-12) 1,000 mcg tablet Take by mouth daily     • clobetasol (TEMOVATE) 0 05 % ointment Apply topically 2 (two) times a day (Patient not taking: No sig reported) 45 g 0     No current facility-administered medications for this visit      _______________________________________________________________________  Review of Systems   Constitutional: Negative for chills and fever  HENT: Negative for congestion, ear pain, hearing loss, nosebleeds, sinus pain, sore throat and trouble swallowing  Eyes: Negative for discharge, redness and visual disturbance  Respiratory: Negative for cough, chest tightness and shortness of breath  Cardiovascular: Negative for chest pain and palpitations  Gastrointestinal: Negative for abdominal pain, blood in stool, constipation, diarrhea, nausea and vomiting  Genitourinary: Negative for dysuria, flank pain, frequency and hematuria  Musculoskeletal: Positive for arthralgias (Left hip pain)  Negative for myalgias and neck pain  Skin: Negative for color change and rash  Neurological: Negative for dizziness, speech difficulty, weakness and headaches  Hematological: Does not bruise/bleed easily  Psychiatric/Behavioral: Negative for agitation, behavioral problems, self-injury and suicidal ideas  Objective:  Vitals:    10/13/22 1640   BP: 152/92   BP Location: Left arm   Patient Position: Sitting   Cuff Size: Adult   Pulse: 80   Resp: 14   Temp: 98 2 °F (36 8 °C)   TempSrc: Tympanic   SpO2: 98%   Weight: (!) 152 kg (335 lb)   Height: 5' 8" (1 727 m)     Body mass index is 50 94 kg/m²  Physical Exam  Vitals and nursing note reviewed  Constitutional:       General: She is not in acute distress  Appearance: Normal appearance  She is obese  HENT:      Head: Normocephalic and atraumatic  Right Ear: Ear canal and external ear normal       Left Ear: Ear canal and external ear normal       Nose: Nose normal       Mouth/Throat:      Mouth: Mucous membranes are moist    Eyes:      General: No scleral icterus  Right eye: No discharge  Left eye: No discharge  Extraocular Movements: Extraocular movements intact  Conjunctiva/sclera: Conjunctivae normal    Cardiovascular:      Rate and Rhythm: Normal rate and regular rhythm  Pulses: Normal pulses  Heart sounds: No murmur heard  Pulmonary:      Effort: Pulmonary effort is normal  No respiratory distress  Breath sounds: Normal breath sounds  Abdominal:      General: Bowel sounds are normal       Palpations: Abdomen is soft  Tenderness: There is no abdominal tenderness  Musculoskeletal:         General: Tenderness ( left hip tenderness  ) present  Normal range of motion  Cervical back: Normal range of motion and neck supple  No muscular tenderness  Right lower leg: Edema (Has a nonpitting edema ) present  Left lower leg: Edema (  Has a nonpitting edema pain) present  Comments: She is ambulating with cane  Skin:     General: Skin is warm  Findings: No rash  Neurological:      General: No focal deficit present  Mental Status: She is alert and oriented to person, place, and time  Motor: No weakness  Coordination: Coordination normal    Psychiatric:         Mood and Affect: Mood normal          Behavior: Behavior normal        I spent 30 minutes with the patient today    More than 50% time spent for reviewing of external notes, reviewing of the results of diagnostics test, management of care, patient education and ordering of test

## 2022-10-14 NOTE — ASSESSMENT & PLAN NOTE
Her symptoms are controlled on citalopram as well as buspirone  She would like to continue both medications as it is effective and has no side effect

## 2022-10-14 NOTE — ASSESSMENT & PLAN NOTE
She was seen by vascular surgery  Was advised compression stockings  Was also requested pneumatic compression pumps but patient states she has gotten yet

## 2022-10-14 NOTE — ASSESSMENT & PLAN NOTE
She takes omeprazole p r n  about couple times per week depending on the foods she eats    Advised to watch diet and reflux precautions

## 2022-10-14 NOTE — ASSESSMENT & PLAN NOTE
Her fasting blood sugar was 109 so advised to watch diet for sugar and carbs intake    Will check fasting blood sugar as well as a hemoglobin A1c next time

## 2022-10-14 NOTE — ASSESSMENT & PLAN NOTE
She was seen by orthopedic specialist had injection but did not got better  She is ambulating with cane  She gets pain in the left hip  She is not surgical candidate at present due to obesity

## 2022-10-14 NOTE — ASSESSMENT & PLAN NOTE
Diet controlled  Cholesterol 191, triglyceride 159, HDL 45,   Advised for low-cholesterol, low saturated, low carbs diet

## 2022-10-14 NOTE — ASSESSMENT & PLAN NOTE
Vitamin-D deficiency resolved after being on vitamin-D supplement vitamin-D level increased from 25-44 so advised to continue vitamin-D 2000 International Units daily

## 2022-10-14 NOTE — ASSESSMENT & PLAN NOTE
Blood pressure elevated  She is on metoprolol as well as Dyazide  Will add amlodipine 5 mg daily  Advised for low-salt diet and lose weight

## 2022-10-14 NOTE — ASSESSMENT & PLAN NOTE
Patient had tried diet in the past   Patient state unable to lose weight  Will refer to Nutrition therapy  Discussed with the patient about bariatric surgery evaluation but she would like to wait  Due to her left hip pain she is not able to exercise  Advised to decrease portion size as well as decrease carbs and cholesterol intake

## 2022-10-20 NOTE — ASSESSMENT & PLAN NOTE
Attempted to call patient to schedule a Resumption of Care appointment for the [facility] with NP Justin Unable to reach at this time. Writer left a voicemail for a return call.    Writer was going to schedule patient 10/21/22 @9AM.    Thank you,  Tiffani  [phone]     Patient with newly diagnosed FIGO grade 1 endometrial cancer  Interestingly, she had non atypical endometrial hyperplasia diagnosed in 2016  I hysterectomy was discussed at that time and then approximately 2 years later  She declined  Despite that, she has not been treated medically  Therefore, it is possible that she has had malignant pathology for quite some time  On those grounds, I will obtain baseline CT scan of the chest, abdomen and pelvis to rule out the possibility of metastatic disease  In the absence of unresectable disease or other unexpected findings I have recommended and she has consented to proceed with robotic hysterectomy, bilateral salpingo-oophorectomy, pelvic sentinel lymph node biopsies versus lymphadenectomy  Given history of chronic lymphedema, I would strongly consider for going lymphadenectomy to minimize added morbidity  She has acceptable exercise tolerance certainly greater than 4 METS  No additional cardiovascular workup is indicated  Will obtain preoperative testing as per procedure pass  She is overdue for mammogram and colonoscopy and will obtain those tests prior to upcoming surgery  I discussed with patient indication, risks, benefits and alternatives of surgical exploration  We discussed possibility of bleeding requiring blood transfusion, life-threatening infections requiring additional procedures, injuries to surrounding organs such as bladder, ureters, gastrointestinal tract and or neurovascular structures  Additionally, we discussed general risks associated to stress of surgery such as venous thromboembolism, acute myocardial events and stroke  All questions answered to patient's satisfaction  We discussed potential need to convert to laparotomy  She agrees and wants to proceed  Informed consent form signed           Our schedulers will contact patient with potential scheduling options and she will have the opportunity to have this procedure scheduled with either myself or my partner Dr Genaro Hassan  She would have the opportunity to meet her and be Marion Countess counseled /consented by her prior to surgery  All questions were answered to patient and 's satisfaction

## 2022-10-28 ENCOUNTER — ONCOLOGY SURVIVORSHIP (OUTPATIENT)
Dept: GYNECOLOGIC ONCOLOGY | Facility: CLINIC | Age: 62
End: 2022-10-28

## 2022-10-28 VITALS
BODY MASS INDEX: 44.41 KG/M2 | DIASTOLIC BLOOD PRESSURE: 80 MMHG | HEIGHT: 68 IN | WEIGHT: 293 LBS | SYSTOLIC BLOOD PRESSURE: 170 MMHG | HEART RATE: 94 BPM | TEMPERATURE: 97.5 F | OXYGEN SATURATION: 95 %

## 2022-10-28 DIAGNOSIS — B37.2 CANDIDIASIS OF SKIN: Primary | ICD-10-CM

## 2022-10-28 DIAGNOSIS — Z85.42 HISTORY OF ENDOMETRIAL CANCER: ICD-10-CM

## 2022-10-28 PROBLEM — Z08 ENCOUNTER FOR FOLLOW-UP SURVEILLANCE OF ENDOMETRIAL CANCER: Status: ACTIVE | Noted: 2021-09-08

## 2022-10-28 RX ORDER — NYSTATIN 100000 [USP'U]/G
POWDER TOPICAL 4 TIMES DAILY
Qty: 60 G | Refills: 1
Start: 2022-10-28

## 2022-10-28 RX ORDER — NYSTATIN 100000 [USP'U]/G
POWDER TOPICAL 4 TIMES DAILY
Qty: 60 G | Refills: 1 | Status: SHIPPED | OUTPATIENT
Start: 2022-10-28 | End: 2022-10-28 | Stop reason: SDUPTHER

## 2022-10-28 NOTE — PROGRESS NOTES
Assessment/Plan:    Problem List Items Addressed This Visit        Musculoskeletal and Integument    Candidiasis of skin - Primary     Candidal intertrigo of breast folds  Nystatin powder sent  Relevant Medications    nystatin (MYCOSTATIN) powder       Other    History of endometrial cancer     41-year-old with a history of stage IB low-intermediate risk endometrial cancer diagnosed in October 2021  She required no adjuvant therapy  She has no current complaints  Her pelvic exam is normal  Her PS is 0  Patient will return to the office in 6 months for continued surveillance  She will call in the interim with any concerns                   REASON FOR VISIT:   Survivorship/Surveillance Visit    Problem:  Cancer Staging  Encounter for follow-up surveillance of endometrial cancer  Staging form: Corpus Uteri - Carcinoma, AJCC 8th Edition  - Pathologic stage from 10/8/2021: FIGO Stage IB (pT1b, pN0(sn), cM0) - Signed by Laxmi Leroy MD on 11/3/2021        Previous therapy:  Oncology History   Encounter for follow-up surveillance of endometrial cancer   9/8/2021 Initial Diagnosis    Endometrial cancer ([facility] [address] )     10/8/2021 -  Cancer Staged    Staging form: Corpus Uteri - Carcinoma, AJCC 8th Edition  - Pathologic stage from 10/8/2021: FIGO Stage IB (pT1b, pN0(sn), cM0) - Signed by Laxmi Leroy MD on 11/3/2021  Stage prefix: Initial diagnosis  Method of lymph node assessment: [place] lymph node biopsy  Histologic grade (G): G1  Histologic grading system: 3 grade system  Residual tumor (R): R0 - None  Peritoneal cytology results: Unknown  Pelvic rafaela status: Negative  Number of pelvic nodes positive from dissection: 0  Number of pelvic nodes examined during dissection: 5  Para-aortic status: Not assessed  Lymph node metastasis: Not assessed  Omentectomy performed: No  Morcellation performed: No               Patient ID: Emogene Kocher is a 60 y o  female     Karyna Duck has been well in the interim and is without acute complaints  She denies nausea or vomiting  Her appetite is appropriate  She is voiding and moving her bowels without difficulty  She denies abdominal or pelvic pain  The patient is without vaginal bleeding or discharge  She is ambulatory  Review of Systems   Constitutional: Negative for chills, fatigue, fever and unexpected weight change  HENT: Negative for nosebleeds  Eyes: Negative  Respiratory: Negative for cough, chest tightness, shortness of breath and wheezing  Cardiovascular: Negative for chest pain, palpitations and leg swelling  Gastrointestinal: Negative for abdominal distention, abdominal pain, anal bleeding, blood in stool, constipation, diarrhea, nausea, rectal pain and vomiting  Endocrine: Negative  Genitourinary: Negative for difficulty urinating, dysuria, frequency, hematuria, pelvic pain, urgency, vaginal bleeding, vaginal discharge and vaginal pain  Musculoskeletal: Positive for arthralgias  Negative for joint swelling  Skin: Negative for color change, pallor and rash  Neurological: Negative for dizziness, weakness, light-headedness, numbness and headaches  Hematological: Negative  Psychiatric/Behavioral: Negative  Objective:    Blood pressure 170/80, pulse 94, temperature 97 5 °F (36 4 °C), temperature source Temporal, height 5' 8" (1 727 m), weight (!) 154 kg (340 lb), SpO2 95 %  Body mass index is 51 7 kg/m²  Body surface area is 2 56 meters squared  Physical Exam  Vitals reviewed  Exam conducted with a chaperone present  Constitutional:       General: She is not in acute distress  Appearance: Normal appearance  She is obese  She is not ill-appearing  HENT:      Head: Normocephalic and atraumatic  Mouth/Throat:      Mouth: Mucous membranes are moist    Eyes:      General:         Right eye: No discharge  Left eye: No discharge        Conjunctiva/sclera: Conjunctivae normal    Pulmonary:      Effort: Pulmonary effort is normal    Abdominal:      Palpations: Abdomen is soft  There is no mass  Tenderness: There is no abdominal tenderness  Hernia: No hernia is present  Genitourinary:     Comments: The external female genitalia is normal  The bartholin's, uretheral and skenes glands are normal  The urethral meatus is normal (midline with no lesions)  Anus without fissure or lesion  Speculum exam reveals a grossly normal vagina  No masses, lesions,discharge or bleeding  No significant cystocele or rectocele noted  Bimanual exam notes a surgical absent cervix, uterus and adnexal structures  No masses or fullness  Bladder is without fullness, mass or tenderness  Musculoskeletal:      Right lower leg: No edema  Left lower leg: No edema  Skin:     General: Skin is warm and dry  Coloration: Skin is not jaundiced  Findings: No rash  Neurological:      General: No focal deficit present  Mental Status: She is alert and oriented to person, place, and time  Cranial Nerves: No cranial nerve deficit  Sensory: No sensory deficit  Motor: No weakness  Gait: Gait normal    Psychiatric:         Mood and Affect: Mood normal          Behavior: Behavior normal          Thought Content: Thought content normal          Judgment: Judgment normal              Discussed symptoms related to disease recurrence, Yes    Evaluated for late effects related to cancer treatment, Yes    Screening current for breast cancer, No, scheduled for 1/18/23    Screening current for colon cancer, Yes, describe: done 9/29/21    Management of obesity addressed, Yes, describe: discussed diet and exercise  Patient has difficulty exercising due to hip pain  She has a dietician appt in January  She reports needing to lose ~70lbs prior to being considered for hip replacement      Screening current for osteoporosis, No, n/a    Oncology Treatment Summary reviewed with patient and copy provided, Yes    Referral placed for psychosocial evaluation/screening to oncology social work Yes

## 2022-10-28 NOTE — ASSESSMENT & PLAN NOTE
59-year-old with a history of stage IB low-intermediate risk endometrial cancer diagnosed in October 2021  She required no adjuvant therapy  She has no current complaints  Her pelvic exam is normal  Her PS is 0  Patient will return to the office in 6 months for continued surveillance  She will call in the interim with any concerns

## 2022-12-06 ENCOUNTER — TELEPHONE (OUTPATIENT)
Dept: INTERNAL MEDICINE CLINIC | Facility: CLINIC | Age: 62
End: 2022-12-06

## 2022-12-06 DIAGNOSIS — F41.9 ANXIETY AND DEPRESSION: Chronic | ICD-10-CM

## 2022-12-06 DIAGNOSIS — F32.A ANXIETY AND DEPRESSION: Chronic | ICD-10-CM

## 2022-12-06 RX ORDER — CITALOPRAM 20 MG/1
20 TABLET ORAL
Qty: 90 TABLET | Refills: 0 | Status: SHIPPED | OUTPATIENT
Start: 2022-12-06

## 2022-12-06 NOTE — TELEPHONE ENCOUNTER
A script for Celexa was sent in today  The pharmacy noticed that she is also taking Buspar and they wanted you to know that the are interactions with these two medications

## 2023-01-06 ENCOUNTER — VBI (OUTPATIENT)
Dept: ADMINISTRATIVE | Facility: OTHER | Age: 63
End: 2023-01-06

## 2023-01-10 ENCOUNTER — RA CDI HCC (OUTPATIENT)
Dept: OTHER | Facility: HOSPITAL | Age: 63
End: 2023-01-10

## 2023-01-10 NOTE — PROGRESS NOTES
Y40V        [facility]  coding opportunities          Chart Reviewed number of suggestions sent to Provider: 2     Patients Insurance        Commercial Insurance: [employer]

## 2023-01-17 ENCOUNTER — OFFICE VISIT (OUTPATIENT)
Dept: INTERNAL MEDICINE CLINIC | Facility: CLINIC | Age: 63
End: 2023-01-17

## 2023-01-17 VITALS
TEMPERATURE: 98.2 F | BODY MASS INDEX: 44.41 KG/M2 | HEART RATE: 84 BPM | HEIGHT: 68 IN | WEIGHT: 293 LBS | SYSTOLIC BLOOD PRESSURE: 132 MMHG | RESPIRATION RATE: 16 BRPM | OXYGEN SATURATION: 97 % | DIASTOLIC BLOOD PRESSURE: 78 MMHG

## 2023-01-17 DIAGNOSIS — I89.0 LYMPHEDEMA: ICD-10-CM

## 2023-01-17 DIAGNOSIS — F41.9 ANXIETY AND DEPRESSION: Chronic | ICD-10-CM

## 2023-01-17 DIAGNOSIS — E55.9 VITAMIN D DEFICIENCY: Chronic | ICD-10-CM

## 2023-01-17 DIAGNOSIS — F32.A ANXIETY AND DEPRESSION: Chronic | ICD-10-CM

## 2023-01-17 DIAGNOSIS — I10 ESSENTIAL HYPERTENSION: ICD-10-CM

## 2023-01-17 DIAGNOSIS — J30.89 SEASONAL ALLERGIC RHINITIS DUE TO OTHER ALLERGIC TRIGGER: ICD-10-CM

## 2023-01-17 DIAGNOSIS — R73.9 HYPERGLYCEMIA: Chronic | ICD-10-CM

## 2023-01-17 DIAGNOSIS — K21.9 GASTROESOPHAGEAL REFLUX DISEASE WITHOUT ESOPHAGITIS: ICD-10-CM

## 2023-01-17 DIAGNOSIS — Z00.00 ANNUAL PHYSICAL EXAM: Primary | ICD-10-CM

## 2023-01-17 DIAGNOSIS — E78.2 MIXED HYPERLIPIDEMIA: Chronic | ICD-10-CM

## 2023-01-17 DIAGNOSIS — M25.552 LEFT HIP PAIN: ICD-10-CM

## 2023-01-17 RX ORDER — CITALOPRAM 20 MG/1
20 TABLET ORAL
Qty: 90 TABLET | Refills: 0 | Status: SHIPPED | OUTPATIENT
Start: 2023-01-17

## 2023-01-17 RX ORDER — BUSPIRONE HYDROCHLORIDE 5 MG/1
5 TABLET ORAL
Qty: 90 TABLET | Refills: 0 | Status: SHIPPED | OUTPATIENT
Start: 2023-01-17

## 2023-01-17 RX ORDER — TRIAMTERENE AND HYDROCHLOROTHIAZIDE 37.5; 25 MG/1; MG/1
1 CAPSULE ORAL EVERY MORNING
Qty: 90 CAPSULE | Refills: 1 | Status: SHIPPED | OUTPATIENT
Start: 2023-01-17

## 2023-01-17 RX ORDER — AMLODIPINE BESYLATE 5 MG/1
5 TABLET ORAL DAILY
Qty: 90 TABLET | Refills: 1 | Status: SHIPPED | OUTPATIENT
Start: 2023-01-17

## 2023-01-17 RX ORDER — METOPROLOL SUCCINATE 100 MG/1
100 TABLET, EXTENDED RELEASE ORAL
Qty: 90 TABLET | Refills: 1 | Status: SHIPPED | OUTPATIENT
Start: 2023-01-17

## 2023-01-17 NOTE — ASSESSMENT & PLAN NOTE
Her symptoms are controlled BuSpar as well as Celexa on present medications  She would like to continue both medications as it is effective and has no side effect

## 2023-01-17 NOTE — ASSESSMENT & PLAN NOTE
Patient is going to see nutrition therapy after 2 days  Patient  was advised to decrease portion size  Advised to decrease carb, sugar, cholesterol intake  Patient states she cannot exercise due to her left hip problem  Advised to lose weight

## 2023-01-17 NOTE — ASSESSMENT & PLAN NOTE
Patient was seen by vascular surgery  But she did not have a follow-up appointment and never got lymphedema pump  Advised to follow with vascular

## 2023-01-17 NOTE — PROGRESS NOTES
Assessment/Plan:    1  Annual physical exam    2  Essential hypertension  -     amLODIPine (NORVASC) 5 mg tablet; Take 1 tablet (5 mg total) by mouth daily  -     metoprolol succinate (TOPROL-XL) 100 mg 24 hr tablet; Take 1 tablet (100 mg total) by mouth daily at bedtime  -     triamterene-hydrochlorothiazide (DYAZIDE) 37 5-25 mg per capsule; Take 1 capsule by mouth every morning  -     CBC and differential; Future    3  Mixed hyperlipidemia    4  Hyperglycemia  -     Comprehensive metabolic panel; Future  -     Hemoglobin A1C; Future    5  Anxiety and depression  -     busPIRone (BUSPAR) 5 mg tablet; Take 1 tablet (5 mg total) by mouth daily at bedtime  -     citalopram (CeleXA) 20 mg tablet; Take 1 tablet (20 mg total) by mouth daily at bedtime  -     CBC and differential; Future  -     Comprehensive metabolic panel; Future    6  BMI 50 0-59 9, adult ([facility])  -     Comprehensive metabolic panel; Future    7  Lymphedema    8  Seasonal allergic rhinitis due to other allergic trigger    9  Gastroesophageal reflux disease without esophagitis            Subjective:       Patient ID: Ramin Day is a 60 y o  female      HPI    The following portions of the patient's history were reviewed and updated as appropriate:     Past Medical History:  She has a past medical history of Allergic rhinitis (7/7/2022), Anxiety, Arthritis, BMI 50 0-59 9, adult ([place] [address] ) (6/1/2021), Cellulitis of left leg (8/17/2021), Depression, Eczema (6/2/2022), Edema of both lower legs (1/23/2021), Edema of both lower legs (11/23/2021), Endometrial cancer ([facility] ) (09/14/2021), Heart murmur, History of COVID-19 (11/2020), Hyperglycemia (1/23/2021), Hyperlipidemia, Hypertension, Left hip pain (6/2/2022), Lower abdominal pain (11/23/2021), Mixed hyperlipidemia (1/23/2021), Numbness and tingling in left arm, Pruritus, Rash of hand (2/28/2022), Shortness of breath, Skin lesion, Tinea pedis of both feet (2/28/2022), and Vitamin D deficiency (2021)  ,  _______________________________________________________________________  Past Surgical History:   has a past surgical history that includes  section; pr hysteroscopy bx endometrium&/polypc w/wo d&c (N/A, 3/21/2016); Replacement total knee (Right);  section; CHOLECYSTECTOMY LAPAROSCOPIC (N/A, 3/3/2019); Joint replacement (2016); Mammo (historical) (10/11/2018); Colonoscopy; Carpal tunnel release (Bilateral); Cholecystectomy; pr laps total hysterect 250 gm/< w/rmvl tube/ovary (N/A, 10/8/2021); CYSTOSCOPY (N/A, 10/8/2021); and FL injection left hip (non arthrogram) (2022)  ,  _______________________________________________________________________  Family History:  family history includes Brain cancer (age of onset: 50) in her maternal aunt; Breast cancer (age of onset: 48) in her maternal aunt; Diabetes in her father; Heart failure in her father; Hemophilia in her brother; Hypertension in her father and mother; Lymphoma (age of onset: 58) in her father; No Known Problems in her daughter, maternal aunt, maternal grandfather, maternal grandmother, paternal grandfather, paternal grandmother, and sister  ,  _______________________________________________________________________  Social History:   reports that she has never smoked  She has never used smokeless tobacco  She reports that she does not drink alcohol and does not use drugs  ,  _______________________________________________________________________  Allergies:  is allergic to griseofulvin, penicillins, and zithromax [azithromycin]     _______________________________________________________________________  Current Outpatient Medications   Medication Sig Dispense Refill   • amLODIPine (NORVASC) 5 mg tablet Take 1 tablet (5 mg total) by mouth daily 90 tablet 1   • Ascorbic Acid (vitamin C) 1000 MG tablet Take 1,000 mg by mouth daily     • busPIRone (BUSPAR) 5 mg tablet Take 1 tablet (5 mg total) by mouth daily at bedtime 90 tablet 0   • calcium carbonate (OS-CAMDEN) 600 MG tablet Take 600 mg by mouth daily     • Cholecalciferol (Vitamin D) 50 MCG (2000 UT) tablet Take 2,000 Units by mouth daily     • citalopram (CeleXA) 20 mg tablet Take 1 tablet (20 mg total) by mouth daily at bedtime 90 tablet 0   • clobetasol (TEMOVATE) 0 05 % ointment Apply topically 2 (two) times a day 45 g 0   • clotrimazole-betamethasone (LOTRISONE) 1-0 05 % cream Apply topically 2 (two) times a day Apply to feet b i d  45 g 1   • fexofenadine (ALLEGRA) 180 MG tablet Take 180 mg by mouth as needed     • metoprolol succinate (TOPROL-XL) 100 mg 24 hr tablet Take 1 tablet (100 mg total) by mouth daily at bedtime 90 tablet 1   • Multiple Vitamins-Minerals (multivitamin with minerals) tablet Take 1 tablet by mouth daily     • nystatin (MYCOSTATIN) powder Apply topically 4 (four) times a day 60 g 1   • omeprazole (PriLOSEC) 20 mg delayed release capsule Take 20 mg by mouth as needed     • triamterene-hydrochlorothiazide (DYAZIDE) 37 5-25 mg per capsule Take 1 capsule by mouth every morning 90 capsule 1   • vitamin B-12 (VITAMIN B-12) 1,000 mcg tablet Take by mouth daily       No current facility-administered medications for this visit      _______________________________________________________________________  Review of Systems      Objective:  Vitals:    01/17/23 1626   BP: 132/78   BP Location: Left arm   Patient Position: Sitting   Cuff Size: Adult   Pulse: 84   Resp: 16   Temp: 98 2 °F (36 8 °C)   TempSrc: Tympanic   SpO2: 97%   Weight: (!) 153 kg (337 lb)   Height: 5' 8" (1 727 m)     Body mass index is 51 24 kg/m²       Physical Exam

## 2023-01-17 NOTE — PROGRESS NOTES
ADULT ANNUAL [address] INTERNAL MEDICINE    NAME: Pamela Champagne  AGE: 60 y o  SEX: female  : 1960     DATE: 2023     Assessment and Plan:     Problem List Items Addressed This Visit        Digestive    GE reflux     She takes only as needed omeprazole whenever she eats certain foods  Respiratory    Allergic rhinitis     Symptoms are controlled on fexofenadine as needed  Cardiovascular and Mediastinum    Essential hypertension     Blood pressure much better  Advised to continue present medication and low-salt diet  Relevant Medications    amLODIPine (NORVASC) 5 mg tablet    metoprolol succinate (TOPROL-XL) 100 mg 24 hr tablet    triamterene-hydrochlorothiazide (DYAZIDE) 37 5-25 mg per capsule    Other Relevant Orders    CBC and differential       Other    Anxiety and depression (Chronic)     Her symptoms are controlled BuSpar as well as Celexa on present medications  She would like to continue both medications as it is effective and has no side effect  Relevant Medications    busPIRone (BUSPAR) 5 mg tablet    citalopram (CeleXA) 20 mg tablet    Other Relevant Orders    CBC and differential    Comprehensive metabolic panel    Mixed hyperlipidemia (Chronic)     Diet controlled  Cholesterol 191, triglyceride 159, HDL 45,   Advised to continue low-cholesterol, low-carb diet and lose weight  Hyperglycemia (Chronic)     Fasting blood sugar 109  Advised to watch diet for sugar and carbs intake  Relevant Orders    Comprehensive metabolic panel    Hemoglobin A1C    Vitamin D deficiency (Chronic)     Vitamin D level increased from 25-44 advised to continue vitamin D 2000 IU daily  BMI 50 0-59 9, adult [facility])     Patient is going to see nutrition therapy after 2 days  Patient  was advised to decrease portion size  Advised to decrease carb, sugar, cholesterol intake    Patient states she cannot exercise due to her left hip problem  Advised to lose weight  Relevant Orders    Comprehensive metabolic panel    Lymphedema     Patient was seen by vascular surgery  But she did not have a follow-up appointment and never got lymphedema pump  Advised to follow with vascular  Annual physical exam - Primary     She refused for influenza vaccination, pneumococcal vaccines, COVID vaccination  Left hip pain     She was seen orthopedic specialist   Uses cane for ambulation she needs hip replacement but due to weight she does not qualify for surgery at present  Immunizations and preventive care screenings were discussed with patient today  Appropriate education was printed on patient's after visit summary  Counseling:  · Alcohol/drug use: discussed moderation in alcohol intake, the recommendations for healthy alcohol use, and avoidance of illicit drug use  Return in about 3 months (around 4/17/2023)  Chief Complaint:     Chief Complaint   Patient presents with   • Annual Exam      History of Present Illness:     Adult Annual Physical   Patient here for a comprehensive physical exam  The patient reports no problems  Diet and Physical Activity  · Diet/Nutrition: well balanced diet  · Exercise: no formal exercise  Depression Screening  PHQ-2/9 Depression Screening         General Health  · Sleep: sleeps well  · Hearing: normal - bilateral   · Vision: wears contacts  · Dental: regular dental visits  /GYN Health  · Patient is:postmenopausal  · Last menstrual period:10yrs ago    · Contraceptive method: not necessary  Review of Systems:     Review of Systems   Constitutional: Negative for chills and fever  HENT: Negative for congestion, ear pain, hearing loss, nosebleeds, sinus pain, sore throat and trouble swallowing  Eyes: Negative for discharge, redness and visual disturbance     Respiratory: Negative for cough, chest tightness and shortness of breath  Cardiovascular: Negative for chest pain and palpitations  Gastrointestinal: Negative for abdominal pain, blood in stool, constipation, diarrhea, nausea and vomiting  Genitourinary: Negative for dysuria, flank pain, frequency and hematuria  Musculoskeletal: Positive for arthralgias ( left hip pain)  Negative for myalgias and neck pain  Skin: Negative for color change and rash  Neurological: Negative for dizziness, speech difficulty, weakness and headaches  Hematological: Does not bruise/bleed easily  Psychiatric/Behavioral: Negative for agitation and behavioral problems           Past Medical History:     Past Medical History:   Diagnosis Date   • Allergic rhinitis 2022   • Anxiety    • Arthritis    • BMI 50 0-59 9, adult ([facility] ) 2021   • Cellulitis of left leg 2021   • Depression    • Eczema 2022   • Edema of both lower legs 2021   • Edema of both lower legs 2021   • Endometrial cancer ([facility] ) 2021   • Heart murmur    • History of COVID-19 2020    Mild sypmtoms Cough,Tired,diarrhea,sweats, Loss taste and smell   • Hyperglycemia 2021   • Hyperlipidemia    • Hypertension    • Left hip pain 2022   • Lower abdominal pain 2021   • Mixed hyperlipidemia 2021   • Numbness and tingling in left arm    • Pruritus    • Rash of hand 2022   • Shortness of breath    • Skin lesion    • Tinea pedis of both feet 2022   • Vitamin D deficiency 2021      Past Surgical History:     Past Surgical History:   Procedure Laterality Date   • CARPAL TUNNEL RELEASE Bilateral    •  SECTION      x3   •  SECTION     • CHOLECYSTECTOMY     • CHOLECYSTECTOMY LAPAROSCOPIC N/A 3/3/2019    Procedure: CHOLECYSTECTOMY LAPAROSCOPIC;  Surgeon: Luis Felipe Harris MD;  Location: [facility];  Service: General   • COLONOSCOPY     • CYSTOSCOPY N/A 10/8/2021    Procedure: Cystoscopy;  Surgeon: Vonnie Winter MD;  Location: [facility] OR;  Service: Gynecology Oncology   • [facility] INJECTION LEFT HIP (NON ARTHROGRAM)  7/11/2022   • JOINT REPLACEMENT  08/19/2016    R total HIp   • MAMMO (HISTORICAL)  10/11/2018    Advise for yearly mammo screening   • [place] HYSTEROSCOPY BX ENDOMETRIUM&/POLYPC W/WO D&C N/A 3/21/2016    Procedure: FRACTIONAL DILATATION AND CURETTAGE (D&C) WITH HYSTEROSOCPY;  Surgeon: Sergei Klein MD;  Location:  MAIN OR;  Service: Gynecology   • [place] LAPS TOTAL HYSTERECT 250 GM/< W/RMVL TUBE/OVARY N/A 10/8/2021    Procedure: ROBOTIC HYSTERECTOMY, BILATERAL SALPINGOOPHERECTOMY; LEFT EXTERNAL ILIAC SENTINEL LYMPH NODE BIOPSY, RIGHT PELVIC LYMPHADENECTOMY;  Surgeon: Abraham Oneal MD;  Location: [facility];  Service: Gynecology Oncology   • REPLACEMENT TOTAL KNEE Right       Social History:     Social History     Socioeconomic History   • Marital status: /Civil Union     Spouse name: None   • Number of children: None   • Years of education: None   • Highest education level: None   Occupational History   • None   Tobacco Use   • Smoking status: Never   • Smokeless tobacco: Never   Vaping Use   • Vaping Use: Never used   Substance and Sexual Activity   • Alcohol use: No   • Drug use: No   • Sexual activity: Never     Partners: Male     Comment: pt declines hiv std testing   Other Topics Concern   • None   Social History Narrative    Current work/study status: Full-time    Single-family home    Lives with spouse    Private household service  [employer] 2/19/2019     Annual dental checkup: sees q6months    Annual eye exam: Sees as needed    Pap smear: By her gyn, Dr Nicci Marlow    - As per [employer]     Social Determinants of Health     Financial Resource Strain: Not on file   Food Insecurity: Not on file   Transportation Needs: Not on file   Physical Activity: Not on file   Stress: Not on file   Social Connections: Not on file   Intimate Partner Violence: Not on file   Housing Stability: Not on file      Family History: Family History   Problem Relation Age of Onset   • Hypertension Mother    • Hypertension Father    • Heart failure Father    • Lymphoma Father 58   • Diabetes Father         at old age   • No Known Problems Sister    • No Known Problems Daughter    • No Known Problems Maternal Grandmother    • No Known Problems Maternal Grandfather    • No Known Problems Paternal Grandmother    • No Known Problems Paternal Grandfather    • Breast cancer Maternal Aunt 48   • Brain cancer Maternal Aunt 50   • No Known Problems Maternal Aunt    • Hemophilia Brother       Current Medications:     Current Outpatient Medications   Medication Sig Dispense Refill   • amLODIPine (NORVASC) 5 mg tablet Take 1 tablet (5 mg total) by mouth daily 90 tablet 1   • Ascorbic Acid (vitamin C) 1000 MG tablet Take 1,000 mg by mouth daily     • busPIRone (BUSPAR) 5 mg tablet Take 1 tablet (5 mg total) by mouth daily at bedtime 90 tablet 0   • calcium carbonate (OS-CAMDEN) 600 MG tablet Take 600 mg by mouth daily     • Cholecalciferol (Vitamin D) 50 MCG (2000 UT) tablet Take 2,000 Units by mouth daily     • citalopram (CeleXA) 20 mg tablet Take 1 tablet (20 mg total) by mouth daily at bedtime 90 tablet 0   • clobetasol (TEMOVATE) 0 05 % ointment Apply topically 2 (two) times a day 45 g 0   • clotrimazole-betamethasone (LOTRISONE) 1-0 05 % cream Apply topically 2 (two) times a day Apply to feet b i d  45 g 1   • fexofenadine (ALLEGRA) 180 MG tablet Take 180 mg by mouth as needed     • metoprolol succinate (TOPROL-XL) 100 mg 24 hr tablet Take 1 tablet (100 mg total) by mouth daily at bedtime 90 tablet 1   • Multiple Vitamins-Minerals (multivitamin with minerals) tablet Take 1 tablet by mouth daily     • nystatin (MYCOSTATIN) powder Apply topically 4 (four) times a day 60 g 1   • omeprazole (PriLOSEC) 20 mg delayed release capsule Take 20 mg by mouth as needed     • triamterene-hydrochlorothiazide (DYAZIDE) 37 5-25 mg per capsule Take 1 capsule by mouth every morning 90 capsule 1   • vitamin B-12 (VITAMIN B-12) 1,000 mcg tablet Take by mouth daily       No current facility-administered medications for this visit  Allergies: Allergies   Allergen Reactions   • Griseofulvin Hives   • Penicillins Hives     She can take cephalosporin without any side effect   • Zithromax [Azithromycin] Headache      Physical Exam:     /78 (BP Location: Left arm, Patient Position: Sitting, Cuff Size: Adult)   Pulse 84   Temp 98 2 °F (36 8 °C) (Tympanic)   Resp 16   Ht 5' 8" (1 727 m)   Wt (!) 153 kg (337 lb)   LMP  (LMP Unknown)   SpO2 97%   BMI 51 24 kg/m²     Physical Exam  Vitals and nursing note reviewed  Constitutional:       General: She is not in acute distress  Appearance: She is well-developed  She is obese  HENT:      Head: Normocephalic and atraumatic  Right Ear: Tympanic membrane, ear canal and external ear normal  There is no impacted cerumen  Left Ear: Tympanic membrane, ear canal and external ear normal  There is no impacted cerumen  Nose: Nose normal       Mouth/Throat:      Mouth: Mucous membranes are moist    Eyes:      General: No scleral icterus  Right eye: No discharge  Left eye: No discharge  Extraocular Movements: Extraocular movements intact  Conjunctiva/sclera: Conjunctivae normal       Pupils: Pupils are equal, round, and reactive to light  Cardiovascular:      Rate and Rhythm: Normal rate and regular rhythm  Pulses: Normal pulses  Heart sounds: Normal heart sounds  No murmur heard  Pulmonary:      Effort: Pulmonary effort is normal  No respiratory distress  Breath sounds: Normal breath sounds  Abdominal:      General: Bowel sounds are normal  There is no distension  Palpations: Abdomen is soft  Tenderness: There is no abdominal tenderness  Musculoskeletal:         General: No swelling  Normal range of motion        Cervical back: Normal range of motion and neck supple  Right lower leg: Edema ( non pitting edema) present  Left lower leg: Edema ( non pitting edema) present  Comments: She ambulates with a cane due to left hip pain  Skin:     General: Skin is warm and dry  Capillary Refill: Capillary refill takes less than 2 seconds  Neurological:      General: No focal deficit present  Mental Status: She is alert and oriented to person, place, and time  Motor: No weakness        Coordination: Coordination normal    Psychiatric:         Mood and Affect: Mood normal          Behavior: Behavior normal             Soni Hewitt MD  [address] [facility]

## 2023-01-17 NOTE — ASSESSMENT & PLAN NOTE
Diet controlled  Cholesterol 191, triglyceride 159, HDL 45,   Advised to continue low-cholesterol, low-carb diet and lose weight

## 2023-01-17 NOTE — PATIENT INSTRUCTIONS
Wellness Visit for Adults   AMBULATORY CARE:   A wellness visit  is when you see your healthcare provider to get screened for health problems  Your healthcare provider will also give you advice on how to stay healthy  Write down your questions so you remember to ask them  Ask your healthcare provider how often you should have a wellness visit  What happens at a wellness visit:  Your healthcare provider will ask about your health, and your family history of health problems  This includes high blood pressure, heart disease, and cancer  He or she will ask if you have symptoms that concern you, if you smoke, and about your mood  You may also be asked about your intake of medicines, supplements, food, and alcohol  Any of the following may be done: Your weight  will be checked  Your height may also be checked so your body mass index (BMI) can be calculated  Your BMI shows if you are at a healthy weight  Your blood pressure  and heart rate will be checked  Your temperature may also be checked  Blood and urine tests  may be done  Blood tests may be done to check your cholesterol levels  Abnormal cholesterol levels increase your risk for heart disease and stroke  You may also need a blood or urine test to check for diabetes if you are at increased risk  Urine tests may be done to look for signs of an infection or kidney disease  A physical exam  includes checking your heartbeat and lungs with a stethoscope  Your healthcare provider may also check your skin to look for sun damage  Screening tests  may be recommended  A screening test is done to check for diseases that may not cause symptoms  The screening tests you may need depend on your age, gender, family history, and lifestyle habits  For example, colorectal screening may be recommended if you are 48years old or older  Screening tests you need if you are a woman:   A Pap smear  is used to screen for cervical cancer   Pap smears are usually done every 3 to 5 years depending on your age  You may need them more often if you have had abnormal Pap smear test results in the past  Ask your healthcare provider how often you should have a Pap smear  A mammogram  is an x-ray of your breasts to screen for breast cancer  Experts recommend mammograms every 2 years starting at age 48 years  You may need a mammogram at age 52 years or younger if you have an increased risk for breast cancer  Talk to your healthcare provider about when you should start having mammograms and how often you need them  Vaccines you may need:   Get an influenza vaccine  every year  The influenza vaccine protects you from the flu  Several types of viruses cause the flu  The viruses change over time, so new vaccines are made each year  Get a tetanus-diphtheria (Td) booster vaccine  every 10 years  This vaccine protects you against tetanus and diphtheria  Tetanus is a severe infection that may cause painful muscle spasms and lockjaw  Diphtheria is a severe bacterial infection that causes a thick covering in the back of your mouth and throat  Get a human papillomavirus (HPV) vaccine  if you are female and aged 23 to 32 or male 23 to 24 and never received it  This vaccine protects you from HPV infection  HPV is the most common infection spread by sexual contact  HPV may also cause vaginal, penile, and anal cancers  Get a pneumococcal vaccine  if you are aged 72 years or older  The pneumococcal vaccine is an injection given to protect you from pneumococcal disease  Pneumococcal disease is an infection caused by pneumococcal bacteria  The infection may cause pneumonia, meningitis, or an ear infection  Get a shingles vaccine  if you are 60 or older, even if you have had shingles before  The shingles vaccine is an injection to protect you from the varicella-zoster virus  This is the same virus that causes chickenpox   Shingles is a painful rash that develops in people who had chickenpox or have been exposed to the virus  How to eat healthy:  My Plate is a model for planning healthy meals  It shows the types and amounts of foods that should go on your plate  Fruits and vegetables make up about half of your plate, and grains and protein make up the other half  A serving of dairy is included on the side of your plate  The amount of calories and serving sizes you need depends on your age, gender, weight, and height  Examples of healthy foods are listed below:  Eat a variety of vegetables  such as dark green, red, and orange vegetables  You can also include canned vegetables low in sodium (salt) and frozen vegetables without added butter or sauces  Eat a variety of fresh fruits , canned fruit in 100% juice, frozen fruit, and dried fruit  Include whole grains  At least half of the grains you eat should be whole grains  Examples include whole-wheat bread, wheat pasta, brown rice, and whole-grain cereals such as oatmeal     Eat a variety of protein foods such as seafood (fish and shellfish), lean meat, and poultry without skin (turkey and chicken)  Examples of lean meats include pork leg, shoulder, or tenderloin, and beef round, sirloin, tenderloin, and extra lean ground beef  Other protein foods include eggs and egg substitutes, beans, peas, soy products, nuts, and seeds  Choose low-fat dairy products such as skim or 1% milk or low-fat yogurt, cheese, and cottage cheese  Limit unhealthy fats  such as butter, hard margarine, and shortening  Exercise:  Exercise at least 30 minutes per day on most days of the week  Some examples of exercise include walking, biking, dancing, and swimming  You can also fit in more physical activity by taking the stairs instead of the elevator or parking farther away from stores  Include muscle strengthening activities 2 days each week  Regular exercise provides many health benefits   It helps you manage your weight, and decreases your risk for type 2 diabetes, heart disease, stroke, and high blood pressure  Exercise can also help improve your mood  Ask your healthcare provider about the best exercise plan for you  General health and safety guidelines:   Do not smoke  Nicotine and other chemicals in cigarettes and cigars can cause lung damage  Ask your healthcare provider for information if you currently smoke and need help to quit  E-cigarettes or smokeless tobacco still contain nicotine  Talk to your healthcare provider before you use these products  Limit alcohol  A drink of alcohol is 12 ounces of beer, 5 ounces of wine, or 1½ ounces of liquor  Lose weight, if needed  Being overweight increases your risk of certain health conditions  These include heart disease, high blood pressure, type 2 diabetes, and certain types of cancer  Protect your skin  Do not sunbathe or use tanning beds  Use sunscreen with a SPF 15 or higher  Apply sunscreen at least 15 minutes before you go outside  Reapply sunscreen every 2 hours  Wear protective clothing, hats, and sunglasses when you are outside  Drive safely  Always wear your seatbelt  Make sure everyone in your car wears a seatbelt  A seatbelt can save your life if you are in an accident  Do not use your cell phone when you are driving  This could distract you and cause an accident  Pull over if you need to make a call or send a text message  Practice safe sex  Use latex condoms if are sexually active and have more than one partner  Your healthcare provider may recommend screening tests for sexually transmitted infections (STIs)  Wear helmets, lifejackets, and protective gear  Always wear a helmet when you ride a bike or motorcycle, go skiing, or play sports that could cause a head injury  Wear protective equipment when you play sports  Wear a lifejacket when you are on a boat or doing water sports      © Copyright [employer] 2022 Information is for End User's use only and may not be sold, redistributed or otherwise used for commercial purposes  All illustrations and images included in CareNotes® are the copyrighted property of A D A M [employer] Inc  or [employer]  The above information is an  only  It is not intended as medical advice for individual conditions or treatments  Talk to your doctor, nurse or pharmacist before following any medical regimen to see if it is safe and effective for you  Patient was advised to continue present medications  discussed with the patient medications and laboratory data in detail  Follow-up with me in 3 months or as advised  If any blood test was ordered please do 1 week prior to next appointment unless advise to get earlier    If you have any questions please call the office [phone]

## 2023-01-17 NOTE — ASSESSMENT & PLAN NOTE
She was seen orthopedic specialist   Uses cane for ambulation she needs hip replacement but due to weight she does not qualify for surgery at present

## 2023-01-18 ENCOUNTER — HOSPITAL ENCOUNTER (OUTPATIENT)
Dept: MAMMOGRAPHY | Facility: HOSPITAL | Age: 63
Discharge: HOME | End: 2023-01-18
Attending: INTERNAL MEDICINE

## 2023-01-18 VITALS — BODY MASS INDEX: 44.41 KG/M2 | WEIGHT: 293 LBS | HEIGHT: 68 IN

## 2023-01-18 DIAGNOSIS — Z12.31 ENCOUNTER FOR SCREENING MAMMOGRAM FOR MALIGNANT NEOPLASM OF BREAST: ICD-10-CM

## 2023-01-19 ENCOUNTER — CLINICAL SUPPORT (OUTPATIENT)
Dept: NUTRITION | Facility: HOSPITAL | Age: 63
End: 2023-01-19
Attending: INTERNAL MEDICINE

## 2023-01-19 VITALS — WEIGHT: 293 LBS | BODY MASS INDEX: 44.41 KG/M2 | HEIGHT: 68 IN

## 2023-01-19 DIAGNOSIS — E78.2 MIXED HYPERLIPIDEMIA: Chronic | ICD-10-CM

## 2023-01-19 DIAGNOSIS — R73.9 HYPERGLYCEMIA: Chronic | ICD-10-CM

## 2023-01-19 NOTE — PROGRESS NOTES
Nutrition Assessment Form    Patient Name: Lucila Hamman    YOB: 1960    Sex: Female     Assessment Date: 1/19/2023  Start Time: 3:05 pm Stop Time: 4:05 PM Total Minutes: 60     Data:  Present at session: self   Parent/Patient Concerns/reason for visit:    Medical Dx/Reason for Referral:    Past Medical History:   Diagnosis Date   • Allergic rhinitis 7/7/2022   • Anxiety    • Arthritis    • BMI 50 0-59 9, [facility] ([facility] ) 6/1/2021   • Cellulitis of left leg 8/17/2021   • Depression    • Eczema 6/2/2022   • Edema of both lower legs 1/23/2021   • Edema of both lower legs 11/23/2021   • Endometrial cancer ([facility] ) 09/14/2021   • Heart murmur    • History of COVID-19 11/2020    Mild sypmtoms Cough,Tired,diarrhea,sweats, Loss taste and smell   • Hyperglycemia 1/23/2021   • Hyperlipidemia    • Hypertension    • Left hip pain 6/2/2022   • Lower abdominal pain 11/23/2021   • Mixed hyperlipidemia 1/23/2021   • Numbness and tingling in left arm    • Pruritus    • Rash of hand 2/28/2022   • Shortness of breath    • Skin lesion    • Tinea pedis of both feet 2/28/2022   • Vitamin D deficiency 1/23/2021       Current Outpatient Medications   Medication Sig Dispense Refill   • amLODIPine (NORVASC) 5 mg tablet Take 1 tablet (5 mg total) by mouth daily 90 tablet 1   • Ascorbic Acid (vitamin C) 1000 MG tablet Take 1,000 mg by mouth daily     • busPIRone (BUSPAR) 5 mg tablet Take 1 tablet (5 mg total) by mouth daily at bedtime 90 tablet 0   • calcium carbonate (OS-CAMDEN) 600 MG tablet Take 600 mg by mouth daily     • Cholecalciferol (Vitamin D) 50 MCG (2000 UT) tablet Take 2,000 Units by mouth daily     • citalopram (CeleXA) 20 mg tablet Take 1 tablet (20 mg total) by mouth daily at bedtime 90 tablet 0   • clobetasol (TEMOVATE) 0 05 % ointment Apply topically 2 (two) times a day 45 g 0   • clotrimazole-betamethasone (LOTRISONE) 1-0 05 % cream Apply topically 2 (two) times a day Apply to feet b i d  45 g 1   • fexofenadine (ALLEGRA) 180 MG tablet Take 180 mg by mouth as needed     • metoprolol succinate (TOPROL-XL) 100 mg 24 hr tablet Take 1 tablet (100 mg total) by mouth daily at bedtime 90 tablet 1   • Multiple Vitamins-Minerals (multivitamin with minerals) tablet Take 1 tablet by mouth daily     • nystatin (MYCOSTATIN) powder Apply topically 4 (four) times a day 60 g 1   • omeprazole (PriLOSEC) 20 mg delayed release capsule Take 20 mg by mouth as needed     • triamterene-hydrochlorothiazide (DYAZIDE) 37 5-25 mg per capsule Take 1 capsule by mouth every morning 90 capsule 1   • vitamin B-12 (VITAMIN B-12) 1,000 mcg tablet Take by mouth daily       No current facility-administered medications for this visit  Additional Meds/Supplements:    Special Learning Needs/barriers to learning/any new barriers N/A   Height: HC Readings from Last 5 Encounters:   No data found for [facility]       Weight: Wt Readings from Last 10 Encounters:   01/18/23 (!) 153 kg (337 lb 4 9 oz)   01/17/23 (!) 153 kg (337 lb)   10/28/22 (!) 154 kg (340 lb)   10/13/22 (!) 152 kg (335 lb)   09/27/22 (!) 153 kg (338 lb)   09/01/22 (!) 153 kg (337 lb)   07/07/22 (!) 151 kg (332 lb)   06/28/22 (!) 156 kg (344 lb)   06/09/22 (!) 152 kg (335 lb 12 8 oz)   06/02/22 (!) 152 kg (335 lb)     Estimated body mass index is 51 29 kg/m² as calculated from the following:    Height as of 1/18/23: 5' 8" (1 727 m)  Weight as of 1/18/23: 153 kg (337 lb 4 9 oz)  Recent Weight Change: []Yes     []No  Amount:       Energy Needs: [address] Equation:     Allergies   Allergen Reactions   • Griseofulvin Hives   • Penicillins Hives     She can take cephalosporin without any side effect   • Zithromax [Azithromycin] Headache    or intolerances    Social History     Substance and Sexual Activity   Alcohol Use No    Alcohol-maybe 1x/year  Denies smoking   Social History     Tobacco Use   Smoking Status Never   Smokeless Tobacco Never       Who shops?  spouse   Who cooks/cooking methods   spouse  Cooking methods: bake/mcleod/air mcleod/grill/boil/other________  Take out: ___ x/wk or month Dining out ____ x/wk or month   Exercise: N/A d/t hip issues, especially over the last year   Prior Nutritional Counseling? []Yes     [x]No  When:      Why:         Diet Hx:  Breakfast: Work days: isogenic shake + otis coffee + a few munchkins  Weekends: Bagel with cream cheese or egg   Lunch: Work (take out most of the time) dominik's (4 for 4), chik filet 4 pice nuggets and sweet tea or jersey mikes for a hoagie or a salad       Dinner: Pasta (sausage & marinara sauce) and bagged salad mix + a roll  Or   Meat (4oz) + rice or potato (1c) + bagged salad mix         Snacks: Weekends: sometimes icecream        Nutrition Diagnosis:   Overweight/obesity  related to Excess energy intake as evidenced by  BMI more than normative standard for age and sex (obesity-grade III 40+)       Any change or new dx since previous visit:     Nutrition Diagnosis:         Medical Nutrition Therapy Intervention:  []Individualized Meal Plan []Understanding Lab Values   []Basic Pathophysiology of Disease []Food/Medication Interactions   []Food Diary []Exercise   []Lifestyle/Behavior Modification Techniques []Medication, Mechanism of Action   []Label Reading: CHO/ Na/ Fat/ other_________ []Self Blood Glucose Monitoring   [x]Weight/BMI Goals: gain/lose/maintain:  []Other -    Other Notes/Assessment:    Household: pt, pt's , pt's son (29 yo)    Pt was told she has to be at a certain BMI to be able to to have hip surgery-doesn't remember then BMI but they said about 70# wt loss  Pt reports this is the highest her wt has been  Pt feels she has had a lot of stressors over the last few years  Fluids: takes water to drink, occasionally a otis iced caramel coffee, pre-made sweet tea (32-48oz/day)  Used to drink a lot of soda but cut back  Pt works full time out of the house       Pt and  have been on isogenics diet-has an isogenic shake in the morning    Pt admits to buying out lunch daily-especially with issues with hip: between doing things at home, fatigue, pain, doesn't get around to making lunch   Pt knows she has to cut back on eating out-health, cost        Follow up scheduled for 02/21/23 3:00 PM       Comprehension: []Excellent  []Very Good  []Good  []Fair   []Poor    Receptivity: []Excellent  []Very Good  []Good  []Fair   []Poor    Expected Compliance: []Excellent  []Very Good  []Good  []Fair   []Poor        Goals:  1    2    3        Labs:  CMP  Lab Results   Component Value Date    K 4 6 06/28/2022     06/28/2022    CO2 30 06/28/2022    BUN 19 06/28/2022    CREATININE 0 92 06/28/2022    GLUF 109 (H) 06/28/2022    CALCIUM 9 7 06/28/2022    AST 25 11/17/2021    ALT 40 11/17/2021    ALKPHOS 91 11/17/2021    EGFR 66 06/28/2022       BMP  Lab Results   Component Value Date    CALCIUM 9 7 06/28/2022    K 4 6 06/28/2022    CO2 30 06/28/2022     06/28/2022    BUN 19 06/28/2022    CREATININE 0 92 06/28/2022       Lipids  No results found for: CHOL  Lab Results   Component Value Date    HDL 45 (L) 06/28/2022    HDL 53 06/05/2021     Lab Results   Component Value Date    LDLCALC 114 (H) 06/28/2022    LDLCALC 119 (H) 06/05/2021     Lab Results   Component Value Date    TRIG 159 (H) 06/28/2022    TRIG 143 06/05/2021     No results found for: CHOLHDL    Hemoglobin A1C  Lab Results   Component Value Date    HGBA1C 5 3 09/21/2021       Fasting Glucose  Lab Results   Component Value Date    GLUF 109 (H) 06/28/2022       Insulin     Thyroid  No results found for: TSH, V7BKCKM, E1FMFQX, THYROIDAB    Hepatic Function Panel  Lab Results   Component Value Date    ALT 40 11/17/2021    AST 25 11/17/2021    ALKPHOS 91 11/17/2021       Celiac Disease Antibody Panel  No results found for: ENDOMYSIAL IGA, GLIADIN IGA, GLIADIN IGG, IGA, TISSUE TRANSGLUT AB, TTG IGA   Iron  No results found for: IRON, TIBC, FERRITIN         Jonnathan Alicea, [id number] [address]  [address] [facility] [address] [place] [address]

## 2023-01-29 DIAGNOSIS — B35.3 TINEA PEDIS OF BOTH FEET: ICD-10-CM

## 2023-01-30 RX ORDER — CLOTRIMAZOLE AND BETAMETHASONE DIPROPIONATE 10; .64 MG/G; MG/G
CREAM TOPICAL 2 TIMES DAILY
Qty: 45 G | Refills: 0 | Status: SHIPPED | OUTPATIENT
Start: 2023-01-30

## 2023-03-13 DIAGNOSIS — L30.8 OTHER ECZEMA: ICD-10-CM

## 2023-03-13 RX ORDER — CLOBETASOL PROPIONATE 0.5 MG/G
OINTMENT TOPICAL 2 TIMES DAILY
Qty: 45 G | Refills: 0 | Status: SHIPPED | OUTPATIENT
Start: 2023-03-13

## 2023-04-04 DIAGNOSIS — F32.A ANXIETY AND DEPRESSION: Chronic | ICD-10-CM

## 2023-04-04 DIAGNOSIS — F41.9 ANXIETY AND DEPRESSION: Chronic | ICD-10-CM

## 2023-04-04 RX ORDER — BUSPIRONE HYDROCHLORIDE 5 MG/1
5 TABLET ORAL
Qty: 90 TABLET | Refills: 0 | Status: SHIPPED | OUTPATIENT
Start: 2023-04-04

## 2023-04-17 ENCOUNTER — TELEPHONE (OUTPATIENT)
Dept: OBGYN CLINIC | Facility: CLINIC | Age: 63
End: 2023-04-17

## 2023-04-17 NOTE — TELEPHONE ENCOUNTER
Patient was last seen in office in 2021, patient is on clobetasol which is not helping  She also has tried nystatin powder which is not working either  Offered patient appointments this week in the [place] [facility] [place] [facility], patient unable to make any appointments  Works until [phone], can only be seen after that  Patient told she could follow up with her PCP but we would need to see her to prescribe her anything  Patient understood

## 2023-05-02 ENCOUNTER — OFFICE VISIT (OUTPATIENT)
Dept: INTERNAL MEDICINE CLINIC | Facility: CLINIC | Age: 63
End: 2023-05-02

## 2023-05-02 VITALS
WEIGHT: 293 LBS | HEART RATE: 87 BPM | BODY MASS INDEX: 44.41 KG/M2 | HEIGHT: 68 IN | TEMPERATURE: 98 F | OXYGEN SATURATION: 95 % | DIASTOLIC BLOOD PRESSURE: 80 MMHG | SYSTOLIC BLOOD PRESSURE: 130 MMHG | RESPIRATION RATE: 16 BRPM

## 2023-05-02 DIAGNOSIS — L30.8 OTHER ECZEMA: ICD-10-CM

## 2023-05-02 DIAGNOSIS — J30.89 SEASONAL ALLERGIC RHINITIS DUE TO OTHER ALLERGIC TRIGGER: ICD-10-CM

## 2023-05-02 DIAGNOSIS — F32.A ANXIETY AND DEPRESSION: Primary | Chronic | ICD-10-CM

## 2023-05-02 DIAGNOSIS — E55.9 VITAMIN D DEFICIENCY: Chronic | ICD-10-CM

## 2023-05-02 DIAGNOSIS — I10 ESSENTIAL HYPERTENSION: ICD-10-CM

## 2023-05-02 DIAGNOSIS — Z85.42 HISTORY OF ENDOMETRIAL CANCER: ICD-10-CM

## 2023-05-02 DIAGNOSIS — M25.552 LEFT HIP PAIN: ICD-10-CM

## 2023-05-02 DIAGNOSIS — R73.9 HYPERGLYCEMIA: Chronic | ICD-10-CM

## 2023-05-02 DIAGNOSIS — F41.9 ANXIETY AND DEPRESSION: Primary | Chronic | ICD-10-CM

## 2023-05-02 DIAGNOSIS — R21 SKIN RASH: ICD-10-CM

## 2023-05-02 DIAGNOSIS — K21.9 GASTROESOPHAGEAL REFLUX DISEASE WITHOUT ESOPHAGITIS: ICD-10-CM

## 2023-05-02 DIAGNOSIS — E78.2 MIXED HYPERLIPIDEMIA: Chronic | ICD-10-CM

## 2023-05-02 DIAGNOSIS — I89.0 LYMPHEDEMA: ICD-10-CM

## 2023-05-02 RX ORDER — CLOTRIMAZOLE AND BETAMETHASONE DIPROPIONATE 10; .64 MG/G; MG/G
CREAM TOPICAL 2 TIMES DAILY
Qty: 45 G | Refills: 0 | Status: SHIPPED | OUTPATIENT
Start: 2023-05-02

## 2023-05-02 RX ORDER — CLOBETASOL PROPIONATE 0.5 MG/G
OINTMENT TOPICAL 2 TIMES DAILY
Qty: 60 G | Refills: 0 | Status: SHIPPED | OUTPATIENT
Start: 2023-05-02

## 2023-05-02 NOTE — PATIENT INSTRUCTIONS
Patient was advised to continue present medications  discussed with the patient medications and laboratory data in detail  Follow-up with me in 3 months or as advised  If any blood test was ordered please do 1 week prior to next appointment unless advise to get earlier    If you have any questions please call the office [phone]

## 2023-05-02 NOTE — PROGRESS NOTES
Assessment/Plan:    1  Anxiety and depression  Assessment & Plan:  Patient states her symptoms are controlled on citalopram and buspirone  She has no side effect with this medication  She would like to continue both medications as prescribed as it is effective and has no side effect  She is not ready to taper down any of this medications at present  2  Essential hypertension  Assessment & Plan:  Blood pressure well controlled  Advised to continue present medication  Advised for low-salt diet  3  Lymphedema  Assessment & Plan:  Seen by vascular surgery last year was advised to start  lymphedema pump but she has not gotten yet as patient states it was too expensive for her  Patient states she will see her vascular surgery for follow-up and further advice  4  Mixed hyperlipidemia  Assessment & Plan:  Controlled  Cholesterol 191, triglyceride 159, , HDL 45 advised to continue low-cholesterol low-carb diet advised to lose weight exercise  Will follow lipid panel  Orders:  -     Lipid panel; Future    5  Hyperglycemia  Assessment & Plan:  Last time fasting blood sugar 109  Advised to watch diet for sugar and carbs intake  We will follow blood sugar hemoglobin A1c       6  Vitamin D deficiency  Assessment & Plan:  Vitamin D deficiency resolved after being on vitamin D supplement vitamin D level 44 advised to continue same dose  7  BMI 50 0-59 9, adult [facility])  Assessment & Plan:  Patient  was advised to decrease portion size  Advised to decrease carb, sugar, cholesterol intake  Advised to exercise 3-5 times per week  Advised to lose weight  8  Gastroesophageal reflux disease without esophagitis  Assessment & Plan:  She needs to take omeprazole daily to control symptoms  Advised to watch diet and reflux precautions  9  Seasonal allergic rhinitis due to other allergic trigger  Assessment & Plan:  Takes fexofenadine as needed    Which is effective to control her symptoms  10  Skin rash  Assessment & Plan:  She has rash under right breast   Possible tinea infection  Will prescribe Lotrisone cream   Patient states used nystatin powder in the past was not effective  Orders:  -     clotrimazole-betamethasone (LOTRISONE) 1-0 05 % cream; Apply topically 2 (two) times a day    11  History of endometrial cancer  Assessment & Plan:  Been seen and followed by GYN oncologist   No recurrence  12  Left hip pain  Assessment & Plan:  She was seen by orthopedic specialist   She has been using cane for ambulation  She needs surgery of her left hip but due to she does not qualify for surgery at present per patient  Subjective: Patient presents for follow-up  Patient ID: Maycol Burger is a 60 y o  female  HPI   57-year-old white female patient presents for follow-up of medical problems  She denies any chest pain, shortness of breath, pain in abdomen  Denies any cough, fever, chills  Denies any nausea vomiting diarrhea  Complain of rash under right breast   Has a chronic pain in the left hip  Chronic edema of legs      The following portions of the patient's history were reviewed and updated as appropriate:     Past Medical History:  She has a past medical history of Allergic rhinitis (7/7/2022), Anxiety, Arthritis, BMI 50 0-59 9, adult ([facility] [address] ) (6/1/2021), Cellulitis of left leg (8/17/2021), Depression, Eczema (6/2/2022), Edema of both lower legs (1/23/2021), Edema of both lower legs (11/23/2021), Endometrial cancer ([facility] ) (09/14/2021), Heart murmur, History of COVID-19 (11/2020), Hyperglycemia (1/23/2021), Hyperlipidemia, Hypertension, Left hip pain (6/2/2022), Lower abdominal pain (11/23/2021), Mixed hyperlipidemia (1/23/2021), Numbness and tingling in left arm, Pruritus, Rash of hand (2/28/2022), Shortness of breath, Skin lesion, Skin rash (5/2/2023), Tinea pedis of both feet (2/28/2022), and Vitamin D deficiency (2021)  ,  _______________________________________________________________________  Past Surgical History:   has a past surgical history that includes  section; pr hysteroscopy bx endometrium&/polypc w/wo d&c (N/A, 3/21/2016); Replacement total knee (Right);  section; CHOLECYSTECTOMY LAPAROSCOPIC (N/A, 3/3/2019); Joint replacement (2016); Mammo (historical) (10/11/2018); Colonoscopy; Carpal tunnel release (Bilateral); Cholecystectomy; pr laps total hysterect 250 gm/< w/rmvl tube/ovary (N/A, 10/8/2021); CYSTOSCOPY (N/A, 10/8/2021); and FL injection left hip (non arthrogram) (2022)  ,  _______________________________________________________________________  Family History:  family history includes Brain cancer (age of onset: 50) in her maternal aunt; Breast cancer (age of onset: 48) in her maternal aunt; Diabetes in her father; Heart failure in her father; Hemophilia in her brother; Hypertension in her father and mother; Lymphoma (age of onset: 58) in her father; No Known Problems in her daughter, maternal aunt, maternal grandfather, maternal grandmother, paternal grandfather, paternal grandmother, sister, son, son, and son ,  _______________________________________________________________________  Social History:   reports that she has never smoked  She has never used smokeless tobacco  She reports that she does not drink alcohol and does not use drugs  ,  _______________________________________________________________________  Allergies:  is allergic to griseofulvin, penicillins, and zithromax [azithromycin]     _______________________________________________________________________  Current Outpatient Medications   Medication Sig Dispense Refill    amLODIPine (NORVASC) 5 mg tablet Take 1 tablet (5 mg total) by mouth daily 90 tablet 1    Ascorbic Acid (vitamin C) 1000 MG tablet Take 1,000 mg by mouth daily      busPIRone (BUSPAR) 5 mg tablet TAKE 1 TABLET (5 MG TOTAL) BY MOUTH DAILY AT BEDTIME 90 tablet 0    calcium carbonate (OS-CAMDEN) 600 MG tablet Take 600 mg by mouth daily      Cholecalciferol (Vitamin D) 50 MCG (2000 UT) tablet Take 2,000 Units by mouth daily      citalopram (CeleXA) 20 mg tablet Take 1 tablet (20 mg total) by mouth daily at bedtime 90 tablet 0    clobetasol (TEMOVATE) 0 05 % ointment APPLY TOPICALLY 2 (TWO) TIMES A DAY 60 g 0    clotrimazole-betamethasone (LOTRISONE) 1-0 05 % cream Apply topically 2 (two) times a day Apply to feet b i d  45 g 0    clotrimazole-betamethasone (LOTRISONE) 1-0 05 % cream Apply topically 2 (two) times a day 45 g 0    fexofenadine (ALLEGRA) 180 MG tablet Take 180 mg by mouth as needed      metoprolol succinate (TOPROL-XL) 100 mg 24 hr tablet Take 1 tablet (100 mg total) by mouth daily at bedtime 90 tablet 1    Multiple Vitamins-Minerals (multivitamin with minerals) tablet Take 1 tablet by mouth daily      omeprazole (PriLOSEC) 20 mg delayed release capsule Take 20 mg by mouth as needed      triamterene-hydrochlorothiazide (DYAZIDE) 37 5-25 mg per capsule Take 1 capsule by mouth every morning 90 capsule 1    vitamin B-12 (VITAMIN B-12) 1,000 mcg tablet Take by mouth daily      nystatin (MYCOSTATIN) powder Apply topically 4 (four) times a day (Patient not taking: Reported on 5/2/2023) 60 g 1     No current facility-administered medications for this visit      _______________________________________________________________________  Review of Systems   Constitutional: Negative for chills and fever  HENT: Negative for congestion, ear pain, hearing loss, nosebleeds, sinus pain, sore throat and trouble swallowing  Eyes: Negative for discharge, redness and visual disturbance  Respiratory: Negative for cough, chest tightness and shortness of breath  Cardiovascular: Positive for leg swelling  Negative for chest pain and palpitations     Gastrointestinal: Negative for abdominal pain, blood in stool, constipation, diarrhea, nausea and "vomiting  Genitourinary: Negative for dysuria, flank pain, frequency and hematuria  Musculoskeletal: Positive for arthralgias ( Left hip pain)  Negative for myalgias and neck pain  Skin: Positive for rash ( Rash under right breast)  Negative for color change  Neurological: Negative for dizziness, speech difficulty, weakness and headaches  Hematological: Does not bruise/bleed easily  Psychiatric/Behavioral: Negative for agitation, behavioral problems, self-injury and suicidal ideas  The patient is not nervous/anxious  Objective:  Vitals:    05/02/23 1643   BP: 130/80   BP Location: Right arm   Patient Position: Sitting   Cuff Size: Adult   Pulse: 87   Resp: 16   Temp: 98 °F (36 7 °C)   TempSrc: Temporal   SpO2: 95%   Weight: (!) 156 kg (344 lb)   Height: 5' 8\" (1 727 m)     Body mass index is 52 31 kg/m²  Physical Exam  Vitals and nursing note reviewed  Constitutional:       General: She is not in acute distress  Appearance: She is obese  HENT:      Head: Normocephalic and atraumatic  Right Ear: Ear canal and external ear normal       Left Ear: Ear canal and external ear normal       Nose: Nose normal       Mouth/Throat:      Mouth: Mucous membranes are moist    Eyes:      General: No scleral icterus  Right eye: No discharge  Left eye: No discharge  Extraocular Movements: Extraocular movements intact  Conjunctiva/sclera: Conjunctivae normal    Cardiovascular:      Rate and Rhythm: Normal rate and regular rhythm  Pulses: Normal pulses  Heart sounds: Normal heart sounds  No murmur heard  Pulmonary:      Effort: Pulmonary effort is normal  No respiratory distress  Breath sounds: Normal breath sounds  No wheezing, rhonchi or rales  Abdominal:      General: Bowel sounds are normal       Palpations: Abdomen is soft  Tenderness: There is no abdominal tenderness  Musculoskeletal:         General: Normal range of motion        Cervical " back: Normal range of motion and neck supple  No muscular tenderness  Right lower leg: Edema ( Has nonpitting edema ) present  Left lower leg: Edema ( Has nonpitting edema ) present  Skin:     General: Skin is warm  Findings: Rash (Has erythematous macular rash under right breast  has  No vesicles  No discharge ) present  Neurological:      General: No focal deficit present  Mental Status: She is alert and oriented to person, place, and time  Motor: No weakness  Coordination: Coordination normal    Psychiatric:         Mood and Affect: Mood normal          Behavior: Behavior normal          I spent 30 minutes with the patient today    More than 50% time spent for reviewing of external notes, reviewing of the results of diagnostics test, management of care, patient education and ordering of test

## 2023-05-03 NOTE — ASSESSMENT & PLAN NOTE
Controlled  Cholesterol 191, triglyceride 159, , HDL 45 advised to continue low-cholesterol low-carb diet advised to lose weight exercise  Will follow lipid panel

## 2023-05-03 NOTE — ASSESSMENT & PLAN NOTE
Vitamin D deficiency resolved after being on vitamin D supplement vitamin D level 44 advised to continue same dose

## 2023-05-03 NOTE — ASSESSMENT & PLAN NOTE
Seen by vascular surgery last year was advised to start  lymphedema pump but she has not gotten yet as patient states it was too expensive for her  Patient states she will see her vascular surgery for follow-up and further advice

## 2023-05-03 NOTE — ASSESSMENT & PLAN NOTE
Patient states her symptoms are controlled on citalopram and buspirone  She has no side effect with this medication  She would like to continue both medications as prescribed as it is effective and has no side effect  She is not ready to taper down any of this medications at present

## 2023-05-03 NOTE — ASSESSMENT & PLAN NOTE
She was seen by orthopedic specialist   She has been using cane for ambulation  She needs surgery of her left hip but due to she does not qualify for surgery at present per patient

## 2023-05-03 NOTE — ASSESSMENT & PLAN NOTE
Last time fasting blood sugar 109  Advised to watch diet for sugar and carbs intake  We will follow blood sugar hemoglobin A1c

## 2023-05-03 NOTE — ASSESSMENT & PLAN NOTE
She needs to take omeprazole daily to control symptoms  Advised to watch diet and reflux precautions

## 2023-05-03 NOTE — ASSESSMENT & PLAN NOTE
She has rash under right breast   Possible tinea infection  Will prescribe Lotrisone cream   Patient states used nystatin powder in the past was not effective

## 2023-05-09 DIAGNOSIS — F32.A ANXIETY AND DEPRESSION: Chronic | ICD-10-CM

## 2023-05-09 DIAGNOSIS — F41.9 ANXIETY AND DEPRESSION: Chronic | ICD-10-CM

## 2023-05-09 RX ORDER — CITALOPRAM 20 MG/1
20 TABLET ORAL
Qty: 90 TABLET | Refills: 0 | Status: SHIPPED | OUTPATIENT
Start: 2023-05-09

## 2023-05-15 DIAGNOSIS — I10 ESSENTIAL HYPERTENSION: ICD-10-CM

## 2023-05-16 RX ORDER — METOPROLOL SUCCINATE 100 MG/1
100 TABLET, EXTENDED RELEASE ORAL
Qty: 90 TABLET | Refills: 0 | Status: SHIPPED | OUTPATIENT
Start: 2023-05-16

## 2023-05-17 ENCOUNTER — TELEPHONE (OUTPATIENT)
Dept: INTERNAL MEDICINE CLINIC | Facility: CLINIC | Age: 63
End: 2023-05-17

## 2023-05-17 NOTE — TELEPHONE ENCOUNTER
Patient called the office to confirm what lotion the doctor prescribed and stated has no received it yet will give the pharmacy a call to confirm the lotion name

## 2023-06-08 ENCOUNTER — DOCUMENTATION (OUTPATIENT)
Dept: VASCULAR SURGERY | Facility: CLINIC | Age: 63
End: 2023-06-08

## 2023-06-08 ENCOUNTER — OFFICE VISIT (OUTPATIENT)
Dept: VASCULAR SURGERY | Facility: CLINIC | Age: 63
End: 2023-06-08
Payer: COMMERCIAL

## 2023-06-08 VITALS
RESPIRATION RATE: 20 BRPM | SYSTOLIC BLOOD PRESSURE: 144 MMHG | BODY MASS INDEX: 52.31 KG/M2 | HEIGHT: 68 IN | DIASTOLIC BLOOD PRESSURE: 88 MMHG | HEART RATE: 88 BPM

## 2023-06-08 DIAGNOSIS — I89.0 LYMPHEDEMA: Primary | ICD-10-CM

## 2023-06-08 PROCEDURE — 99213 OFFICE O/P EST LOW 20 MIN: CPT | Performed by: NURSE PRACTITIONER

## 2023-06-08 NOTE — LETTER
June 8, 2023     [facility] Lymphodema  [address]  [address] [facility]    Patient: Allegra Willson   YOB: 1960   Date of Visit: 6/8/2023       Dear Dr Sue Parr: Thank you for referring Alisa Martinofrancisco to me for evaluation  Below are my notes for this consultation  If you have questions, please do not hesitate to call me  I look forward to following your patient along with you  Sincerely,        JACQUES Hernandez        CC: No Recipients    JACQUES Hernandez  6/8/2023 11:29 AM  Cosign Needed  Assessment/Plan:    Lymphedema  59-year-old female with HTN, morbid obesity, BMI 46, anxiety depression, ovarian CA s/p hysterectomy with lymphadenectomy 10/8/21, OAD s/p R hip and knee repleacements '16, chronic bilateral lower extremity lymphedema, L>R w/ LLE lymphangitis x2  Patient returns the office and follow-up for lymphedema      -Stage II BLE lymphedema with recent flareup of blistering and itching  No current cellulitis or lymphangitis  No wounds or weeping  -Patient would benefit from pneumatic compression pumps twice daily for 1 hour each to deter lymphedema from progressing to the next stage  -Will resubmit for compression pumps as it has been >6 months since last visit  -Compression stockings daily and remove at night  Needs assistance with donning compression due to left hip osteoarthritis  Needs left hip replacement  -Weight loss would be beneficial  -Periodic leg elevation  -Moisturize skin daily to maintain skin integrity  Use steroid creams for flareups only  Not for daily use  -Follow up in 1 month to recheck lymphedema      Diagnoses and all orders for this visit:    Lymphedema  -     Pneumatic compression pumps           Subjective:     Patient ID: Allegra Willson is a 60 y o  female      [address] [facility]   Phone: [phone]  Fax: [phone]   E-mail: "[email or web address]  com    Ordering Provider:  Francois Regan (NPI: [id number])  [facility]  [address]  [place]   [address]  Phone: [phone]  Fax: [phone]      Patient Information  MRN: [id number]   Opal Nicole  1960  [facility] [address] [address]  [phone]     Insurance Information  Payor: [address] [place] [address] / Plan: [id number] / Product Type: Blue Fee for Service /      [id number] - (Blues)     Patient Height and Weight 5' 8\" (1 727 m)    Wt Readings from Last 1 Encounters:   05/02/23 (!) 156 kg (344 lb)       Compression Lymphedema Pump Prescription Form      [x] Patient utilized Compression Garment = 30 mmHg distally OR Gradient Wrap System    Appliance:     Legs:    [x] Right    [x] Left   Arms:    [] Right    [] Left     []Chest garment    []Abdominal garment     Length of need: 99 months    Protocol:  [x] Std: 40 mmHg, TID/ BID,  60 minutes  [] Other:   Pressures: __ mmHg    Frequency: __ / Day   Minutes/ Session: __ minutes    Patient History and Prognosis:  [x] Patient was diagnosed for the chronic disorder with reported on-set __2018  [x] Attempts at elevation and compression have not improved patients' condition and is now at risk of lymphatic disorder progressing to the next stage/ grade  [x] Patient's physical condition/ range of motion limited for exercise  [x] Patient/ Caregiver experienced difficulty applying 30 mmHg distal compression garments  [x] Patient compression stocking/ wrap tolerance limited due to pain/ reduced circulation  [x] Patient advised to reduce salt intake and adhere to a daily regiment of elevation, compression, and lymphatic exercises  [x] Patient's severe condition will not improve without further treatment interventions  [x] Patient has noted skin changes such as marked hyperkeratosis with hyperplasia and hyperpigmentation, papillomatosis cutis lymphostatica, elephantiasis, and/ or " skin breakdown with persisting lymphorrhea    Symptoms/ Observation/ Evaluation/ Plan of Care for Lymphedema / Venous Compression Pumps     Conservative Care = 4 weeks for severe lymphedema (check all that apply):  Patient instructions for DAILY use of conservative therapies;  [x] Elevate extremities daily and nightly to reduce swelling  [x] Exercise and ambulate / range of motion (ROM) daily to increase fluid flow and reduce swelling  [x] Wear 30-mmHg distal compression garments / wraps daily to reduce / control swelling  [x] Manual lymph drainage (MLD)  [x] On-set date of lymphedema - 2018        Leg measurements  Body Part Right Left   Ankle / Forearm 33 cm 34 cm   Calf / Elbow  46 cm 52 cm   Knee / Bicep  Not Applicable (N/A) Not Applicable (N/A)   Mid-Thigh / Axilla  63 cm 63 cm         Patient presents with lymphedema  Pt has had blisters that have healed  Pt has not gotten compression pumps due to insurance  Pt does OTC wear compression stockings  Pt does not elevate her legs  HPI  59-year-old female with HTN, morbid obesity, BMI 51, anxiety depression, ovarian CA s/p hysterectomy with lymphadenectomy 10/8/21, OAD s/p R hip and knee repleacements '16, chronic bilateral lower extremity lymphedema, L>R w/ LLE lymphangitis x2  Patient returns the office and follow-up for lymphedema  Patient previously evaluated 1 year ago for chronic bilateral lower extremity lymphedema  She was ordered for pneumatic compression pumps which she received a call from medical supplier though did not hear back  Has been greater than 6 months since her last visit  She continues with bilateral lower extremity lymphedema  She is wearing light compression today though at home her  helps her with heavy-duty compression  She did recently have blistering and itching of the anterior shin bilaterally which has since resolved  She used Lotrisone  She occasionally has numbness of the left foot with elevation    She was "previously evaluated with limited arterial study that showed triphasic arterial waveforms  She has difficulty with donning compression by herself due to severe left hip arthritis and will need a left hip replacement  She sits for long periods of time at a desk  She refused to wait this morning  Stage II BLE lymphedema with recent flareup of blistering and itching  No current cellulitis or lymphangitis  No wounds or weeping  The following portions of the patient's history were reviewed and updated as appropriate: allergies, current medications, past family history, past medical history, past social history, past surgical history and problem list   ROS reviewed     Review of Systems   Cardiovascular: Positive for leg swelling  Skin: Positive for color change  Negative for wound  Objective:  I have reviewed and made appropriate changes to the review of systems input by the medical assistant      Vitals:    23 1028   BP: 144/88   BP Location: Left arm   Patient Position: Sitting   Pulse: 88   Resp: 20   Height: 5' 8\" (1 727 m)       Patient Active Problem List   Diagnosis   • Depression   • Osteoarthritis   • Anxiety and depression   • Mixed hyperlipidemia   • Hyperglycemia   • Vitamin D deficiency   • BMI 50 0-59 9, adult (HCC)   • GE reflux   • Anxiety   • Lymphedema   • Encounter for follow-up surveillance of endometrial cancer   • Family history of cancer   • Encounter for screening mammogram for malignant neoplasm of breast   • Edema of both lower legs   • Lower abdominal pain   • Annual physical exam   • Rash of hand   • Tinea pedis of both feet   • Essential hypertension   • Left hip pain   • Eczema   • Acute pain of left thigh   • Decreased pedal pulses   • Allergic rhinitis   • History of endometrial cancer   • Candidiasis of skin   • Skin rash       Past Surgical History:   Procedure Laterality Date   • CARPAL TUNNEL RELEASE Bilateral    •  SECTION      x3   •  SECTION     • " CHOLECYSTECTOMY     • CHOLECYSTECTOMY LAPAROSCOPIC N/A 3/3/2019    Procedure: CHOLECYSTECTOMY LAPAROSCOPIC;  Surgeon: Dorinda Bello MD;  Location: [facility];  Service: General   • COLONOSCOPY     • CYSTOSCOPY N/A 10/8/2021    Procedure: Cystoscopy;  Surgeon: Nadine Arce MD;  Location: [facility];  Service: Gynecology Oncology   • [facility] INJECTION LEFT HIP (NON ARTHROGRAM)  7/11/2022   • JOINT REPLACEMENT  08/19/2016    R total HIp   • MAMMO (HISTORICAL)  10/11/2018    Advise for yearly mammo screening   • VT HYSTEROSCOPY BX ENDOMETRIUM&/POLYPC W/WO D&C N/A 3/21/2016    Procedure: FRACTIONAL DILATATION AND CURETTAGE (D&C) WITH HYSTEROSOCPY;  Surgeon: Latonya Aguiar MD;  Location: [facility];  Service: Gynecology   • VT LAPS TOTAL HYSTERECT 250 GM/< W/RMVL TUBE/OVARY N/A 10/8/2021    Procedure: ROBOTIC HYSTERECTOMY, BILATERAL SALPINGOOPHERECTOMY; LEFT EXTERNAL ILIAC SENTINEL LYMPH NODE BIOPSY, RIGHT PELVIC LYMPHADENECTOMY;  Surgeon: Nadine Arce MD;  Location: [facility];  Service: Gynecology Oncology   • REPLACEMENT TOTAL KNEE Right        Family History   Problem Relation Age of Onset   • Hypertension Mother    • Hypertension Father    • Heart failure Father    • Lymphoma Father 58   • Diabetes Father         at old age   • No Known Problems Sister    • No Known Problems Daughter    • No Known Problems Maternal Grandmother    • No Known Problems Maternal Grandfather    • No Known Problems Paternal Grandmother    • No Known Problems Paternal Grandfather    • Hemophilia Brother    • Breast cancer Maternal Aunt 48   • Brain cancer Maternal Aunt 48   • No Known Problems Maternal Aunt    • No Known Problems Son    • No Known Problems Son    • No Known Problems Son        Social History     Socioeconomic History   • Marital status: /Civil Union     Spouse name: Not on file   • Number of children: Not on file   • Years of education: Not on file   • Highest education level: Not on file   Occupational History • Not on file   Tobacco Use   • Smoking status: Never   • Smokeless tobacco: Never   Vaping Use   • Vaping Use: Never used   Substance and Sexual Activity   • Alcohol use: No   • Drug use: No   • Sexual activity: Never     Partners: Male     Comment: pt declines hiv std testing   Other Topics Concern   • Not on file   Social History Narrative    Current work/study status: Full-time    Single-family home    Lives with spouse    Private household service  Leans house - Inactive 2/19/2019     Annual dental checkup: sees q6months    Annual eye exam: Sees as needed    Pap smear: By her gyn, Dr Xenia Fraga    - As per [employer]     Social Determinants of Health     Financial Resource Strain: Not on file   Food Insecurity: Not on file   Transportation Needs: Not on file   Physical Activity: Not on file   Stress: Not on file   Social Connections: Not on file   Intimate Partner Violence: Not on file   Housing Stability: Not on file       Allergies   Allergen Reactions   • Griseofulvin Hives   • Penicillins Hives     She can take cephalosporin without any side effect   • Zithromax [Azithromycin] Headache         Current Outpatient Medications:   •  amLODIPine (NORVASC) 5 mg tablet, Take 1 tablet (5 mg total) by mouth daily, Disp: 90 tablet, Rfl: 1  •  Ascorbic Acid (vitamin C) 1000 MG tablet, Take 1,000 mg by mouth daily, Disp: , Rfl:   •  busPIRone (BUSPAR) 5 mg tablet, TAKE 1 TABLET (5 MG TOTAL) BY MOUTH DAILY AT BEDTIME, Disp: 90 tablet, Rfl: 0  •  calcium carbonate (OS-CAMDEN) 600 MG tablet, Take 600 mg by mouth daily, Disp: , Rfl:   •  Cholecalciferol (Vitamin D) 50 MCG (2000 UT) tablet, Take 2,000 Units by mouth daily, Disp: , Rfl:   •  citalopram (CeleXA) 20 mg tablet, TAKE 1 TABLET (20 MG TOTAL) BY MOUTH DAILY AT BEDTIME, Disp: 90 tablet, Rfl: 0  •  clobetasol (TEMOVATE) 0 05 % ointment, Apply topically 2 (two) times a day, Disp: 60 g, Rfl: 0  •  clotrimazole-betamethasone (LOTRISONE) 1-0 05 % cream, Apply topically 2 "(two) times a day Apply to feet b i d , Disp: 45 g, Rfl: 0  •  clotrimazole-betamethasone (LOTRISONE) 1-0 05 % cream, Apply topically 2 (two) times a day, Disp: 45 g, Rfl: 0  •  fexofenadine (ALLEGRA) 180 MG tablet, Take 180 mg by mouth as needed, Disp: , Rfl:   •  metoprolol succinate (TOPROL-XL) 100 mg 24 hr tablet, Take 1 tablet (100 mg total) by mouth daily at bedtime, Disp: 90 tablet, Rfl: 0  •  Multiple Vitamins-Minerals (multivitamin with minerals) tablet, Take 1 tablet by mouth daily, Disp: , Rfl:   •  omeprazole (PriLOSEC) 20 mg delayed release capsule, Take 20 mg by mouth as needed, Disp: , Rfl:   •  triamterene-hydrochlorothiazide (DYAZIDE) 37 5-25 mg per capsule, Take 1 capsule by mouth every morning, Disp: 90 capsule, Rfl: 1  •  vitamin B-12 (VITAMIN B-12) 1,000 mcg tablet, Take by mouth daily, Disp: , Rfl:   •  nystatin (MYCOSTATIN) powder, Apply topically 4 (four) times a day (Patient not taking: Reported on 5/2/2023), Disp: 60 g, Rfl: 1    /88 (BP Location: Left arm, Patient Position: Sitting)   Pulse 88   Resp 20   Ht 5' 8\" (1 727 m)   LMP  (LMP Unknown)   BMI 52 31 kg/m²         Physical Exam  Vitals and nursing note reviewed  Constitutional:       Appearance: Normal appearance  She is obese  HENT:      Head: Normocephalic and atraumatic  Eyes:      Extraocular Movements: Extraocular movements intact  Cardiovascular:      Heart sounds: Normal heart sounds  Pulmonary:      Effort: Pulmonary effort is normal       Breath sounds: Normal breath sounds  Musculoskeletal:      Comments: Bilateral lower extremity lymphedema, left greater than right, skin thickening, skin fibrosis   Skin:     General: Skin is warm  Neurological:      General: No focal deficit present  Mental Status: She is alert and oriented to person, place, and time     Psychiatric:         Behavior: Behavior normal           "

## 2023-06-08 NOTE — PROGRESS NOTES
"Assessment/Plan:    Lymphedema  42-year-old female with HTN, morbid obesity, BMI 51, anxiety depression, ovarian CA s/p hysterectomy with lymphadenectomy 10/8/21, OAD s/p R hip and knee repleacements '16, chronic bilateral lower extremity lymphedema, L>R w/ LLE lymphangitis x2  Patient returns the office and follow-up for lymphedema      -Stage II BLE lymphedema with recent flareup of blistering and itching  No current cellulitis or lymphangitis  No wounds or weeping  -Patient would benefit from pneumatic compression pumps twice daily for 1 hour each to deter lymphedema from progressing to the next stage  -Will resubmit for compression pumps as it has been >6 months since last visit  -Compression stockings daily and remove at night  Needs assistance with donning compression due to left hip osteoarthritis  Needs left hip replacement  -Weight loss would be beneficial  -Periodic leg elevation  -Moisturize skin daily to maintain skin integrity  Use steroid creams for flareups only  Not for daily use  -Follow up in 1 month to recheck lymphedema       Diagnoses and all orders for this visit:    Lymphedema  -     Pneumatic compression pumps            Subjective:      Patient ID: Owen Alvarenga is a 60 y o  female  [address] [facility]   Phone: [phone]  Fax: [phone]   E-mail: [email or web address]    Ordering Provider:  Marjorie Weldon (NPI: [id number])  [facility]  [address] [place] [address]   [address]  Phone: [phone]  Fax: [phone]      Patient Information  MRN: [id number]   Owen Alvarenga  1960  [address] [address]  [phone]     Insurance Information  Payor: [address] [place] [address] / Plan: [id number] / Product Type: Blue Fee for Service /      [id number] [place] [facility] [place]     Patient Height and Weight 5' 8\" (1 727 m)    Wt Readings from Last 1 Encounters:   05/02/23 (!) 156 kg (344 lb) " Compression Lymphedema Pump Prescription Form      [x] Patient utilized Compression Garment ?  30 mmHg distally OR Gradient Wrap System    Appliance:     Legs:    [x] Right    [x] Left   Arms:    [] Right    [] Left     []Chest garment    []Abdominal garment     Length of need: 99 months    Protocol:  [x] Std: 40 mmHg, TID/ BID,  60 minutes  [] Other:   Pressures: __ mmHg    Frequency: __ / Day   Minutes/ Session: __ minutes    Patient History and Prognosis:  [x] Patient was diagnosed for the chronic disorder with reported on-set __2018  [x] Attempts at elevation and compression have not improved patients' condition and is now at risk of lymphatic disorder progressing to the next stage/ grade  [x] Patient's physical condition/ range of motion limited for exercise  [x] Patient/ Caregiver experienced difficulty applying 30 mmHg distal compression garments  [x] Patient compression stocking/ wrap tolerance limited due to pain/ reduced circulation  [x] Patient advised to reduce salt intake and adhere to a daily regiment of elevation, compression, and lymphatic exercises  [x] Patient's severe condition will not improve without further treatment interventions  [x] Patient has noted skin changes such as marked hyperkeratosis with hyperplasia and hyperpigmentation, papillomatosis cutis lymphostatica, elephantiasis, and/ or skin breakdown with persisting lymphorrhea    Symptoms/ Observation/ Evaluation/ Plan of Care for Lymphedema / Venous Compression Pumps     Conservative Care ? 4 weeks for severe lymphedema (check all that apply):  Patient instructions for DAILY use of conservative therapies;  [x] Elevate extremities daily and nightly to reduce swelling  [x] Exercise and ambulate / range of motion (ROM) daily to increase fluid flow and reduce swelling  [x] Wear 30-mmHg distal compression garments / wraps daily to reduce / control swelling  [x] Manual lymph drainage (MLD)  [x] On-set date of lymphedema - 2018      Leg measurements  Body Part Right Left   Ankle / Forearm 33 cm 34 cm   Calf / Elbow  46 cm 52 cm   Knee / Bicep  Not Applicable (N/A) Not Applicable (N/A)   Mid-Thigh / Axilla  63 cm 63 cm         Patient presents with lymphedema  Pt has had blisters that have healed  Pt has not gotten compression pumps due to insurance  Pt does OTC wear compression stockings  Pt does not elevate her legs  HPI  19-year-old female with HTN, morbid obesity, BMI 51, anxiety depression, ovarian CA s/p hysterectomy with lymphadenectomy 10/8/21, OAD s/p R hip and knee repleacements '16, chronic bilateral lower extremity lymphedema, L>R w/ LLE lymphangitis x2  Patient returns the office and follow-up for lymphedema  Patient previously evaluated 1 year ago for chronic bilateral lower extremity lymphedema  She was ordered for pneumatic compression pumps which she received a call from medical supplier though did not hear back  Has been greater than 6 months since her last visit  She continues with bilateral lower extremity lymphedema  She is wearing light compression today though at home her  helps her with heavy-duty compression  She did recently have blistering and itching of the anterior shin bilaterally which has since resolved  She used Lotrisone  She occasionally has numbness of the left foot with elevation  She was previously evaluated with limited arterial study that showed triphasic arterial waveforms  She has difficulty with donning compression by herself due to severe left hip arthritis and will need a left hip replacement  She sits for long periods of time at a desk  She refused to wait this morning  Stage II BLE lymphedema with recent flareup of blistering and itching  No current cellulitis or lymphangitis    No wounds or weeping  The following portions of the patient's history were reviewed and updated as appropriate: allergies, current medications, past family history, past medical history, past "social history, past surgical history and problem list   ROS reviewed     Review of Systems   Cardiovascular: Positive for leg swelling  Skin: Positive for color change  Negative for wound  Objective:  I have reviewed and made appropriate changes to the review of systems input by the medical assistant      Vitals:    23 1028   BP: 144/88   BP Location: Left arm   Patient Position: Sitting   Pulse: 88   Resp: 20   Height: 5' 8\" (1 727 m)       Patient Active Problem List   Diagnosis   • Depression   • Osteoarthritis   • Anxiety and depression   • Mixed hyperlipidemia   • Hyperglycemia   • Vitamin D deficiency   • BMI 50 0-59 9, adult (HCC)   • GE reflux   • Anxiety   • Lymphedema   • Encounter for follow-up surveillance of endometrial cancer   • Family history of cancer   • Encounter for screening mammogram for malignant neoplasm of breast   • Edema of both lower legs   • Lower abdominal pain   • Annual physical exam   • Rash of hand   • Tinea pedis of both feet   • Essential hypertension   • Left hip pain   • Eczema   • Acute pain of left thigh   • Decreased pedal pulses   • Allergic rhinitis   • History of endometrial cancer   • Candidiasis of skin   • Skin rash       Past Surgical History:   Procedure Laterality Date   • CARPAL TUNNEL RELEASE Bilateral    •  SECTION      x3   •  SECTION     • CHOLECYSTECTOMY     • CHOLECYSTECTOMY LAPAROSCOPIC N/A 3/3/2019    Procedure: CHOLECYSTECTOMY LAPAROSCOPIC;  Surgeon: Castro Peguero MD;  [facility];  Service: General   • COLONOSCOPY     • CYSTOSCOPY N/A 10/8/2021    Procedure: Cystoscopy;  Surgeon: Columba Hart MD;  [facility]: [facility];  Service: Gynecology Oncology   • [facility] INJECTION LEFT HIP (NON ARTHROGRAM)  2022   • JOINT REPLACEMENT  2016    R total HIp   • MAMMO (HISTORICAL)  10/11/2018    Advise for yearly mammo screening   • LA HYSTEROSCOPY BX ENDOMETRIUM&/POLYPC W/WO D&C N/A 3/21/2016    Procedure: FRACTIONAL " DILATATION AND CURETTAGE (D&C) WITH HYSTEROSOCPY;  Surgeon: Jason Pineda MD;  Location:  MAIN OR;  Service: Gynecology   • IA LAPS TOTAL HYSTERECT 250 GM/< W/RMVL TUBE/OVARY N/A 10/8/2021    Procedure: ROBOTIC HYSTERECTOMY, BILATERAL SALPINGOOPHERECTOMY; LEFT EXTERNAL ILIAC SENTINEL LYMPH NODE BIOPSY, RIGHT PELVIC LYMPHADENECTOMY;  Surgeon: Elias Abdul MD;  [facility];  Service: Gynecology Oncology   • REPLACEMENT TOTAL KNEE Right        Family History   Problem Relation Age of Onset   • Hypertension Mother    • Hypertension Father    • Heart failure Father    • Lymphoma Father 58   • Diabetes Father         at old age   • No Known Problems Sister    • No Known Problems Daughter    • No Known Problems Maternal Grandmother    • No Known Problems Maternal Grandfather    • No Known Problems Paternal Grandmother    • No Known Problems Paternal Grandfather    • Hemophilia Brother    • Breast cancer Maternal Aunt 48   • Brain cancer Maternal Aunt 48   • No Known Problems Maternal Aunt    • No Known Problems Son    • No Known Problems Son    • No Known Problems Son        Social History     Socioeconomic History   • Marital status: /Civil Union     Spouse name: Not on file   • Number of children: Not on file   • Years of education: Not on file   • Highest education level: Not on file   Occupational History   • Not on file   Tobacco Use   • Smoking status: Never   • Smokeless tobacco: Never   Vaping Use   • Vaping Use: Never used   Substance and Sexual Activity   • Alcohol use: No   • Drug use: No   • Sexual activity: Never     Partners: Male     Comment: pt declines hiv std testing   Other Topics Concern   • Not on file   Social History Narrative    Current work/study status: Full-time    Single-family home    Lives with spouse    Private household service  Leans house - Inactive 2/19/2019     Annual dental checkup: sees q6months    Annual eye exam: Sees as needed    Pap smear: By her gyn,   Nick    - As per [facility]     Social Determinants of Health     Financial Resource Strain: Not on file   Food Insecurity: Not on file   Transportation Needs: Not on file   Physical Activity: Not on file   Stress: Not on file   Social Connections: Not on file   Intimate Partner Violence: Not on file   Housing Stability: Not on file       Allergies   Allergen Reactions   • Griseofulvin Hives   • Penicillins Hives     She can take cephalosporin without any side effect   • Zithromax [Azithromycin] Headache         Current Outpatient Medications:   •  amLODIPine (NORVASC) 5 mg tablet, Take 1 tablet (5 mg total) by mouth daily, Disp: 90 tablet, Rfl: 1  •  Ascorbic Acid (vitamin C) 1000 MG tablet, Take 1,000 mg by mouth daily, Disp: , Rfl:   •  busPIRone (BUSPAR) 5 mg tablet, TAKE 1 TABLET (5 MG TOTAL) BY MOUTH DAILY AT BEDTIME, Disp: 90 tablet, Rfl: 0  •  calcium carbonate (OS-CAMDEN) 600 MG tablet, Take 600 mg by mouth daily, Disp: , Rfl:   •  Cholecalciferol (Vitamin D) 50 MCG (2000 UT) tablet, Take 2,000 Units by mouth daily, Disp: , Rfl:   •  citalopram (CeleXA) 20 mg tablet, TAKE 1 TABLET (20 MG TOTAL) BY MOUTH DAILY AT BEDTIME, Disp: 90 tablet, Rfl: 0  •  clobetasol (TEMOVATE) 0 05 % ointment, Apply topically 2 (two) times a day, Disp: 60 g, Rfl: 0  •  clotrimazole-betamethasone (LOTRISONE) 1-0 05 % cream, Apply topically 2 (two) times a day Apply to feet b i d , Disp: 45 g, Rfl: 0  •  clotrimazole-betamethasone (LOTRISONE) 1-0 05 % cream, Apply topically 2 (two) times a day, Disp: 45 g, Rfl: 0  •  fexofenadine (ALLEGRA) 180 MG tablet, Take 180 mg by mouth as needed, Disp: , Rfl:   •  metoprolol succinate (TOPROL-XL) 100 mg 24 hr tablet, Take 1 tablet (100 mg total) by mouth daily at bedtime, Disp: 90 tablet, Rfl: 0  •  Multiple Vitamins-Minerals (multivitamin with minerals) tablet, Take 1 tablet by mouth daily, Disp: , Rfl:   •  omeprazole (PriLOSEC) 20 mg delayed release capsule, Take 20 mg by mouth as needed, "Disp: , Rfl:   •  triamterene-hydrochlorothiazide (DYAZIDE) 37 5-25 mg per capsule, Take 1 capsule by mouth every morning, Disp: 90 capsule, Rfl: 1  •  vitamin B-12 (VITAMIN B-12) 1,000 mcg tablet, Take by mouth daily, Disp: , Rfl:   •  nystatin (MYCOSTATIN) powder, Apply topically 4 (four) times a day (Patient not taking: Reported on 5/2/2023), Disp: 60 g, Rfl: 1    /88 (BP Location: Left arm, Patient Position: Sitting)   Pulse 88   Resp 20   Ht 5' 8\" (1 727 m)   LMP  (LMP Unknown)   BMI 52 31 kg/m²          Physical Exam  Vitals and nursing note reviewed  Constitutional:       Appearance: Normal appearance  She is obese  HENT:      Head: Normocephalic and atraumatic  Eyes:      Extraocular Movements: Extraocular movements intact  Cardiovascular:      Heart sounds: Normal heart sounds  Pulmonary:      Effort: Pulmonary effort is normal       Breath sounds: Normal breath sounds  Musculoskeletal:      Comments: Bilateral lower extremity lymphedema, left greater than right, skin thickening, skin fibrosis   Skin:     General: Skin is warm  Neurological:      General: No focal deficit present  Mental Status: She is alert and oriented to person, place, and time     Psychiatric:         Behavior: Behavior normal          "

## 2023-06-08 NOTE — ASSESSMENT & PLAN NOTE
19-year-old female with HTN, morbid obesity, BMI 51, anxiety depression, ovarian CA s/p hysterectomy with lymphadenectomy 10/8/21, OAD s/p R hip and knee repleacements '16, chronic bilateral lower extremity lymphedema, L>R w/ LLE lymphangitis x2  Patient returns the office and follow-up for lymphedema      -Stage II BLE lymphedema with recent flareup of blistering and itching  No current cellulitis or lymphangitis  No wounds or weeping  -Patient would benefit from pneumatic compression pumps twice daily for 1 hour each to deter lymphedema from progressing to the next stage  -Will resubmit for compression pumps as it has been >6 months since last visit  -Compression stockings daily and remove at night  Needs assistance with donning compression due to left hip osteoarthritis  Needs left hip replacement  -Weight loss would be beneficial  -Periodic leg elevation  -Moisturize skin daily to maintain skin integrity  Use steroid creams for flareups only  Not for daily use    -Follow up in 1 month to recheck lymphedema

## 2023-06-08 NOTE — PROGRESS NOTES
[facility]   Phone: [phone]  Fax: [phone]   E-mail: [email or web address]    Ordering Provider:  Francois Regan (NPI: [id number])  [facility]  [address] [place] [address] [facility]   [address]  Phone: [phone]  Fax: [phone]      Patient Information  MRN: [id number]   Opal Nicole  1960  [address] [address] [phone]     Insurance Information  Payor: [address] [place] [address] / Plan: EEVKKROXY / Product Type: Blue Fee for Service /      [id number] - ()     Patient Height and Weight      Wt Readings from Last 1 Encounters:   05/02/23 (!) 156 kg (344 lb)         Post 4-week Measurements      Body Part Right Left   Ankle / Forearm 33 cm 34 cm   Calf / Elbow  46 cm 52 cm   Knee / Bicep  Not Applicable (N/A) Not Applicable (N/A)   Mid-Thigh / Axilla  63 cm 63 cm     Patient outcome after 4-weeks of conservative therapy:   [x] Patient's condition has NOT improved

## 2023-07-08 DIAGNOSIS — R21 SKIN RASH: ICD-10-CM

## 2023-07-10 RX ORDER — CLOTRIMAZOLE AND BETAMETHASONE DIPROPIONATE 10; .64 MG/G; MG/G
CREAM TOPICAL 2 TIMES DAILY
Qty: 45 G | Refills: 0 | Status: SHIPPED | OUTPATIENT
Start: 2023-07-10 | End: 2023-07-20 | Stop reason: SDUPTHER

## 2023-07-20 ENCOUNTER — OFFICE VISIT (OUTPATIENT)
Dept: VASCULAR SURGERY | Facility: CLINIC | Age: 63
End: 2023-07-20
Payer: COMMERCIAL

## 2023-07-20 VITALS
BODY MASS INDEX: 44.41 KG/M2 | DIASTOLIC BLOOD PRESSURE: 90 MMHG | HEART RATE: 74 BPM | HEIGHT: 68 IN | WEIGHT: 293 LBS | SYSTOLIC BLOOD PRESSURE: 152 MMHG

## 2023-07-20 DIAGNOSIS — I89.0 LYMPHEDEMA: Primary | ICD-10-CM

## 2023-07-20 DIAGNOSIS — R21 SKIN RASH: ICD-10-CM

## 2023-07-20 PROCEDURE — 99213 OFFICE O/P EST LOW 20 MIN: CPT | Performed by: NURSE PRACTITIONER

## 2023-07-20 RX ORDER — CLOTRIMAZOLE AND BETAMETHASONE DIPROPIONATE 10; .64 MG/G; MG/G
CREAM TOPICAL 2 TIMES DAILY
Qty: 45 G | Refills: 0 | Status: SHIPPED | OUTPATIENT
Start: 2023-07-20

## 2023-07-20 NOTE — ASSESSMENT & PLAN NOTE
27-year-old female with HTN, morbid obesity, BMI 52, anxiety depression, ovarian CA s/p hysterectomy with lymphadenectomy 10/8/21, OAD s/p R hip and knee repleacements '16, chronic bilateral lower extremity lymphedema, L>R w/ LLE lymphangitis x2. Patient returns the office and follow-up for lymphedema and evaluation of right shin rash     -Right anterior shin stasis dermatitis X 2 days. Applying clotrimazole betamethasone cream.  Refill sent to pharmacy. Use sparingly  -Obtained pneumatic compression pumps last week and notes improvement and tightness and heaviness with use. Difficulty with managing timing of usage due to working full-time  -Ideally would like to use pumps twice daily for 1 hour each  -Okay to resume pumps as there is no cellulitis. Stasis dermatitis appears to be resolving  -Expressed interest in lymphedema therapy.   Referral given  -Follow-up in 2 to 3 months to recheck lymphedema with consistent use of pumps

## 2023-07-20 NOTE — PROGRESS NOTES
Assessment/Plan:    Lymphedema  57-year-old female with HTN, morbid obesity, BMI 52, anxiety depression, ovarian CA s/p hysterectomy with lymphadenectomy 10/8/21, OAD s/p R hip and knee repleacements '16, chronic bilateral lower extremity lymphedema, L>R w/ LLE lymphangitis x2. Patient returns the office and follow-up for lymphedema and evaluation of right shin rash     -Right anterior shin stasis dermatitis X 2 days. Applying clotrimazole betamethasone cream.  Refill sent to pharmacy. Use sparingly  -Obtained pneumatic compression pumps last week and notes improvement and tightness and heaviness with use. Difficulty with managing timing of usage due to working full-time  -Ideally would like to use pumps twice daily for 1 hour each  -Okay to resume pumps as there is no cellulitis. Stasis dermatitis appears to be resolving  -Expressed interest in lymphedema therapy. Referral given  -Follow-up in 2 to 3 months to recheck lymphedema with consistent use of pumps       Diagnoses and all orders for this visit:    Lymphedema  -     Ambulatory Referral to PT/OT Lymphedema Therapy; Future    Skin rash  -     clotrimazole-betamethasone (LOTRISONE) 1-0.05 % cream; Apply topically 2 (two) times a day          Subjective:      Patient ID: Elisa Swift is a 60 y.o. female. Patient presents for f/u on L>R LE swelling. Pt has been using compression and elevating her legs for symptoms alleviation. She reports pain and redness has improved since LOV. She is not a smoker. HPI  57-year-old female with HTN, morbid obesity, BMI 52, anxiety depression, ovarian CA s/p hysterectomy with lymphadenectomy 10/8/21, OAD s/p R hip and knee repleacements '16, chronic bilateral lower extremity lymphedema, L>R w/ LLE lymphangitis x2. Patient returns the office and follow-up for lymphedema and evaluation of right shin rash   Patient obtained pneumatic compression pumps last week.   She has used them twice daily at 5 PM and 9 PM for the exception of yesterday due to development of right shin rash. She is using lotrisone on right shin and has decreased itching. She is not able to don formal compression socks due to severe osteoarthritis. Her  helps apply her regular stockings. She notes improvement in how her legs feel with short usage of compression pumps. She expressed interest in lymphedema therapy  She does have difficulty with finding time for compression pumps in the morning due to working full-time. She is not able to don formal compression socks due to difficulty with bending due to arthritis. Her  applies her socks  The following portions of the patient's history were reviewed and updated as appropriate: allergies, current medications, past family history, past medical history, past social history, past surgical history and problem list.    Review of Systems   Cardiovascular: Positive for leg swelling. All other systems reviewed and are negative. Objective:  I have reviewed and made appropriate changes to the review of systems input by the medical assistant.     Vitals:    07/20/23 1034   BP: 152/90   BP Location: Left arm   Patient Position: Sitting   Cuff Size: Large   Pulse: 74   Weight: (!) 157 kg (346 lb)   Height: 5' 8" (1.727 m)       Patient Active Problem List   Diagnosis   • Depression   • Osteoarthritis   • Anxiety and depression   • Mixed hyperlipidemia   • Hyperglycemia   • Vitamin D deficiency   • BMI 50.0-59.9, adult (HCC)   • GE reflux   • Anxiety   • Lymphedema   • Encounter for follow-up surveillance of endometrial cancer   • Family history of cancer   • Encounter for screening mammogram for malignant neoplasm of breast   • Edema of both lower legs   • Lower abdominal pain   • Annual physical exam   • Rash of hand   • Tinea pedis of both feet   • Essential hypertension   • Left hip pain   • Eczema   • Acute pain of left thigh   • Decreased pedal pulses   • Allergic rhinitis   • History of endometrial cancer   • Candidiasis of skin   • Skin rash       Past Surgical History:   Procedure Laterality Date   • CARPAL TUNNEL RELEASE Bilateral    •  SECTION      x3   •  SECTION     • CHOLECYSTECTOMY     • CHOLECYSTECTOMY LAPAROSCOPIC N/A 3/3/2019    Procedure: CHOLECYSTECTOMY LAPAROSCOPIC;  Surgeon: Faviola Pavon MD;  [facility];  Service: General   • COLONOSCOPY     • CYSTOSCOPY N/A 10/8/2021    Procedure: Cystoscopy;  Surgeon: Madiha Weldon MD;  Location: [facility];  Service: Gynecology Oncology   • [facility] INJECTION LEFT HIP (NON ARTHROGRAM)  2022   • JOINT REPLACEMENT  2016    R total HIp   • MAMMO (HISTORICAL)  10/11/2018    Advise for yearly mammo screening   • [place] HYSTEROSCOPY BX ENDOMETRIUM&/POLYPC W/WO D&C N/A 3/21/2016    Procedure: FRACTIONAL DILATATION AND CURETTAGE (D&C) WITH HYSTEROSOCPY;  Surgeon: Kaci Roldan MD;  [facility];  Service: Gynecology   • WY LAPS TOTAL HYSTERECT 250 GM/< W/RMVL TUBE/OVARY N/A 10/8/2021    Procedure: ROBOTIC HYSTERECTOMY, BILATERAL SALPINGOOPHERECTOMY; LEFT EXTERNAL ILIAC SENTINEL LYMPH NODE BIOPSY, RIGHT PELVIC LYMPHADENECTOMY;  Surgeon: Madiha Weldon MD;  [facility];  Service: Gynecology Oncology   • REPLACEMENT TOTAL KNEE Right        Family History   Problem Relation Age of Onset   • Hypertension Mother    • Hypertension Father    • Heart failure Father    • Lymphoma Father 58   • Diabetes Father         at old age   • No Known Problems Sister    • No Known Problems Daughter    • No Known Problems Maternal Grandmother    • No Known Problems Maternal Grandfather    • No Known Problems Paternal Grandmother    • No Known Problems Paternal Grandfather    • Hemophilia Brother    • Breast cancer Maternal Aunt 48   • Brain cancer Maternal Aunt 48   • No Known Problems Maternal Aunt    • No Known Problems Son    • No Known Problems Son    • No Known Problems Son        Social History     Socioeconomic History • Marital status: /Civil Union     Spouse name: Not on file   • Number of children: Not on file   • Years of education: Not on file   • Highest education level: Not on file   Occupational History   • Not on file   Tobacco Use   • Smoking status: Never   • Smokeless tobacco: Never   Vaping Use   • Vaping Use: Never used   Substance and Sexual Activity   • Alcohol use: No   • Drug use: No   • Sexual activity: Never     Partners: Male     Comment: pt declines hiv std testing   Other Topics Concern   • Not on file   Social History Narrative    Current work/study status: Full-time    Single-family home    Lives with spouse    Private household service  [employer] 2/19/2019     Annual dental checkup: sees q6months    Annual eye exam: Sees as needed    Pap smear: By her gyn, Dr. Nimo Cool    - [facility] [place]     Social Determinants of Health     Financial Resource Strain: Not on file   Food Insecurity: Not on file   Transportation Needs: Not on file   Physical Activity: Not on file   Stress: Not on file   Social Connections: Not on file   Intimate Partner Violence: Not on file   Housing Stability: Not on file       Allergies   Allergen Reactions   • Griseofulvin Hives   • Penicillins Hives     She can take cephalosporin without any side effect   • Zithromax [Azithromycin] Headache         Current Outpatient Medications:   •  amLODIPine (NORVASC) 5 mg tablet, Take 1 tablet (5 mg total) by mouth daily, Disp: 90 tablet, Rfl: 1  •  Ascorbic Acid (vitamin C) 1000 MG tablet, Take 1,000 mg by mouth daily, Disp: , Rfl:   •  busPIRone (BUSPAR) 5 mg tablet, TAKE 1 TABLET (5 MG TOTAL) BY MOUTH DAILY AT BEDTIME, Disp: 90 tablet, Rfl: 0  •  calcium carbonate (OS-CAMDEN) 600 MG tablet, Take 600 mg by mouth daily, Disp: , Rfl:   •  Cholecalciferol (Vitamin D) 50 MCG (2000 UT) tablet, Take 2,000 Units by mouth daily, Disp: , Rfl:   •  citalopram (CeleXA) 20 mg tablet, TAKE 1 TABLET (20 MG TOTAL) BY MOUTH DAILY AT BEDTIME, Disp: 90 tablet, Rfl: 0  •  clotrimazole-betamethasone (LOTRISONE) 1-0.05 % cream, Apply topically 2 (two) times a day Apply to feet b.i.d., Disp: 45 g, Rfl: 0  •  clotrimazole-betamethasone (LOTRISONE) 1-0.05 % cream, Apply topically 2 (two) times a day, Disp: 45 g, Rfl: 0  •  fexofenadine (ALLEGRA) 180 MG tablet, Take 180 mg by mouth as needed, Disp: , Rfl:   •  metoprolol succinate (TOPROL-XL) 100 mg 24 hr tablet, Take 1 tablet (100 mg total) by mouth daily at bedtime, Disp: 90 tablet, Rfl: 0  •  Multiple Vitamins-Minerals (multivitamin with minerals) tablet, Take 1 tablet by mouth daily, Disp: , Rfl:   •  omeprazole (PriLOSEC) 20 mg delayed release capsule, Take 20 mg by mouth as needed, Disp: , Rfl:   •  triamterene-hydrochlorothiazide (DYAZIDE) 37.5-25 mg per capsule, Take 1 capsule by mouth every morning, Disp: 90 capsule, Rfl: 1  •  vitamin B-12 (VITAMIN B-12) 1,000 mcg tablet, Take by mouth daily, Disp: , Rfl:   •  clobetasol (TEMOVATE) 0.05 % ointment, Apply topically 2 (two) times a day (Patient not taking: Reported on 7/20/2023), Disp: 60 g, Rfl: 0  •  nystatin (MYCOSTATIN) powder, Apply topically 4 (four) times a day (Patient not taking: Reported on 5/2/2023), Disp: 60 g, Rfl: 1      /90 (BP Location: Left arm, Patient Position: Sitting, Cuff Size: Large)   Pulse 74   Ht 5' 8" (1.727 m)   Wt (!) 157 kg (346 lb)   LMP  (LMP Unknown)   BMI 52.61 kg/m²          Physical Exam  Vitals and nursing note reviewed. Constitutional:       Appearance: Normal appearance. Eyes:      Extraocular Movements: Extraocular movements intact. Pulmonary:      Effort: Pulmonary effort is normal.   Abdominal:      General: Bowel sounds are normal.      Palpations: Abdomen is soft. Musculoskeletal:         General: Swelling present. Comments: Stage II bilateral lower extremity lymphedema, left greater than right. Mild stasis dermatitis right anterior shin   Skin:     General: Skin is warm. Neurological:      General: No focal deficit present. Mental Status: She is alert and oriented to person, place, and time. Psychiatric:         Behavior: Behavior normal.           [address] [facility]   Phone: [phone]  Fax: [phone]   E-mail: [email or web address]    Ordering Provider:  Flor Contreras (NPI: [id number])  [facility] [address] [facility] [place], [address] [facility]  Phone: [phone]  Fax: [phone]      Patient Information  MRN: [id number]   Che Beatty  1960  [address] [place] [id number] [phone]     Insurance Information  Payor: [address] [place] [address] / Plan: [id number] / Product Type: Blue Fee for Service /      [id number] - (Blues)     Patient Height and Weight 5' 8" (1.727 m)    Wt Readings from Last 1 Encounters:   07/20/23 (!) 157 kg (346 lb)         Post 4-week Measurements      Body Part Right Left   Ankle / Forearm 32 cm 34.5 cm   Calf / Elbow  47.5 cm 52 cm   Knee / Bicep  Not Applicable (N/A) Not Applicable (N/A)   Mid-Thigh / Axilla  Not Applicable (N/A) Not Applicable (N/A)     Patient outcome after 4-weeks of conservative therapy:   [x] Patient's condition has NOT improved

## 2023-07-30 ENCOUNTER — APPOINTMENT (OUTPATIENT)
Dept: LAB | Facility: CLINIC | Age: 63
End: 2023-07-30
Payer: COMMERCIAL

## 2023-07-30 DIAGNOSIS — I10 ESSENTIAL HYPERTENSION: ICD-10-CM

## 2023-07-30 DIAGNOSIS — F41.9 ANXIETY AND DEPRESSION: Chronic | ICD-10-CM

## 2023-07-30 DIAGNOSIS — E78.2 MIXED HYPERLIPIDEMIA: Chronic | ICD-10-CM

## 2023-07-30 DIAGNOSIS — I89.0 LYMPHEDEMA: ICD-10-CM

## 2023-07-30 DIAGNOSIS — R73.9 HYPERGLYCEMIA: Chronic | ICD-10-CM

## 2023-07-30 DIAGNOSIS — F32.A ANXIETY AND DEPRESSION: Chronic | ICD-10-CM

## 2023-07-30 LAB
ALBUMIN SERPL BCP-MCNC: 4.2 G/DL (ref 3.5–5)
ALP SERPL-CCNC: 79 U/L (ref 34–104)
ALT SERPL W P-5'-P-CCNC: 21 U/L (ref 7–52)
ANION GAP SERPL CALCULATED.3IONS-SCNC: 4 MMOL/L
AST SERPL W P-5'-P-CCNC: 21 U/L (ref 13–39)
BASOPHILS # BLD AUTO: 0.04 THOUSANDS/ÂΜL (ref 0–0.1)
BASOPHILS NFR BLD AUTO: 1 % (ref 0–1)
BILIRUB SERPL-MCNC: 1.47 MG/DL (ref 0.2–1)
BUN SERPL-MCNC: 15 MG/DL (ref 5–25)
CALCIUM SERPL-MCNC: 9.3 MG/DL (ref 8.4–10.2)
CHLORIDE SERPL-SCNC: 104 MMOL/L (ref 96–108)
CHOLEST SERPL-MCNC: 197 MG/DL
CO2 SERPL-SCNC: 32 MMOL/L (ref 21–32)
CREAT SERPL-MCNC: 0.93 MG/DL (ref 0.6–1.3)
EOSINOPHIL # BLD AUTO: 0.22 THOUSAND/ÂΜL (ref 0–0.61)
EOSINOPHIL NFR BLD AUTO: 4 % (ref 0–6)
ERYTHROCYTE [DISTWIDTH] IN BLOOD BY AUTOMATED COUNT: 14.2 % (ref 11.6–15.1)
EST. AVERAGE GLUCOSE BLD GHB EST-MCNC: 105 MG/DL
GFR SERPL CREATININE-BSD FRML MDRD: 65 ML/MIN/1.73SQ M
GLUCOSE P FAST SERPL-MCNC: 104 MG/DL (ref 65–99)
HBA1C MFR BLD: 5.3 %
HCT VFR BLD AUTO: 44.9 % (ref 34.8–46.1)
HDLC SERPL-MCNC: 46 MG/DL
HGB BLD-MCNC: 13.9 G/DL (ref 11.5–15.4)
IMM GRANULOCYTES # BLD AUTO: 0.01 THOUSAND/UL (ref 0–0.2)
IMM GRANULOCYTES NFR BLD AUTO: 0 % (ref 0–2)
LDLC SERPL CALC-MCNC: 101 MG/DL (ref 0–100)
LYMPHOCYTES # BLD AUTO: 1.6 THOUSANDS/ÂΜL (ref 0.6–4.47)
LYMPHOCYTES NFR BLD AUTO: 29 % (ref 14–44)
MCH RBC QN AUTO: 28.1 PG (ref 26.8–34.3)
MCHC RBC AUTO-ENTMCNC: 31 G/DL (ref 31.4–37.4)
MCV RBC AUTO: 91 FL (ref 82–98)
MONOCYTES # BLD AUTO: 0.34 THOUSAND/ÂΜL (ref 0.17–1.22)
MONOCYTES NFR BLD AUTO: 6 % (ref 4–12)
NEUTROPHILS # BLD AUTO: 3.37 THOUSANDS/ÂΜL (ref 1.85–7.62)
NEUTS SEG NFR BLD AUTO: 60 % (ref 43–75)
NONHDLC SERPL-MCNC: 151 MG/DL
NRBC BLD AUTO-RTO: 0 /100 WBCS
PLATELET # BLD AUTO: 213 THOUSANDS/UL (ref 149–390)
PMV BLD AUTO: 11.4 FL (ref 8.9–12.7)
POTASSIUM SERPL-SCNC: 4.6 MMOL/L (ref 3.5–5.3)
PROT SERPL-MCNC: 6.8 G/DL (ref 6.4–8.4)
RBC # BLD AUTO: 4.95 MILLION/UL (ref 3.81–5.12)
SODIUM SERPL-SCNC: 140 MMOL/L (ref 135–147)
TRIGL SERPL-MCNC: 251 MG/DL
WBC # BLD AUTO: 5.58 THOUSAND/UL (ref 4.31–10.16)

## 2023-07-30 PROCEDURE — 85025 COMPLETE CBC W/AUTO DIFF WBC: CPT

## 2023-07-30 PROCEDURE — 80061 LIPID PANEL: CPT

## 2023-07-30 PROCEDURE — 83036 HEMOGLOBIN GLYCOSYLATED A1C: CPT

## 2023-07-30 PROCEDURE — 80053 COMPREHEN METABOLIC PANEL: CPT

## 2023-07-30 PROCEDURE — 36415 COLL VENOUS BLD VENIPUNCTURE: CPT

## 2023-08-14 DIAGNOSIS — I10 ESSENTIAL HYPERTENSION: ICD-10-CM

## 2023-08-14 DIAGNOSIS — F41.9 ANXIETY AND DEPRESSION: Chronic | ICD-10-CM

## 2023-08-14 DIAGNOSIS — F32.A ANXIETY AND DEPRESSION: Chronic | ICD-10-CM

## 2023-08-15 RX ORDER — AMLODIPINE BESYLATE 5 MG/1
5 TABLET ORAL DAILY
Qty: 90 TABLET | Refills: 0 | Status: SHIPPED | OUTPATIENT
Start: 2023-08-15

## 2023-08-15 RX ORDER — CITALOPRAM 20 MG/1
20 TABLET ORAL
Qty: 90 TABLET | Refills: 0 | Status: SHIPPED | OUTPATIENT
Start: 2023-08-15

## 2023-08-15 RX ORDER — BUSPIRONE HYDROCHLORIDE 5 MG/1
5 TABLET ORAL
Qty: 90 TABLET | Refills: 0 | Status: SHIPPED | OUTPATIENT
Start: 2023-08-15

## 2023-08-15 RX ORDER — METOPROLOL SUCCINATE 100 MG/1
100 TABLET, EXTENDED RELEASE ORAL
Qty: 90 TABLET | Refills: 0 | Status: SHIPPED | OUTPATIENT
Start: 2023-08-15

## 2023-08-17 ENCOUNTER — RA CDI HCC (OUTPATIENT)
Dept: OTHER | Facility: HOSPITAL | Age: 63
End: 2023-08-17

## 2023-08-17 NOTE — PROGRESS NOTES
[facility] coding opportunities          Chart Reviewed number of suggestions sent to Provider: 1   E66.01    Patients Insurance        Commercial Insurance: [employer]

## 2023-08-24 ENCOUNTER — OFFICE VISIT (OUTPATIENT)
Dept: INTERNAL MEDICINE CLINIC | Facility: CLINIC | Age: 63
End: 2023-08-24
Payer: COMMERCIAL

## 2023-08-24 VITALS
WEIGHT: 293 LBS | DIASTOLIC BLOOD PRESSURE: 70 MMHG | HEIGHT: 68 IN | OXYGEN SATURATION: 99 % | HEART RATE: 74 BPM | SYSTOLIC BLOOD PRESSURE: 160 MMHG | BODY MASS INDEX: 44.41 KG/M2 | RESPIRATION RATE: 18 BRPM | TEMPERATURE: 97.5 F

## 2023-08-24 DIAGNOSIS — K21.9 GASTROESOPHAGEAL REFLUX DISEASE WITHOUT ESOPHAGITIS: ICD-10-CM

## 2023-08-24 DIAGNOSIS — J30.89 SEASONAL ALLERGIC RHINITIS DUE TO OTHER ALLERGIC TRIGGER: ICD-10-CM

## 2023-08-24 DIAGNOSIS — F41.9 ANXIETY AND DEPRESSION: Primary | Chronic | ICD-10-CM

## 2023-08-24 DIAGNOSIS — I89.0 LYMPHEDEMA: ICD-10-CM

## 2023-08-24 DIAGNOSIS — R21 SKIN RASH: ICD-10-CM

## 2023-08-24 DIAGNOSIS — M25.552 LEFT HIP PAIN: ICD-10-CM

## 2023-08-24 DIAGNOSIS — F32.A ANXIETY AND DEPRESSION: Primary | Chronic | ICD-10-CM

## 2023-08-24 DIAGNOSIS — E55.9 VITAMIN D DEFICIENCY: Chronic | ICD-10-CM

## 2023-08-24 DIAGNOSIS — R73.9 HYPERGLYCEMIA: Chronic | ICD-10-CM

## 2023-08-24 DIAGNOSIS — E78.2 MIXED HYPERLIPIDEMIA: Chronic | ICD-10-CM

## 2023-08-24 DIAGNOSIS — I10 ESSENTIAL HYPERTENSION: ICD-10-CM

## 2023-08-24 PROBLEM — B35.6 TINEA CRURIS: Status: RESOLVED | Noted: 2023-08-24 | Resolved: 2023-08-24

## 2023-08-24 PROBLEM — B35.6 TINEA CRURIS: Status: ACTIVE | Noted: 2023-08-24

## 2023-08-24 PROCEDURE — 99214 OFFICE O/P EST MOD 30 MIN: CPT | Performed by: INTERNAL MEDICINE

## 2023-08-24 RX ORDER — LISINOPRIL 20 MG/1
20 TABLET ORAL DAILY
Qty: 30 TABLET | Refills: 3 | Status: SHIPPED | OUTPATIENT
Start: 2023-08-24

## 2023-08-24 RX ORDER — KETOCONAZOLE 20 MG/G
CREAM TOPICAL DAILY
Qty: 45 G | Refills: 0 | Status: SHIPPED | OUTPATIENT
Start: 2023-08-24

## 2023-08-24 NOTE — ASSESSMENT & PLAN NOTE
Blood pressure elevated. Will discontinue Dyazide as it is not effective for her lymphedema and blood pressure still elevated. And patient also having  excessive urination. We will start lisinopril 20 mg once a day. We will check BMP within 3 to 4 weeks. Continue amlodipine and metoprolol as prescribed. Advised for low-salt diet.

## 2023-08-24 NOTE — ASSESSMENT & PLAN NOTE
She takes vitamin D supplement daily. Vitamin D level 44 last time. Advised to continue same dose 2000 IU daily.

## 2023-08-24 NOTE — PROGRESS NOTES
Assessment/Plan:    1. Anxiety and depression  Assessment & Plan:  Her symptoms are well controlled citalopram and buspirone. She would like to continue both medications as it is effective and has no side effects. 2. Essential hypertension  Assessment & Plan:  Blood pressure elevated. Will discontinue Dyazide as it is not effective for her lymphedema and blood pressure still elevated. And patient also having  excessive urination. We will start lisinopril 20 mg once a day. We will check BMP within 3 to 4 weeks. Continue amlodipine and metoprolol as prescribed. Advised for low-salt diet. Orders:  -     lisinopril (ZESTRIL) 20 mg tablet; Take 1 tablet (20 mg total) by mouth daily  -     Basic metabolic panel; Future    3. Seasonal allergic rhinitis due to other allergic trigger  Assessment & Plan:  She takes fexofenadine which is somewhat effective for her symptoms. 4. Gastroesophageal reflux disease without esophagitis  Assessment & Plan:  Takes omeprazole 20 mg only as needed. She has been watching diet and taking reflux precautions. 5. BMI 50.0-59.9, adult ([address])    6. Lymphedema  Assessment & Plan:  She was seen by vascular surgery. She got to lymphedema pump. She has been using pump daily twice a day. 7. Mixed hyperlipidemia  Assessment & Plan:  Cholesterol 197. Triglyceride has been increasing from  143-1 59-2 51. HDL 46 . Advised to add fish oil 1 a day. Continue low-cholesterol low-carb diet. 8. Vitamin D deficiency  Assessment & Plan:  She takes vitamin D supplement daily. Vitamin D level 44 last time. Advised to continue same dose 2000 IU daily. 9. Hyperglycemia  Assessment & Plan:  Her fasting blood sugar decreased from 10 9-1 04 hemoglobin A1c 5.3 advised to watch diet for sugar and carbs intake. Orders:  -     Basic metabolic panel; Future    10.  Skin rash  Assessment & Plan:  She gets recurrent skin rash under the breasts despite she has been using Lotrisone cream and also tried nystatin powder. Will prescribe ketoconazole cream.    Orders:  -     ketoconazole (NIZORAL) 2 % cream; Apply topically daily    11. Left hip pain  Assessment & Plan:  She has chronic hip pain due to osteoarthritis. He was seen by orthopedic specialist not a surgical candidate at present due to overweight. She ambulates with cane. BMI Counseling: Body mass index is 52.46 kg/m². The BMI is above normal. Nutrition recommendations include decreasing portion sizes, decreasing fast food intake, consuming healthier snacks, limiting drinks that contain sugar, moderation in carbohydrate intake, reducing intake of saturated and trans fat and reducing intake of cholesterol. Exercise recommendations include exercising 3-5 times per week. No pharmacotherapy was ordered. Rationale for BMI follow-up plan is due to patient being overweight or obese. Exercise as much as you can due to her hip problem. Subjective: Patient presents for follow-up. Patient ID: Jaclyn Garcias is a 60 y.o. female. HPI   70-year-old white female patient presents for follow-up of her medical problems. She denies any chest pain, shortness of breath, pain or repair denied cough, fever, chills. Denies nausea vomiting diarrhea. She was seen by vascular surgery now got her lymphedema pump which she has been using regularly.     The following portions of the patient's history were reviewed and updated as appropriate:     Past Medical History:  She has a past medical history of Allergic rhinitis (7/7/2022), Anxiety, Arthritis, BMI 50.0-59.9, adult ([address]) (6/1/2021), Cellulitis of left leg (8/17/2021), Depression, Eczema (6/2/2022), Edema of both lower legs (1/23/2021), Edema of both lower legs (11/23/2021), Endometrial cancer ([address]) (09/14/2021), Heart murmur, History of COVID-19 (11/2020), Hyperglycemia (1/23/2021), Hyperlipidemia, Hypertension, Left hip pain (6/2/2022), Lower abdominal pain (11/23/2021), Mixed hyperlipidemia (2021), Numbness and tingling in left arm, Pruritus, Rash of hand (2022), Shortness of breath, Skin lesion, Skin rash (2023), Tinea cruris (2023), Tinea pedis of both feet (2022), and Vitamin D deficiency (2021). ,  _______________________________________________________________________  Past Surgical History:   has a past surgical history that includes  section; pr hysteroscopy bx endometrium&/polypc w/wo d&c (N/A, 3/21/2016); Replacement total knee (Right);  section; CHOLECYSTECTOMY LAPAROSCOPIC (N/A, 3/3/2019); Joint replacement (2016); Mammo (historical) (10/11/2018); Colonoscopy; Carpal tunnel release (Bilateral); Cholecystectomy; pr laps total hysterect 250 gm/< w/rmvl tube/ovary (N/A, 10/8/2021); CYSTOSCOPY (N/A, 10/8/2021); and [place] injection left hip (non arthrogram) (2022). ,  _______________________________________________________________________  Family History:  family history includes Brain cancer (age of onset: 50) in her maternal aunt; Breast cancer (age of onset: 48) in her maternal aunt; Diabetes in her father; Heart failure in her father; Hemophilia in her brother; Hypertension in her father and mother; Lymphoma (age of onset: 58) in her father; No Known Problems in her daughter, maternal aunt, maternal grandfather, maternal grandmother, paternal grandfather, paternal grandmother, sister, son, son, and son.,  _______________________________________________________________________  Social History:   reports that she has never smoked. She has never used smokeless tobacco. She reports that she does not drink alcohol and does not use drugs. ,  _______________________________________________________________________  Allergies:  is allergic to griseofulvin, penicillins, and zithromax [azithromycin]. .  _______________________________________________________________________  Current Outpatient Medications   Medication Sig Dispense Refill   • ketoconazole (NIZORAL) 2 % cream Apply topically daily 45 g 0   • lisinopril (ZESTRIL) 20 mg tablet Take 1 tablet (20 mg total) by mouth daily 30 tablet 3   • amLODIPine (NORVASC) 5 mg tablet Take 1 tablet (5 mg total) by mouth daily 90 tablet 0   • Ascorbic Acid (vitamin C) 1000 MG tablet Take 1,000 mg by mouth daily     • busPIRone (BUSPAR) 5 mg tablet Take 1 tablet (5 mg total) by mouth daily at bedtime 90 tablet 0   • calcium carbonate (OS-CAMDEN) 600 MG tablet Take 600 mg by mouth daily     • Cholecalciferol (Vitamin D) 50 MCG (2000 UT) tablet Take 2,000 Units by mouth daily     • citalopram (CeleXA) 20 mg tablet Take 1 tablet (20 mg total) by mouth daily at bedtime 90 tablet 0   • clobetasol (TEMOVATE) 0.05 % ointment Apply topically 2 (two) times a day (Patient not taking: Reported on 7/20/2023) 60 g 0   • fexofenadine (ALLEGRA) 180 MG tablet Take 180 mg by mouth as needed     • metoprolol succinate (TOPROL-XL) 100 mg 24 hr tablet Take 1 tablet (100 mg total) by mouth daily at bedtime 90 tablet 0   • Multiple Vitamins-Minerals (multivitamin with minerals) tablet Take 1 tablet by mouth daily     • omeprazole (PriLOSEC) 20 mg delayed release capsule Take 20 mg by mouth as needed     • vitamin B-12 (VITAMIN B-12) 1,000 mcg tablet Take by mouth daily       No current facility-administered medications for this visit.     _______________________________________________________________________  Review of Systems   Constitutional: Negative for chills and fever. HENT: Negative for congestion, ear pain, hearing loss, nosebleeds, sinus pain, sore throat and trouble swallowing. Eyes: Negative for discharge, redness and visual disturbance. Respiratory: Negative for cough, chest tightness and shortness of breath. Cardiovascular: Negative for chest pain and palpitations. Gastrointestinal: Negative for abdominal pain, blood in stool, constipation, diarrhea, nausea and vomiting.    Genitourinary: Negative for dysuria, flank pain and hematuria. Musculoskeletal: Positive for arthralgias ( Left hip pain). Negative for myalgias and neck pain. Skin: Negative for color change and rash. Neurological: Negative for dizziness, speech difficulty, weakness and headaches. Hematological: Does not bruise/bleed easily. Psychiatric/Behavioral: Negative for agitation, behavioral problems, self-injury and suicidal ideas. The patient is not nervous/anxious. Objective:  Vitals:    08/24/23 1644   BP: 160/70   BP Location: Left arm   Patient Position: Sitting   Cuff Size: Large   Pulse: 74   Resp: 18   Temp: 97.5 °F (36.4 °C)   TempSrc: Temporal   SpO2: 99%   Weight: (!) 156 kg (345 lb)   Height: 5' 8" (1.727 m)     Body mass index is 52.46 kg/m². Physical Exam  Vitals and nursing note reviewed. Constitutional:       General: She is not in acute distress. Appearance: She is obese. HENT:      Head: Normocephalic and atraumatic. Right Ear: Tympanic membrane, ear canal and external ear normal. There is no impacted cerumen. Left Ear: Tympanic membrane, ear canal and external ear normal. There is no impacted cerumen. Nose: Nose normal.      Mouth/Throat:      Mouth: Mucous membranes are moist.   Eyes:      General: No scleral icterus. Right eye: No discharge. Left eye: No discharge. Extraocular Movements: Extraocular movements intact. Conjunctiva/sclera: Conjunctivae normal.   Cardiovascular:      Rate and Rhythm: Normal rate and regular rhythm. Pulses: Normal pulses. Heart sounds: Normal heart sounds. No murmur heard. Pulmonary:      Effort: Pulmonary effort is normal. No respiratory distress. Breath sounds: Normal breath sounds. No wheezing, rhonchi or rales. Abdominal:      General: Bowel sounds are normal.      Palpations: Abdomen is soft. Tenderness: There is no abdominal tenderness.    Musculoskeletal:         General: Tenderness ( She gets pain in the left hip on ambulation has been using cane.) present. Normal range of motion. Cervical back: Normal range of motion and neck supple. No muscular tenderness. Right lower leg: Edema ( Has nonpitting edema) present. Left lower leg: Edema ( Has nonpitting edema) present. Skin:     General: Skin is warm. Findings: No rash. Neurological:      General: No focal deficit present. Mental Status: She is alert and oriented to person, place, and time. Motor: No weakness. Psychiatric:         Mood and Affect: Mood normal.         Behavior: Behavior normal.         I spent 30 minutes with the patient today.   More than 50% time spent for reviewing of external notes, reviewing of the results of diagnostics test, management of care, patient education and ordering of test.

## 2023-08-24 NOTE — ASSESSMENT & PLAN NOTE
She was seen by vascular surgery. She got to lymphedema pump. She has been using pump daily twice a day.

## 2023-08-24 NOTE — PATIENT INSTRUCTIONS
Patient was advised to continue present medications. discussed with the patient medications and laboratory data in detail. Follow-up with me in 3 months or as advised. If any blood test was ordered please do 1 week prior to next appointment unless advise to get earlier.   If you have any questions please call the office [phone]

## 2023-08-24 NOTE — ASSESSMENT & PLAN NOTE
Her fasting blood sugar decreased from 10 9-1 04 hemoglobin A1c 5.3 advised to watch diet for sugar and carbs intake.

## 2023-08-24 NOTE — ASSESSMENT & PLAN NOTE
Her symptoms are well controlled citalopram and buspirone. She would like to continue both medications as it is effective and has no side effects.

## 2023-08-24 NOTE — ASSESSMENT & PLAN NOTE
She gets recurrent skin rash under the breasts despite she has been using Lotrisone cream and also tried nystatin powder.   Will prescribe ketoconazole cream.

## 2023-08-24 NOTE — ASSESSMENT & PLAN NOTE
Cholesterol 197. Triglyceride has been increasing from  143-1 59-2 51. HDL 46 . Advised to add fish oil 1 a day. Continue low-cholesterol low-carb diet.

## 2023-08-24 NOTE — ASSESSMENT & PLAN NOTE
She has chronic hip pain due to osteoarthritis. He was seen by orthopedic specialist not a surgical candidate at present due to overweight. She ambulates with cane.

## 2023-09-26 DIAGNOSIS — I10 ESSENTIAL HYPERTENSION: ICD-10-CM

## 2023-09-26 RX ORDER — LISINOPRIL 20 MG/1
20 TABLET ORAL DAILY
Qty: 30 TABLET | Refills: 3 | Status: SHIPPED | OUTPATIENT
Start: 2023-09-26

## 2023-10-09 ENCOUNTER — TELEPHONE (OUTPATIENT)
Dept: INTERNAL MEDICINE CLINIC | Facility: CLINIC | Age: 63
End: 2023-10-09

## 2023-10-09 NOTE — TELEPHONE ENCOUNTER
Call received from [address] . Patient refused to speak to staff. Patient states she has ongoing cold symptoms and wants to be seen today or tomorrow. Advised patient that there are no openings in the schedule and Dr. Jeremy Benedict is advising patient to go to Urgent Care. Patient states that she refuses and the fees are too high. Offered patient appointment with Dr. Rodolfo Tuttle and advised the copay would be the same as being seen here. Patient also refused. Patient states that she is "disgusted with the office" and she will "just have to suffer" and the message better be sent to the provider.

## 2023-10-19 ENCOUNTER — OFFICE VISIT (OUTPATIENT)
Dept: VASCULAR SURGERY | Facility: CLINIC | Age: 63
End: 2023-10-19
Payer: COMMERCIAL

## 2023-10-19 VITALS
HEIGHT: 68 IN | DIASTOLIC BLOOD PRESSURE: 84 MMHG | WEIGHT: 293 LBS | BODY MASS INDEX: 44.41 KG/M2 | SYSTOLIC BLOOD PRESSURE: 148 MMHG | HEART RATE: 94 BPM

## 2023-10-19 DIAGNOSIS — I89.0 LYMPHEDEMA: Primary | ICD-10-CM

## 2023-10-19 PROCEDURE — 99213 OFFICE O/P EST LOW 20 MIN: CPT | Performed by: NURSE PRACTITIONER

## 2023-10-19 NOTE — PATIENT INSTRUCTIONS
Happy Birthday!      Continue to use your compression pumps and follow up in the office with any issues or concerns

## 2023-10-19 NOTE — ASSESSMENT & PLAN NOTE
69-year-old female with HTN, morbid obesity, BMI 52, anxiety depression, ovarian CA s/p hysterectomy with lymphadenectomy 10/8/21, OAD s/p R hip and knee repleacements '16, chronic bilateral lower extremity lymphedema, L>R w/ LLE lymphangitis x2. Patient returns the office for lymphedema follow up     -Stage II bilateral lower extremity lymphedema,L>R  -Use pneumatic compression pumps twice daily for 1 hour each   -Has not needed to use steroid cream for inflammation  -No episodes of cellulitis or lymphangitis  -Not able to wear formal compression stockings due to difficulty with bending secondary to left hip arthritis  -Continue to use pumps  -Weight loss would be beneficial. Talk to PCP about options for weight loss medications  -Follow up PRN. Call the office with any issues.

## 2023-10-19 NOTE — PROGRESS NOTES
Assessment/Plan:    Lymphedema  59-year-old female with HTN, morbid obesity, BMI 52, anxiety depression, ovarian CA s/p hysterectomy with lymphadenectomy 10/8/21, OAD s/p R hip and knee repleacements '16, chronic bilateral lower extremity lymphedema, L>R w/ LLE lymphangitis x2. Patient returns the office for lymphedema follow up     -Stage II bilateral lower extremity lymphedema,L>R  -Use pneumatic compression pumps twice daily for 1 hour each   -Has not needed to use steroid cream for inflammation  -No episodes of cellulitis or lymphangitis  -Not able to wear formal compression stockings due to difficulty with bending secondary to left hip arthritis  -Continue to use pumps  -Weight loss would be beneficial. Talk to PCP about options for weight loss medications  -Follow up PRN. Call the office with any issues. There are no diagnoses linked to this encounter. Subjective:      Patient ID: Cleopatra Porter is a 60 y.o. female. Patient presents 3 mo nfollow-up for lymphedema pumps. HPI  59-year-old female with HTN, morbid obesity, BMI 52, anxiety depression, ovarian CA s/p hysterectomy with lymphadenectomy 10/8/21, OAD s/p R hip and knee repleacements '16, chronic bilateral lower extremity lymphedema, L>R w/ LLE lymphangitis x2. Patient returns the office for lymphedema follow up   She is using  using at 5pm and 9pm. Discussed AM and PM use though not able to do before work due to time constraints and work schedule. She has not had to use steroid cream for any inflammation since last office visit. We previously discussed lymphedema therapy for manual lymph drainage - she was confused on locations it was offered. At this time will hold off due to time constraints and work schedule. No issues with weeping or cellulitis. She notes no significant improvement in lower extremities with the use of compression pumps. Upon examination lower extremity skin is less tense/tight.    The following portions of the patient's history were reviewed and updated as appropriate: allergies, current medications, past family history, past medical history, past social history, past surgical history, and problem list.  ROS reviewed     Review of Systems   Constitutional: Negative. HENT: Negative. Eyes: Negative. Respiratory: Negative. Cardiovascular:  Positive for leg swelling. Gastrointestinal: Negative. Endocrine: Negative. Genitourinary: Negative. Musculoskeletal: Negative. Skin: Negative. Allergic/Immunologic: Negative. Neurological: Negative. Hematological: Negative. Psychiatric/Behavioral: Negative. Objective:    I have reviewed and made appropriate changes to the review of systems input by the medical assistant.     Vitals:    10/19/23 1030   BP: 148/84   BP Location: Left arm   Patient Position: Sitting   Cuff Size: Standard   Pulse: 94   Weight: (!) 154 kg (340 lb)   Height: 5' 8" (1.727 m)       Patient Active Problem List   Diagnosis    Depression    Osteoarthritis    Anxiety and depression    Mixed hyperlipidemia    Hyperglycemia    Vitamin D deficiency    BMI 50.0-59.9, adult (HCC)    GE reflux    Anxiety    Lymphedema    Encounter for follow-up surveillance of endometrial cancer    Family history of cancer    Encounter for screening mammogram for malignant neoplasm of breast    Edema of both lower legs    Lower abdominal pain    Annual physical exam    Rash of hand    Tinea pedis of both feet    Essential hypertension    Left hip pain    Eczema    Acute pain of left thigh    Decreased pedal pulses    Allergic rhinitis    History of endometrial cancer    Candidiasis of skin    Skin rash       Past Surgical History:   Procedure Laterality Date    CARPAL TUNNEL RELEASE Bilateral      SECTION      x3     SECTION      CHOLECYSTECTOMY      CHOLECYSTECTOMY LAPAROSCOPIC N/A 3/3/2019    Procedure: CHOLECYSTECTOMY LAPAROSCOPIC;  Surgeon: Mindy Pressley MD; [facility];  Service: General    COLONOSCOPY      CYSTOSCOPY N/A 10/8/2021    Procedure: Cystoscopy;  Surgeon: Kj Brownlee MD;  [facility];  Service: Gynecology Oncology    FL INJECTION LEFT HIP (NON ARTHROGRAM)  7/11/2022    JOINT REPLACEMENT  08/19/2016    R total HIp    MAMMO (HISTORICAL)  10/11/2018    Advise for yearly mammo screening    MO HYSTEROSCOPY BX ENDOMETRIUM&/POLYPC W/WO D&C N/A 3/21/2016    Procedure: FRACTIONAL DILATATION AND CURETTAGE (D&C) WITH HYSTEROSOCPY;  Surgeon: Earle Mcdermott MD;  [facility];  Service: Gynecology    MO LAPS TOTAL HYSTERECT 250 GM/< W/RMVL TUBE/OVARY N/A 10/8/2021    Procedure: ROBOTIC HYSTERECTOMY, BILATERAL SALPINGOOPHERECTOMY; LEFT EXTERNAL ILIAC SENTINEL LYMPH NODE BIOPSY, RIGHT PELVIC LYMPHADENECTOMY;  Surgeon: Kj Brownlee MD;  [facility];  Service: Gynecology Oncology    REPLACEMENT TOTAL KNEE Right        Family History   Problem Relation Age of Onset    Hypertension Mother     Hypertension Father     Heart failure Father     Lymphoma Father 58    Diabetes Father         at old age    No Known Problems Sister     No Known Problems Daughter     No Known Problems Maternal Grandmother     No Known Problems Maternal Grandfather     No Known Problems Paternal Grandmother     No Known Problems Paternal Grandfather     Hemophilia Brother     Breast cancer Maternal Aunt 48    Brain cancer Maternal Aunt 50    No Known Problems Maternal Aunt     No Known Problems Son     No Known Problems Son     No Known Problems Son        Social History     Socioeconomic History    Marital status: /Civil Union     Spouse name: Not on file    Number of children: Not on file    Years of education: Not on file    Highest education level: Not on file   Occupational History    Not on file   Tobacco Use    Smoking status: Never    Smokeless tobacco: Never   Vaping Use    Vaping Use: Never used   Substance and Sexual Activity    Alcohol use: No    Drug use: No    Sexual activity: Never     Partners: Male     Comment: pt declines hiv std testing   Other Topics Concern    Not on file   Social History Narrative    Current work/study status: Full-time    Single-family home    Lives with spouse    Private household service  Leans house - Inactive 2/19/2019     Annual dental checkup: sees q6months    Annual eye exam: Sees as needed    Pap smear: By her gyn, Dr. Christy Torres    - As per [employer]     Social Determinants of Health     Financial Resource Strain: Not on file   Food Insecurity: Not on file   Transportation Needs: Not on file   Physical Activity: Not on file   Stress: Not on file   Social Connections: Not on file   Intimate Partner Violence: Not on file   Housing Stability: Not on file       Allergies   Allergen Reactions    Griseofulvin Hives    Penicillins Hives     She can take cephalosporin without any side effect    Lisinopril Cough    Zithromax [Azithromycin] Headache         Current Outpatient Medications:     amLODIPine (NORVASC) 5 mg tablet, Take 1 tablet (5 mg total) by mouth daily, Disp: 90 tablet, Rfl: 0    Ascorbic Acid (vitamin C) 1000 MG tablet, Take 1,000 mg by mouth daily, Disp: , Rfl:     busPIRone (BUSPAR) 5 mg tablet, Take 1 tablet (5 mg total) by mouth daily at bedtime, Disp: 90 tablet, Rfl: 0    calcium carbonate (OS-CAMDEN) 600 MG tablet, Take 600 mg by mouth daily, Disp: , Rfl:     Cholecalciferol (Vitamin D) 50 MCG (2000 UT) tablet, Take 2,000 Units by mouth daily, Disp: , Rfl:     citalopram (CeleXA) 20 mg tablet, Take 1 tablet (20 mg total) by mouth daily at bedtime, Disp: 90 tablet, Rfl: 0    fexofenadine (ALLEGRA) 180 MG tablet, Take 180 mg by mouth as needed, Disp: , Rfl:     ketoconazole (NIZORAL) 2 % cream, Apply topically daily, Disp: 45 g, Rfl: 0    metoprolol succinate (TOPROL-XL) 100 mg 24 hr tablet, Take 1 tablet (100 mg total) by mouth daily at bedtime, Disp: 90 tablet, Rfl: 0    Multiple Vitamins-Minerals (multivitamin with minerals) tablet, Take 1 tablet by mouth daily, Disp: , Rfl:     omeprazole (PriLOSEC) 20 mg delayed release capsule, Take 20 mg by mouth as needed, Disp: , Rfl:     vitamin B-12 (VITAMIN B-12) 1,000 mcg tablet, Take by mouth daily, Disp: , Rfl:     clobetasol (TEMOVATE) 0.05 % ointment, Apply topically 2 (two) times a day (Patient not taking: Reported on 7/20/2023), Disp: 60 g, Rfl: 0    /84 (BP Location: Left arm, Patient Position: Sitting, Cuff Size: Standard)   Pulse 94   Ht 5' 8" (1.727 m)   Wt (!) 154 kg (340 lb)   LMP  (LMP Unknown)   BMI 51.70 kg/m²          Physical Exam  Vitals and nursing note reviewed. Constitutional:       Appearance: Normal appearance. She is obese. HENT:      Head: Normocephalic and atraumatic. Eyes:      Extraocular Movements: Extraocular movements intact. Cardiovascular:      Heart sounds: Normal heart sounds. Pulmonary:      Effort: Pulmonary effort is normal.      Breath sounds: Normal breath sounds. Musculoskeletal:      Comments: Stage II BLE lymphedema, L>R with distal lower extremity lipodermatosclerosis    Neurological:      General: No focal deficit present. Mental Status: She is alert and oriented to person, place, and time.    Psychiatric:         Mood and Affect: Mood normal.         Behavior: Behavior normal.

## 2023-11-16 DIAGNOSIS — I10 ESSENTIAL HYPERTENSION: ICD-10-CM

## 2023-11-16 RX ORDER — METOPROLOL SUCCINATE 100 MG/1
100 TABLET, EXTENDED RELEASE ORAL
Qty: 90 TABLET | Refills: 1 | Status: SHIPPED | OUTPATIENT
Start: 2023-11-16

## 2023-11-22 ENCOUNTER — RA CDI HCC (OUTPATIENT)
Dept: OTHER | Facility: HOSPITAL | Age: 63
End: 2023-11-22

## 2023-11-22 NOTE — PROGRESS NOTES
[address] [facility] coding opportunities          Chart Reviewed number of suggestions sent to Provider: 1   E66.01    Patients Insurance        Commercial Insurance: [employer]

## 2023-11-30 ENCOUNTER — OFFICE VISIT (OUTPATIENT)
Dept: INTERNAL MEDICINE CLINIC | Facility: CLINIC | Age: 63
End: 2023-11-30
Payer: COMMERCIAL

## 2023-11-30 VITALS
OXYGEN SATURATION: 99 % | TEMPERATURE: 97.7 F | HEART RATE: 80 BPM | HEIGHT: 68 IN | WEIGHT: 293 LBS | SYSTOLIC BLOOD PRESSURE: 128 MMHG | DIASTOLIC BLOOD PRESSURE: 70 MMHG | BODY MASS INDEX: 44.41 KG/M2 | RESPIRATION RATE: 20 BRPM

## 2023-11-30 DIAGNOSIS — K21.9 GASTROESOPHAGEAL REFLUX DISEASE WITHOUT ESOPHAGITIS: ICD-10-CM

## 2023-11-30 DIAGNOSIS — I89.0 LYMPHEDEMA: ICD-10-CM

## 2023-11-30 DIAGNOSIS — R73.9 HYPERGLYCEMIA: Chronic | ICD-10-CM

## 2023-11-30 DIAGNOSIS — E78.2 MIXED HYPERLIPIDEMIA: Chronic | ICD-10-CM

## 2023-11-30 DIAGNOSIS — I10 ESSENTIAL HYPERTENSION: Primary | ICD-10-CM

## 2023-11-30 DIAGNOSIS — F32.A ANXIETY AND DEPRESSION: Chronic | ICD-10-CM

## 2023-11-30 DIAGNOSIS — R21 SKIN RASH: ICD-10-CM

## 2023-11-30 DIAGNOSIS — Z12.31 ENCOUNTER FOR SCREENING MAMMOGRAM FOR BREAST CANCER: ICD-10-CM

## 2023-11-30 DIAGNOSIS — M25.552 LEFT HIP PAIN: ICD-10-CM

## 2023-11-30 DIAGNOSIS — F41.9 ANXIETY AND DEPRESSION: Chronic | ICD-10-CM

## 2023-11-30 DIAGNOSIS — J30.89 SEASONAL ALLERGIC RHINITIS DUE TO OTHER ALLERGIC TRIGGER: ICD-10-CM

## 2023-11-30 PROCEDURE — 99214 OFFICE O/P EST MOD 30 MIN: CPT | Performed by: INTERNAL MEDICINE

## 2023-11-30 RX ORDER — TRIAMTERENE AND HYDROCHLOROTHIAZIDE 37.5; 25 MG/1; MG/1
1 CAPSULE ORAL EVERY MORNING
COMMUNITY

## 2023-11-30 NOTE — PATIENT INSTRUCTIONS
Patient was advised to continue present medications. discussed with the patient medications and laboratory data in detail. Follow-up with me in 3 months or as advised. If any blood test was ordered please do 1 week prior to next appointment unless advise to get earlier.   If you have any questions please call the office [phone]

## 2023-11-30 NOTE — PROGRESS NOTES
Assessment/Plan:    1. Essential hypertension  -     Basic metabolic panel; Future    2. Seasonal allergic rhinitis due to other allergic trigger    3. Gastroesophageal reflux disease without esophagitis    4. Anxiety and depression  -     Basic metabolic panel; Future    5. Mixed hyperlipidemia  -     Lipid panel; Future    6. Lymphedema  -     Basic metabolic panel; Future    7. Left hip pain    8. Hyperglycemia    9. BMI 50.0-59.9, adult [facility])  Assessment & Plan:  Patient  was advised to decrease portion size. Advised to decrease carb, sugar, cholesterol intake. Advised to exercise 3-5 times per week. Advised to lose weight. 10. Skin rash    11. Encounter for screening mammogram for breast cancer  -     Mammo screening bilateral w 3d & cad; Future    Her blood pressure is well-controlled. Advised to continue present medications and low-salt diet. Her allergic rhinitis controlled on fexofenadine which she takes as needed. For GE reflux she takes omeprazole 20 mg only as needed and has been watching diet. Her anxiety and depression well-controlled on citalopram 20 mg once a day and buspirone 5 mg daily. She would like to continue both medications as it is effective and has no side effect. Hyperlipidemia controlled on diet except has elevated triglyceride last time. Cholesterol 197, triglyceride 251, HDL 46, . Advised to continue low-cholesterol, low carbs and low sugar diet. She has a lymphedema of legs was seen by vascular surgery on lymphedema pump. She has a chronic left hip pain will need surgical intervention but due to weight she does not qualify per patient. She has seen orthopedic in the past.  She uses cane for ambulation. Her last blood sugar was 104 and hemoglobin A1c was 5.3. Patient has been watching diet for sugar and carbs intake. Will follow blood sugar. She gets rash under the breast most likely tinea infection.   Advised to use ketoconazole cream as Lotrisone cream may cause thinning of the skin so advised not to use Lotrisone cream.        Subjective: Patient presents for follow-up. Patient ID: Lora Awad is a 58 y.o. female. HPI  60-year-old white female patient presents for follow-up of medical problems. She denies any chest pain, shortness of breath, pain in abdomen. Denies any cough, fever, chills. Denies nausea vomiting diarrhea. She had a chest pain mainly near the left shoulder area and below clavicle area for which she went to see urgent care. She had EKG and x-ray of the left shoulder revealed moderate osteoarthritis. She denies any chest pain. She gets some pain in the left shoulder particularly on certain movement. And below clear near shoulder area. The following portions of the patient's history were reviewed and updated as appropriate:     Past Medical History:  She has a past medical history of Allergic rhinitis (2022), Anxiety, Arthritis, BMI 50.0-59.9, adult ([address]) (2021), Cellulitis of left leg (2021), Depression, Eczema (2022), Edema of both lower legs (2021), Edema of both lower legs (2021), Endometrial cancer ([address]) (2021), Heart murmur, History of COVID-19 (2020), Hyperglycemia (2021), Hyperlipidemia, Hypertension, Left hip pain (2022), Lower abdominal pain (2021), Mixed hyperlipidemia (2021), Numbness and tingling in left arm, Pruritus, Rash of hand (2022), Shortness of breath, Skin lesion, Skin rash (2023), Tinea cruris (2023), Tinea pedis of both feet (2022), and Vitamin D deficiency (2021). ,  _______________________________________________________________________  Past Surgical History:   has a past surgical history that includes  section; pr hysteroscopy bx endometrium&/polypc w/wo d&c (N/A, 3/21/2016); Replacement total knee (Right);  section; CHOLECYSTECTOMY LAPAROSCOPIC (N/A, 3/3/2019); Joint replacement (2016);  Mammo (historical) (10/11/2018); Colonoscopy; Carpal tunnel release (Bilateral); Cholecystectomy; pr laps total hysterect 250 gm/< w/rmvl tube/ovary (N/A, 10/8/2021); CYSTOSCOPY (N/A, 10/8/2021); and FL injection left hip (non arthrogram) (7/11/2022). ,  _______________________________________________________________________  Family History:  family history includes Brain cancer (age of onset: 50) in her maternal aunt; Breast cancer (age of onset: 48) in her maternal aunt; Diabetes in her father; Heart failure in her father; Hemophilia in her brother; Hypertension in her father and mother; Lymphoma (age of onset: 58) in her father; No Known Problems in her daughter, maternal aunt, maternal grandfather, maternal grandmother, paternal grandfather, paternal grandmother, sister, son, son, and son.,  _______________________________________________________________________  Social History:   reports that she has never smoked. She has never used smokeless tobacco. She reports that she does not drink alcohol and does not use drugs. ,  _______________________________________________________________________  Allergies:  is allergic to griseofulvin, penicillins, lisinopril, and zithromax [azithromycin]. .  _______________________________________________________________________  Current Outpatient Medications   Medication Sig Dispense Refill    amLODIPine (NORVASC) 5 mg tablet Take 1 tablet (5 mg total) by mouth daily 90 tablet 0    Ascorbic Acid (vitamin C) 1000 MG tablet Take 1,000 mg by mouth daily      busPIRone (BUSPAR) 5 mg tablet Take 1 tablet (5 mg total) by mouth daily at bedtime 90 tablet 0    calcium carbonate (OS-CAMDEN) 600 MG tablet Take 600 mg by mouth daily      Cholecalciferol (Vitamin D) 50 MCG (2000 UT) tablet Take 2,000 Units by mouth daily      citalopram (CeleXA) 20 mg tablet Take 1 tablet (20 mg total) by mouth daily at bedtime 90 tablet 0    fexofenadine (ALLEGRA) 180 MG tablet Take 180 mg by mouth as needed metoprolol succinate (TOPROL-XL) 100 mg 24 hr tablet Take 1 tablet (100 mg total) by mouth daily at bedtime 90 tablet 1    Multiple Vitamins-Minerals (multivitamin with minerals) tablet Take 1 tablet by mouth daily      omeprazole (PriLOSEC) 20 mg delayed release capsule Take 20 mg by mouth as needed      triamterene-hydrochlorothiazide (DYAZIDE) 37.5-25 mg per capsule Take 1 capsule by mouth every morning      vitamin B-12 (VITAMIN B-12) 1,000 mcg tablet Take by mouth daily      clobetasol (TEMOVATE) 0.05 % ointment Apply topically 2 (two) times a day (Patient not taking: Reported on 11/30/2023) 60 g 0    ketoconazole (NIZORAL) 2 % cream Apply topically daily (Patient not taking: Reported on 11/30/2023) 45 g 0     No current facility-administered medications for this visit.     _______________________________________________________________________  Review of Systems   Constitutional:  Negative for chills and fever. HENT:  Negative for congestion, ear pain, hearing loss, nosebleeds, sinus pain, sore throat and trouble swallowing. Eyes:  Negative for discharge, redness and visual disturbance. Respiratory:  Negative for cough, chest tightness and shortness of breath. Cardiovascular:  Negative for chest pain and palpitations. Gastrointestinal:  Negative for abdominal pain, blood in stool, constipation, diarrhea, nausea and vomiting. Genitourinary:  Negative for dysuria, flank pain and hematuria. Musculoskeletal:  Positive for arthralgias (Left hip pain). Negative for myalgias and neck pain. Skin:  Negative for color change and rash. Neurological:  Negative for dizziness, speech difficulty, weakness and headaches. Hematological:  Does not bruise/bleed easily. Psychiatric/Behavioral:  Negative for agitation and behavioral problems.             Objective:  Vitals:    11/30/23 1634   BP: 128/70   BP Location: Left arm   Patient Position: Sitting   Cuff Size: Large   Pulse: 80   Resp: 20   Temp: 97.7 °F (36.5 °C)   TempSrc: Temporal   SpO2: 99%   Weight: (!) 158 kg (348 lb)   Height: 5' 8" (1.727 m)     Body mass index is 52.91 kg/m². Physical Exam  Vitals and nursing note reviewed. Constitutional:       General: She is not in acute distress. Appearance: She is obese. HENT:      Head: Normocephalic and atraumatic. Right Ear: Ear canal and external ear normal.      Left Ear: Ear canal and external ear normal.      Nose: Nose normal.      Mouth/Throat:      Mouth: Mucous membranes are moist.   Eyes:      General: No scleral icterus. Right eye: No discharge. Left eye: No discharge. Extraocular Movements: Extraocular movements intact. Conjunctiva/sclera: Conjunctivae normal.   Cardiovascular:      Rate and Rhythm: Normal rate and regular rhythm. Pulses: Normal pulses. Heart sounds: Normal heart sounds. Pulmonary:      Effort: Pulmonary effort is normal. No respiratory distress. Breath sounds: Normal breath sounds. No wheezing, rhonchi or rales. Abdominal:      General: Bowel sounds are normal.      Palpations: Abdomen is soft. Tenderness: There is no abdominal tenderness. Musculoskeletal:         General: Tenderness (Left hip tenderness and pain on ambulation) present. Normal range of motion. Cervical back: Normal range of motion and neck supple. No muscular tenderness. Right lower leg: Edema (Has nonpitting edema has a stocking on) present. Left lower leg: Edema (Nonpitting edema has a stocking on) present. Comments: She ambulates with a cane   Skin:     General: Skin is warm. Findings: No rash. Neurological:      General: No focal deficit present. Mental Status: She is alert and oriented to person, place, and time. Motor: No weakness. Psychiatric:         Mood and Affect: Mood normal.         Behavior: Behavior normal.       I spent 30 minutes with the patient today.   More than 50% time spent for reviewing of external notes, reviewing of the results of diagnostics test, management of care, patient education and ordering of test.

## 2023-12-14 DIAGNOSIS — F32.A ANXIETY AND DEPRESSION: Chronic | ICD-10-CM

## 2023-12-14 DIAGNOSIS — F41.9 ANXIETY AND DEPRESSION: Chronic | ICD-10-CM

## 2023-12-14 DIAGNOSIS — I10 ESSENTIAL HYPERTENSION: ICD-10-CM

## 2023-12-15 RX ORDER — BUSPIRONE HYDROCHLORIDE 5 MG/1
5 TABLET ORAL
Qty: 90 TABLET | Refills: 1 | Status: SHIPPED | OUTPATIENT
Start: 2023-12-15

## 2023-12-15 RX ORDER — CITALOPRAM 20 MG/1
20 TABLET ORAL
Qty: 90 TABLET | Refills: 0 | Status: SHIPPED | OUTPATIENT
Start: 2023-12-15

## 2023-12-15 RX ORDER — AMLODIPINE BESYLATE 5 MG/1
5 TABLET ORAL DAILY
Qty: 90 TABLET | Refills: 0 | Status: SHIPPED | OUTPATIENT
Start: 2023-12-15

## 2023-12-29 ENCOUNTER — TELEPHONE (OUTPATIENT)
Age: 63
End: 2023-12-29

## 2023-12-29 DIAGNOSIS — B37.2 CANDIDIASIS OF SKIN: Primary | ICD-10-CM

## 2023-12-29 RX ORDER — CLOTRIMAZOLE AND BETAMETHASONE DIPROPIONATE 10; .64 MG/G; MG/G
CREAM TOPICAL 2 TIMES DAILY
Qty: 30 G | Refills: 0 | Status: SHIPPED | OUTPATIENT
Start: 2023-12-29

## 2023-12-29 NOTE — TELEPHONE ENCOUNTER
Pt requested a script for the clotrimazole-betamethasone (LOTRISONE) 1-0.05 % cream.       Please send to [facility] on file.         Thank you.

## 2023-12-29 NOTE — TELEPHONE ENCOUNTER
Called Cielo to verify that she is still using this.  She says she uses it for flare up rashes on her legs.  Can you send to [facility]?

## 2024-01-29 ENCOUNTER — OFFICE VISIT (OUTPATIENT)
Dept: FAMILY MEDICINE CLINIC | Facility: CLINIC | Age: 64
End: 2024-01-29
Payer: COMMERCIAL

## 2024-01-29 VITALS
HEIGHT: 68 IN | HEART RATE: 81 BPM | DIASTOLIC BLOOD PRESSURE: 90 MMHG | TEMPERATURE: 98.2 F | RESPIRATION RATE: 16 BRPM | OXYGEN SATURATION: 98 % | BODY MASS INDEX: 52.91 KG/M2 | SYSTOLIC BLOOD PRESSURE: 166 MMHG

## 2024-01-29 DIAGNOSIS — I89.0 LYMPHEDEMA: ICD-10-CM

## 2024-01-29 DIAGNOSIS — I10 ESSENTIAL HYPERTENSION: Primary | ICD-10-CM

## 2024-01-29 PROBLEM — Z00.00 ANNUAL PHYSICAL EXAM: Status: RESOLVED | Noted: 2021-11-23 | Resolved: 2024-01-29

## 2024-01-29 PROBLEM — F41.9 ANXIETY: Status: RESOLVED | Noted: 2021-08-31 | Resolved: 2024-01-29

## 2024-01-29 PROCEDURE — 99214 OFFICE O/P EST MOD 30 MIN: CPT | Performed by: INTERNAL MEDICINE

## 2024-01-29 PROCEDURE — 3080F DIAST BP >= 90 MM HG: CPT | Performed by: INTERNAL MEDICINE

## 2024-01-29 PROCEDURE — 3077F SYST BP >= 140 MM HG: CPT | Performed by: INTERNAL MEDICINE

## 2024-01-29 RX ORDER — LOSARTAN POTASSIUM AND HYDROCHLOROTHIAZIDE 12.5; 5 MG/1; MG/1
1 TABLET ORAL DAILY
Qty: 30 TABLET | Refills: 1 | Status: SHIPPED | OUTPATIENT
Start: 2024-01-29

## 2024-01-29 NOTE — PROGRESS NOTES
"Assessment/Plan:       Problem List Items Addressed This Visit       Lymphedema     This is at her baseline  I do not feel like increasing a water pill will make much of a difference and patient agreed         Essential hypertension - Primary     Blood pressure reading is high today  This could be the reason she is having the symptoms but it is difficult to say with certainty  We discussed ER precautions at length  Discussed either increasing amlodipine to 10 mg or switching from triamterene-HCTZ to an ARB  Looked at blood pressure trends and it is difficult to say which medications have made a bigger difference  Decided to switch to ARB-HCTZ combo and she is to call if she develops that cough or if symptoms do not improve  Instructed to come in for a blood pressure check in 1 to 2 weeks with the nurse  No exam findings to warrant imaging at this time         Relevant Medications    losartan-hydrochlorothiazide (HYZAAR) 50-12.5 mg per tablet         Subjective:     Chief Complaint   Patient presents with    Dizziness     Fells foggy, almost pass out last night          Patient ID: Cielo Thomason is a 63 y.o. female who presents with fogginess and feeling \"different \"for the last couple of days.  She had a brief episode where she felt like she was in a pass out but that was fleeting and felt back to normal after.  She reports that she felt this more when she was laying down.  She denies significant headache or vision changes.  She feels like her symptoms are related to her blood pressure though she does not have a blood pressure cuff at home to check.  Has been taking her medications as prescribed.  States that she had a cough with lisinopril so she was switched to triamterene-HCTZ but she is not sure if that has made much of a difference in her blood pressure readings.  She denies any other recent changes in medications or diet.  Denies increased stress.  She denies any chest pain or feeling under the " "weather.          Patient's past medical history, surgical history, family history, medications, allergies and social history reviewed and updated    Review of Systems   Constitutional:  Negative for chills and fever.   HENT:  Negative for congestion and sore throat.    Respiratory:  Negative for cough and shortness of breath.    Cardiovascular:  Negative for chest pain.   Gastrointestinal:  Negative for abdominal pain.   Genitourinary:  Negative for dysuria and frequency.   Neurological:  Positive for dizziness and light-headedness. Negative for syncope, numbness and headaches.         All other ROS negative.     Objective:    Vitals:    01/29/24 1503   BP: 166/90   Pulse: 81   Resp: 16   Temp: 98.2 °F (36.8 °C)   SpO2: 98%          Physical Exam  Vitals reviewed.   Constitutional:       General: She is not in acute distress.     Appearance: She is obese.   HENT:      Right Ear: External ear normal.      Left Ear: External ear normal.      Nose: Nose normal.      Mouth/Throat:      Mouth: Mucous membranes are moist.   Eyes:      Extraocular Movements: Extraocular movements intact.      Conjunctiva/sclera: Conjunctivae normal.      Pupils: Pupils are equal, round, and reactive to light.   Cardiovascular:      Rate and Rhythm: Normal rate and regular rhythm.      Heart sounds: No murmur heard.  Pulmonary:      Effort: Pulmonary effort is normal. No respiratory distress.      Breath sounds: No wheezing.   Lymphadenopathy:      Cervical: No cervical adenopathy.   Neurological:      Mental Status: She is alert and oriented to person, place, and time. Mental status is at baseline.      Cranial Nerves: No cranial nerve deficit.   Psychiatric:         Mood and Affect: Mood normal.               Portions of the record may have been created with voice recognition software.  Occasional wrong word or \"sound a like\" substitutions may have occurred due to the inherent limitations of voice recognition software.  Read the chart " carefully and recognize, using context, where substitutions have occurred.     Cindy Palomino MD  Internal Medicine and Pediatrics

## 2024-01-30 NOTE — ASSESSMENT & PLAN NOTE
This is at her baseline  I do not feel like increasing a water pill will make much of a difference and patient agreed

## 2024-01-30 NOTE — ASSESSMENT & PLAN NOTE
Blood pressure reading is high today  This could be the reason she is having the symptoms but it is difficult to say with certainty  We discussed ER precautions at length  Discussed either increasing amlodipine to 10 mg or switching from triamterene-HCTZ to an ARB  Looked at blood pressure trends and it is difficult to say which medications have made a bigger difference  Decided to switch to ARB-HCTZ combo and she is to call if she develops that cough or if symptoms do not improve  Instructed to come in for a blood pressure check in 1 to 2 weeks with the nurse  No exam findings to warrant imaging at this time

## 2024-02-09 DIAGNOSIS — I10 ESSENTIAL HYPERTENSION: ICD-10-CM

## 2024-02-10 RX ORDER — METOPROLOL SUCCINATE 100 MG/1
100 TABLET, EXTENDED RELEASE ORAL
Qty: 90 TABLET | Refills: 0 | Status: SHIPPED | OUTPATIENT
Start: 2024-02-10

## 2024-02-10 RX ORDER — AMLODIPINE BESYLATE 5 MG/1
5 TABLET ORAL DAILY
Qty: 90 TABLET | Refills: 0 | Status: SHIPPED | OUTPATIENT
Start: 2024-02-10

## 2024-03-10 ENCOUNTER — APPOINTMENT (OUTPATIENT)
Dept: LAB | Facility: CLINIC | Age: 64
End: 2024-03-10
Payer: COMMERCIAL

## 2024-03-10 DIAGNOSIS — F32.A ANXIETY AND DEPRESSION: Chronic | ICD-10-CM

## 2024-03-10 DIAGNOSIS — R73.9 HYPERGLYCEMIA: Chronic | ICD-10-CM

## 2024-03-10 DIAGNOSIS — F41.9 ANXIETY AND DEPRESSION: Chronic | ICD-10-CM

## 2024-03-10 DIAGNOSIS — E78.2 MIXED HYPERLIPIDEMIA: Chronic | ICD-10-CM

## 2024-03-10 DIAGNOSIS — I10 ESSENTIAL HYPERTENSION: ICD-10-CM

## 2024-03-10 DIAGNOSIS — I89.0 LYMPHEDEMA: ICD-10-CM

## 2024-03-10 LAB
ANION GAP SERPL CALCULATED.3IONS-SCNC: 8 MMOL/L
BUN SERPL-MCNC: 15 MG/DL (ref 5–25)
CALCIUM SERPL-MCNC: 9.5 MG/DL (ref 8.4–10.2)
CHLORIDE SERPL-SCNC: 103 MMOL/L (ref 96–108)
CHOLEST SERPL-MCNC: 178 MG/DL
CO2 SERPL-SCNC: 28 MMOL/L (ref 21–32)
CREAT SERPL-MCNC: 0.96 MG/DL (ref 0.6–1.3)
GFR SERPL CREATININE-BSD FRML MDRD: 62 ML/MIN/1.73SQ M
GLUCOSE P FAST SERPL-MCNC: 127 MG/DL (ref 65–99)
HDLC SERPL-MCNC: 41 MG/DL
LDLC SERPL CALC-MCNC: 95 MG/DL (ref 0–100)
NONHDLC SERPL-MCNC: 137 MG/DL
POTASSIUM SERPL-SCNC: 4.1 MMOL/L (ref 3.5–5.3)
SODIUM SERPL-SCNC: 139 MMOL/L (ref 135–147)
TRIGL SERPL-MCNC: 209 MG/DL

## 2024-03-10 PROCEDURE — 36415 COLL VENOUS BLD VENIPUNCTURE: CPT

## 2024-03-10 PROCEDURE — 80048 BASIC METABOLIC PNL TOTAL CA: CPT

## 2024-03-10 PROCEDURE — 80061 LIPID PANEL: CPT

## 2024-03-11 ENCOUNTER — RA CDI HCC (OUTPATIENT)
Dept: OTHER | Facility: HOSPITAL | Age: 64
End: 2024-03-11

## 2024-03-13 DIAGNOSIS — I10 ESSENTIAL HYPERTENSION: ICD-10-CM

## 2024-03-13 RX ORDER — LOSARTAN POTASSIUM AND HYDROCHLOROTHIAZIDE 12.5; 5 MG/1; MG/1
1 TABLET ORAL DAILY
Qty: 30 TABLET | Refills: 1 | Status: SHIPPED | OUTPATIENT
Start: 2024-03-13

## 2024-03-15 DIAGNOSIS — F41.9 ANXIETY AND DEPRESSION: Chronic | ICD-10-CM

## 2024-03-15 DIAGNOSIS — F32.A ANXIETY AND DEPRESSION: Chronic | ICD-10-CM

## 2024-03-16 RX ORDER — CITALOPRAM 20 MG/1
20 TABLET ORAL
Qty: 90 TABLET | Refills: 0 | Status: SHIPPED | OUTPATIENT
Start: 2024-03-16

## 2024-03-25 ENCOUNTER — OFFICE VISIT (OUTPATIENT)
Dept: INTERNAL MEDICINE CLINIC | Facility: CLINIC | Age: 64
End: 2024-03-25
Payer: COMMERCIAL

## 2024-03-25 VITALS
RESPIRATION RATE: 18 BRPM | OXYGEN SATURATION: 94 % | TEMPERATURE: 97.8 F | WEIGHT: 293 LBS | HEIGHT: 68 IN | SYSTOLIC BLOOD PRESSURE: 126 MMHG | HEART RATE: 84 BPM | DIASTOLIC BLOOD PRESSURE: 72 MMHG | BODY MASS INDEX: 44.41 KG/M2

## 2024-03-25 DIAGNOSIS — R01.1 HEART MURMUR: ICD-10-CM

## 2024-03-25 DIAGNOSIS — F41.9 ANXIETY AND DEPRESSION: Chronic | ICD-10-CM

## 2024-03-25 DIAGNOSIS — R73.9 HYPERGLYCEMIA: Chronic | ICD-10-CM

## 2024-03-25 DIAGNOSIS — I89.0 LYMPHEDEMA: ICD-10-CM

## 2024-03-25 DIAGNOSIS — Z12.31 ENCOUNTER FOR SCREENING MAMMOGRAM FOR BREAST CANCER: ICD-10-CM

## 2024-03-25 DIAGNOSIS — F32.A ANXIETY AND DEPRESSION: Chronic | ICD-10-CM

## 2024-03-25 DIAGNOSIS — E78.2 MIXED HYPERLIPIDEMIA: Chronic | ICD-10-CM

## 2024-03-25 DIAGNOSIS — E66.01 CLASS 3 SEVERE OBESITY WITH SERIOUS COMORBIDITY AND BODY MASS INDEX (BMI) OF 50.0 TO 59.9 IN ADULT, UNSPECIFIED OBESITY TYPE (HCC): ICD-10-CM

## 2024-03-25 DIAGNOSIS — I10 ESSENTIAL HYPERTENSION: Primary | ICD-10-CM

## 2024-03-25 DIAGNOSIS — K21.9 GASTROESOPHAGEAL REFLUX DISEASE WITHOUT ESOPHAGITIS: ICD-10-CM

## 2024-03-25 DIAGNOSIS — E55.9 VITAMIN D DEFICIENCY: Chronic | ICD-10-CM

## 2024-03-25 PROBLEM — E66.9 OBESITY: Status: ACTIVE | Noted: 2024-03-25

## 2024-03-25 PROCEDURE — 99214 OFFICE O/P EST MOD 30 MIN: CPT | Performed by: INTERNAL MEDICINE

## 2024-03-25 NOTE — PROGRESS NOTES
Assessment/Plan:    1. Essential hypertension  -     Ambulatory Referral to Cardiology; Future  -     Comprehensive metabolic panel; Future  -     CBC and differential; Future  -     UA (URINE) with reflex to Scope; Future    2. Anxiety and depression  -     Comprehensive metabolic panel; Future  -     CBC and differential; Future    3. Mixed hyperlipidemia  -     Ambulatory Referral to Cardiology; Future    4. Lymphedema  -     Comprehensive metabolic panel; Future  -     CBC and differential; Future    5. Heart murmur  -     Ambulatory Referral to Cardiology; Future    6. Hyperglycemia  -     Hemoglobin A1C; Future  -     Comprehensive metabolic panel; Future  -     UA (URINE) with reflex to Scope; Future    7. Vitamin D deficiency  -     Comprehensive metabolic panel; Future    8. Class 3 severe obesity with serious comorbidity and body mass index (BMI) of 50.0 to 59.9 in adult, unspecified obesity type (HCC)    9. Gastroesophageal reflux disease without esophagitis    10. Encounter for screening mammogram for breast cancer  -     Mammo screening bilateral w 3d & cad; Future          Discussion/summary/plan:    Her blood pressure is well-controlled.  She was seen by my covering physician January 2024 when her Dyazide was discontinued and she was placed on losartan/HCTZ.  She has been taking all of her blood pressure medications including amlodipine, metoprolol and losartan/HCTZ as prescribed.  Occasionally she gets lightheaded.  Discussed with the patient to check blood pressure at home call if it systolic blood pressure less than 110 then we will decrease the dose of her medication.  Advised for low-salt diet.  For anxiety depression she is on buspirone and citalopram she would like to continue both medication as prescribed as it is effective and has no side effect.  Her hyperlipidemia is improving.  Cholesterol decreased from 1 97-1 78, triglyceride decreased from 2 51-2 09, LDL decreased from 10 1-95.  Advised  to continue low-cholesterol, low carbs diet.  Her HDL is 46 advised to exercise and lose weight.  Her fasting blood sugar 127.  Advised to watch diet for sugar and carbs intake.  Will follow blood sugar and check hemoglobin A1c next time.  She has a heart murmur discussed with the patient to see cardiologist for the further evaluation recommendation.  Vitamin D deficiency resolved on vitamin D supplement.    For Obesity discussed with the patient about seeing a  Weight loss  .she has appointment to see endocrinologist on April 11, 2024.  But at present she does not want any medication to help lose weight.  Discussed with the patient to see endocrinologist for further evaluation recommendation.  For GE reflux she takes omeprazole 20 mg only as needed which is effective.  She has been watching her diet.  Lymphedema of legs she was seen by vascular surgery was advised to use her pneumatic compression pump twice daily for 1 hour each.      Subjective: Patient presents for follow-up.      Patient ID: Cielo Thomason is a 63 y.o. female.    HPI  63-year-old white female patient presents for follow-up her medical problems.  She denies any chest pain, shortness of breath, pain in abdomen.  Denies any cough, fever, chills.  Denies any nausea vomiting diarrhea.  She was seen by covering physician January 2024 when her blood pressure medication Dyazide were discontinued and she was placed on losartan/HCTZ.  She has been also taking another medication for blood pressure amlodipine and metoprolol as prescribed as well.  Occasionally she gets lightheaded particular when she lies down.  Denies any focal weakness.    The following portions of the patient's history were reviewed and updated as appropriate:     Past Medical History:  She has a past medical history of Allergic rhinitis (07/07/2022), Anxiety, Arthritis, BMI 50.0-59.9, adult (HCC) (06/01/2021), Cellulitis of left leg (08/17/2021), Depression,  Eczema (2022), Edema of both lower legs (2021), Edema of both lower legs (2021), Endometrial cancer (HCC) (2021), Heart murmur, Heart murmur (2024), History of COVID-19 (2020), Hyperglycemia (2021), Hyperlipidemia, Hypertension, Left hip pain (2022), Lower abdominal pain (2021), Mixed hyperlipidemia (2021), Numbness and tingling in left arm, Obesity (2024), Pruritus, Rash of hand (2022), Shortness of breath, Skin lesion, Skin rash (2023), Tinea cruris (2023), Tinea pedis of both feet (2022), and Vitamin D deficiency (2021).,  _______________________________________________________________________  Past Surgical History:   has a past surgical history that includes  section; pr hysteroscopy bx endometrium&/polypc w/wo d&c (N/A, 3/21/2016); Replacement total knee (Right);  section; CHOLECYSTECTOMY LAPAROSCOPIC (N/A, 3/3/2019); Joint replacement (2016); Mammo (historical) (10/11/2018); Colonoscopy; Carpal tunnel release (Bilateral); Cholecystectomy; pr laps total hysterect 250 gm/< w/rmvl tube/ovary (N/A, 10/8/2021); CYSTOSCOPY (N/A, 10/8/2021); and FL injection left hip (non arthrogram) (2022).,  _______________________________________________________________________  Family History:  family history includes Brain cancer (age of onset: 48) in her maternal aunt; Breast cancer (age of onset: 50) in her maternal aunt; Diabetes in her father; Heart failure in her father; Hemophilia in her brother; Hypertension in her father and mother; Lymphoma (age of onset: 62) in her father; No Known Problems in her daughter, maternal aunt, maternal grandfather, maternal grandmother, paternal grandfather, paternal grandmother, sister, son, son, and son.,  _______________________________________________________________________  Social History:   reports that she has never smoked. She has never used smokeless tobacco.  She reports that she does not drink alcohol and does not use drugs.,  _______________________________________________________________________  Allergies:  is allergic to griseofulvin, penicillins, lisinopril, and zithromax [azithromycin]..  _______________________________________________________________________  Current Outpatient Medications   Medication Sig Dispense Refill    amLODIPine (NORVASC) 5 mg tablet Take 1 tablet (5 mg total) by mouth daily 90 tablet 0    Ascorbic Acid (vitamin C) 1000 MG tablet Take 1,000 mg by mouth daily      busPIRone (BUSPAR) 5 mg tablet Take 1 tablet (5 mg total) by mouth daily at bedtime 90 tablet 1    calcium carbonate (OS-CAMDEN) 600 MG tablet Take 600 mg by mouth daily      Cholecalciferol (Vitamin D) 50 MCG (2000 UT) tablet Take 2,000 Units by mouth daily      citalopram (CeleXA) 20 mg tablet Take 1 tablet (20 mg total) by mouth daily at bedtime 90 tablet 0    clotrimazole-betamethasone (LOTRISONE) 1-0.05 % cream Apply topically 2 (two) times a day 30 g 0    fexofenadine (ALLEGRA) 180 MG tablet Take 180 mg by mouth daily as needed      losartan-hydrochlorothiazide (HYZAAR) 50-12.5 mg per tablet TAKE 1 TABLET BY MOUTH ONCE DAILY 30 tablet 1    metoprolol succinate (TOPROL-XL) 100 mg 24 hr tablet Take 1 tablet (100 mg total) by mouth daily at bedtime 90 tablet 0    Multiple Vitamins-Minerals (multivitamin with minerals) tablet Take 1 tablet by mouth daily      omeprazole (PriLOSEC) 20 mg delayed release capsule Take 20 mg by mouth as needed      vitamin B-12 (VITAMIN B-12) 1,000 mcg tablet Take by mouth daily      clobetasol (TEMOVATE) 0.05 % ointment Apply topically 2 (two) times a day (Patient not taking: Reported on 11/30/2023) 60 g 0    ketoconazole (NIZORAL) 2 % cream Apply topically daily (Patient not taking: Reported on 11/30/2023) 45 g 0     No current facility-administered medications for this visit.  "    _______________________________________________________________________  Review of Systems   Constitutional:  Negative for chills and fever.   HENT:  Negative for congestion, ear pain, hearing loss, nosebleeds, sinus pain, sore throat and trouble swallowing.    Eyes:  Negative for discharge, redness and visual disturbance.   Respiratory:  Negative for cough, chest tightness and shortness of breath.    Cardiovascular:  Negative for chest pain and palpitations.   Gastrointestinal:  Negative for abdominal pain, blood in stool, constipation, diarrhea, nausea and vomiting.   Genitourinary:  Negative for dysuria, flank pain, frequency and hematuria.   Musculoskeletal:  Positive for arthralgias (Has a chronic pain in her hips). Negative for myalgias and neck pain.   Skin:  Negative for color change and rash.   Neurological:  Positive for light-headedness (Sometimes he gets lightheaded particularly when she lies down.). Negative for dizziness, speech difficulty, weakness and headaches.   Hematological:  Does not bruise/bleed easily.   Psychiatric/Behavioral:  Negative for agitation and behavioral problems.            Objective:  Vitals:    03/25/24 1640   BP: 126/72   BP Location: Left arm   Patient Position: Sitting   Cuff Size: Large   Pulse: 84   Resp: 18   Temp: 97.8 °F (36.6 °C)   TempSrc: Temporal   SpO2: 94%   Weight: (!) 150 kg (329 lb 9.6 oz)   Height: 5' 8\" (1.727 m)     Body mass index is 50.12 kg/m².     Physical Exam  Vitals and nursing note reviewed.   Constitutional:       General: She is not in acute distress.     Appearance: She is obese.   HENT:      Head: Normocephalic and atraumatic.      Nose: Nose normal.   Eyes:      General: No scleral icterus.        Right eye: No discharge.         Left eye: No discharge.      Extraocular Movements: Extraocular movements intact.      Conjunctiva/sclera: Conjunctivae normal.   Cardiovascular:      Rate and Rhythm: Normal rate and regular rhythm.      Pulses: " Normal pulses.      Heart sounds: Murmur heard.   Pulmonary:      Effort: Pulmonary effort is normal. No respiratory distress.      Breath sounds: Normal breath sounds. No wheezing.   Abdominal:      General: Bowel sounds are normal.      Palpations: Abdomen is soft.      Tenderness: There is no abdominal tenderness.   Musculoskeletal:         General: Normal range of motion.      Cervical back: Normal range of motion and neck supple. No muscular tenderness.      Right lower leg: Edema (Has nonpitting edema) present.      Left lower leg: Edema (Has nonpitting edema) present.   Skin:     General: Skin is warm.   Neurological:      General: No focal deficit present.      Mental Status: She is alert and oriented to person, place, and time.   Psychiatric:         Mood and Affect: Mood normal.         Behavior: Behavior normal.       I spent 30 minutes with the patient today.  More than 50% time spent for reviewing of external notes, reviewing of the results of diagnostics test, management of care, patient education and ordering of test.

## 2024-03-25 NOTE — PATIENT INSTRUCTIONS
Patient was advised to continue present medications. discussed with the patient medications and laboratory data in detail.  Follow-up with me in 3 months or as advised.  If any blood test was ordered please do 1 week prior to next appointment unless advise to get earlier.  If you have any questions please call the office [phone]

## 2024-04-04 DIAGNOSIS — I10 ESSENTIAL HYPERTENSION: ICD-10-CM

## 2024-04-05 RX ORDER — LOSARTAN POTASSIUM AND HYDROCHLOROTHIAZIDE 12.5; 5 MG/1; MG/1
1 TABLET ORAL DAILY
Qty: 30 TABLET | Refills: 0 | Status: SHIPPED | OUTPATIENT
Start: 2024-04-05

## 2024-05-10 DIAGNOSIS — I10 ESSENTIAL HYPERTENSION: ICD-10-CM

## 2024-05-10 RX ORDER — AMLODIPINE BESYLATE 5 MG/1
5 TABLET ORAL DAILY
Qty: 90 TABLET | Refills: 1 | Status: SHIPPED | OUTPATIENT
Start: 2024-05-10

## 2024-05-10 RX ORDER — LOSARTAN POTASSIUM AND HYDROCHLOROTHIAZIDE 12.5; 5 MG/1; MG/1
1 TABLET ORAL DAILY
Qty: 30 TABLET | Refills: 5 | Status: SHIPPED | OUTPATIENT
Start: 2024-05-10

## 2024-06-06 ENCOUNTER — CONSULT (OUTPATIENT)
Dept: CARDIOLOGY CLINIC | Facility: CLINIC | Age: 64
End: 2024-06-06
Payer: COMMERCIAL

## 2024-06-06 VITALS
SYSTOLIC BLOOD PRESSURE: 126 MMHG | HEART RATE: 63 BPM | BODY MASS INDEX: 44.41 KG/M2 | TEMPERATURE: 97.9 F | OXYGEN SATURATION: 97 % | DIASTOLIC BLOOD PRESSURE: 70 MMHG | WEIGHT: 293 LBS | HEIGHT: 68 IN

## 2024-06-06 DIAGNOSIS — I10 ESSENTIAL HYPERTENSION: ICD-10-CM

## 2024-06-06 DIAGNOSIS — R01.1 HEART MURMUR: ICD-10-CM

## 2024-06-06 DIAGNOSIS — R60.0 EDEMA OF BOTH LOWER LEGS: Primary | ICD-10-CM

## 2024-06-06 DIAGNOSIS — E78.2 MIXED HYPERLIPIDEMIA: Chronic | ICD-10-CM

## 2024-06-06 DIAGNOSIS — I89.0 LYMPHEDEMA: ICD-10-CM

## 2024-06-06 DIAGNOSIS — I25.10 CORONARY ARTERY CALCIFICATION: ICD-10-CM

## 2024-06-06 DIAGNOSIS — I25.84 CORONARY ARTERY CALCIFICATION: ICD-10-CM

## 2024-06-06 PROCEDURE — 93000 ELECTROCARDIOGRAM COMPLETE: CPT | Performed by: INTERNAL MEDICINE

## 2024-06-06 PROCEDURE — 99204 OFFICE O/P NEW MOD 45 MIN: CPT | Performed by: INTERNAL MEDICINE

## 2024-06-06 NOTE — ASSESSMENT & PLAN NOTE
Lab Results   Component Value Date    LDLCALC 95 03/10/2024   Low HDL.  TG elevated.  CT chest from 2021 reviewed.  Mild proximal mid LAD coronary calcification noted.  Images shared with the patient.  She will benefit from statin therapy.  She will discuss this with Dr. Araujo.

## 2024-06-06 NOTE — ASSESSMENT & PLAN NOTE
Grade 3 ejection systolic murmur audible over the precordium.  Normal S1 and S2.  Echocardiogram planned to evaluate heart function and rule out significant valvular heart disease.

## 2024-06-06 NOTE — PROGRESS NOTES
[facility] [place] [address] [facility] [address]  Phone#  [phone]  Fax#  [phone]  [facility] Cardiology Office Consultation             NAME: Cielo Thomason  AGE: 63 y.o. SEX: female   : 1960   MRN: [id number]    DATE: 2024  TIME: 3:40 PM    Cardiology Problem list:  Cardiac murmur  Coronary artery calcification  Hypertension  Hyperlipidemia  Lymphedema  Obesity, BMI 50    Assessment and recommendations    Heart murmur  Grade 3 ejection systolic murmur audible over the precordium.  Normal S1 and S2.  Echocardiogram planned to evaluate heart function and rule out significant valvular heart disease.      Essential hypertension  BP Readings from Last 3 Encounters:   24 126/70   24 126/72   24 166/90   Continue current medications.  Lifestyle modification.  Close blood pressure monitoring.        Edema of both lower legs  Chronic LE lymphedema.  Continue use of compression pump.  Continue efforts at ongoing weight loss.    Mixed hyperlipidemia  Lab Results   Component Value Date    LDLCALC 95 03/10/2024   Low HDL.  TG elevated.  CT chest from  reviewed.  Mild proximal mid LAD coronary calcification noted.  Images shared with the patient.  She will benefit from statin therapy.  She will discuss this with Dr. Araujo.    Lymphedema  She has seen vascular surgery.  Compression pumps have been advised.  These have been marginally beneficial.    Coronary artery calcification  Mild calcification of the LAD noted on CT chest.  Prognostic implications reviewed.  She will benefit from statin therapy after discussion with Dr. Araujo.  Her 10-year ASCVD risk is below 7.5% but due to presence of coronary calcification, statin therapy is indicated.        HPI:    Cielo Thomason is a 63 y.o.-year-old female who presents to the cardiology clinic for initial consultation.    She has a history of hypertension, obesity with a BMI of close to 50, family  history of heart disease.  She is on 4 blood pressure medications with good compliance.  10-year ASCVD risk is between 5 to 10%.  Last LDL was 95, HDL 41 and triglycerides 209 consistent with metabolic syndrome. BMI is down to 46 now with diet.  She is unable to exercise due to her bad hip.  She ambulates with a cane.    She has a history of lymphedema and was advised pneumatic compression pump by vascular surgery.  These have been marginally helpful.    During recent primary care visit, she was found to have a heart murmur and therefore referral was made for evaluation.  She has not had echocardiogram performed.  She has no history of coronary artery disease, TIA or stroke.  No history of heart failure.  No known atrial fibrillation.    Has lost 30lbs weight with diet control alone.     Needs hip surgery after weight loss.  She is on a low calorie diet.    Chest CT: LAD calcification as below.  She is not on statin therapy.      Past history, family history, social history, current medications, vital signs, recent lab and imaging studies and  prior cardiology studies reviewed independently on this visit.    Allergies   Allergen Reactions    Griseofulvin Hives    Penicillins Hives     She can take cephalosporin without any side effect    Lisinopril Cough    Zithromax [Azithromycin] Headache       Current Outpatient Medications:     amLODIPine (NORVASC) 5 mg tablet, Take 1 tablet (5 mg total) by mouth daily, Disp: 90 tablet, Rfl: 1    Ascorbic Acid (vitamin C) 1000 MG tablet, Take 1,000 mg by mouth daily, Disp: , Rfl:     busPIRone (BUSPAR) 5 mg tablet, Take 1 tablet (5 mg total) by mouth daily at bedtime, Disp: 90 tablet, Rfl: 1    calcium carbonate (OS-CAMDEN) 600 MG tablet, Take 600 mg by mouth daily, Disp: , Rfl:     Cholecalciferol (Vitamin D) 50 MCG (2000 UT) tablet, Take 2,000 Units by mouth daily, Disp: , Rfl:     citalopram (CeleXA) 20 mg tablet, Take 1 tablet (20 mg total) by mouth daily at bedtime, Disp: 90  tablet, Rfl: 0    losartan-hydrochlorothiazide (HYZAAR) 50-12.5 mg per tablet, Take 1 tablet by mouth daily, Disp: 30 tablet, Rfl: 5    metoprolol succinate (TOPROL-XL) 100 mg 24 hr tablet, Take 1 tablet (100 mg total) by mouth daily at bedtime, Disp: 90 tablet, Rfl: 0    Multiple Vitamins-Minerals (multivitamin with minerals) tablet, Take 1 tablet by mouth daily, Disp: , Rfl:     omeprazole (PriLOSEC) 20 mg delayed release capsule, Take 20 mg by mouth as needed, Disp: , Rfl:     vitamin B-12 (VITAMIN B-12) 1,000 mcg tablet, Take by mouth daily, Disp: , Rfl:     clobetasol (TEMOVATE) 0.05 % ointment, Apply topically 2 (two) times a day (Patient not taking: Reported on 11/30/2023), Disp: 60 g, Rfl: 0    clotrimazole-betamethasone (LOTRISONE) 1-0.05 % cream, Apply topically 2 (two) times a day (Patient not taking: Reported on 6/6/2024), Disp: 30 g, Rfl: 0    fexofenadine (ALLEGRA) 180 MG tablet, Take 180 mg by mouth daily as needed (Patient not taking: Reported on 6/6/2024), Disp: , Rfl:     ketoconazole (NIZORAL) 2 % cream, Apply topically daily (Patient not taking: Reported on 11/30/2023), Disp: 45 g, Rfl: 0    Past Medical History:   Diagnosis Date    Allergic rhinitis 07/07/2022    Anxiety     Arthritis     BMI 50.0-59.9, adult (HCC) 06/01/2021    Cellulitis of left leg 08/17/2021    Depression     Eczema 06/02/2022    Edema of both lower legs 01/23/2021    Edema of both lower legs 11/23/2021    Endometrial cancer (HCC) 09/14/2021    Heart murmur     Heart murmur 03/25/2024    History of COVID-19 11/2020    Mild sypmtoms Cough,Tired,diarrhea,sweats, Loss taste and smell    Hyperglycemia 01/23/2021    Hyperlipidemia     Hypertension     Left hip pain 06/02/2022    Lower abdominal pain 11/23/2021    Mixed hyperlipidemia 01/23/2021    Numbness and tingling in left arm     Obesity 03/25/2024    Pruritus     Rash of hand 02/28/2022    Shortness of breath     Skin lesion     Skin rash 05/02/2023    Tinea cruris  2023    Tinea pedis of both feet 2022    Vitamin D deficiency 2021     Past Surgical History:   Procedure Laterality Date    CARPAL TUNNEL RELEASE Bilateral      SECTION      x3     SECTION      CHOLECYSTECTOMY      CHOLECYSTECTOMY LAPAROSCOPIC N/A 3/3/2019    Procedure: CHOLECYSTECTOMY LAPAROSCOPIC;  Surgeon: Shayne De Leon MD;  Location: [facility];  Service: General    COLONOSCOPY      CYSTOSCOPY N/A 10/8/2021    Procedure: Cystoscopy;  Surgeon: Adelso Freeman MD;  Location: [facility];  Service: Gynecology Oncology    FL INJECTION LEFT HIP (NON ARTHROGRAM)  2022    JOINT REPLACEMENT  2016    R total HIp    MAMMO (HISTORICAL)  10/11/2018    Advise for yearly mammo screening    IA HYSTEROSCOPY BX ENDOMETRIUM&/POLYPC W/WO D&C N/A 3/21/2016    Procedure: FRACTIONAL DILATATION AND CURETTAGE (D&C) WITH HYSTEROSOCPY;  Surgeon: Farnaz Romero MD;  Location: [facility];  Service: Gynecology    IA LAPS TOTAL HYSTERECT 250 GM/< W/RMVL TUBE/OVARY N/A 10/8/2021    Procedure: ROBOTIC HYSTERECTOMY, BILATERAL SALPINGOOPHERECTOMY; LEFT EXTERNAL ILIAC SENTINEL LYMPH NODE BIOPSY, RIGHT PELVIC LYMPHADENECTOMY;  Surgeon: Adelso Freeman MD;  Location: [facility];  Service: Gynecology Oncology    REPLACEMENT TOTAL KNEE Right      Family History   Problem Relation Age of Onset    Hypertension Mother     Hypertension Father     Heart failure Father     Lymphoma Father 62    Diabetes Father         at old age    No Known Problems Sister     No Known Problems Daughter     No Known Problems Maternal Grandmother     No Known Problems Maternal Grandfather     No Known Problems Paternal Grandmother     No Known Problems Paternal Grandfather     Hemophilia Brother     Breast cancer Maternal Aunt 50    Brain cancer Maternal Aunt 48    No Known Problems Maternal Aunt     No Known Problems Son     No Known Problems Son     No Known Problems Son      Social History   reports that she has never  smoked. She has never used smokeless tobacco. She reports that she does not drink alcohol and does not use drugs.     Review of Systems   Constitutional:  Negative for fatigue.   HENT: Negative.     Eyes: Negative.    Respiratory:  Negative for apnea and shortness of breath.    Cardiovascular:  Negative for chest pain, palpitations and leg swelling.   Gastrointestinal: Negative.    Endocrine: Negative.    Musculoskeletal:  Positive for arthralgias, gait problem and myalgias.   Neurological:  Negative for syncope.   Hematological: Negative.    Psychiatric/Behavioral:  Negative for sleep disturbance.    All other systems reviewed and are negative.      Objective:     Vitals:    24 1510   BP: 126/70   Pulse: 63   Temp: 97.9 °F (36.6 °C)   SpO2: 97%     Physical Exam  Vitals reviewed.   Constitutional:       General: She is not in acute distress.     Appearance: She is obese.   HENT:      Head: Normocephalic.   Cardiovascular:      Rate and Rhythm: Normal rate and regular rhythm.      Heart sounds: S1 normal and S2 normal. Murmur heard.      Crescendo systolic murmur is present with a grade of 3/6.      Comments: Lymphedema noted  Pulmonary:      Breath sounds: No wheezing or rales.   Musculoskeletal:         General: Swelling present.      Right lower le+ Edema present.      Left lower le+ Edema present.   Skin:     General: Skin is warm.   Neurological:      Mental Status: She is alert.   Psychiatric:         Mood and Affect: Mood normal.         Pertinent Laboratory/Diagnostic Studies:    Laboratory studies reviewed personally by Chris Ortega MD    BMP:   Lab Results   Component Value Date    SODIUM 139 03/10/2024    K 4.1 03/10/2024     03/10/2024    CO2 28 03/10/2024    BUN 15 03/10/2024    CREATININE 0.96 03/10/2024    GLUC 133 2021    CALCIUM 9.5 03/10/2024     CBC:  Lab Results   Component Value Date    WBC 5.58 2023    HGB 13.9 2023    HCT 44.9 2023    MCV 91  "07/30/2023     07/30/2023     Lipid Profile:   Lab Results   Component Value Date    HDL 41 (L) 03/10/2024     Lab Results   Component Value Date    LDLCALC 95 03/10/2024     Lab Results   Component Value Date    TRIG 209 (H) 03/10/2024      Other labs:  Lab Results   Component Value Date    UCJ8HYBSCTLV 3.667 06/05/2021     Lab Results   Component Value Date    ALT 21 07/30/2023    AST 21 07/30/2023       Imaging Studies:     Pertinent cardiac studies and imaging studies were personally reviewed on this visit and results summarized.        Portions of the record may have been created with voice recognition software.  Occasional wrong word or \"sound alike\" substitutions may have occurred due to the inherent limitations of voice recognition software.  Read the chart carefully and recognize, using context, where substitutions have occurred. Please reach out to me directly for any clarifications.  "

## 2024-06-06 NOTE — ASSESSMENT & PLAN NOTE
She has seen vascular surgery.  Compression pumps have been advised.  These have been marginally beneficial.

## 2024-06-06 NOTE — ASSESSMENT & PLAN NOTE
Mild calcification of the LAD noted on CT chest.  Prognostic implications reviewed.  She will benefit from statin therapy after discussion with Dr. Araujo.  Her 10-year ASCVD risk is below 7.5% but due to presence of coronary calcification, statin therapy is indicated.

## 2024-06-06 NOTE — ASSESSMENT & PLAN NOTE
BP Readings from Last 3 Encounters:   06/06/24 126/70   03/25/24 126/72   01/29/24 166/90   Continue current medications.  Lifestyle modification.  Close blood pressure monitoring.

## 2024-06-06 NOTE — ASSESSMENT & PLAN NOTE
Chronic LE lymphedema.  Continue use of compression pump.  Continue efforts at ongoing weight loss.

## 2024-06-06 NOTE — PATIENT INSTRUCTIONS
"Echocardiogram planned to evaluate heart function and rule out significant valvular heart disease.    Continue efforts at weight loss including diet modification, increased physical activity and avoidance of calorie dense foods.    Mediterranean style plant based diet and calorie restriction will be beneficial.    Avoid cholesterol rich foods such as egg yolk, red meat, high fat dairy and fried food.    Heart healthy nutrition:    Avoid cholesterol rich foods such as egg yolk, red meat, high fat dairy and fried food.        Mediterranean diet: A heart-healthy eating plan    What is the Mediterranean diet?  The Mediterranean diet is a way of eating based on the traditional cuisine of countries bordering the [place]. While there is no single definition of the Mediterranean diet, it is typically high in vegetables, fruits, whole grains, beans, nut and seeds, and olive oil.    The main components of Mediterranean diet include:    Daily consumption of vegetables, fruits, whole grains and healthy fats.  Avoid cholesterol rich foods such as egg yolks, red meat, high fat dairy and fried food.  Limited intake of animal-based food.  Other important elements of the Mediterranean diet are sharing meals with family and friends    Plant based, not meat based  The foundation of the Mediterranean diet is vegetables, fruits, herbs, nuts, beans and whole grains. Meals are built around these plant-based foods.     Healthy fats  Healthy fats are a mainstay of the Mediterranean diet. They're eaten instead of less healthy fats, such as saturated and trans fats, which contribute to heart disease.    Olive oil is the primary source of added fat in the Mediterranean diet. Olive oil provides monounsaturated fat, which has been found to lower total cholesterol and low-density lipoprotein (LDL or \"bad\") cholesterol levels. Nuts and seeds also contain monounsaturated fat.    Fish are also important in the Mediterranean diet. Fatty " fish -- such as mackerel, herring, sardines, albacore tuna, salmon and lake trout -- are rich in omega-3 fatty acids, a type of polyunsaturated fat that may reduce inflammation in the body. Omega-3 fatty acids also help decrease triglycerides, reduce blood clotting, and decrease the risk of stroke and heart failure.    Eating the Mediterranean way  Interested in trying the Mediterranean diet? These tips will help you get started:    Eat more fruits and vegetables. Aim for 7 to 10 servings a day of fruit and vegetables.  Opt for whole grains. Switch to whole-grain bread, cereal and pasta. [place] with other whole grains.  Use healthy fats. Try olive oil as a replacement for butter when cooking. Instead of putting butter or margarine on bread, try dipping it in flavored olive oil.  Eat more seafood. Eat fish twice a week. Fresh or water-packed tuna, salmon, trout, mackerel and herring are healthy choices. Grilled fish tastes good and requires little cleanup. Avoid deep-fried fish.  Avoid red meat.   Spice it up. Herbs and spices boost flavor and lessen the need for salt.    Source: https://www.[facility].org/healthy-lifestyle/nutrition-and-healthy-eating/in-depth/mediterranean-diet/art-16713141

## 2024-06-06 NOTE — LETTER
2024     Myriam Araujo MD  [address] [facility] [place] [address]    Patient: Cielo Thomason   YOB: 1960   Date of Visit: 2024       Dear Dr. Araujo:    Thank you for referring Cielo Thomason to me for evaluation. Below are my notes for this consultation.    If you have questions, please do not hesitate to call me. I look forward to following your patient along with you.         Sincerely,        Chris Ortega MD        CC: No Recipients    Chris Ortega MD  2024  3:42 PM  Incomplete  [facility] [place] [address] [facility] [address]  Phone#  [phone]  Fax#  [phone]  [facility] Cardiology Office Consultation             NAME: Cielo Thomason  AGE: 63 y.o. SEX: female   : 1960   MRN: [id number]    DATE: 2024  TIME: 3:40 PM    Cardiology Problem list:  Cardiac murmur  Coronary artery calcification  Hypertension  Hyperlipidemia  Lymphedema  Obesity, BMI 50    Assessment and recommendations    Heart murmur  Grade 3 ejection systolic murmur audible over the precordium.  Normal S1 and S2.  Echocardiogram planned to evaluate heart function and rule out significant valvular heart disease.      Essential hypertension  BP Readings from Last 3 Encounters:   24 126/70   24 126/72   24 166/90   Continue current medications.  Lifestyle modification.  Close blood pressure monitoring.        Edema of both lower legs  Chronic LE lymphedema.  Continue use of compression pump.  Continue efforts at ongoing weight loss.    Mixed hyperlipidemia  Lab Results   Component Value Date    LDLCALC 95 03/10/2024   Low HDL.  TG elevated.  CT chest from  reviewed.  Mild proximal mid LAD coronary calcification noted.  Images shared with the patient.  She will benefit from statin therapy.  She will discuss this with Dr. Araujo.    Lymphedema  She has seen vascular surgery.  Compression pumps have been advised.  These have been  marginally beneficial.    Coronary artery calcification  Mild calcification of the LAD noted on CT chest.  Prognostic implications reviewed.  She will benefit from statin therapy after discussion with Dr. Araujo.  Her 10-year ASCVD risk is below 7.5% but due to presence of coronary calcification, statin therapy is indicated.        HPI:    Cielo Thomason is a 63 y.o.-year-old female who presents to the cardiology clinic for initial consultation.    She has a history of hypertension, obesity with a BMI of close to 50, family history of heart disease.  She is on 4 blood pressure medications with good compliance.  10-year ASCVD risk is between 5 to 10%.  Last LDL was 95, HDL 41 and triglycerides 209 consistent with metabolic syndrome. BMI is down to 46 now with diet.  She is unable to exercise due to her bad hip.  She ambulates with a cane.    She has a history of lymphedema and was advised pneumatic compression pump by vascular surgery.  These have been marginally helpful.    During recent primary care visit, she was found to have a heart murmur and therefore referral was made for evaluation.  She has not had echocardiogram performed.  She has no history of coronary artery disease, TIA or stroke.  No history of heart failure.  No known atrial fibrillation.    Has lost 30lbs weight with diet control alone.     Needs hip surgery after weight loss.  She is on a low calorie diet.    Chest CT: LAD calcification as below.  She is not on statin therapy.      Past history, family history, social history, current medications, vital signs, recent lab and imaging studies and  prior cardiology studies reviewed independently on this visit.    Allergies   Allergen Reactions   • Griseofulvin Hives   • Penicillins Hives     She can take cephalosporin without any side effect   • Lisinopril Cough   • Zithromax [Azithromycin] Headache       Current Outpatient Medications:   •  amLODIPine (NORVASC) 5 mg tablet, Take 1 tablet (5 mg  total) by mouth daily, Disp: 90 tablet, Rfl: 1  •  Ascorbic Acid (vitamin C) 1000 MG tablet, Take 1,000 mg by mouth daily, Disp: , Rfl:   •  busPIRone (BUSPAR) 5 mg tablet, Take 1 tablet (5 mg total) by mouth daily at bedtime, Disp: 90 tablet, Rfl: 1  •  calcium carbonate (OS-CAMDEN) 600 MG tablet, Take 600 mg by mouth daily, Disp: , Rfl:   •  Cholecalciferol (Vitamin D) 50 MCG (2000 UT) tablet, Take 2,000 Units by mouth daily, Disp: , Rfl:   •  citalopram (CeleXA) 20 mg tablet, Take 1 tablet (20 mg total) by mouth daily at bedtime, Disp: 90 tablet, Rfl: 0  •  losartan-hydrochlorothiazide (HYZAAR) 50-12.5 mg per tablet, Take 1 tablet by mouth daily, Disp: 30 tablet, Rfl: 5  •  metoprolol succinate (TOPROL-XL) 100 mg 24 hr tablet, Take 1 tablet (100 mg total) by mouth daily at bedtime, Disp: 90 tablet, Rfl: 0  •  Multiple Vitamins-Minerals (multivitamin with minerals) tablet, Take 1 tablet by mouth daily, Disp: , Rfl:   •  omeprazole (PriLOSEC) 20 mg delayed release capsule, Take 20 mg by mouth as needed, Disp: , Rfl:   •  vitamin B-12 (VITAMIN B-12) 1,000 mcg tablet, Take by mouth daily, Disp: , Rfl:   •  clobetasol (TEMOVATE) 0.05 % ointment, Apply topically 2 (two) times a day (Patient not taking: Reported on 11/30/2023), Disp: 60 g, Rfl: 0  •  clotrimazole-betamethasone (LOTRISONE) 1-0.05 % cream, Apply topically 2 (two) times a day (Patient not taking: Reported on 6/6/2024), Disp: 30 g, Rfl: 0  •  fexofenadine (ALLEGRA) 180 MG tablet, Take 180 mg by mouth daily as needed (Patient not taking: Reported on 6/6/2024), Disp: , Rfl:   •  ketoconazole (NIZORAL) 2 % cream, Apply topically daily (Patient not taking: Reported on 11/30/2023), Disp: 45 g, Rfl: 0    Past Medical History:   Diagnosis Date   • Allergic rhinitis 07/07/2022   • Anxiety    • Arthritis    • BMI 50.0-59.9, adult ([facility]) 06/01/2021   • Cellulitis of left leg 08/17/2021   • Depression    • Eczema 06/02/2022   • Edema of both lower legs 01/23/2021   •  Edema of both lower legs 2021   • Endometrial cancer (HCC) 2021   • Heart murmur    • Heart murmur 2024   • History of COVID-19 2020    Mild sypmtoms Cough,Tired,diarrhea,sweats, Loss taste and smell   • Hyperglycemia 2021   • Hyperlipidemia    • Hypertension    • Left hip pain 2022   • Lower abdominal pain 2021   • Mixed hyperlipidemia 2021   • Numbness and tingling in left arm    • Obesity 2024   • Pruritus    • Rash of hand 2022   • Shortness of breath    • Skin lesion    • Skin rash 2023   • Tinea cruris 2023   • Tinea pedis of both feet 2022   • Vitamin D deficiency 2021     Past Surgical History:   Procedure Laterality Date   • CARPAL TUNNEL RELEASE Bilateral    •  SECTION      x3   •  SECTION     • CHOLECYSTECTOMY     • CHOLECYSTECTOMY LAPAROSCOPIC N/A 3/3/2019    Procedure: CHOLECYSTECTOMY LAPAROSCOPIC;  Surgeon: Shayne De Leon MD;  [facility];  Service: General   • COLONOSCOPY     • CYSTOSCOPY N/A 10/8/2021    Procedure: Cystoscopy;  Surgeon: Adelso Freeman MD;  [facility];  Service: Gynecology Oncology   • FL INJECTION LEFT HIP (NON ARTHROGRAM)  2022   • JOINT REPLACEMENT  2016    R total HIp   • MAMMO (HISTORICAL)  10/11/2018    Advise for yearly mammo screening   • AL HYSTEROSCOPY BX ENDOMETRIUM&/POLYPC W/WO D&C N/A 3/21/2016    Procedure: FRACTIONAL DILATATION AND CURETTAGE (D&C) WITH HYSTEROSOCPY;  Surgeon: Farnaz Romero MD;  [facility];  Service: Gynecology   • AL LAPS TOTAL HYSTERECT 250 GM/< W/RMVL TUBE/OVARY N/A 10/8/2021    Procedure: ROBOTIC HYSTERECTOMY, BILATERAL SALPINGOOPHERECTOMY; LEFT EXTERNAL ILIAC SENTINEL LYMPH NODE BIOPSY, RIGHT PELVIC LYMPHADENECTOMY;  Surgeon: Adelso Freeman MD;  [facility];  Service: Gynecology Oncology   • REPLACEMENT TOTAL KNEE Right      Family History   Problem Relation Age of Onset   • Hypertension Mother    •  Hypertension Father    • Heart failure Father    • Lymphoma Father 62   • Diabetes Father         at old age   • No Known Problems Sister    • No Known Problems Daughter    • No Known Problems Maternal Grandmother    • No Known Problems Maternal Grandfather    • No Known Problems Paternal Grandmother    • No Known Problems Paternal Grandfather    • Hemophilia Brother    • Breast cancer Maternal Aunt 50   • Brain cancer Maternal Aunt 48   • No Known Problems Maternal Aunt    • No Known Problems Son    • No Known Problems Son    • No Known Problems Son      Social History   reports that she has never smoked. She has never used smokeless tobacco. She reports that she does not drink alcohol and does not use drugs.     Review of Systems   Constitutional:  Negative for fatigue.   HENT: Negative.     Eyes: Negative.    Respiratory:  Negative for apnea and shortness of breath.    Cardiovascular:  Negative for chest pain, palpitations and leg swelling.   Gastrointestinal: Negative.    Endocrine: Negative.    Musculoskeletal:  Positive for arthralgias, gait problem and myalgias.   Neurological:  Negative for syncope.   Hematological: Negative.    Psychiatric/Behavioral:  Negative for sleep disturbance.    All other systems reviewed and are negative.      Objective:     Vitals:    24 1510   BP: 126/70   Pulse: 63   Temp: 97.9 °F (36.6 °C)   SpO2: 97%     Physical Exam  Vitals reviewed.   Constitutional:       General: She is not in acute distress.     Appearance: She is obese.   HENT:      Head: Normocephalic.   Cardiovascular:      Rate and Rhythm: Normal rate and regular rhythm.      Heart sounds: S1 normal and S2 normal. Murmur heard.      Crescendo systolic murmur is present with a grade of 3/6.      Comments: Lymphedema noted  Pulmonary:      Breath sounds: No wheezing or rales.   Musculoskeletal:         General: Swelling present.      Right lower le+ Edema present.      Left lower le+ Edema present.  "  Skin:     General: Skin is warm.   Neurological:      Mental Status: She is alert.   Psychiatric:         Mood and Affect: Mood normal.         Pertinent Laboratory/Diagnostic Studies:    Laboratory studies reviewed personally by Chris Ortega MD    BMP:   Lab Results   Component Value Date    SODIUM 139 03/10/2024    K 4.1 03/10/2024     03/10/2024    CO2 28 03/10/2024    BUN 15 03/10/2024    CREATININE 0.96 03/10/2024    GLUC 133 2021    CALCIUM 9.5 03/10/2024     CBC:  Lab Results   Component Value Date    WBC 5.58 2023    HGB 13.9 2023    HCT 44.9 2023    MCV 91 2023     2023     Lipid Profile:   Lab Results   Component Value Date    HDL 41 (L) 03/10/2024     Lab Results   Component Value Date    LDLCALC 95 03/10/2024     Lab Results   Component Value Date    TRIG 209 (H) 03/10/2024      Other labs:  Lab Results   Component Value Date    [id number] 3.667 2021     Lab Results   Component Value Date    ALT 21 2023    AST 21 2023       Imaging Studies:     Pertinent cardiac studies and imaging studies were personally reviewed on this visit and results summarized.        Portions of the record may have been created with voice recognition software.  Occasional wrong word or \"sound alike\" substitutions may have occurred due to the inherent limitations of voice recognition software.  Read the chart carefully and recognize, using context, where substitutions have occurred. Please reach out to me directly for any clarifications.    Chris Ortega MD  2024  3:31 PM  Sign when Signing Visit  [facility] [address] [address]  Phone#  [phone]  Fax#  [phone]  [facility] Cardiology Office Consultation             NAME: Cielo Thomason  AGE: 63 y.o. SEX: female   : 1960   MRN: [id number]    DATE: 2024  TIME: 3:27 PM    Cardiology Problem list:  Cardiac " murmur  Hypertension  Hyperlipidemia  Lymphedema  Obesity, BMI 50    Assessment and recommendations    Heart murmur  Echocardiogram planned to evaluate heart function and rule out significant valvular heart disease.      Essential hypertension  BP Readings from Last 3 Encounters:   06/06/24 126/70   03/25/24 126/72   01/29/24 166/90   Continue current medications.  Lifestyle modification.  Close blood pressure monitoring.        Edema of both lower legs  Chronic LE lymphedema.    Mixed hyperlipidemia  Lab Results   Component Value Date    LDLCALC 95 03/10/2024   Low HDL.  TG elevated.        HPI:    Cielo Thomason is a 63 y.o.-year-old female who presents to the cardiology clinic for initial consultation.    She has a history of hypertension, obesity with a BMI of close to 50, family history of heart disease.  She is on 4 blood pressure medications with good compliance.  10-year ASCVD risk is between 5 to 10%.  Last LDL was 95, HDL 41 and triglycerides 209 consistent with metabolic syndrome. BMI is down to 46 now with diet.    She has a history of lymphedema and was advised pneumatic compression pump by vascular surgery.    During recent primary care visit, she was found to have a heart murmur and therefore referral was made for evaluation.  She has not had echocardiogram performed.    Has lost 30lbs weight with diet control alone.     Needs hip surgery after weight loss.     Chest CT:      Past history, family history, social history, current medications, vital signs, recent lab and imaging studies and  prior cardiology studies reviewed independently on this visit.    Allergies   Allergen Reactions   • Griseofulvin Hives   • Penicillins Hives     She can take cephalosporin without any side effect   • Lisinopril Cough   • Zithromax [Azithromycin] Headache       Current Outpatient Medications:   •  amLODIPine (NORVASC) 5 mg tablet, Take 1 tablet (5 mg total) by mouth daily, Disp: 90 tablet, Rfl: 1  •  Ascorbic  Acid (vitamin C) 1000 MG tablet, Take 1,000 mg by mouth daily, Disp: , Rfl:   •  busPIRone (BUSPAR) 5 mg tablet, Take 1 tablet (5 mg total) by mouth daily at bedtime, Disp: 90 tablet, Rfl: 1  •  calcium carbonate (OS-CAMDEN) 600 MG tablet, Take 600 mg by mouth daily, Disp: , Rfl:   •  Cholecalciferol (Vitamin D) 50 MCG (2000 UT) tablet, Take 2,000 Units by mouth daily, Disp: , Rfl:   •  citalopram (CeleXA) 20 mg tablet, Take 1 tablet (20 mg total) by mouth daily at bedtime, Disp: 90 tablet, Rfl: 0  •  losartan-hydrochlorothiazide (HYZAAR) 50-12.5 mg per tablet, Take 1 tablet by mouth daily, Disp: 30 tablet, Rfl: 5  •  metoprolol succinate (TOPROL-XL) 100 mg 24 hr tablet, Take 1 tablet (100 mg total) by mouth daily at bedtime, Disp: 90 tablet, Rfl: 0  •  Multiple Vitamins-Minerals (multivitamin with minerals) tablet, Take 1 tablet by mouth daily, Disp: , Rfl:   •  omeprazole (PriLOSEC) 20 mg delayed release capsule, Take 20 mg by mouth as needed, Disp: , Rfl:   •  vitamin B-12 (VITAMIN B-12) 1,000 mcg tablet, Take by mouth daily, Disp: , Rfl:   •  clobetasol (TEMOVATE) 0.05 % ointment, Apply topically 2 (two) times a day (Patient not taking: Reported on 11/30/2023), Disp: 60 g, Rfl: 0  •  clotrimazole-betamethasone (LOTRISONE) 1-0.05 % cream, Apply topically 2 (two) times a day (Patient not taking: Reported on 6/6/2024), Disp: 30 g, Rfl: 0  •  fexofenadine (ALLEGRA) 180 MG tablet, Take 180 mg by mouth daily as needed (Patient not taking: Reported on 6/6/2024), Disp: , Rfl:   •  ketoconazole (NIZORAL) 2 % cream, Apply topically daily (Patient not taking: Reported on 11/30/2023), Disp: 45 g, Rfl: 0    Past Medical History:   Diagnosis Date   • Allergic rhinitis 07/07/2022   • Anxiety    • Arthritis    • BMI 50.0-59.9, adult ([facility]) 06/01/2021   • Cellulitis of left leg 08/17/2021   • Depression    • Eczema 06/02/2022   • Edema of both lower legs 01/23/2021   • Edema of both lower legs 11/23/2021   • Endometrial cancer ([facility])  2021   • Heart murmur    • Heart murmur 2024   • History of COVID-19 2020    Mild sypmtoms Cough,Tired,diarrhea,sweats, Loss taste and smell   • Hyperglycemia 2021   • Hyperlipidemia    • Hypertension    • Left hip pain 2022   • Lower abdominal pain 2021   • Mixed hyperlipidemia 2021   • Numbness and tingling in left arm    • Obesity 2024   • Pruritus    • Rash of hand 2022   • Shortness of breath    • Skin lesion    • Skin rash 2023   • Tinea cruris 2023   • Tinea pedis of both feet 2022   • Vitamin D deficiency 2021     Past Surgical History:   Procedure Laterality Date   • CARPAL TUNNEL RELEASE Bilateral    •  SECTION      x3   •  SECTION     • CHOLECYSTECTOMY     • CHOLECYSTECTOMY LAPAROSCOPIC N/A 3/3/2019    Procedure: CHOLECYSTECTOMY LAPAROSCOPIC;  Surgeon: Shayne De Leon MD;  [facility];  Service: General   • COLONOSCOPY     • CYSTOSCOPY N/A 10/8/2021    Procedure: Cystoscopy;  Surgeon: Adelso Freeman MD;  [facility];  Service: Gynecology Oncology   • FL INJECTION LEFT HIP (NON ARTHROGRAM)  2022   • JOINT REPLACEMENT  2016    R total HIp   • MAMMO (HISTORICAL)  10/11/2018    Advise for yearly mammo screening   • IL HYSTEROSCOPY BX ENDOMETRIUM&/POLYPC W/WO D&C N/A 3/21/2016    Procedure: FRACTIONAL DILATATION AND CURETTAGE (D&C) WITH HYSTEROSOCPY;  Surgeon: Farnaz Romero MD;  [facility];  Service: Gynecology   • IL LAPS TOTAL HYSTERECT 250 GM/< W/RMVL TUBE/OVARY N/A 10/8/2021    Procedure: ROBOTIC HYSTERECTOMY, BILATERAL SALPINGOOPHERECTOMY; LEFT EXTERNAL ILIAC SENTINEL LYMPH NODE BIOPSY, RIGHT PELVIC LYMPHADENECTOMY;  Surgeon: Adelso Freeman MD;  [facility];  Service: Gynecology Oncology   • REPLACEMENT TOTAL KNEE Right      Family History   Problem Relation Age of Onset   • Hypertension Mother    • Hypertension Father    • Heart failure Father    • Lymphoma Father  62   • Diabetes Father         at old age   • No Known Problems Sister    • No Known Problems Daughter    • No Known Problems Maternal Grandmother    • No Known Problems Maternal Grandfather    • No Known Problems Paternal Grandmother    • No Known Problems Paternal Grandfather    • Hemophilia Brother    • Breast cancer Maternal Aunt 50   • Brain cancer Maternal Aunt 48   • No Known Problems Maternal Aunt    • No Known Problems Son    • No Known Problems Son    • No Known Problems Son      Social History   reports that she has never smoked. She has never used smokeless tobacco. She reports that she does not drink alcohol and does not use drugs.     Review of Systems   Constitutional:  Negative for fatigue.   HENT: Negative.     Eyes: Negative.    Respiratory:  Negative for apnea and shortness of breath.    Cardiovascular:  Negative for chest pain, palpitations and leg swelling.   Gastrointestinal: Negative.    Endocrine: Negative.    Musculoskeletal:  Positive for arthralgias and myalgias.   Neurological:  Negative for syncope.   Hematological: Negative.    Psychiatric/Behavioral:  Negative for sleep disturbance.    All other systems reviewed and are negative.      Objective:     Vitals:    24 1510   BP: 126/70   Pulse: 63   Temp: 97.9 °F (36.6 °C)   SpO2: 97%     Physical Exam  Vitals reviewed.   Constitutional:       General: She is not in acute distress.     Appearance: She is obese.   HENT:      Head: Normocephalic.   Cardiovascular:      Rate and Rhythm: Normal rate and regular rhythm.      Heart sounds: No murmur heard.     Comments: Lymphedema noted  Pulmonary:      Breath sounds: No wheezing or rales.   Musculoskeletal:         General: Swelling present.      Right lower le+ Edema present.      Left lower le+ Edema present.   Skin:     General: Skin is warm.   Neurological:      Mental Status: She is alert.   Psychiatric:         Mood and Affect: Mood normal.         Pertinent  "Laboratory/Diagnostic Studies:    Laboratory studies reviewed personally by Chris Ortega MD    BMP:   Lab Results   Component Value Date    SODIUM 139 03/10/2024    K 4.1 03/10/2024     03/10/2024    CO2 28 03/10/2024    BUN 15 03/10/2024    CREATININE 0.96 03/10/2024    GLUC 133 11/17/2021    CALCIUM 9.5 03/10/2024     CBC:  Lab Results   Component Value Date    WBC 5.58 07/30/2023    HGB 13.9 07/30/2023    HCT 44.9 07/30/2023    MCV 91 07/30/2023     07/30/2023     Lipid Profile:   Lab Results   Component Value Date    HDL 41 (L) 03/10/2024     Lab Results   Component Value Date    LDLCALC 95 03/10/2024     Lab Results   Component Value Date    TRIG 209 (H) 03/10/2024      Other labs:  Lab Results   Component Value Date    [id number] 3.667 06/05/2021     Lab Results   Component Value Date    ALT 21 07/30/2023    AST 21 07/30/2023       Imaging Studies:     Pertinent cardiac studies and imaging studies were personally reviewed on this visit and results summarized.        Portions of the record may have been created with voice recognition software.  Occasional wrong word or \"sound alike\" substitutions may have occurred due to the inherent limitations of voice recognition software.  Read the chart carefully and recognize, using context, where substitutions have occurred. Please reach out to me directly for any clarifications.  "

## 2024-06-07 DIAGNOSIS — I10 ESSENTIAL HYPERTENSION: ICD-10-CM

## 2024-06-07 DIAGNOSIS — F32.A ANXIETY AND DEPRESSION: Chronic | ICD-10-CM

## 2024-06-07 DIAGNOSIS — F41.9 ANXIETY AND DEPRESSION: Chronic | ICD-10-CM

## 2024-06-07 DIAGNOSIS — B37.2 CANDIDIASIS OF SKIN: ICD-10-CM

## 2024-06-07 RX ORDER — AMLODIPINE BESYLATE 5 MG/1
5 TABLET ORAL DAILY
Qty: 90 TABLET | Refills: 1 | Status: SHIPPED | OUTPATIENT
Start: 2024-06-07

## 2024-06-07 RX ORDER — LOSARTAN POTASSIUM AND HYDROCHLOROTHIAZIDE 12.5; 5 MG/1; MG/1
1 TABLET ORAL DAILY
Qty: 90 TABLET | Refills: 1 | Status: SHIPPED | OUTPATIENT
Start: 2024-06-07

## 2024-06-07 RX ORDER — CITALOPRAM 20 MG/1
20 TABLET ORAL
Qty: 90 TABLET | Refills: 1 | Status: SHIPPED | OUTPATIENT
Start: 2024-06-07

## 2024-06-07 NOTE — TELEPHONE ENCOUNTER
Reason for call:   [x] Refill   [] Prior Auth  [] Other:     Office:   [x] PCP/Provider - Myriam Araujo MD   [] Specialty/Provider -     Medication: amLODIPine (NORVASC) 5 mg tablet    Dose/Frequency: Take 1 tablet (5 mg total) by mouth daily     Quantity: 90    Pharmacy: [facility] 2024 [facility]      Does the patient have enough for 3 days?   [] Yes   [x] No - Send as HP to POD    Reason for call:   [x] Refill   [] Prior Auth  [] Other:     Office:   [x] PCP/Provider - Myriam Araujo MD  [] Specialty/Provider -     Medication: citalopram (CeleXA) 20 mg tablet     Dose/Frequency: Take 1 tablet (20 mg total) by mouth daily at bedtime     Quantity: 90    Pharmacy: [facility] 2024 [facility]      Does the patient have enough for 3 days?   [] Yes   [x] No - Send as HP to POD    Reason for call:   [x] Refill   [] Prior Auth  [] Other:     Office:   [x] PCP/Provider - Myriam Araujo MD  [] Specialty/Provider -     Medication: clotrimazole-betamethasone (LOTRISONE) 1-0.05 % cream     Dose/Frequency: Apply topically 2 (two) times a day     Quantity: 30 g    Pharmacy:  [facility] 2024 [facility]      Does the patient have enough for 3 days?   [] Yes   [x] No - Send as HP to POD      Reason for call:   [x] Refill   [] Prior Auth  [] Other:     Office:   [x] PCP/Provider - Myriam Araujo MD  [] Specialty/Provider -     Medication: losartan-hydrochlorothiazide (HYZAAR) 50-12.5 mg per tablet     Dose/Frequency: Take 1 tablet by mouth daily     Quantity: 90    Pharmacy: [facility] 2024 [facility]      Does the patient have enough for 3 days?   [] Yes   [x] No - Send as HP to POD

## 2024-06-07 NOTE — TELEPHONE ENCOUNTER
Refill must be reviewed and completed by the office or provider. The refill is unable to be approved or denied by the medication management team.    Off protocol

## 2024-06-10 RX ORDER — CLOTRIMAZOLE AND BETAMETHASONE DIPROPIONATE 10; .64 MG/G; MG/G
CREAM TOPICAL 2 TIMES DAILY
Qty: 45 G | Refills: 1 | Status: SHIPPED | OUTPATIENT
Start: 2024-06-10

## 2024-06-28 DIAGNOSIS — B37.2 CANDIDIASIS OF SKIN: ICD-10-CM

## 2024-07-02 RX ORDER — CLOTRIMAZOLE AND BETAMETHASONE DIPROPIONATE 10; .64 MG/G; MG/G
CREAM TOPICAL 2 TIMES DAILY
Qty: 45 G | Refills: 1 | Status: SHIPPED | OUTPATIENT
Start: 2024-07-02

## 2024-07-05 ENCOUNTER — HOSPITAL ENCOUNTER (OUTPATIENT)
Dept: NON INVASIVE DIAGNOSTICS | Facility: HOSPITAL | Age: 64
Discharge: HOME | End: 2024-07-05
Attending: INTERNAL MEDICINE
Payer: COMMERCIAL

## 2024-07-05 VITALS
DIASTOLIC BLOOD PRESSURE: 70 MMHG | HEART RATE: 63 BPM | BODY MASS INDEX: 44.41 KG/M2 | SYSTOLIC BLOOD PRESSURE: 126 MMHG | HEIGHT: 68 IN | WEIGHT: 293 LBS

## 2024-07-05 DIAGNOSIS — R01.1 HEART MURMUR: ICD-10-CM

## 2024-07-05 DIAGNOSIS — I10 ESSENTIAL HYPERTENSION: ICD-10-CM

## 2024-07-05 LAB
AORTIC ROOT: 2.7 CM
AORTIC VALVE MEAN VELOCITY: 13.6 M/S
ASCENDING AORTA: 3.1 CM
AV AREA BY CONTINUOUS VTI: 1.7 CM2
AV AREA PEAK VELOCITY: 1.7 CM2
AV LVOT MEAN GRADIENT: 3 MMHG
AV LVOT PEAK GRADIENT: 6 MMHG
AV MEAN GRADIENT: 8 MMHG
AV PEAK GRADIENT: 15 MMHG
AV VALVE AREA: 1.68 CM2
AV VELOCITY RATIO: 0.66
BSA FOR ECHO PROCEDURE: 2.45 M2
DOP CALC AO PEAK VEL: 1.9 M/S
DOP CALC AO VTI: 49 CM
DOP CALC LVOT AREA: 2.83 CM2
DOP CALC LVOT CARDIAC INDEX: 2.12 L/MIN/M2
DOP CALC LVOT CARDIAC OUTPUT: 5.2 L/MIN
DOP CALC LVOT DIAMETER: 1.9 CM
DOP CALC LVOT PEAK VEL VTI: 29 CM
DOP CALC LVOT PEAK VEL: 1.25 M/S
DOP CALC LVOT STROKE INDEX: 35.5 ML/M2
DOP CALC LVOT STROKE VOLUME: 82.18 CM3
E WAVE DECELERATION TIME: 312 MS
E/A RATIO: 1.18
FRACTIONAL SHORTENING: 36 (ref 28–44)
INTERVENTRICULAR SEPTUM IN DIASTOLE (PARASTERNAL SHORT AXIS VIEW): 0.8 CM
INTERVENTRICULAR SEPTUM: 0.8 CM (ref 0.6–1.1)
LAAS-AP2: 15.5 CM2
LAAS-AP4: 19.1 CM2
LEFT ATRIUM SIZE: 4 CM
LEFT ATRIUM VOLUME (MOD BIPLANE): 47 ML
LEFT ATRIUM VOLUME INDEX (MOD BIPLANE): 19.2 ML/M2
LEFT INTERNAL DIMENSION IN SYSTOLE: 3.5 CM (ref 2.1–4)
LEFT VENTRICULAR INTERNAL DIMENSION IN DIASTOLE: 5.5 CM (ref 3.5–6)
LEFT VENTRICULAR POSTERIOR WALL IN END DIASTOLE: 0.8 CM
LEFT VENTRICULAR STROKE VOLUME: 94 ML
LVSV (TEICH): 94 ML
MV E'TISSUE VEL-SEP: 12 CM/S
MV PEAK A VEL: 0.95 M/S
MV PEAK E VEL: 112 CM/S
MV STENOSIS PRESSURE HALF TIME: 91 MS
MV VALVE AREA P 1/2 METHOD: 2.42
RIGHT ATRIUM AREA SYSTOLE A4C: 14.1 CM2
RIGHT VENTRICLE ID DIMENSION: 3.2 CM
SL CV LEFT ATRIUM LENGTH A2C: 5.1 CM
SL CV LV EF: 60
SL CV PED ECHO LEFT VENTRICLE DIASTOLIC VOLUME (MOD BIPLANE) 2D: 145 ML
SL CV PED ECHO LEFT VENTRICLE SYSTOLIC VOLUME (MOD BIPLANE) 2D: 51 ML
TRICUSPID ANNULAR PLANE SYSTOLIC EXCURSION: 2.4 CM

## 2024-07-05 PROCEDURE — 93306 TTE W/DOPPLER COMPLETE: CPT | Performed by: INTERNAL MEDICINE

## 2024-07-05 PROCEDURE — 93306 TTE W/DOPPLER COMPLETE: CPT

## 2024-07-05 NOTE — RESULT ENCOUNTER NOTE
ECHO reviewed.  Pumping strength (ejection fraction) is normal.  Valves: Mild calcification of aortic valve noted with mild degree of narrowing consistent with mild aortic stenosis.  This will need to follow-up in 1 year.  Other valves are normal.  Please call with results.

## 2024-07-05 NOTE — RESULT ENCOUNTER NOTE
Called pt to give results. Pt understood. Has question on whether she can take a flight to [place] with this mild degree of narrowing in her aortic valve. Please advise.    I let her know that she will be called once the schedule for June of next year is available.

## 2024-07-25 ENCOUNTER — RA CDI HCC (OUTPATIENT)
Dept: OTHER | Facility: HOSPITAL | Age: 64
End: 2024-07-25

## 2024-07-27 ENCOUNTER — APPOINTMENT (EMERGENCY)
Dept: RADIOLOGY | Facility: HOSPITAL | Age: 64
End: 2024-07-27
Payer: COMMERCIAL

## 2024-07-27 ENCOUNTER — HOSPITAL ENCOUNTER (EMERGENCY)
Facility: HOSPITAL | Age: 64
Discharge: HOME | End: 2024-07-27
Attending: EMERGENCY MEDICINE | Admitting: EMERGENCY MEDICINE
Payer: COMMERCIAL

## 2024-07-27 VITALS
SYSTOLIC BLOOD PRESSURE: 188 MMHG | HEART RATE: 81 BPM | DIASTOLIC BLOOD PRESSURE: 89 MMHG | BODY MASS INDEX: 44.7 KG/M2 | TEMPERATURE: 97.9 F | RESPIRATION RATE: 18 BRPM | WEIGHT: 293 LBS | OXYGEN SATURATION: 95 %

## 2024-07-27 DIAGNOSIS — G57.02 PIRIFORMIS SYNDROME OF LEFT SIDE: Primary | ICD-10-CM

## 2024-07-27 DIAGNOSIS — S70.02XA CONTUSION OF LEFT HIP, INITIAL ENCOUNTER: ICD-10-CM

## 2024-07-27 PROCEDURE — 99284 EMERGENCY DEPT VISIT MOD MDM: CPT | Performed by: EMERGENCY MEDICINE

## 2024-07-27 PROCEDURE — 73502 X-RAY EXAM HIP UNI 2-3 VIEWS: CPT

## 2024-07-27 PROCEDURE — 96372 THER/PROPH/DIAG INJ SC/IM: CPT

## 2024-07-27 PROCEDURE — 99284 EMERGENCY DEPT VISIT MOD MDM: CPT

## 2024-07-27 RX ORDER — METHOCARBAMOL 500 MG/1
500 TABLET, FILM COATED ORAL 2 TIMES DAILY
Qty: 20 TABLET | Refills: 0 | Status: SHIPPED | OUTPATIENT
Start: 2024-07-27

## 2024-07-27 RX ORDER — KETOROLAC TROMETHAMINE 30 MG/ML
15 INJECTION, SOLUTION INTRAMUSCULAR; INTRAVENOUS ONCE
Status: COMPLETED | OUTPATIENT
Start: 2024-07-27 | End: 2024-07-27

## 2024-07-27 RX ORDER — METHOCARBAMOL 500 MG/1
500 TABLET, FILM COATED ORAL ONCE
Status: COMPLETED | OUTPATIENT
Start: 2024-07-27 | End: 2024-07-27

## 2024-07-27 RX ADMIN — METHOCARBAMOL 500 MG: 500 TABLET ORAL at 09:36

## 2024-07-27 RX ADMIN — KETOROLAC TROMETHAMINE 15 MG: 30 INJECTION, SOLUTION INTRAMUSCULAR; INTRAVENOUS at 08:46

## 2024-07-27 NOTE — ED PROVIDER NOTES
History  Chief Complaint   Patient presents with    Fall     Patient to ED from home with c/o left leg pain that shoots down leg following a fall last Saturday. Patient reports sitting down on a picnic table and fell off and has had progressively worsening pain.      HPI    Patient is a 63-year-old female with history of hip osteoarthritis presenting after a fall last Saturday.  She says that she was at a wedding when she went to sit down on a metal bench and missed, landing on her left hip.  She says that she initially had some discomfort then but it has subsequently become worse.  In particular, she noticed an acute worsening of symptoms after she worked yesterday at which time she sat for most of the day.  She says pain is worse with ambulation and laying in certain positions.  Does not notice anything in particular that makes it better.  Has been taking Advil without significant relief.  She says that she is supposed to have total hip replacement but is working on losing weight to be eligible for the procedure currently.  Denies any numbness or weakness in the lower extremities.  Describes pain as a sharp discomfort radiating from the left buttock down the posterior thigh.    Prior to Admission Medications   Prescriptions Last Dose Informant Patient Reported? Taking?   Ascorbic Acid (vitamin C) 1000 MG tablet  Self Yes No   Sig: Take 1,000 mg by mouth daily   Cholecalciferol (Vitamin D) 50 MCG (2000 UT) tablet  Self Yes No   Sig: Take 2,000 Units by mouth daily   Multiple Vitamins-Minerals (multivitamin with minerals) tablet  Self Yes No   Sig: Take 1 tablet by mouth daily   amLODIPine (NORVASC) 5 mg tablet   No No   Sig: Take 1 tablet (5 mg total) by mouth daily   busPIRone (BUSPAR) 5 mg tablet   No No   Sig: Take 1 tablet (5 mg total) by mouth daily at bedtime   calcium carbonate (OS-CAMDEN) 600 MG tablet  Self Yes No   Sig: Take 600 mg by mouth daily   citalopram (CeleXA) 20 mg tablet   No No   Sig: Take 1  tablet (20 mg total) by mouth daily at bedtime   clobetasol (TEMOVATE) 0.05 % ointment  Self No No   Sig: Apply topically 2 (two) times a day   Patient not taking: Reported on 11/30/2023   clotrimazole-betamethasone (LOTRISONE) 1-0.05 % cream   No No   Sig: APPLY TOPICALLY 2 (TWO) TIMES A DAY   fexofenadine (ALLEGRA) 180 MG tablet  Self Yes No   Sig: Take 180 mg by mouth daily as needed   Patient not taking: Reported on 6/6/2024   ketoconazole (NIZORAL) 2 % cream  Self No No   Sig: Apply topically daily   Patient not taking: Reported on 11/30/2023   losartan-hydrochlorothiazide (HYZAAR) 50-12.5 mg per tablet   No No   Sig: Take 1 tablet by mouth daily   metoprolol succinate (TOPROL-XL) 100 mg 24 hr tablet   No No   Sig: Take 1 tablet (100 mg total) by mouth daily at bedtime   omeprazole (PriLOSEC) 20 mg delayed release capsule  Self Yes No   Sig: Take 20 mg by mouth as needed   vitamin B-12 (VITAMIN B-12) 1,000 mcg tablet  Self Yes No   Sig: Take by mouth daily      Facility-Administered Medications: None       Past Medical History:   Diagnosis Date    Allergic rhinitis 07/07/2022    Anxiety     Arthritis     BMI 50.0-59.9, adult ([facility]) 06/01/2021    Cellulitis of left leg 08/17/2021    Depression     Eczema 06/02/2022    Edema of both lower legs 01/23/2021    Edema of both lower legs 11/23/2021    Endometrial cancer ([facility]) 09/14/2021    Heart murmur     Heart murmur 03/25/2024    History of COVID-19 11/2020    Mild sypmtoms Cough,Tired,diarrhea,sweats, Loss taste and smell    Hyperglycemia 01/23/2021    Hyperlipidemia     Hypertension     Left hip pain 06/02/2022    Lower abdominal pain 11/23/2021    Mixed hyperlipidemia 01/23/2021    Numbness and tingling in left arm     Obesity 03/25/2024    Pruritus     Rash of hand 02/28/2022    Shortness of breath     Skin lesion     Skin rash 05/02/2023    Tinea cruris 08/24/2023    Tinea pedis of both feet 02/28/2022    Vitamin D deficiency 01/23/2021       Past Surgical  History:   Procedure Laterality Date    CARPAL TUNNEL RELEASE Bilateral      SECTION      x3     SECTION      CHOLECYSTECTOMY      CHOLECYSTECTOMY LAPAROSCOPIC N/A 3/3/2019    Procedure: CHOLECYSTECTOMY LAPAROSCOPIC;  Surgeon: Shayne De Leon MD;  [facility];  Service: General    COLONOSCOPY      CYSTOSCOPY N/A 10/8/2021    Procedure: Cystoscopy;  Surgeon: Adelso Freeman MD;  [facility];  Service: Gynecology Oncology    FL INJECTION LEFT HIP (NON ARTHROGRAM)  2022    JOINT REPLACEMENT  2016    R total HIp    MAMMO (HISTORICAL)  10/11/2018    Advise for yearly mammo screening    DC HYSTEROSCOPY BX ENDOMETRIUM&/POLYPC W/WO D&C N/A 3/21/2016    Procedure: FRACTIONAL DILATATION AND CURETTAGE (D&C) WITH HYSTEROSOCPY;  Surgeon: Farnaz Romero MD;  [facility];  Service: Gynecology    DC LAPS TOTAL HYSTERECT 250 GM/< W/RMVL TUBE/OVARY N/A 10/8/2021    Procedure: ROBOTIC HYSTERECTOMY, BILATERAL SALPINGOOPHERECTOMY; LEFT EXTERNAL ILIAC SENTINEL LYMPH NODE BIOPSY, RIGHT PELVIC LYMPHADENECTOMY;  Surgeon: Adelso Freeman MD;  [facility];  Service: Gynecology Oncology    REPLACEMENT TOTAL KNEE Right        Family History   Problem Relation Age of Onset    Hypertension Mother     Hypertension Father     Heart failure Father     Lymphoma Father 62    Diabetes Father         at old age    No Known Problems Sister     No Known Problems Daughter     No Known Problems Maternal Grandmother     No Known Problems Maternal Grandfather     No Known Problems Paternal Grandmother     No Known Problems Paternal Grandfather     Hemophilia Brother     Breast cancer Maternal Aunt 50    Brain cancer Maternal Aunt 48    No Known Problems Maternal Aunt     No Known Problems Son     No Known Problems Son     No Known Problems Son      I have reviewed and agree with the history as documented.    E-Cigarette/Vaping    E-Cigarette Use Never User      E-Cigarette/Vaping Substances     Nicotine No     THC No     CBD No     Flavoring No     Other No     Unknown No      Social History     Tobacco Use    Smoking status: Never    Smokeless tobacco: Never   Vaping Use    Vaping status: Never Used   Substance Use Topics    Alcohol use: No    Drug use: No        Review of Systems   Constitutional:  Negative for chills and fever.   HENT: Negative.     Eyes: Negative.    Respiratory:  Negative for cough and shortness of breath.    Cardiovascular:  Negative for chest pain and palpitations.   Gastrointestinal:  Negative for abdominal pain and vomiting.   Genitourinary:  Negative for dysuria and hematuria.   Musculoskeletal:  Positive for arthralgias. Negative for back pain.        Left hip pain   Skin:  Negative for color change and rash.   Neurological:  Negative for syncope and light-headedness.   All other systems reviewed and are negative.      Physical Exam  ED Triage Vitals [07/27/24 0815]   Temperature Pulse Respirations Blood Pressure SpO2   97.9 °F (36.6 °C) 81 18 (!) 188/89 95 %      Temp Source Heart Rate Source Patient Position - Orthostatic VS BP Location FiO2 (%)   Oral Monitor Lying Right arm --      Pain Score       10 - Worst Possible Pain             Orthostatic Vital Signs  Vitals:    07/27/24 0815   BP: (!) 188/89   Pulse: 81   Patient Position - Orthostatic VS: Lying       Physical Exam  Vitals and nursing note reviewed.   Constitutional:       Appearance: She is well-developed.   HENT:      Head: Normocephalic and atraumatic.   Eyes:      Conjunctiva/sclera: Conjunctivae normal.   Cardiovascular:      Rate and Rhythm: Normal rate.   Pulmonary:      Effort: Pulmonary effort is normal. No respiratory distress.   Musculoskeletal:         General: Tenderness present. No swelling. Normal range of motion.      Comments: Tenderness over lateral left hip at greater trochanter and over gluteal area over piriformis distribution.  Straight leg raise test negative on left.   Skin:     General: Skin  is warm and dry.   Neurological:      Mental Status: She is alert.      Sensory: No sensory deficit.      Motor: No weakness.   Psychiatric:         Mood and Affect: Mood normal.         ED Medications  Medications   ketorolac (TORADOL) injection 15 mg (15 mg Intramuscular Given 7/27/24 0846)   methocarbamol (ROBAXIN) tablet 500 mg (500 mg Oral Given 7/27/24 0936)       Diagnostic Studies  Results Reviewed       None                   XR hip/pelv 2-3 vws left if performed   ED Interpretation by Davy Page DO (07/27 0903)   No acute fracture, right hip replacement, left hip joint with advanced osteoarthritis      Final Result by Mert Clinton MD (07/27 1013)      No acute osseous abnormality. Severe osteoarthritis in the left hip which is significantly worse compared to the prior study.         Computerized Assisted Algorithm (CAA) may have been used to analyze all applicable images.            Workstation performed: [id number]               Procedures  Procedures      ED Course                                       Medical Decision Making  63-year-old female patient presenting with left hip pain after a fall 1 week ago.  On initial evaluation, she appeared uncomfortable was with stable vitals.  Physical examination revealed tenderness over distribution of the piriformis from hip greater trochanter over buttock area.  There was no obvious hip deformity or injury and range of motion in the hip was preserved.  Given known hip issues however, x-ray was done to rule out any occult fracture which showed no acute findings on my independent interpretation.  Patient was treated with muscle relaxer and Toradol with minimal improvement.  Distribution of the patient's pain is consistent with a piriformis syndrome which is possibly brought on by a contusion from the fall and aggravated by sitting all day at work.  She was counseled on this and ultimately was discharged in stable condition with counseling to  follow-up with orthopedics outpatient for consideration of further imaging and management as well as physical therapy.    Problems Addressed:  Contusion of left hip, initial encounter: acute illness or injury  Piriformis syndrome of left side: acute illness or injury    Amount and/or Complexity of Data Reviewed  Radiology: ordered and independent interpretation performed.    Risk  Prescription drug management.          Disposition  Final diagnoses:   Piriformis syndrome of left side   Contusion of left hip, initial encounter     Time reflects when diagnosis was documented in both MDM as applicable and the Disposition within this note       Time User Action Codes Description Comment    7/27/2024  9:18 AM Aruna Vegas Add [G57.02] Piriformis syndrome of left side     7/27/2024  9:31 AM Davy Page Add [S70.02XA] Contusion of left hip, initial encounter           ED Disposition       ED Disposition   Discharge    Condition   Stable    Date/Time   Sat Jul 27, 2024  9:18 AM    Comment   Cielo Dayron Paddy discharge to home/self care.                   Follow-up Information       Follow up With Specialties Details Why Contact Info Additional Information    Myriam Araujo MD Internal Medicine Schedule an appointment as soon as possible for a visit   [address] [address]  [phone]       [facility] Emergency Medicine Go to  If symptoms worsen [address] [address]  [phone] [facility], [address] [place], [place], [address]    Arvind Eaton MD Orthopedic Surgery Schedule an appointment as soon as possible for a visit   [address] [facility]  [address]  [facility] [address]  [phone]               Discharge Medication List as of 7/27/2024  9:20 AM        CONTINUE these medications which have NOT CHANGED    Details    amLODIPine (NORVASC) 5 mg tablet Take 1 tablet (5 mg total) by mouth daily, Starting Fri 6/7/2024, Normal      Ascorbic Acid (vitamin C) 1000 MG tablet Take 1,000 mg by mouth daily, Historical Med      busPIRone (BUSPAR) 5 mg tablet Take 1 tablet (5 mg total) by mouth daily at bedtime, Starting Fri 12/15/2023, Normal      calcium carbonate (OS-CAMDEN) 600 MG tablet Take 600 mg by mouth daily, Historical Med      Cholecalciferol (Vitamin D) 50 MCG (2000 UT) tablet Take 2,000 Units by mouth daily, Historical Med      citalopram (CeleXA) 20 mg tablet Take 1 tablet (20 mg total) by mouth daily at bedtime, Starting Fri 6/7/2024, Normal      clobetasol (TEMOVATE) 0.05 % ointment Apply topically 2 (two) times a day, Starting Wed 5/17/2023, Normal      clotrimazole-betamethasone (LOTRISONE) 1-0.05 % cream APPLY TOPICALLY 2 (TWO) TIMES A DAY, Starting Tue 7/2/2024, Normal      fexofenadine (ALLEGRA) 180 MG tablet Take 180 mg by mouth daily as needed, Historical Med      ketoconazole (NIZORAL) 2 % cream Apply topically daily, Starting Thu 8/24/2023, Normal      losartan-hydrochlorothiazide (HYZAAR) 50-12.5 mg per tablet Take 1 tablet by mouth daily, Starting Fri 6/7/2024, Normal      metoprolol succinate (TOPROL-XL) 100 mg 24 hr tablet Take 1 tablet (100 mg total) by mouth daily at bedtime, Starting Sat 2/10/2024, Normal      Multiple Vitamins-Minerals (multivitamin with minerals) tablet Take 1 tablet by mouth daily, Historical Med      omeprazole (PriLOSEC) 20 mg delayed release capsule Take 20 mg by mouth as needed, Historical Med      vitamin B-12 (VITAMIN B-12) 1,000 mcg tablet Take by mouth daily, Historical Med               PDMP Review       None             ED Provider  Attending physically available and evaluated Cielo Thomason. I managed the patient along with the ED Attending.    Electronically Signed by           Aruna Vegas,   07/27/24 8133

## 2024-07-27 NOTE — Clinical Note
Cielo Thomason was seen and treated in our emergency department on 7/27/2024.    No restrictions            Diagnosis:     Cielo  may return to work on return date.    She may return on this date: 08/01/2024    Please excuse Cielo from work as she was evaluated in the ER for an acute injury     If you have any questions or concerns, please don't hesitate to call.      Aruna Vegas, DO    ______________________________           _______________          _______________  Hospital Representative                              Date                                Time

## 2024-07-27 NOTE — DISCHARGE INSTRUCTIONS
Rest and take motrin, tylenol as needed for pain. Ultimately, it may take some time for inflammation of the area to calm down. We would recommend you follow up with orthopedics as soon as you are able as well as consider following with your PCP and physical therapy.

## 2024-07-29 ENCOUNTER — TELEPHONE (OUTPATIENT)
Dept: OBGYN CLINIC | Facility: HOSPITAL | Age: 64
End: 2024-07-29

## 2024-07-29 ENCOUNTER — OFFICE VISIT (OUTPATIENT)
Dept: OBGYN CLINIC | Facility: CLINIC | Age: 64
End: 2024-07-29
Payer: COMMERCIAL

## 2024-07-29 VITALS
WEIGHT: 291 LBS | HEIGHT: 68 IN | DIASTOLIC BLOOD PRESSURE: 72 MMHG | BODY MASS INDEX: 44.1 KG/M2 | SYSTOLIC BLOOD PRESSURE: 142 MMHG

## 2024-07-29 DIAGNOSIS — M16.12 PRIMARY OSTEOARTHRITIS OF ONE HIP, LEFT: ICD-10-CM

## 2024-07-29 DIAGNOSIS — M54.16 LEFT LUMBAR RADICULOPATHY: ICD-10-CM

## 2024-07-29 DIAGNOSIS — M51.36 DDD (DEGENERATIVE DISC DISEASE), LUMBAR: Primary | ICD-10-CM

## 2024-07-29 PROCEDURE — 99214 OFFICE O/P EST MOD 30 MIN: CPT | Performed by: PHYSICIAN ASSISTANT

## 2024-07-29 PROCEDURE — 3077F SYST BP >= 140 MM HG: CPT | Performed by: PHYSICIAN ASSISTANT

## 2024-07-29 PROCEDURE — 3078F DIAST BP <80 MM HG: CPT | Performed by: PHYSICIAN ASSISTANT

## 2024-07-29 RX ORDER — PREDNISONE 10 MG/1
TABLET ORAL
Qty: 42 TABLET | Refills: 0 | Status: SHIPPED | OUTPATIENT
Start: 2024-07-29

## 2024-07-29 NOTE — TELEPHONE ENCOUNTER
Hello,    Please advise if a forced appointment can be accommodated for the patient:    Call back #: [phone]    Insurance: [employer]    Reason for appointment: Patient saw you previously for left hip, fell at her sons wedding and the pain is in her behind her eft thigh. Was seen in [facility] ED, was told to follow up with ortho. Stated she is in a lot of pain, She cannot sit, cannot lay down (cannot put weight on her thigh), Pain goes down the leg into her foot and her foot gets number.  Looking to get an injection of some kind. Requesting [facility].    Pain Score- 9/10    Requested doctor and/or location: [facility]      Thank you.

## 2024-07-29 NOTE — TELEPHONE ENCOUNTER
Caller: Patient     Reason for call: Patient called to schedule with [facility]. I offered her a 2:30pm appt for today in . Patient stated she will call the office right back. She is unable to drive and wanted to make sure she could get a ride.     Call back#: [phone]

## 2024-07-29 NOTE — TELEPHONE ENCOUNTER
Spoke w/pt. She states that she is unable to wait to be seen and will try to get in to see someone in [facility] for a sooner appt. Contact info given.

## 2024-07-29 NOTE — PROGRESS NOTES
"Patient Name:  Cielo Thomason  MRN:  [id number]    Assessment & Plan     Left sided low back and buttock pain after mechanical fall 7/20/2024.  Multilevel lumbar DDD and left-sided lumbar radiculopathy.  No red flags or patient on examination today.  Prescription for prednisone taper.  Advised against taking Advil while taking prednisone taper.  Advil may be resumed when prednisone taper is complete.  Continue muscle relaxer as needed.  Referral to physical therapy.  Gradually return to normal activities as tolerated as pain improves.  Follow-up in 6 weeks with primary care sports medicine.    Chief Complaint     Left hip and buttock pain    History of the Present Illness     Cielo Thomason is a 63 y.o. female who reports to the office today for evaluation of her left hip and buttock.  She notes an onset of pain on 7/20/2024.  She states she slipped off of a picnic table at a wedding falling onto the ground.  Initially she noted minimal pain in the left buttock region.  However her pain progressed and became severe which prompted her to report to the emergency department on 7/27/2024.  At that time she was prescribed a muscle laxer and was advised to take Advil.  She returns to the office today noting persistent left buttock pain with radiation along the posterior thigh.  Pain radiates all the way to the foot.  She notes associated numbness and tingling in this distribution as well.  Pain can reach 10 out of 10 intensity.  Pain is worse with sitting and lying as well as increased activity.  She states it is very hard to find a comfortable position to be in.  She denies any bowel or bladder dysfunction or saddle paresthesias.  She does have a known history of severe left hip DJD.  She denies any fevers or chills.    Physical Exam     /72 (BP Location: Left arm, Patient Position: Standing, Cuff Size: Large)   Ht 5' 8\" (1.727 m)   Wt 132 kg (291 lb)   LMP  (LMP Unknown)   BMI 44.25 kg/m²     Lumbar " spine: No tenderness midline and paraspinal musculature.  No tenderness bilateral SI joints.  There is significant tenderness over the left piriformis musculature.  No tenderness anterior hip and greater trochanter.  Hip range of motion is intact and includes flexion 90, external rotation 10, internal rotation 5 without significant pain.  Negative logroll test.  Motor/sensation intact L2-S1 bilaterally with 5 out of 5 strength.  Negative JERE test.  Negative FADIR test.  Negative straight leg raise and resisted straight leg raise test bilaterally.    Eyes: Anicteric sclerae.  ENT: Trachea midline.  Lungs: Normal respiratory effort.  CV: Capillary refill is less than 2 seconds.  Skin: Intact without erythema.  Lymph: No palpable lymphadenopathy.  Neuro: Sensation is grossly intact to light touch.  Psych: Mood and affect are appropriate.    Data Review     I have personally reviewed pertinent films in PACS, and my interpretation follows:    X-rays left hip 7/27/2024: No acute osseous abnormality.  No fracture or dislocation.  Severe degenerative changes left hip joint.  Visualized lower lumbar spine exhibits significant DDD.    X-rays lumbar spine 7/29/2024: No acute osseous abnormality.  No fracture or signs of instability.  Dextroscoliotic changes are evident with multilevel DDD appreciated.    Past Medical History:   Diagnosis Date    Allergic rhinitis 07/07/2022    Anxiety     Arthritis     BMI 50.0-59.9, adult ([facility]) 06/01/2021    Cellulitis of left leg 08/17/2021    Depression     Eczema 06/02/2022    Edema of both lower legs 01/23/2021    Edema of both lower legs 11/23/2021    Endometrial cancer ([facility]) 09/14/2021    Heart murmur     Heart murmur 03/25/2024    History of COVID-19 11/2020    Mild sypmtoms Cough,Tired,diarrhea,sweats, Loss taste and smell    Hyperglycemia 01/23/2021    Hyperlipidemia     Hypertension     Left hip pain 06/02/2022    Lower abdominal pain 11/23/2021    Mixed hyperlipidemia 01/23/2021     Numbness and tingling in left arm     Obesity 2024    Pruritus     Rash of hand 2022    Shortness of breath     Skin lesion     Skin rash 2023    Tinea cruris 2023    Tinea pedis of both feet 2022    Vitamin D deficiency 2021       Past Surgical History:   Procedure Laterality Date    CARPAL TUNNEL RELEASE Bilateral      SECTION      x3     SECTION      CHOLECYSTECTOMY      CHOLECYSTECTOMY LAPAROSCOPIC N/A 3/3/2019    Procedure: CHOLECYSTECTOMY LAPAROSCOPIC;  Surgeon: Shayne De Leon MD;  [facility];  Service: General    COLONOSCOPY      CYSTOSCOPY N/A 10/8/2021    Procedure: Cystoscopy;  Surgeon: Adelso Freeman MD;  [facility];  Service: Gynecology Oncology    FL INJECTION LEFT HIP (NON ARTHROGRAM)  2022    JOINT REPLACEMENT  2016    R total HIp    MAMMO (HISTORICAL)  10/11/2018    Advise for yearly mammo screening    FL HYSTEROSCOPY BX ENDOMETRIUM&/POLYPC W/WO D&C N/A 3/21/2016    Procedure: FRACTIONAL DILATATION AND CURETTAGE (D&C) WITH HYSTEROSOCPY;  Surgeon: Farnaz Romero MD;  [facility];  Service: Gynecology    FL LAPS TOTAL HYSTERECT 250 GM/< W/RMVL TUBE/OVARY N/A 10/8/2021    Procedure: ROBOTIC HYSTERECTOMY, BILATERAL SALPINGOOPHERECTOMY; LEFT EXTERNAL ILIAC SENTINEL LYMPH NODE BIOPSY, RIGHT PELVIC LYMPHADENECTOMY;  Surgeon: Adelso Freeman MD;  [facility];  Service: Gynecology Oncology    REPLACEMENT TOTAL KNEE Right        Allergies   Allergen Reactions    Griseofulvin Hives    Penicillins Hives     She can take cephalosporin without any side effect    Lisinopril Cough    Zithromax [Azithromycin] Headache       Current Outpatient Medications on File Prior to Visit   Medication Sig Dispense Refill    amLODIPine (NORVASC) 5 mg tablet Take 1 tablet (5 mg total) by mouth daily 90 tablet 1    Ascorbic Acid (vitamin C) 1000 MG tablet Take 1,000 mg by mouth daily      busPIRone (BUSPAR) 5 mg tablet Take 1  tablet (5 mg total) by mouth daily at bedtime 90 tablet 1    calcium carbonate (OS-CAMDEN) 600 MG tablet Take 600 mg by mouth daily      Cholecalciferol (Vitamin D) 50 MCG (2000 UT) tablet Take 2,000 Units by mouth daily      citalopram (CeleXA) 20 mg tablet Take 1 tablet (20 mg total) by mouth daily at bedtime 90 tablet 1    clobetasol (TEMOVATE) 0.05 % ointment Apply topically 2 (two) times a day (Patient not taking: Reported on 11/30/2023) 60 g 0    clotrimazole-betamethasone (LOTRISONE) 1-0.05 % cream APPLY TOPICALLY 2 (TWO) TIMES A DAY 45 g 1    fexofenadine (ALLEGRA) 180 MG tablet Take 180 mg by mouth daily as needed (Patient not taking: Reported on 6/6/2024)      ketoconazole (NIZORAL) 2 % cream Apply topically daily (Patient not taking: Reported on 11/30/2023) 45 g 0    losartan-hydrochlorothiazide (HYZAAR) 50-12.5 mg per tablet Take 1 tablet by mouth daily 90 tablet 1    methocarbamol (ROBAXIN) 500 mg tablet Take 1 tablet (500 mg total) by mouth 2 (two) times a day 20 tablet 0    metoprolol succinate (TOPROL-XL) 100 mg 24 hr tablet Take 1 tablet (100 mg total) by mouth daily at bedtime 90 tablet 0    Multiple Vitamins-Minerals (multivitamin with minerals) tablet Take 1 tablet by mouth daily      omeprazole (PriLOSEC) 20 mg delayed release capsule Take 20 mg by mouth as needed      vitamin B-12 (VITAMIN B-12) 1,000 mcg tablet Take by mouth daily       No current facility-administered medications on file prior to visit.       Social History     Tobacco Use    Smoking status: Never    Smokeless tobacco: Never   Vaping Use    Vaping status: Never Used   Substance Use Topics    Alcohol use: No    Drug use: No       Family History   Problem Relation Age of Onset    Hypertension Mother     Hypertension Father     Heart failure Father     Lymphoma Father 62    Diabetes Father         at old age    No Known Problems Sister     No Known Problems Daughter     No Known Problems Maternal Grandmother     No Known Problems  Maternal Grandfather     No Known Problems Paternal Grandmother     No Known Problems Paternal Grandfather     Hemophilia Brother     Breast cancer Maternal Aunt 50    Brain cancer Maternal Aunt 48    No Known Problems Maternal Aunt     No Known Problems Son     No Known Problems Son     No Known Problems Son        Review of Systems     As stated in the HPI. All other systems reviewed and are negative.

## 2024-08-05 ENCOUNTER — HOSPITAL ENCOUNTER (OUTPATIENT)
Dept: MAMMOGRAPHY | Facility: HOSPITAL | Age: 64
Discharge: HOME | End: 2024-08-05
Attending: INTERNAL MEDICINE
Payer: COMMERCIAL

## 2024-08-05 ENCOUNTER — TELEPHONE (OUTPATIENT)
Dept: OBGYN CLINIC | Facility: HOSPITAL | Age: 64
End: 2024-08-05

## 2024-08-05 VITALS — WEIGHT: 291.01 LBS | BODY MASS INDEX: 44.1 KG/M2 | HEIGHT: 68 IN

## 2024-08-05 DIAGNOSIS — M51.36 DDD (DEGENERATIVE DISC DISEASE), LUMBAR: ICD-10-CM

## 2024-08-05 DIAGNOSIS — M54.16 LEFT LUMBAR RADICULOPATHY: Primary | ICD-10-CM

## 2024-08-05 DIAGNOSIS — Z12.31 ENCOUNTER FOR SCREENING MAMMOGRAM FOR BREAST CANCER: ICD-10-CM

## 2024-08-05 PROCEDURE — 77067 SCR MAMMO BI INCL CAD: CPT

## 2024-08-05 PROCEDURE — 77063 BREAST TOMOSYNTHESIS BI: CPT

## 2024-08-05 NOTE — TELEPHONE ENCOUNTER
Patient aware of machine size, she will keep appt where it is, offered to schedule mri review, per pt she would like to hold off on scheduling a f/u until she sees her results

## 2024-08-05 NOTE — TELEPHONE ENCOUNTER
Caller: Patient    Doctor: Hector    Reason for call: Patient scheduled her MRI 8/8 @[place]. Was told the script location should be changed to [place] and not [place]. In addition, questioned if we knew if this location had a larger MRI machine? Please cb to advise    Call back#: [phone]

## 2024-08-05 NOTE — TELEPHONE ENCOUNTER
Caller: Patient    Doctor: Jose Young    Reason for call: Patient is still having pain and is requesting a script for an MRI.    Call back#: [phone]

## 2024-08-05 NOTE — TELEPHONE ENCOUNTER
Patient calling back on request for MRI - advised of Jose's instructions and that Vi would call to help her get set up for her MRI or gave # if she would like to call directly.

## 2024-08-05 NOTE — TELEPHONE ENCOUNTER
I ordered an MRI of her lumbar spine for further evaluation.  I also placed a referral to pain management.  She should follow-up with pain management after the MRI is completed for further recommendations based on her results.      In the meantime she should complete her prednisone taper as prescribed and she should also initiate physical therapy if she has yet to do so.      Can you help her get set up for the MRI?

## 2024-08-08 ENCOUNTER — HOSPITAL ENCOUNTER (OUTPATIENT)
Dept: RADIOLOGY | Facility: HOSPITAL | Age: 64
Discharge: HOME | End: 2024-08-08
Payer: COMMERCIAL

## 2024-08-08 DIAGNOSIS — M51.36 DDD (DEGENERATIVE DISC DISEASE), LUMBAR: ICD-10-CM

## 2024-08-08 DIAGNOSIS — M54.16 LEFT LUMBAR RADICULOPATHY: ICD-10-CM

## 2024-08-08 PROCEDURE — 72148 MRI LUMBAR SPINE W/O DYE: CPT

## 2024-08-09 ENCOUNTER — TELEPHONE (OUTPATIENT)
Age: 64
End: 2024-08-09

## 2024-08-09 NOTE — TELEPHONE ENCOUNTER
Received call from patient 's daughter Lorelei to report patient tested Covid + Monday 8/5 and still experiencing strong cough. Patient is <66 yo and has BMI>25. Cancer survivor >12 years. Still has fatigue. Loerlei is requesting order for cough medication. RN explained OTC medications and to check with the pharmacist for recommendations. No office visit available and advised patient be seen at [facility] for further evaluation. Patient may be out of window for antiviral. Last OV 3/25/24.

## 2024-08-11 ENCOUNTER — APPOINTMENT (OUTPATIENT)
Dept: LAB | Facility: CLINIC | Age: 64
End: 2024-08-11
Payer: COMMERCIAL

## 2024-08-11 DIAGNOSIS — F41.9 ANXIETY AND DEPRESSION: Chronic | ICD-10-CM

## 2024-08-11 DIAGNOSIS — I10 ESSENTIAL HYPERTENSION: ICD-10-CM

## 2024-08-11 DIAGNOSIS — E55.9 VITAMIN D DEFICIENCY: Chronic | ICD-10-CM

## 2024-08-11 DIAGNOSIS — R73.9 HYPERGLYCEMIA: ICD-10-CM

## 2024-08-11 DIAGNOSIS — F32.A ANXIETY AND DEPRESSION: Chronic | ICD-10-CM

## 2024-08-11 DIAGNOSIS — I89.0 LYMPHEDEMA: ICD-10-CM

## 2024-08-11 LAB
ALBUMIN SERPL BCG-MCNC: 4 G/DL (ref 3.5–5)
ALP SERPL-CCNC: 71 U/L (ref 34–104)
ALT SERPL W P-5'-P-CCNC: 26 U/L (ref 7–52)
ANION GAP SERPL CALCULATED.3IONS-SCNC: 7 MMOL/L (ref 4–13)
AST SERPL W P-5'-P-CCNC: 21 U/L (ref 13–39)
BACTERIA UR QL AUTO: ABNORMAL /HPF
BASOPHILS # BLD AUTO: 0.04 THOUSANDS/ÂΜL (ref 0–0.1)
BASOPHILS NFR BLD AUTO: 1 % (ref 0–1)
BILIRUB SERPL-MCNC: 1.51 MG/DL (ref 0.2–1)
BILIRUB UR QL STRIP: NEGATIVE
BUN SERPL-MCNC: 14 MG/DL (ref 5–25)
CALCIUM SERPL-MCNC: 9.3 MG/DL (ref 8.4–10.2)
CHLORIDE SERPL-SCNC: 102 MMOL/L (ref 96–108)
CLARITY UR: ABNORMAL
CO2 SERPL-SCNC: 29 MMOL/L (ref 21–32)
COLOR UR: YELLOW
CREAT SERPL-MCNC: 0.9 MG/DL (ref 0.6–1.3)
EOSINOPHIL # BLD AUTO: 0.29 THOUSAND/ÂΜL (ref 0–0.61)
EOSINOPHIL NFR BLD AUTO: 6 % (ref 0–6)
ERYTHROCYTE [DISTWIDTH] IN BLOOD BY AUTOMATED COUNT: 14.8 % (ref 11.6–15.1)
EST. AVERAGE GLUCOSE BLD GHB EST-MCNC: 105 MG/DL
GFR SERPL CREATININE-BSD FRML MDRD: 67 ML/MIN/1.73SQ M
GLUCOSE P FAST SERPL-MCNC: 115 MG/DL (ref 65–99)
GLUCOSE UR STRIP-MCNC: NEGATIVE MG/DL
HBA1C MFR BLD: 5.3 %
HCT VFR BLD AUTO: 46.2 % (ref 34.8–46.1)
HGB BLD-MCNC: 14.4 G/DL (ref 11.5–15.4)
HGB UR QL STRIP.AUTO: NEGATIVE
IMM GRANULOCYTES # BLD AUTO: 0.02 THOUSAND/UL (ref 0–0.2)
IMM GRANULOCYTES NFR BLD AUTO: 0 % (ref 0–2)
KETONES UR STRIP-MCNC: NEGATIVE MG/DL
LEUKOCYTE ESTERASE UR QL STRIP: NEGATIVE
LYMPHOCYTES # BLD AUTO: 1.91 THOUSANDS/ÂΜL (ref 0.6–4.47)
LYMPHOCYTES NFR BLD AUTO: 38 % (ref 14–44)
MCH RBC QN AUTO: 27.3 PG (ref 26.8–34.3)
MCHC RBC AUTO-ENTMCNC: 31.2 G/DL (ref 31.4–37.4)
MCV RBC AUTO: 88 FL (ref 82–98)
MONOCYTES # BLD AUTO: 0.36 THOUSAND/ÂΜL (ref 0.17–1.22)
MONOCYTES NFR BLD AUTO: 7 % (ref 4–12)
MUCOUS THREADS UR QL AUTO: ABNORMAL
NEUTROPHILS # BLD AUTO: 2.48 THOUSANDS/ÂΜL (ref 1.85–7.62)
NEUTS SEG NFR BLD AUTO: 48 % (ref 43–75)
NITRITE UR QL STRIP: NEGATIVE
NON-SQ EPI CELLS URNS QL MICRO: ABNORMAL /HPF
NRBC BLD AUTO-RTO: 0 /100 WBCS
PH UR STRIP.AUTO: 5 [PH]
PLATELET # BLD AUTO: 197 THOUSANDS/UL (ref 149–390)
PMV BLD AUTO: 11.6 FL (ref 8.9–12.7)
POTASSIUM SERPL-SCNC: 4 MMOL/L (ref 3.5–5.3)
PROT SERPL-MCNC: 6.7 G/DL (ref 6.4–8.4)
PROT UR STRIP-MCNC: ABNORMAL MG/DL
RBC # BLD AUTO: 5.27 MILLION/UL (ref 3.81–5.12)
RBC #/AREA URNS AUTO: ABNORMAL /HPF
SODIUM SERPL-SCNC: 138 MMOL/L (ref 135–147)
SP GR UR STRIP.AUTO: 1.02 (ref 1–1.03)
UROBILINOGEN UR STRIP-ACNC: <2 MG/DL
WBC # BLD AUTO: 5.1 THOUSAND/UL (ref 4.31–10.16)
WBC #/AREA URNS AUTO: ABNORMAL /HPF

## 2024-08-11 PROCEDURE — 85025 COMPLETE CBC W/AUTO DIFF WBC: CPT

## 2024-08-11 PROCEDURE — 80053 COMPREHEN METABOLIC PANEL: CPT

## 2024-08-11 PROCEDURE — 81001 URINALYSIS AUTO W/SCOPE: CPT

## 2024-08-11 PROCEDURE — 36415 COLL VENOUS BLD VENIPUNCTURE: CPT

## 2024-08-11 PROCEDURE — 83036 HEMOGLOBIN GLYCOSYLATED A1C: CPT

## 2024-08-14 NOTE — PROGRESS NOTES
Assessment & Plan   Diagnoses and all orders for this visit:    Encounter for gynecological examination (general) (routine) without abnormal findings  -     Liquid-based pap, screening  The current ASCCP guidelines were reviewed. Patient's last pap was 6/14/18 - WNL (-) HRHPV type 16/18 neg and history of endometrial cancer/no history of abnormal paps; Discussed with patient I need to confirm ok to d/c pap smear screening or not; Will update pap smear today regardless. I emphasized the importance of an annual pelvic and breast exam. Patient ok to have a pap done today.    Encounter for screening mammogram for malignant neoplasm of breast  -     Mammo screening bilateral w 3d & cad; Future  A yearly mammogram is recommended for breast cancer screening starting at age 40;     Chronic vulvitis  Encouraged using an antifungal powder to grown/vulva; Can also use OTC hydrocortisone cream as needed for itching. Discussed consulting with pelvic floor physical therapy to help strengthen pelvic floor muscles and decrease risk of incontinence. Can consider consulting with urogyn.     Discussion  I have discussed the importance of monthly self-breast exams, exercise and healthy diet as well as adequate intake of calcium and vitamin D. Encourage at least 1200 mg calcium citrate + 2000 IUs vitamin D3 divided through diet and supplement throughout the day; Encourage 30-40 min weight bearing exercise most days of week  STI testing - declines  In addition, colon cancer screening with a colonoscopy is recommended starting at age 45-50 and reviewed benefits - she is up to date with screening.  I have reviewed the patient's risk factors for osteoporosis and ordered a dexa scan if applicable. Pt to check with insurance for coverage - will plan to discuss further with PCP later today.  All questions have been answered to her satisfaction  RTO for APE or sooner if needed    Subjective     HPI   Cielo Thomason is a 63 y.o. female who  presents for annual well woman exam. S/p robotic hysterectomy, B/L salpingo oophorectomy for endometrial cancer 10/2021; last follow-up 10/2022 - advised to return in 6 months - encourage patient to schedule a follow-up with gyn onc.  LMP - 2012; Denies  bleeding  Occasional vulva itching - but does need to wear pads daily due to leakage; No vaginal itch/burn; No abn discharge or odor; No urinary sx - burning/pain/frequency/hematuria; (+) stress/urge incontinence - annoying; noticed no change even after weight loss of 55 lbs; can consider pelvic floor physical therapy or consult with urogyn   (+) SBEs - no breast masses, asymmetry, nipple discharge or bleeding, changes in skin of breast, or breast tenderness bilaterally  No abd/pelvic pain or HAs;   (+) menopausal symptoms: (+) hot flashes/night sweats - comes and goes in waves - annoying but tolerates at this time; no problems with intercourse, vaginal dryness; sleeping just ok.   Pt is sexually active in a mutually monog/ sexual relationship; No issues with intercourse; She declines sti/hiv/hep testing; Feels safe at home  (+) PCP for routine Bw/care; last gyn onc follow-up 10/2022 - recommended to f/u in 6 months    Last Pap - 6/14/18 - WNL (-) HRHPV type 16/18 neg  History of abnormal Pap smear: no  Last mammo - 8/5/24 - [facility]  History of abnormal mammogram: yes once and normal on repeat  Last colonoscopy - 9/29/21 - follow-up 5 yr  Last Dexa scan - none    Review of Systems   Constitutional:  Negative for activity change, fatigue, fever and unexpected weight change (has lost about 55 lbs - trying lose).   HENT:  Negative for congestion, dental problem, sinus pressure and sinus pain.    Eyes:  Negative for visual disturbance.   Respiratory:  Negative for cough, shortness of breath and wheezing.    Cardiovascular:  Negative for chest pain and leg swelling.   Gastrointestinal:  Negative for abdominal distention, abdominal pain, blood in stool,  constipation, diarrhea, nausea and vomiting.   Endocrine: Negative for polydipsia.   Genitourinary:  Negative for difficulty urinating, dyspareunia, dysuria, frequency, hematuria, menstrual problem, pelvic pain, urgency, vaginal bleeding, vaginal discharge and vaginal pain.   Musculoskeletal:  Negative for arthralgias and back pain.   Allergic/Immunologic: Negative for environmental allergies.   Neurological:  Negative for dizziness, seizures and headaches.   Psychiatric/Behavioral:  Negative for dysphoric mood and sleep disturbance. The patient is not nervous/anxious.        The following portions of the patient's history were reviewed and updated as appropriate: allergies, current medications, past family history, past medical history, past social history, past surgical history, and problem list.         OB History          5    Para   4    Term   4            AB   1    Living   4         SAB   1    IAB        Ectopic        Multiple        Live Births   4           Obstetric Comments   : 1 SAB, 3 C/S, 1                Past Medical History:   Diagnosis Date    Allergic rhinitis 2022    Anxiety     Arthritis     BMI 50.0-59.9, adult (HCC) 2021    Cellulitis of left leg 2021    Depression     Eczema 2022    Edema of both lower legs 2021    Edema of both lower legs 2021    Endometrial cancer (HCC) 2021    Heart murmur     Heart murmur 2024    History of COVID-19 2020    Mild sypmtoms Cough,Tired,diarrhea,sweats, Loss taste and smell    Hyperglycemia 2021    Hyperlipidemia     Hypertension     Left hip pain 2022    Lower abdominal pain 2021    Mixed hyperlipidemia 2021    Numbness and tingling in left arm     Obesity 2024    Pruritus     Rash of hand 2022    Shortness of breath     Skin lesion     Skin rash 2023    Tinea cruris 2023    Tinea pedis of both feet 2022    Vitamin D deficiency  2021       Past Surgical History:   Procedure Laterality Date    CARPAL TUNNEL RELEASE Bilateral      SECTION      x3     SECTION      CHOLECYSTECTOMY      CHOLECYSTECTOMY LAPAROSCOPIC N/A 2019    Procedure: CHOLECYSTECTOMY LAPAROSCOPIC;  Surgeon: Shayne De Leon MD;  [facility];  Service: General    COLONOSCOPY      CYSTOSCOPY N/A 10/08/2021    Procedure: Cystoscopy;  Surgeon: Adelso Freeman MD;  Location: [facility];  Service: Gynecology Oncology    FL GUIDED NEEDLE PLAC BX/ASP/INJ  2015    FL INJECTION LEFT HIP (NON ARTHROGRAM)  2022    HYSTERECTOMY      JOINT REPLACEMENT  2016    R total HIp    MAMMO (HISTORICAL)  10/11/2018    Advise for yearly mammo screening    OOPHORECTOMY Bilateral     MS HYSTEROSCOPY BX ENDOMETRIUM&/POLYPC W/WO D&C N/A 2016    Procedure: FRACTIONAL DILATATION AND CURETTAGE (D&C) WITH HYSTEROSOCPY;  Surgeon: Farnaz Romero MD;  Location: [facility];  Service: Gynecology    MS LAPS TOTAL HYSTERECT 250 GM/< W/RMVL TUBE/OVARY N/A 10/08/2021    Procedure: ROBOTIC HYSTERECTOMY, BILATERAL SALPINGOOPHERECTOMY; LEFT EXTERNAL ILIAC SENTINEL LYMPH NODE BIOPSY, RIGHT PELVIC LYMPHADENECTOMY;  Surgeon: Adelso Freeman MD;  Location: [facility];  Service: Gynecology Oncology    REPLACEMENT TOTAL KNEE Right        Family History   Problem Relation Age of Onset    Hypertension Mother     Hypertension Father     Heart failure Father     Lymphoma Father 62    Diabetes Father         at old age    No Known Problems Sister     No Known Problems Daughter     No Known Problems Maternal Grandmother     No Known Problems Maternal Grandfather     No Known Problems Paternal Grandmother     No Known Problems Paternal Grandfather     Hemophilia Brother     Breast cancer Maternal Aunt 50    Brain cancer Maternal Aunt 48    No Known Problems Maternal Aunt     No Known Problems Son     No Known Problems Son     No Known Problems Son        Social  History     Socioeconomic History    Marital status: /Civil Union     Spouse name: Not on file    Number of children: Not on file    Years of education: Not on file    Highest education level: Not on file   Occupational History    Not on file   Tobacco Use    Smoking status: Never    Smokeless tobacco: Never   Vaping Use    Vaping status: Never Used   Substance and Sexual Activity    Alcohol use: No    Drug use: Not Currently    Sexual activity: Not Currently     Partners: Male     Comment: pt declines hiv std testing   Other Topics Concern    Not on file   Social History Narrative    Current work/study status: Full-time    Single-family home    Lives with spouse    Private household service  Leans house - Inactive 2/19/2019     Annual dental checkup: sees q6months    Annual eye exam: Sees as needed    Pap smear: By her gyn, Dr. Romero    - [facility]     Social Determinants of Health     Financial Resource Strain: Not on file   Food Insecurity: Not on file   Transportation Needs: Not on file   Physical Activity: Not on file   Stress: Not on file   Social Connections: Not on file   Intimate Partner Violence: Not on file   Housing Stability: Not on file         Current Outpatient Medications:     amLODIPine (NORVASC) 5 mg tablet, Take 1 tablet (5 mg total) by mouth daily, Disp: 90 tablet, Rfl: 1    Ascorbic Acid (vitamin C) 1000 MG tablet, Take 1,000 mg by mouth daily, Disp: , Rfl:     busPIRone (BUSPAR) 5 mg tablet, Take 1 tablet (5 mg total) by mouth daily at bedtime, Disp: 90 tablet, Rfl: 1    calcium carbonate (OS-CAMDEN) 600 MG tablet, Take 600 mg by mouth daily, Disp: , Rfl:     Cholecalciferol (Vitamin D) 50 MCG (2000 UT) tablet, Take 2,000 Units by mouth daily, Disp: , Rfl:     citalopram (CeleXA) 20 mg tablet, Take 1 tablet (20 mg total) by mouth daily at bedtime, Disp: 90 tablet, Rfl: 1    clotrimazole-betamethasone (LOTRISONE) 1-0.05 % cream, APPLY TOPICALLY 2 (TWO) TIMES A DAY, Disp: 45 g,  "Rfl: 1    fexofenadine (ALLEGRA) 180 MG tablet, Take 180 mg by mouth daily as needed, Disp: , Rfl:     losartan-hydrochlorothiazide (HYZAAR) 50-12.5 mg per tablet, Take 1 tablet by mouth daily, Disp: 90 tablet, Rfl: 1    methocarbamol (ROBAXIN) 500 mg tablet, Take 1 tablet (500 mg total) by mouth 2 (two) times a day, Disp: 20 tablet, Rfl: 0    metoprolol succinate (TOPROL-XL) 100 mg 24 hr tablet, Take 1 tablet (100 mg total) by mouth daily at bedtime, Disp: 90 tablet, Rfl: 0    Multiple Vitamins-Minerals (multivitamin with minerals) tablet, Take 1 tablet by mouth daily, Disp: , Rfl:     omeprazole (PriLOSEC) 20 mg delayed release capsule, Take 20 mg by mouth as needed, Disp: , Rfl:     predniSONE 10 mg tablet, Take 6 tabs days 1&2, 5 tabs days 3&4, 4 tabs days 5&6, 3 tabs days 7&8, 2 tabs days 9&10, 1 tab days 11&12, Disp: 42 tablet, Rfl: 0    vitamin B-12 (VITAMIN B-12) 1,000 mcg tablet, Take by mouth daily, Disp: , Rfl:     Allergies   Allergen Reactions    Griseofulvin Hives    Penicillins Hives     She can take cephalosporin without any side effect    Lisinopril Cough    Zithromax [Azithromycin] Headache       Objective   Vitals:    08/15/24 1105   BP: 124/80   BP Location: Left arm   Patient Position: Sitting   Cuff Size: Large   Weight: 131 kg (288 lb)   Height: 5' 8\" (1.727 m)     Physical Exam  Vitals reviewed.   Constitutional:       General: She is awake. She is not in acute distress.     Appearance: Normal appearance. She is well-developed and well-groomed. She is not ill-appearing, toxic-appearing or diaphoretic.   HENT:      Head: Normocephalic and atraumatic.   Eyes:      Conjunctiva/sclera: Conjunctivae normal.   Neck:      Thyroid: No thyroid mass, thyromegaly or thyroid tenderness.   Cardiovascular:      Rate and Rhythm: Normal rate and regular rhythm.      Heart sounds: Normal heart sounds. No murmur heard.  Pulmonary:      Effort: Pulmonary effort is normal. No tachypnea, bradypnea or respiratory " distress.      Breath sounds: Normal breath sounds. No stridor or decreased air movement. No wheezing.   Chest:   Breasts:     Breasts are symmetrical.      Right: Normal. No swelling, bleeding, inverted nipple, mass, nipple discharge, skin change or tenderness.      Left: Normal. No swelling, bleeding, inverted nipple, mass, nipple discharge, skin change or tenderness.   Abdominal:      General: There is no distension.      Palpations: Abdomen is soft. There is no mass.      Tenderness: There is no abdominal tenderness.      Hernia: No hernia is present. There is no hernia in the left inguinal area or right inguinal area.   Genitourinary:     General: Normal vulva.      Exam position: Supine.      Pubic Area: No rash or pubic lice.       Labia:         Right: No rash, tenderness, lesion or injury.         Left: No rash, tenderness, lesion or injury.       Urethra: No prolapse, urethral pain, urethral swelling or urethral lesion.      Vagina: Normal. No signs of injury and foreign body. No vaginal discharge, erythema, tenderness, bleeding, lesions or prolapsed vaginal walls.      Uterus: Absent.       Comments: Uterus, cervix, adnexa, and fallopian tubes surgically absent  Rectal exam and hemoccult stool card deferred by patient and provider since up to date on colon cancer screening  Mild erythema of vulva and bilateral groin - has lotrisone cream as needed;  Lymphadenopathy:      Cervical: No cervical adenopathy.      Upper Body:      Right upper body: No supraclavicular or axillary adenopathy.      Left upper body: No supraclavicular or axillary adenopathy.      Lower Body: No right inguinal adenopathy. No left inguinal adenopathy.   Skin:     General: Skin is warm and dry.   Neurological:      Mental Status: She is alert and oriented to person, place, and time.   Psychiatric:         Mood and Affect: Mood and affect normal.         Speech: Speech normal.         Behavior: Behavior normal. Behavior is  cooperative.         Thought Content: Thought content normal.         Judgment: Judgment normal.

## 2024-08-15 ENCOUNTER — OFFICE VISIT (OUTPATIENT)
Dept: OBGYN CLINIC | Facility: CLINIC | Age: 64
End: 2024-08-15
Payer: COMMERCIAL

## 2024-08-15 ENCOUNTER — OFFICE VISIT (OUTPATIENT)
Dept: INTERNAL MEDICINE CLINIC | Facility: CLINIC | Age: 64
End: 2024-08-15
Payer: COMMERCIAL

## 2024-08-15 VITALS
HEIGHT: 68 IN | RESPIRATION RATE: 18 BRPM | WEIGHT: 288 LBS | DIASTOLIC BLOOD PRESSURE: 78 MMHG | BODY MASS INDEX: 43.65 KG/M2 | OXYGEN SATURATION: 96 % | HEART RATE: 82 BPM | SYSTOLIC BLOOD PRESSURE: 132 MMHG | TEMPERATURE: 98.6 F

## 2024-08-15 VITALS
HEIGHT: 68 IN | SYSTOLIC BLOOD PRESSURE: 124 MMHG | WEIGHT: 288 LBS | DIASTOLIC BLOOD PRESSURE: 80 MMHG | BODY MASS INDEX: 43.65 KG/M2

## 2024-08-15 DIAGNOSIS — Z12.31 ENCOUNTER FOR SCREENING MAMMOGRAM FOR MALIGNANT NEOPLASM OF BREAST: ICD-10-CM

## 2024-08-15 DIAGNOSIS — K21.9 GASTROESOPHAGEAL REFLUX DISEASE WITHOUT ESOPHAGITIS: ICD-10-CM

## 2024-08-15 DIAGNOSIS — Z85.42 HISTORY OF ENDOMETRIAL CANCER: ICD-10-CM

## 2024-08-15 DIAGNOSIS — M54.16 LUMBAR RADICULOPATHY: ICD-10-CM

## 2024-08-15 DIAGNOSIS — F41.9 ANXIETY AND DEPRESSION: Chronic | ICD-10-CM

## 2024-08-15 DIAGNOSIS — Z01.419 ENCOUNTER FOR GYNECOLOGICAL EXAMINATION (GENERAL) (ROUTINE) WITHOUT ABNORMAL FINDINGS: Primary | ICD-10-CM

## 2024-08-15 DIAGNOSIS — E78.2 MIXED HYPERLIPIDEMIA: Chronic | ICD-10-CM

## 2024-08-15 DIAGNOSIS — I89.0 LYMPHEDEMA: ICD-10-CM

## 2024-08-15 DIAGNOSIS — I10 ESSENTIAL HYPERTENSION: Primary | ICD-10-CM

## 2024-08-15 DIAGNOSIS — F32.A ANXIETY AND DEPRESSION: Chronic | ICD-10-CM

## 2024-08-15 DIAGNOSIS — N76.3 CHRONIC VULVITIS: ICD-10-CM

## 2024-08-15 PROCEDURE — 99213 OFFICE O/P EST LOW 20 MIN: CPT | Performed by: INTERNAL MEDICINE

## 2024-08-15 PROCEDURE — G0145 SCR C/V CYTO,THINLAYER,RESCR: HCPCS | Performed by: PHYSICIAN ASSISTANT

## 2024-08-15 PROCEDURE — S0612 ANNUAL GYNECOLOGICAL EXAMINA: HCPCS | Performed by: PHYSICIAN ASSISTANT

## 2024-08-15 PROCEDURE — G0476 HPV COMBO ASSAY CA SCREEN: HCPCS | Performed by: PHYSICIAN ASSISTANT

## 2024-08-15 RX ORDER — LOSARTAN POTASSIUM AND HYDROCHLOROTHIAZIDE 12.5; 5 MG/1; MG/1
1 TABLET ORAL DAILY
Qty: 90 TABLET | Refills: 1 | Status: SHIPPED | OUTPATIENT
Start: 2024-08-15

## 2024-08-15 RX ORDER — METOPROLOL SUCCINATE 100 MG/1
100 TABLET, EXTENDED RELEASE ORAL
Qty: 90 TABLET | Refills: 1 | Status: SHIPPED | OUTPATIENT
Start: 2024-08-15

## 2024-08-15 RX ORDER — CITALOPRAM HYDROBROMIDE 20 MG/1
20 TABLET ORAL
Qty: 90 TABLET | Refills: 1 | Status: SHIPPED | OUTPATIENT
Start: 2024-08-15

## 2024-08-15 RX ORDER — BUSPIRONE HYDROCHLORIDE 5 MG/1
5 TABLET ORAL
Qty: 90 TABLET | Refills: 1 | Status: SHIPPED | OUTPATIENT
Start: 2024-08-15

## 2024-08-15 RX ORDER — AMLODIPINE BESYLATE 5 MG/1
5 TABLET ORAL DAILY
Qty: 90 TABLET | Refills: 1 | Status: SHIPPED | OUTPATIENT
Start: 2024-08-15

## 2024-08-15 NOTE — PROGRESS NOTES
Assessment/Plan:    1. Essential hypertension  -     metoprolol succinate (TOPROL-XL) 100 mg 24 hr tablet; Take 1 tablet (100 mg total) by mouth daily at bedtime  -     losartan-hydrochlorothiazide (HYZAAR) 50-12.5 mg per tablet; Take 1 tablet by mouth daily  -     amLODIPine (NORVASC) 5 mg tablet; Take 1 tablet (5 mg total) by mouth daily  2. Gastroesophageal reflux disease without esophagitis  3. Anxiety and depression  -     busPIRone (BUSPAR) 5 mg tablet; Take 1 tablet (5 mg total) by mouth daily at bedtime  -     citalopram (CeleXA) 20 mg tablet; Take 1 tablet (20 mg total) by mouth daily at bedtime  4. Mixed hyperlipidemia  5. Lymphedema  6. History of endometrial cancer  7. Lumbar radiculopathy     Discussion/summary/plan:    Blood pressure well-controlled advised to continue present medications and low-salt diet.  For GE reflux she takes only as needed omeprazole and has been watching diet.  Anxiety depression controlled on citalopram and buspirone patient would like to continue same dose as she has been taking without any side effects and effective.  Cholesterol 178, triglyceride 209, HDL 41, LDL 95 advised to continue low-cholesterol low-carb diet.  Advised to add fish oil 1 tablet daily.  For lymphedema she uses lymphedema pump.  She has history of endometrial carcinoma, patient states she was seen by her GYN today.  patient has a left leg pain mainly left buttock ,thigh and in the foot.  Was seen by orthopedic had MRI.  Also she was seen by chiropractor.  Advised to follow with orthopedic specialist.    Subjective: Patient presents for follow-up.      Patient ID: Cielo Thomason is a 63 y.o. female.    Leg Pain       63-year-old white female patient presents for follow-up on medical problems.  She denies any chest pain, shortness of breath, or pain in abdomen. Denies any cough, fever, chills.  Denies nausea vomiting diarrhea.  Patient states he fell  and developed left hip ,left buttock and thigh  pain.  Went to see emergency room on 2024 and then after she went to see orthopedic specialist on 2024.  She had MRI.  She was also seen by chiropractor after MRI was done.  Still has a pain in the left buttock thigh and left foot.    The following portions of the patient's history were reviewed and updated as appropriate:     Past Medical History:  She has a past medical history of Allergic rhinitis (2022), Anxiety, Arthritis, BMI 50.0-59.9, adult ([facility]) (2021), Cellulitis of left leg (2021), Depression, Eczema (2022), Edema of both lower legs (2021), Edema of both lower legs (2021), Endometrial cancer ([facility]) (2021), Heart murmur, Heart murmur (2024), History of COVID-19 (2020), Hyperglycemia (2021), Hyperlipidemia, Hypertension, Left hip pain (2022), Lower abdominal pain (2021), Lumbar radiculopathy (08/15/2024), Mixed hyperlipidemia (2021), Numbness and tingling in left arm, Obesity (2024), Pruritus, Rash of hand (2022), Shortness of breath, Skin lesion, Skin rash (2023), Tinea cruris (2023), Tinea pedis of both feet (2022), and Vitamin D deficiency (2021).,  _______________________________________________________________________  Past Surgical History:   has a past surgical history that includes  section; pr hysteroscopy bx endometrium&/polypc w/wo d&c (N/A, 2016); Replacement total knee (Right);  section; CHOLECYSTECTOMY LAPAROSCOPIC (N/A, 2019); Joint replacement (2016); Mammo (historical) (10/11/2018); Colonoscopy; Carpal tunnel release (Bilateral); Cholecystectomy; pr laps total hysterect 250 gm/< w/rmvl tube/ovary (N/A, 10/08/2021); CYSTOSCOPY (N/A, 10/08/2021); FL injection left hip (non arthrogram) (2022); FL guided needle plac bx/asp/inj (2015); Hysterectomy (); and Oophorectomy (Bilateral,  2021).,  _______________________________________________________________________  Family History:  family history includes Brain cancer (age of onset: 48) in her maternal aunt; Breast cancer (age of onset: 50) in her maternal aunt; Diabetes in her father; Heart failure in her father; Hemophilia in her brother; Hypertension in her father and mother; Lymphoma (age of onset: 62) in her father; No Known Problems in her daughter, maternal aunt, maternal grandfather, maternal grandmother, paternal grandfather, paternal grandmother, sister, son, son, and son.,  _______________________________________________________________________  Social History:   reports that she has never smoked. She has never used smokeless tobacco. She reports that she does not currently use drugs. She reports that she does not drink alcohol.,  _______________________________________________________________________  Allergies:  is allergic to griseofulvin, penicillins, lisinopril, and zithromax [azithromycin]..  _______________________________________________________________________  Current Outpatient Medications   Medication Sig Dispense Refill   • amLODIPine (NORVASC) 5 mg tablet Take 1 tablet (5 mg total) by mouth daily 90 tablet 1   • Ascorbic Acid (vitamin C) 1000 MG tablet Take 1,000 mg by mouth daily     • busPIRone (BUSPAR) 5 mg tablet Take 1 tablet (5 mg total) by mouth daily at bedtime 90 tablet 1   • calcium carbonate (OS-CAMDEN) 600 MG tablet Take 600 mg by mouth daily     • Cholecalciferol (Vitamin D) 50 MCG (2000 UT) tablet Take 2,000 Units by mouth daily     • citalopram (CeleXA) 20 mg tablet Take 1 tablet (20 mg total) by mouth daily at bedtime 90 tablet 1   • clotrimazole-betamethasone (LOTRISONE) 1-0.05 % cream APPLY TOPICALLY 2 (TWO) TIMES A DAY 45 g 1   • fexofenadine (ALLEGRA) 180 MG tablet Take 180 mg by mouth daily as needed     • losartan-hydrochlorothiazide (HYZAAR) 50-12.5 mg per tablet Take 1 tablet by mouth daily 90 tablet  1   • metoprolol succinate (TOPROL-XL) 100 mg 24 hr tablet Take 1 tablet (100 mg total) by mouth daily at bedtime 90 tablet 1   • Multiple Vitamins-Minerals (multivitamin with minerals) tablet Take 1 tablet by mouth daily     • omeprazole (PriLOSEC) 20 mg delayed release capsule Take 20 mg by mouth as needed     • vitamin B-12 (VITAMIN B-12) 1,000 mcg tablet Take by mouth daily     • methocarbamol (ROBAXIN) 500 mg tablet Take 1 tablet (500 mg total) by mouth 2 (two) times a day (Patient not taking: Reported on 8/15/2024) 20 tablet 0   • predniSONE 10 mg tablet Take 6 tabs days 1&2, 5 tabs days 3&4, 4 tabs days 5&6, 3 tabs days 7&8, 2 tabs days 9&10, 1 tab days 11&12 (Patient not taking: Reported on 8/15/2024) 42 tablet 0     No current facility-administered medications for this visit.     _______________________________________________________________________  Review of Systems   Constitutional:  Negative for chills and fever.   HENT:  Negative for congestion, ear pain, hearing loss, nosebleeds, sinus pain, sore throat and trouble swallowing.    Eyes:  Negative for discharge, redness and visual disturbance.   Respiratory:  Negative for cough, chest tightness and shortness of breath.    Cardiovascular:  Positive for leg swelling. Negative for chest pain and palpitations.   Gastrointestinal:  Negative for abdominal pain, blood in stool, constipation, diarrhea, nausea and vomiting.   Genitourinary:  Negative for dysuria, flank pain and hematuria.   Musculoskeletal:  Positive for arthralgias (Pain in the left hip, left buttock, left thigh and left foot). Negative for neck pain.   Skin:  Negative for color change and rash.   Neurological:  Negative for dizziness, speech difficulty, weakness and headaches.   Hematological:  Does not bruise/bleed easily.   Psychiatric/Behavioral:  Negative for agitation, behavioral problems, self-injury and suicidal ideas.          Objective:  Vitals:    08/15/24 1511   BP: 132/78   BP  "Location: Left arm   Patient Position: Sitting   Cuff Size: Large   Pulse: 82   Resp: 18   Temp: 98.6 °F (37 °C)   TempSrc: Temporal   SpO2: 96%   Weight: 131 kg (288 lb)   Height: 5' 8\" (1.727 m)     Body mass index is 43.79 kg/m².     Physical Exam  Vitals and nursing note reviewed.   Constitutional:       General: She is not in acute distress.     Appearance: She is obese.   HENT:      Head: Normocephalic and atraumatic.      Right Ear: External ear normal.      Left Ear: External ear normal.   Eyes:      General: No scleral icterus.        Right eye: No discharge.         Left eye: No discharge.      Extraocular Movements: Extraocular movements intact.      Conjunctiva/sclera: Conjunctivae normal.   Cardiovascular:      Rate and Rhythm: Normal rate and regular rhythm.      Pulses: Normal pulses.   Pulmonary:      Effort: Pulmonary effort is normal. No respiratory distress.      Breath sounds: Normal breath sounds. No wheezing.   Abdominal:      General: Bowel sounds are normal.      Palpations: Abdomen is soft.      Tenderness: There is no abdominal tenderness.   Musculoskeletal:      Cervical back: Normal range of motion and neck supple. No muscular tenderness.      Right lower leg: Edema (Nonpitting) present.      Left lower leg: Edema (Nonpitting) present.      Comments: Patient is ambulating with cane has a pain in the left buttock, left thigh and left foot.   Skin:     General: Skin is warm.      Findings: No rash.   Neurological:      General: No focal deficit present.      Mental Status: She is alert and oriented to person, place, and time.   Psychiatric:         Mood and Affect: Mood normal.         Behavior: Behavior normal.             "

## 2024-08-15 NOTE — PATIENT INSTRUCTIONS
Patient was advised to continue present medications. discussed with the patient medications and laboratory data in detail.  Follow-up with me in 3 months or as advised.  If any blood test was ordered please do 1 week prior to next appointment unless advise to get earlier.  If you have any questions please call the office [phone]

## 2024-08-16 LAB
HPV HR 12 DNA CVX QL NAA+PROBE: NEGATIVE
HPV16 DNA CVX QL NAA+PROBE: NEGATIVE
HPV18 DNA CVX QL NAA+PROBE: NEGATIVE

## 2024-08-19 ENCOUNTER — OFFICE VISIT (OUTPATIENT)
Dept: OBGYN CLINIC | Facility: CLINIC | Age: 64
End: 2024-08-19
Payer: COMMERCIAL

## 2024-08-19 VITALS
SYSTOLIC BLOOD PRESSURE: 134 MMHG | HEART RATE: 72 BPM | WEIGHT: 288 LBS | DIASTOLIC BLOOD PRESSURE: 80 MMHG | BODY MASS INDEX: 43.79 KG/M2

## 2024-08-19 DIAGNOSIS — M54.16 LUMBAR RADICULOPATHY: Primary | ICD-10-CM

## 2024-08-19 PROCEDURE — 99214 OFFICE O/P EST MOD 30 MIN: CPT | Performed by: PHYSICAL MEDICINE & REHABILITATION

## 2024-08-19 NOTE — PROGRESS NOTES
"1. Lumbar radiculopathy  Ambulatory referral to Spine & Pain Management        Orders Placed This Encounter   Procedures    Ambulatory referral to Spine & Pain Management      Impression:  Lumbar pain that is multifactorial and predominantly due to lumbar degenerative disc disorder and facet hypertrophy leading to multilevel spinal/foraminal stenosis.  There are no concerning neurological deficits.  She also has severe left hip osteoarthritis for which she is treating with Dr. Eaton.    We reviewed the MRI of her lumbar spine today.  She has full strength in her left lower extremity.  She would benefit from formal physical therapy.  She reports that her symptoms are improving.  We will also have her see spine and pain.  I will see her back if needed.    No follow-ups on file.    Patient is in agreement with the above plan.      Imaging Studies (I personally reviewed images in PACS and report if it was available):  MRI lumbar spine:  \"VERTEBRAL BODIES:  There are 5 lumbar type vertebral bodies. Mild dextroscoliosis of lumbar spine. No listhesis. No compression fracture.     Type II Modic endplate change L4-L5. Mixed type I/II Modic endplate change L5-S1. Otherwise, normal bone marrow signal.     SACRUM:  Normal signal within the sacrum. No evidence of insufficiency or stress fracture.     DISTAL CORD AND CONUS:  Normal size and signal within the distal cord and conus.     PARASPINAL SOFT TISSUES:  Paraspinal soft tissues are unremarkable.     LOWER THORACIC DISC SPACES: Mild noncompressive lower thoracic degenerative change.     LUMBAR DISC SPACES: Multilevel osteophytes, small intravertebral herniations, disc height loss, and facet arthropathy.     L1-L2: Diffuse disc bulge, small left central/subarticular zone disc osteophyte complex. Facet arthropathy. Mild canal stenosis. No significant foraminal narrowing.     L2-L3: Diffuse disc bulge. Facet arthropathy, ligamentum flavum thickening. Mild canal stenosis. No " "significant foraminal narrowing.     L3-L4: Diffuse disc bulge, left foraminal/extraforaminal disc protrusion mildly compressing left L3 exiting nerve. Hypertrophic facet arthropathy, ligamentum flavum thickening. Mild canal stenosis. Moderate to severe left foraminal narrowing.     L4-L5: Diffuse disc bulge, narrowed right subarticular zones. Hypertrophic facet arthropathy, ligamentum flavum thickening. Mild canal stenosis. Mild right foraminal narrowing.     L5-S1: Diffuse disc bulge, small left subarticular disc extrusion compresses left S1 descending nerve root. Hypertrophic facet arthropathy, ligamentum flavum thickening. Moderate right and mild left foraminal narrowing.     OTHER FINDINGS: Partially imaged right hip hardware susceptibility artifact on localizer images.     IMPRESSION:     L3-L4: Left foraminal/extraforaminal disc protrusion mildly compresses left L3 exiting nerve.     L5-S1: Small left subarticular disc extrusion compresses left S1 descending nerve root.     Multilevel degenerative changes of lumbar spine with varying degrees of canal stenosis (multilevel mild) and foraminal narrowing (moderate-to-severe left L3-L4), as detailed above.\"    HPI:  Cielo Thomason is a 63 y.o. female  who presents for evaluation of   Chief Complaint   Patient presents with    Lower Back - Pain     MRI Review       Onset/Mechanism: Pain in the left leg and thigh.  She has had this for about a month.  Location: As above.  Radiation: As above.  Quality: Numbness and pain.  Provocative: Pain was excruciating but now it is improving.  Severity: Improving since injury.  Associated Symptoms: Numbness/tingling but denies weakness.  Treatment so far: Activity modification.    No red flag symptoms including fever/chills, unintentional weight change, bowel/bladder incontinence, scissoring gait, personal/family history of cancer, pain worse at night, intravenous drug abuse or trauma.      Following history reviewed and " updated:  Past Medical History:   Diagnosis Date    Allergic rhinitis 2022    Anxiety     Arthritis     BMI 50.0-59.9, adult (HCC) 2021    Cellulitis of left leg 2021    Depression     Eczema 2022    Edema of both lower legs 2021    Edema of both lower legs 2021    Endometrial cancer (HCC) 2021    Heart murmur     Heart murmur 2024    History of COVID-19 2020    Mild sypmtoms Cough,Tired,diarrhea,sweats, Loss taste and smell    Hyperglycemia 2021    Hyperlipidemia     Hypertension     Left hip pain 2022    Lower abdominal pain 2021    Lumbar radiculopathy 08/15/2024    Mixed hyperlipidemia 2021    Numbness and tingling in left arm     Obesity 2024    Pruritus     Rash of hand 2022    Shortness of breath     Skin lesion     Skin rash 2023    Tinea cruris 2023    Tinea pedis of both feet 2022    Vitamin D deficiency 2021     Past Surgical History:   Procedure Laterality Date    CARPAL TUNNEL RELEASE Bilateral      SECTION      x3     SECTION      CHOLECYSTECTOMY      CHOLECYSTECTOMY LAPAROSCOPIC N/A 2019    Procedure: CHOLECYSTECTOMY LAPAROSCOPIC;  Surgeon: Shayne De Leon MD;  Location: [facility];  Service: General    COLONOSCOPY      CYSTOSCOPY N/A 10/08/2021    Procedure: Cystoscopy;  Surgeon: Adelso Freeman MD;  Location: [facility];  Service: Gynecology Oncology    FL GUIDED NEEDLE PLAC BX/ASP/INJ  2015    FL INJECTION LEFT HIP (NON ARTHROGRAM)  2022    HYSTERECTOMY      JOINT REPLACEMENT  2016    R total HIp    MAMMO (HISTORICAL)  10/11/2018    Advise for yearly mammo screening    OOPHORECTOMY Bilateral     VA HYSTEROSCOPY BX ENDOMETRIUM&/POLYPC W/WO D&C N/A 2016    Procedure: FRACTIONAL DILATATION AND CURETTAGE (D&C) WITH HYSTEROSOCPY;  Surgeon: Farnaz Romero MD;  Location: [facility];  Service: Gynecology    VA LAPS TOTAL HYSTERECT 250 GM/< W/RMVL  TUBE/OVARY N/A 10/08/2021    Procedure: ROBOTIC HYSTERECTOMY, BILATERAL SALPINGOOPHERECTOMY; LEFT EXTERNAL ILIAC SENTINEL LYMPH NODE BIOPSY, RIGHT PELVIC LYMPHADENECTOMY;  Surgeon: Adelso Freeman MD;  Location: [facility] OR;  Service: Gynecology Oncology    REPLACEMENT TOTAL KNEE Right      Social History   Social History     Substance and Sexual Activity   Alcohol Use No     Social History     Substance and Sexual Activity   Drug Use Not Currently     Social History     Tobacco Use   Smoking Status Never   Smokeless Tobacco Never     Family History   Problem Relation Age of Onset    Hypertension Mother     Hypertension Father     Heart failure Father     Lymphoma Father 62    Diabetes Father         at old age    No Known Problems Sister     No Known Problems Daughter     No Known Problems Maternal Grandmother     No Known Problems Maternal Grandfather     No Known Problems Paternal Grandmother     No Known Problems Paternal Grandfather     Hemophilia Brother     Breast cancer Maternal Aunt 50    Brain cancer Maternal Aunt 48    No Known Problems Maternal Aunt     No Known Problems Son     No Known Problems Son     No Known Problems Son      Allergies   Allergen Reactions    Griseofulvin Hives    Penicillins Hives     She can take cephalosporin without any side effect    Lisinopril Cough    Zithromax [Azithromycin] Headache        Constitutional:  /80   Pulse 72   Wt 131 kg (288 lb)   LMP  (LMP Unknown)   BMI 43.79 kg/m²    General: NAD.   Eyes: Anicteric sclerae.  Neck: Supple.  Lungs: Unlabored breathing.  Cardiovascular: No lower extremity edema.  Skin: Intact without erythema.  Neurologic: Sensation intact to light touch.  Psychiatric: Mood and affect are appropriate.    Orthopedic Examination  Inspection: No obvious deformities, lesions or rashes.  ROM: Limited in flexion and extension.  Palpation: Piriformis area on the left. There are no step offs.  Neuro: Bilateral extremity strength is normal  and symmetric. No muscle atrophy or abnormal tone.  Bilateral extremity muscle stretch reflexes are physiologic and symmetric .  No myelopathic signs.   Sensation to light touch is intact throughout.  Neural compression testing: Normal bilateral SLR/dural stretch.  Gait is left antalgia.  She is using a straight cane.    Procedures

## 2024-08-22 LAB
LAB AP GYN PRIMARY INTERPRETATION: NORMAL
Lab: NORMAL

## 2024-09-04 NOTE — DISCHARGE INSTRUCTIONS
Diet and activity as tolerated  May shower  May drive when not taking pain medication  Please call [phone] for a follow-up office visit for 2 weeks    [address], [address], [place], [address]     [address] between [place] and [facility]
36.7

## 2024-09-07 NOTE — ED ATTENDING ATTESTATION
7/27/2024  IDavy DO, saw and evaluated the patient. I have discussed the patient with the resident/non-physician practitioner and agree with the resident's/non-physician practitioner's findings, Plan of Care, and MDM as documented in the resident's/non-physician practitioner's note, except where noted. All available labs and Radiology studies were reviewed.  I was present for key portions of any procedure(s) performed by the resident/non-physician practitioner and I was immediately available to provide assistance.       At this point I agree with the current assessment done in the Emergency Department.  I have conducted an independent evaluation of this patient a history and physical is as follows:                    Chief Complaint   Patient presents with    Fall     Patient to ED from home with c/o left leg pain that shoots down leg following a fall last Saturday. Patient reports sitting down on a picnic table and fell off and has had progressively worsening pain.                Fall       63-year-old female, presents with left hip pain.  Patient had a fall 4 days ago, states she was in [place] at the time was sitting on a picnic bench, fell off the side of the picnic bench onto her left hip.  She was able to stand up on her own, has been able to walk on it since although is having discomfort.  Patient uses a cane at baseline.  Says that she is in talks about having a hip replacement on that left side already has a hip replacement on the right.  Has known bad arthritis in that hip.,  No distal pain, no other areas of injury, no head strike              Physical Exam  Vitals reviewed.   Constitutional:       General: She is not in acute distress.     Appearance: She is well-developed.   HENT:      Head: Atraumatic.      Nose: Nose normal.   Eyes:      General: No scleral icterus.        Right eye: No discharge.         Left eye: No discharge.      Conjunctiva/sclera: Conjunctivae normal.   Neck:       Trachea: No tracheal deviation.   Pulmonary:      Effort: Pulmonary effort is normal. No respiratory distress.      Breath sounds: No stridor.   Musculoskeletal:         General: No deformity.      Cervical back: Normal range of motion.      Comments: Moderate tenderness over greater trochanter,, no bruising, no pain over the knee or distal femur, no pain to pelvic compression, no pain with axial loading of the heel.  able to stand without visible increase in discomfort   Skin:     General: Skin is warm and dry.      Coloration: Skin is not pale.      Findings: No erythema or rash.   Neurological:      Mental Status: She is alert.      Motor: No abnormal muscle tone.      Coordination: Coordination normal.               Medications   methocarbamol (ROBAXIN) tablet 500 mg (has no administration in time range)   ketorolac (TORADOL) injection 15 mg (15 mg Intramuscular Given 7/27/24 0846)             Labs Reviewed - No data to display      XR hip/pelv 2-3 vws left if performed   ED Interpretation   No acute fracture, right hip replacement, left hip joint with advanced osteoarthritis                   Procedures                            MDM  Number of Diagnoses or Management Options  Contusion of left hip, initial encounter  Piriformis syndrome of left side  Diagnosis management comments:       Initial ED assessment: 63-year-old female, injury to the left hip area after a fall 5 days ago off of a picnic bench landed on that side.  Has been able to ambulate, able to ambulate now    Pathology at risk for includes but is not limited to:   Hip contusion, IT band syndrome, piriformis syndrome, pelvic fracture felt to be less likely, doubt subcapital fracture    Initial ED plan:   X-ray of left hip, IM toradol for pain        Final ED summary/disposition:   After evaluation and workup in the emergency department, x-ray negative, patient discharged will follow with orthopedics               Time reflects when diagnosis  was documented in both MDM as applicable and the Disposition within this note       Time User Action Codes Description Comment    7/27/2024  9:18 AM Aruna Vegas Add [G57.02] Piriformis syndrome of left side     7/27/2024  9:31 AM Davy Page Add [S70.02XA] Contusion of left hip, initial encounter           ED Disposition       ED Disposition   Discharge    Condition   Stable    Date/Time   Sat Jul 27, 2024  9:18 AM    Comment   Cielo Thomason discharge to home/self care.                   Follow-up Information       Follow up With Specialties Details Why Contact Info Additional Information    Myriam Araujo MD Internal Medicine Schedule an appointment as soon as possible for a visit   [address] [facility] [address]  [phone]       [facility] Emergency Medicine Go to  If symptoms worsen [address] [facility] [address]  [phone] [facility], [facility], [place], [address]    Arvind Eaton MD Orthopedic Surgery Schedule an appointment as soon as possible for a visit   [address] [facility]  [address] [facility]  [place] [address] [phone]               5 (moderate pain)

## 2024-10-02 ENCOUNTER — OFFICE VISIT (OUTPATIENT)
Dept: OBGYN CLINIC | Facility: HOSPITAL | Age: 64
End: 2024-10-02
Payer: COMMERCIAL

## 2024-10-02 VITALS
HEIGHT: 68 IN | DIASTOLIC BLOOD PRESSURE: 74 MMHG | WEIGHT: 285 LBS | SYSTOLIC BLOOD PRESSURE: 132 MMHG | BODY MASS INDEX: 43.19 KG/M2 | HEART RATE: 71 BPM

## 2024-10-02 DIAGNOSIS — G89.29 CHRONIC LEFT HIP PAIN: ICD-10-CM

## 2024-10-02 DIAGNOSIS — M16.12 PRIMARY OSTEOARTHRITIS OF LEFT HIP: Primary | ICD-10-CM

## 2024-10-02 DIAGNOSIS — M54.16 RADICULOPATHY, LUMBAR REGION: ICD-10-CM

## 2024-10-02 DIAGNOSIS — M25.552 CHRONIC LEFT HIP PAIN: ICD-10-CM

## 2024-10-02 PROCEDURE — 99214 OFFICE O/P EST MOD 30 MIN: CPT | Performed by: ORTHOPAEDIC SURGERY

## 2024-10-02 NOTE — PROGRESS NOTES
Assessment:   Diagnosis ICD-10-CM Associated Orders   1. Primary osteoarthritis of left hip  M16.12       2. Chronic left hip pain  M25.552     G89.29           Plan:  Diagnosis, treatment options and associated risks were discussed with the patient including no treatment, nonsurgical treatment and potential for surgical intervention.  The patient was given the opportunity to ask questions regarding each.  Counseled on weight loss and general wellness measures  Future candidate for an elective left total hip arthroplasty however due to her elevated BMI we cannot schedule her surgery today.  Recommend target weight loss goal of 265 pounds which will satisfy the BMI component of leapfrog guidelines.  She is averaging a 10 pound weight loss per month.  Recommend follow-up appointment in 6 weeks for repeat evaluation with updated weight upon arrival.  Recommend referral which was placed previously by sports medicine to spine and pain, this was printed off and provided for her in the office today.      To do next visit:  Return in about 6 weeks (around 11/13/2024) for re-check left hip.    The above stated was discussed in layman's terms and the patient expressed understanding.  All questions were answered to the patient's satisfaction.       Scribe Attestation      I,:  Felix Tomlinson am acting as a scribe while in the presence of the attending physician.:       I,:  Arvind Eaton MD personally performed the services described in this documentation    as scribed in my presence.:               Subjective:   Cielo Thomason is a 63 y.o. female who presents today for repeat evaluation of her left hip, known advanced osteoarthritis.  She has a history of right total hip arthroplasty through [facility], 2016.  She presents using a cane for ambulatory assistance.  She does have groin pain that increases with rotatory motions of her left lower extremity.  She finds that she limps when she walks.  She has difficulty turning  in bed, getting in and out of the vehicle as well as putting on or off socks and/or shoes.  She has undergone a significant weight reduction on her own averaging approximately 10 pound weight loss a month.  She finds that her left hip symptoms do limit her day-to-day activities as well as impedes on her quality of life.      Review of systems negative unless otherwise specified in HPI  Review of Systems    Past Medical History:   Diagnosis Date    Allergic rhinitis 2022    Anxiety     Arthritis     BMI 50.0-59.9, adult ([facility]) 2021    Cellulitis of left leg 2021    Depression     Eczema 2022    Edema of both lower legs 2021    Edema of both lower legs 2021    Endometrial cancer (HCC) 2021    Heart murmur     Heart murmur 2024    History of COVID-19 2020    Mild sypmtoms Cough,Tired,diarrhea,sweats, Loss taste and smell    Hyperglycemia 2021    Hyperlipidemia     Hypertension     Left hip pain 2022    Lower abdominal pain 2021    Lumbar radiculopathy 08/15/2024    Mixed hyperlipidemia 2021    Numbness and tingling in left arm     Obesity 2024    Pruritus     Rash of hand 2022    Shortness of breath     Skin lesion     Skin rash 2023    Tinea cruris 2023    Tinea pedis of both feet 2022    Vitamin D deficiency 2021       Past Surgical History:   Procedure Laterality Date    CARPAL TUNNEL RELEASE Bilateral      SECTION      x3     SECTION      CHOLECYSTECTOMY      CHOLECYSTECTOMY LAPAROSCOPIC N/A 2019    Procedure: CHOLECYSTECTOMY LAPAROSCOPIC;  Surgeon: Shayne De Leon MD;  [facility];  Service: General    COLONOSCOPY      CYSTOSCOPY N/A 10/08/2021    Procedure: Cystoscopy;  Surgeon: Adelso Freeman MD;  Location: [facility];  Service: Gynecology Oncology    FL GUIDED NEEDLE PLAC BX/ASP/INJ  2015    FL INJECTION LEFT HIP (NON ARTHROGRAM)  2022    HYSTERECTOMY       JOINT REPLACEMENT  08/19/2016    R total HIp    MAMMO (HISTORICAL)  10/11/2018    Advise for yearly mammo screening    OOPHORECTOMY Bilateral 2021    OK HYSTEROSCOPY BX ENDOMETRIUM&/POLYPC W/WO D&C N/A 03/21/2016    Procedure: FRACTIONAL DILATATION AND CURETTAGE (D&C) WITH HYSTEROSOCPY;  Surgeon: Farnaz Romero MD;  [facility]: [facility];  Service: Gynecology    OK LAPS TOTAL HYSTERECT 250 GM/< W/RMVL TUBE/OVARY N/A 10/08/2021    Procedure: ROBOTIC HYSTERECTOMY, BILATERAL SALPINGOOPHERECTOMY; LEFT EXTERNAL ILIAC SENTINEL LYMPH NODE BIOPSY, RIGHT PELVIC LYMPHADENECTOMY;  Surgeon: Adelso Freeman MD;  Location: [facility];  Service: Gynecology Oncology    REPLACEMENT TOTAL KNEE Right        Family History   Problem Relation Age of Onset    Hypertension Mother     Hypertension Father     Heart failure Father     Lymphoma Father 62    Diabetes Father         at old age    No Known Problems Sister     No Known Problems Daughter     No Known Problems Maternal Grandmother     No Known Problems Maternal Grandfather     No Known Problems Paternal Grandmother     No Known Problems Paternal Grandfather     Hemophilia Brother     Breast cancer Maternal Aunt 50    Brain cancer Maternal Aunt 48    No Known Problems Maternal Aunt     No Known Problems Son     No Known Problems Son     No Known Problems Son        Social History     Occupational History    Not on file   Tobacco Use    Smoking status: Never    Smokeless tobacco: Never   Vaping Use    Vaping status: Never Used   Substance and Sexual Activity    Alcohol use: No    Drug use: Not Currently    Sexual activity: Not Currently     Partners: Male     Comment: pt declines hiv std testing         Current Outpatient Medications:     amLODIPine (NORVASC) 5 mg tablet, Take 1 tablet (5 mg total) by mouth daily, Disp: 90 tablet, Rfl: 1    Ascorbic Acid (vitamin C) 1000 MG tablet, Take 1,000 mg by mouth daily, Disp: , Rfl:     busPIRone (BUSPAR) 5 mg tablet, Take 1 tablet (5 mg  total) by mouth daily at bedtime, Disp: 90 tablet, Rfl: 1    calcium carbonate (OS-CAMDEN) 600 MG tablet, Take 600 mg by mouth daily, Disp: , Rfl:     Cholecalciferol (Vitamin D) 50 MCG (2000 UT) tablet, Take 2,000 Units by mouth daily, Disp: , Rfl:     citalopram (CeleXA) 20 mg tablet, Take 1 tablet (20 mg total) by mouth daily at bedtime, Disp: 90 tablet, Rfl: 1    clotrimazole-betamethasone (LOTRISONE) 1-0.05 % cream, APPLY TOPICALLY 2 (TWO) TIMES A DAY, Disp: 45 g, Rfl: 1    fexofenadine (ALLEGRA) 180 MG tablet, Take 180 mg by mouth daily as needed, Disp: , Rfl:     losartan-hydrochlorothiazide (HYZAAR) 50-12.5 mg per tablet, Take 1 tablet by mouth daily, Disp: 90 tablet, Rfl: 1    metoprolol succinate (TOPROL-XL) 100 mg 24 hr tablet, Take 1 tablet (100 mg total) by mouth daily at bedtime, Disp: 90 tablet, Rfl: 1    Multiple Vitamins-Minerals (multivitamin with minerals) tablet, Take 1 tablet by mouth daily, Disp: , Rfl:     omeprazole (PriLOSEC) 20 mg delayed release capsule, Take 20 mg by mouth as needed, Disp: , Rfl:     vitamin B-12 (VITAMIN B-12) 1,000 mcg tablet, Take by mouth daily, Disp: , Rfl:     Allergies   Allergen Reactions    Griseofulvin Hives    Penicillins Hives     She can take cephalosporin without any side effect    Lisinopril Cough    Zithromax [Azithromycin] Headache            Vitals:    10/02/24 1444   BP: 132/74   Pulse: 71       Body mass index is 43.33 kg/m².  Wt Readings from Last 3 Encounters:   10/02/24 129 kg (285 lb)   08/19/24 131 kg (288 lb)   08/15/24 131 kg (288 lb)       Objective:                    Left Hip Exam     Range of Motion   Flexion:  80 (Leg goes into external rotation)   External rotation:  20 (With restriction)   Internal rotation: 10 (With groin pain and restriction)     Muscle Strength   The patient has normal left hip strength (Pain with resisted hip flexion and abduction).     Other   Erythema: absent  Sensation: normal          She has full myotomal strength  "L3-S1 on the right side  He has at least 1 muscle grade weakness left foot and toe dorsiflexors with her better foot and toe dorsiflexion on the left  Diagnostics, reviewed and taken today if performed as documented:    None performed          Procedures, if performed today:    Procedures    None performed      Portions of the record may have been created with voice recognition software.  Occasional wrong word or \"sound a like\" substitutions may have occurred due to the inherent limitations of voice recognition software.  Read the chart carefully and recognize, using context, where substitutions have occurred.  "

## 2024-12-10 DIAGNOSIS — I10 ESSENTIAL HYPERTENSION: ICD-10-CM

## 2024-12-10 DIAGNOSIS — F32.A ANXIETY AND DEPRESSION: Chronic | ICD-10-CM

## 2024-12-10 DIAGNOSIS — F41.9 ANXIETY AND DEPRESSION: Chronic | ICD-10-CM

## 2024-12-12 RX ORDER — LOSARTAN POTASSIUM AND HYDROCHLOROTHIAZIDE 12.5; 5 MG/1; MG/1
1 TABLET ORAL DAILY
Qty: 90 TABLET | Refills: 1 | Status: SHIPPED | OUTPATIENT
Start: 2024-12-12

## 2024-12-12 RX ORDER — METOPROLOL SUCCINATE 100 MG/1
100 TABLET, EXTENDED RELEASE ORAL
Qty: 90 TABLET | Refills: 1 | Status: SHIPPED | OUTPATIENT
Start: 2024-12-12

## 2024-12-12 RX ORDER — AMLODIPINE BESYLATE 5 MG/1
5 TABLET ORAL DAILY
Qty: 90 TABLET | Refills: 1 | Status: SHIPPED | OUTPATIENT
Start: 2024-12-12

## 2024-12-12 RX ORDER — BUSPIRONE HYDROCHLORIDE 5 MG/1
5 TABLET ORAL
Qty: 90 TABLET | Refills: 1 | Status: SHIPPED | OUTPATIENT
Start: 2024-12-12

## 2024-12-12 RX ORDER — CITALOPRAM HYDROBROMIDE 20 MG/1
20 TABLET ORAL
Qty: 90 TABLET | Refills: 1 | Status: SHIPPED | OUTPATIENT
Start: 2024-12-12

## 2024-12-16 ENCOUNTER — OFFICE VISIT (OUTPATIENT)
Dept: INTERNAL MEDICINE CLINIC | Facility: CLINIC | Age: 64
End: 2024-12-16
Payer: COMMERCIAL

## 2024-12-16 VITALS
OXYGEN SATURATION: 98 % | DIASTOLIC BLOOD PRESSURE: 70 MMHG | WEIGHT: 273 LBS | TEMPERATURE: 99 F | RESPIRATION RATE: 18 BRPM | SYSTOLIC BLOOD PRESSURE: 126 MMHG | HEART RATE: 68 BPM | BODY MASS INDEX: 41.37 KG/M2 | HEIGHT: 68 IN

## 2024-12-16 DIAGNOSIS — E78.2 MIXED HYPERLIPIDEMIA: Chronic | ICD-10-CM

## 2024-12-16 DIAGNOSIS — I89.0 LYMPHEDEMA: ICD-10-CM

## 2024-12-16 DIAGNOSIS — M25.552 LEFT HIP PAIN: ICD-10-CM

## 2024-12-16 DIAGNOSIS — J30.89 SEASONAL ALLERGIC RHINITIS DUE TO OTHER ALLERGIC TRIGGER: ICD-10-CM

## 2024-12-16 DIAGNOSIS — Z00.00 ANNUAL PHYSICAL EXAM: Primary | ICD-10-CM

## 2024-12-16 DIAGNOSIS — F41.9 ANXIETY AND DEPRESSION: Chronic | ICD-10-CM

## 2024-12-16 DIAGNOSIS — I10 ESSENTIAL HYPERTENSION: ICD-10-CM

## 2024-12-16 DIAGNOSIS — E55.9 VITAMIN D DEFICIENCY: Chronic | ICD-10-CM

## 2024-12-16 DIAGNOSIS — K21.9 GASTROESOPHAGEAL REFLUX DISEASE WITHOUT ESOPHAGITIS: ICD-10-CM

## 2024-12-16 DIAGNOSIS — F32.A ANXIETY AND DEPRESSION: Chronic | ICD-10-CM

## 2024-12-16 PROCEDURE — 99213 OFFICE O/P EST LOW 20 MIN: CPT | Performed by: INTERNAL MEDICINE

## 2024-12-16 PROCEDURE — 99397 PER PM REEVAL EST PAT 65+ YR: CPT | Performed by: INTERNAL MEDICINE

## 2024-12-16 NOTE — ASSESSMENT & PLAN NOTE
Patient declined for influenza vaccination, updated COVID-19 vaccination, pneumonia vaccination.  She has been seen and followed by GYN for GYN examination.  Orders:  •  Basic metabolic panel; Future

## 2024-12-16 NOTE — ASSESSMENT & PLAN NOTE
Last time cholesterol was 178, triglyceride 209, HDL 41, LDL 95.  This was done in March 2024.  Since then after she lost significant amount of weight so numbers will be better.  She has been watching diet for cholesterol carbs intake.  Will follow lipid panel.  Orders:  •  Lipid panel; Future

## 2024-12-16 NOTE — PATIENT INSTRUCTIONS
Patient was advised to continue present medications. discussed with the patient medications and laboratory data in detail.  Follow-up with me in 3 months or as advised.  If any blood test was ordered please do 1 week prior to next appointment unless advise to get earlier.  If you have any questions please call the office [phone]

## 2024-12-16 NOTE — ASSESSMENT & PLAN NOTE
Patient states she has to lose at least 10 more pounds weight before she qualifies for left hip surgery.  She has been seen and followed by her orthopedic specialist.  She walks with cane.

## 2024-12-16 NOTE — ASSESSMENT & PLAN NOTE
Her anxiety depression is well-controlled also at low prominent buspirone she would like to continue both medicines as prescribed as it is effective and has no side effect.    Orders:  •  Basic metabolic panel; Future

## 2024-12-16 NOTE — ASSESSMENT & PLAN NOTE
Patient  was advised to decrease portion size.  Advised to decrease carb, sugar, cholesterol intake.  Advised to exercise 3-5 times per week.  Advised to lose weight.  She lost 75 pounds weight  in last 1 year by watching her diet.

## 2024-12-16 NOTE — ASSESSMENT & PLAN NOTE
Patient states since she is losing weight her lymphedema is also better.  She occasionally uses lymphedema pump.

## 2024-12-16 NOTE — PROGRESS NOTES
Adult Annual Physical  Name: Cielo Thomason      : 1960      MRN: [id number]  Encounter Provider: Myriam Araujo MD  Encounter Date: 2024   Encounter department: [facility] INTERNAL MEDICINE    Assessment & Plan  Annual physical exam  Patient declined for influenza vaccination, updated COVID-19 vaccination, pneumonia vaccination.  She has been seen and followed by GYN for GYN examination.  Orders:  •  Basic metabolic panel; Future    Essential hypertension  Blood pressure well-controlled advised to continue present medications.  Patient states she got the supply of her medications recently next time discussed with the patient will consider changing her losartan 50 to 100 mg daily with HCTZ and stop her amlodipine.  To decrease the number of tablets.  Orders:  •  Basic metabolic panel; Future    Seasonal allergic rhinitis due to other allergic trigger  Takes as needed fexofenadine which is effective.       Gastroesophageal reflux disease without esophagitis  She takes as needed omeprazole and watching diet.       Anxiety and depression  Her anxiety depression is well-controlled also at low prominent buspirone she would like to continue both medicines as prescribed as it is effective and has no side effect.    Orders:  •  Basic metabolic panel; Future    Lymphedema  Patient states since she is losing weight her lymphedema is also better.  She occasionally uses lymphedema pump.       Mixed hyperlipidemia  Last time cholesterol was 178, triglyceride 209, HDL 41, LDL 95.  This was done in 2024.  Since then after she lost significant amount of weight so numbers will be better.  She has been watching diet for cholesterol carbs intake.  Will follow lipid panel.  Orders:  •  Lipid panel; Future    Vitamin D deficiency  Vitamin D deficient resolved vitamin D supple vitamin D level 44 advised to continue same supplement.  Will follow vitamin D level.  Orders:  •  Vitamin D  25 hydroxy; Future    Left hip pain  Patient states she has to lose at least 10 more pounds weight before she qualifies for left hip surgery.  She has been seen and followed by her orthopedic specialist.  She walks with cane.       BMI 40.0-44.9, adult (HCC)  Patient  was advised to decrease portion size.  Advised to decrease carb, sugar, cholesterol intake.  Advised to exercise 3-5 times per week.  Advised to lose weight.  She lost 75 pounds weight  in last 1 year by watching her diet.         Immunizations and preventive care screenings were discussed with patient today. Appropriate education was printed on patient's after visit summary.    Counseling:  Alcohol/drug use: discussed moderation in alcohol intake, the recommendations for healthy alcohol use, and avoidance of illicit drug use.  Dental Health: discussed importance of regular tooth brushing, flossing, and dental visits.  Injury prevention: discussed safety/seat belts, safety helmets, smoke detectors, carbon monoxide detectors, and smoking near bedding or upholstery.  Exercise: the importance of regular exercise/physical activity was discussed. Recommend exercise 3-5 times per week for at least 30 minutes.          History of Present Illness     Adult Annual Physical  64-year-old white female patient presents for annual wellness examination as well as follow-up on her medical problems.    Review of Systems   Constitutional:  Negative for chills and fever.   HENT:  Negative for congestion, ear pain, hearing loss, nosebleeds, sinus pain, sore throat and trouble swallowing.    Eyes:  Negative for discharge, redness and visual disturbance.   Respiratory:  Negative for cough, chest tightness and shortness of breath.    Cardiovascular:  Negative for chest pain and palpitations.   Gastrointestinal:  Negative for abdominal pain, blood in stool, constipation, diarrhea, nausea and vomiting.   Genitourinary:  Negative for dysuria, flank pain and hematuria.    Musculoskeletal:  Positive for arthralgias (Left hip pain and sometimes right hip pain). Negative for myalgias and neck pain.   Skin:  Negative for color change and rash.   Neurological:  Negative for dizziness, speech difficulty, weakness and headaches.   Hematological:  Does not bruise/bleed easily.   Psychiatric/Behavioral:  Negative for agitation, behavioral problems, self-injury and suicidal ideas.        Pertinent Medical History   As above    Medical History Reviewed by provider this encounter:  Tobacco  Allergies  Meds  Problems  Med Hx  Surg Hx  Fam Hx     .  Current Outpatient Medications on File Prior to Visit   Medication Sig Dispense Refill   • amLODIPine (NORVASC) 5 mg tablet Take 1 tablet (5 mg total) by mouth daily 90 tablet 1   • Ascorbic Acid (vitamin C) 1000 MG tablet Take 1,000 mg by mouth daily     • busPIRone (BUSPAR) 5 mg tablet Take 1 tablet (5 mg total) by mouth daily at bedtime 90 tablet 1   • calcium carbonate (OS-CAMDEN) 600 MG tablet Take 600 mg by mouth daily     • Cholecalciferol (Vitamin D) 50 MCG (2000 UT) tablet Take 2,000 Units by mouth daily     • citalopram (CeleXA) 20 mg tablet Take 1 tablet (20 mg total) by mouth daily at bedtime 90 tablet 1   • clotrimazole-betamethasone (LOTRISONE) 1-0.05 % cream APPLY TOPICALLY 2 (TWO) TIMES A DAY (Patient taking differently: Apply topically 2 (two) times a day as needed) 45 g 1   • fexofenadine (ALLEGRA) 180 MG tablet Take 180 mg by mouth daily as needed     • losartan-hydrochlorothiazide (HYZAAR) 50-12.5 mg per tablet Take 1 tablet by mouth daily 90 tablet 1   • metoprolol succinate (TOPROL-XL) 100 mg 24 hr tablet Take 1 tablet (100 mg total) by mouth daily at bedtime 90 tablet 1   • Multiple Vitamins-Minerals (multivitamin with minerals) tablet Take 1 tablet by mouth daily     • omeprazole (PriLOSEC) 20 mg delayed release capsule Take 20 mg by mouth as needed     • vitamin B-12 (VITAMIN B-12) 1,000 mcg tablet Take by mouth daily    "    No current facility-administered medications on file prior to visit.      Social History     Tobacco Use   • Smoking status: Never   • Smokeless tobacco: Never   Vaping Use   • Vaping status: Never Used   Substance and Sexual Activity   • Alcohol use: No   • Drug use: Not Currently   • Sexual activity: Not Currently     Partners: Male     Comment: pt declines hiv std testing       Objective   /70 (BP Location: Left arm, Patient Position: Sitting, Cuff Size: Large)   Pulse 68   Temp 99 °F (37.2 °C) (Temporal)   Resp 18   Ht 5' 8\" (1.727 m)   Wt 124 kg (273 lb)   LMP  (LMP Unknown)   SpO2 98%   BMI 41.51 kg/m²     Physical Exam  Constitutional:       Appearance: She is obese.   HENT:      Right Ear: External ear normal.      Left Ear: External ear normal.      Nose: Nose normal.      Mouth/Throat:      Mouth: Mucous membranes are moist.      Pharynx: Oropharynx is clear.   Eyes:      General: No scleral icterus.        Right eye: No discharge.         Left eye: No discharge.      Extraocular Movements: Extraocular movements intact.   Cardiovascular:      Rate and Rhythm: Normal rate and regular rhythm.      Pulses: Normal pulses.      Heart sounds: Normal heart sounds.   Pulmonary:      Effort: Pulmonary effort is normal. No respiratory distress.      Breath sounds: No wheezing.   Abdominal:      General: Bowel sounds are normal.      Palpations: Abdomen is soft.      Tenderness: There is no abdominal tenderness.   Musculoskeletal:         General: Tenderness (Has a left hip pain when she ambulates.) present. Normal range of motion.      Cervical back: Normal range of motion.      Right lower leg: Edema (Has a nonpitting edema) present.      Left lower leg: Edema (Has a nonpitting edema) present.      Comments: She ambulates with cane.   Skin:     General: Skin is warm.      Findings: No rash.   Neurological:      General: No focal deficit present.      Mental Status: She is alert and oriented to " person, place, and time.      Motor: No weakness.   Psychiatric:         Mood and Affect: Mood normal.         Behavior: Behavior normal.

## 2024-12-16 NOTE — ASSESSMENT & PLAN NOTE
Vitamin D deficient resolved vitamin D supple vitamin D level 44 advised to continue same supplement.  Will follow vitamin D level.  Orders:  •  Vitamin D 25 hydroxy; Future

## 2024-12-16 NOTE — ASSESSMENT & PLAN NOTE
Blood pressure well-controlled advised to continue present medications.  Patient states she got the supply of her medications recently next time discussed with the patient will consider changing her losartan 50 to 100 mg daily with HCTZ and stop her amlodipine.  To decrease the number of tablets.  Orders:  •  Basic metabolic panel; Future

## 2025-01-21 ENCOUNTER — OFFICE VISIT (OUTPATIENT)
Dept: OBGYN CLINIC | Facility: HOSPITAL | Age: 65
End: 2025-01-21
Payer: COMMERCIAL

## 2025-01-21 VITALS — HEIGHT: 68 IN | WEIGHT: 271 LBS | BODY MASS INDEX: 41.07 KG/M2

## 2025-01-21 DIAGNOSIS — M25.552 CHRONIC LEFT HIP PAIN: Primary | ICD-10-CM

## 2025-01-21 DIAGNOSIS — M16.12 PRIMARY OSTEOARTHRITIS OF LEFT HIP: ICD-10-CM

## 2025-01-21 DIAGNOSIS — G89.29 CHRONIC LEFT HIP PAIN: Primary | ICD-10-CM

## 2025-01-21 PROCEDURE — 99214 OFFICE O/P EST MOD 30 MIN: CPT | Performed by: ORTHOPAEDIC SURGERY

## 2025-01-21 RX ORDER — MUPIROCIN 20 MG/G
OINTMENT TOPICAL 2 TIMES DAILY
Qty: 15 G | Refills: 0 | Status: SHIPPED | OUTPATIENT
Start: 2025-01-21

## 2025-01-21 RX ORDER — ASCORBIC ACID 500 MG
500 TABLET ORAL DAILY
Qty: 30 TABLET | Refills: 0 | Status: SHIPPED | OUTPATIENT
Start: 2025-01-21

## 2025-01-21 RX ORDER — CHLORHEXIDINE GLUCONATE ORAL RINSE 1.2 MG/ML
15 SOLUTION DENTAL ONCE
OUTPATIENT
Start: 2025-01-21 | End: 2025-01-21

## 2025-01-21 RX ORDER — FERROUS SULFATE 324(65)MG
324 TABLET, DELAYED RELEASE (ENTERIC COATED) ORAL
Qty: 60 TABLET | Refills: 0 | Status: SHIPPED | OUTPATIENT
Start: 2025-01-21

## 2025-01-21 RX ORDER — ENOXAPARIN SODIUM 100 MG/ML
40 INJECTION SUBCUTANEOUS DAILY
Qty: 11.2 ML | Refills: 0 | Status: SHIPPED | OUTPATIENT
Start: 2025-01-21 | End: 2025-02-18

## 2025-01-21 RX ORDER — FOLIC ACID 1 MG/1
1 TABLET ORAL DAILY
Qty: 30 TABLET | Refills: 0 | Status: SHIPPED | OUTPATIENT
Start: 2025-01-21

## 2025-01-21 RX ORDER — SODIUM CHLORIDE, SODIUM LACTATE, POTASSIUM CHLORIDE, CALCIUM CHLORIDE 600; 310; 30; 20 MG/100ML; MG/100ML; MG/100ML; MG/100ML
125 INJECTION, SOLUTION INTRAVENOUS CONTINUOUS
OUTPATIENT
Start: 2025-01-21

## 2025-01-21 RX ORDER — TRANEXAMIC ACID 10 MG/ML
1000 INJECTION, SOLUTION INTRAVENOUS ONCE
OUTPATIENT
Start: 2025-01-21 | End: 2025-01-21

## 2025-01-21 NOTE — PROGRESS NOTES
Assessment:  1. Chronic left hip pain        2. Primary osteoarthritis of left hip            Plan:  Left hip osteoarthritis  Current symptoms of left anterior groin pain  Patient currently successfully losing weight and anticipates 40 BMI in next month  The patient will be scheduled for left total hip arthroplasty.  We feel the patient will find significant relief per current symptoms, findings on imaging and exam.  Risks, benefits, precautions and expectations were discussed. Risks include blood loss, potential infection and blood clots.  Preoperative vitamins were prescribed.     The patient should follow up after surgery.        To do next visit:  Return for post-op with x-rays.    The above stated was discussed in layman's terms and the patient expressed understanding.  All questions were answered to the patient's satisfaction.       Scribe Attestation      I,:  Jus Rubalcava MA am acting as a scribe while in the presence of the attending physician.:       I,:  Arvind Eaton MD personally performed the services described in this documentation    as scribed in my presence.:               Subjective:   Cielo Thomason is a 64 y.o. female who presents for follow up of left hip in pursuit of total left hip arthroplasty.  She has been successful with reduction of her BMI yet has not achieved the leap frog guidelines criteria of 40BMI and is current 41.21.  Today she complains of left anterior groin pain.  Her symptoms effect her daily activity and sleep.  She does use [facility]PC.        Review of systems negative unless otherwise specified in HPI    Past Medical History:   Diagnosis Date    Allergic rhinitis 07/07/2022    Annual physical exam 12/16/2024    Anxiety     Arthritis     BMI 50.0-59.9, adult (HCC) 06/01/2021    Cellulitis of left leg 08/17/2021    Depression     Eczema 06/02/2022    Edema of both lower legs 01/23/2021    Edema of both lower legs 11/23/2021    Endometrial cancer (HCC) 09/14/2021     Heart murmur     Heart murmur 2024    History of COVID-19 2020    Mild sypmtoms Cough,Tired,diarrhea,sweats, Loss taste and smell    Hyperglycemia 2021    Hyperlipidemia     Hypertension     Left hip pain 2022    Lower abdominal pain 2021    Lumbar radiculopathy 08/15/2024    Mixed hyperlipidemia 2021    Numbness and tingling in left arm     Obesity 2024    Pruritus     Rash of hand 2022    Shortness of breath     Skin lesion     Skin rash 2023    Tinea cruris 2023    Tinea pedis of both feet 2022    Vitamin D deficiency 2021       Past Surgical History:   Procedure Laterality Date    CARPAL TUNNEL RELEASE Bilateral      SECTION      x3     SECTION      CHOLECYSTECTOMY      CHOLECYSTECTOMY LAPAROSCOPIC N/A 2019    Procedure: CHOLECYSTECTOMY LAPAROSCOPIC;  Surgeon: Shayne De Leon MD;  [facility];  Service: General    COLONOSCOPY      CYSTOSCOPY N/A 10/08/2021    Procedure: Cystoscopy;  Surgeon: Adelso Freeman MD;  [facility];  Service: Gynecology Oncology    FL GUIDED NEEDLE PLAC BX/ASP/INJ  2015    FL INJECTION LEFT HIP (NON ARTHROGRAM)  2022    HYSTERECTOMY      JOINT REPLACEMENT  2016    R total HIp    MAMMO (HISTORICAL)  10/11/2018    Advise for yearly mammo screening    OOPHORECTOMY Bilateral     HI HYSTEROSCOPY BX ENDOMETRIUM&/POLYPC W/WO D&C N/A 2016    Procedure: FRACTIONAL DILATATION AND CURETTAGE (D&C) WITH HYSTEROSOCPY;  Surgeon: Farnaz Romero MD;  [facility];  Service: Gynecology    HI LAPS TOTAL HYSTERECT 250 GM/< W/RMVL TUBE/OVARY N/A 10/08/2021    Procedure: ROBOTIC HYSTERECTOMY, BILATERAL SALPINGOOPHERECTOMY; LEFT EXTERNAL ILIAC SENTINEL LYMPH NODE BIOPSY, RIGHT PELVIC LYMPHADENECTOMY;  Surgeon: Adelso Freeman MD;  [facility];  Service: Gynecology Oncology    REPLACEMENT TOTAL KNEE Right        Family History   Problem Relation Age of  Onset    Hypertension Mother     Hypertension Father     Heart failure Father     Lymphoma Father 62    Diabetes Father         at old age    No Known Problems Sister     No Known Problems Daughter     No Known Problems Maternal Grandmother     No Known Problems Maternal Grandfather     No Known Problems Paternal Grandmother     No Known Problems Paternal Grandfather     Hemophilia Brother     Breast cancer Maternal Aunt 50    Brain cancer Maternal Aunt 48    No Known Problems Maternal Aunt     No Known Problems Son     No Known Problems Son     No Known Problems Son        Social History     Occupational History    Not on file   Tobacco Use    Smoking status: Never    Smokeless tobacco: Never   Vaping Use    Vaping status: Never Used   Substance and Sexual Activity    Alcohol use: No    Drug use: Not Currently    Sexual activity: Not Currently     Partners: Male     Comment: pt declines hiv std testing         Current Outpatient Medications:     amLODIPine (NORVASC) 5 mg tablet, Take 1 tablet (5 mg total) by mouth daily, Disp: 90 tablet, Rfl: 1    Ascorbic Acid (vitamin C) 1000 MG tablet, Take 1,000 mg by mouth daily, Disp: , Rfl:     busPIRone (BUSPAR) 5 mg tablet, Take 1 tablet (5 mg total) by mouth daily at bedtime, Disp: 90 tablet, Rfl: 1    calcium carbonate (OS-CAMDEN) 600 MG tablet, Take 600 mg by mouth daily, Disp: , Rfl:     Cholecalciferol (Vitamin D) 50 MCG (2000 UT) tablet, Take 2,000 Units by mouth daily, Disp: , Rfl:     citalopram (CeleXA) 20 mg tablet, Take 1 tablet (20 mg total) by mouth daily at bedtime, Disp: 90 tablet, Rfl: 1    clotrimazole-betamethasone (LOTRISONE) 1-0.05 % cream, APPLY TOPICALLY 2 (TWO) TIMES A DAY (Patient taking differently: Apply topically 2 (two) times a day as needed), Disp: 45 g, Rfl: 1    fexofenadine (ALLEGRA) 180 MG tablet, Take 180 mg by mouth daily as needed, Disp: , Rfl:     losartan-hydrochlorothiazide (HYZAAR) 50-12.5 mg per tablet, Take 1 tablet by mouth daily,  "Disp: 90 tablet, Rfl: 1    metoprolol succinate (TOPROL-XL) 100 mg 24 hr tablet, Take 1 tablet (100 mg total) by mouth daily at bedtime, Disp: 90 tablet, Rfl: 1    Multiple Vitamins-Minerals (multivitamin with minerals) tablet, Take 1 tablet by mouth daily, Disp: , Rfl:     omeprazole (PriLOSEC) 20 mg delayed release capsule, Take 20 mg by mouth as needed, Disp: , Rfl:     vitamin B-12 (VITAMIN B-12) 1,000 mcg tablet, Take by mouth daily, Disp: , Rfl:     Allergies   Allergen Reactions    Griseofulvin Hives    Penicillins Hives     She can take cephalosporin without any side effect    Lisinopril Cough    Zithromax [Azithromycin] Headache          There were no vitals filed for this visit.    Objective:  Physical exam  General: Awake, Alert, Oriented  Eyes: Pupils equal, round and reactive to light  Heart: regular rate and rhythm  Lungs: No audible wheezing  Abdomen: soft                    Ortho Exam  Left hip:  Apprehension with ROM  Groin pain with IR  ER with flexion  Calf compartments soft and supple  Sensation intact  Toes are warm sensate and mobile      Diagnostics, reviewed and taken today if performed as documented:    None performed     Procedures, if performed today:    Procedures    None performed      Portions of the record may have been created with voice recognition software.  Occasional wrong word or \"sound a like\" substitutions may have occurred due to the inherent limitations of voice recognition software.  Read the chart carefully and recognize, using context, where substitutions have occurred.    "

## 2025-02-17 ENCOUNTER — TELEPHONE (OUTPATIENT)
Dept: OBGYN CLINIC | Facility: HOSPITAL | Age: 65
End: 2025-02-17

## 2025-02-17 NOTE — TELEPHONE ENCOUNTER
Preoperative Elective Admission Assessment    EKG/LAB/MRSA SWAB/CXR date:     Living Situation:    Who does pt live with: spouse  What kind of home:  split level  How do they enter the home: front  How many levels in home: 2   # of steps to enter home: 3  # of steps to second floor: 8  Are there handrails: Yes  Are there landings: Yes  Sleeping arrangement: first/entry floor or second  Where is Bathroom: second floor  Where is the tub or shower: step in bath tub   Toilet height? Concerns for low toilets Patient has handicapped toilet with raised seat and handle bars, denies needing C  Dogs or ther pets: 1 dog     First Floor Setup:   Is there a bathroom: No  Where would pt sleep: couch     DME: rolling walker. Instructed to bring DOS  We discussed clearing pathways in the home and making sure there is accessibly to use the walker, for example, removing throw rugs.      Patient's Current Level of Function: Ambulates with cane and ADLs: Independent    Post-op Caregiver: spouse  Caregiver Name and phone number for Inpatient discharge needs: [place]  Currently receive any HHC/aides/community supports: No     Post-op Transport: spouse  To/from hospital: spouse  To/from PT 2-3x/week: spouse  Uses community transport now: No     Outpatient Physical Therapy Site:  Site: [facility]  pre and post-op appts scheduled? Yes     Medication Management: self and pillbox  Preferred Pharmacy for Post-op Medications: [facility]   Blood Management Vitamin Regimen:  Starting 30 days prior to surgery  Post-op anticoagulant: prescribed preoperatively - patient educated to  at pharmacy and have at home prior to surgery, ready for after surgery use only  Has Bactroban for 5 days preop: Yes  Educated on Preoperative Bathing Instructions, and use of Soap for 5 days before surgery.      DC Plan: Pt plans to be discharged home    Barriers to DC identified preoperatively: none identified    BMI: 41.21- discussed losing  5-8lbs to meet [facility] protocol of 40    Patient Education:  Pt educated on post-op pain, early mobilization (POD0), LOS goals, OP PT goals, and preoperative bathing. Patient educated that our goal is to appropriately discharge patient based off their post-op function while striving to maintain maximal independence. The goal is to discharge patient to home and for them to attend outpatient physical therapy.    Assigned to care team? Yes

## 2025-02-18 DIAGNOSIS — M25.552 CHRONIC LEFT HIP PAIN: ICD-10-CM

## 2025-02-18 DIAGNOSIS — M16.12 PRIMARY OSTEOARTHRITIS OF LEFT HIP: ICD-10-CM

## 2025-02-18 DIAGNOSIS — G89.29 CHRONIC LEFT HIP PAIN: ICD-10-CM

## 2025-02-18 RX ORDER — FOLIC ACID 1 MG/1
1 TABLET ORAL DAILY
Qty: 30 TABLET | Refills: 0 | OUTPATIENT
Start: 2025-02-18

## 2025-02-23 ENCOUNTER — APPOINTMENT (OUTPATIENT)
Dept: LAB | Facility: CLINIC | Age: 65
End: 2025-02-23
Payer: COMMERCIAL

## 2025-02-23 DIAGNOSIS — F41.9 ANXIETY AND DEPRESSION: Chronic | ICD-10-CM

## 2025-02-23 DIAGNOSIS — M16.12 PRIMARY OSTEOARTHRITIS OF LEFT HIP: ICD-10-CM

## 2025-02-23 DIAGNOSIS — Z00.00 ANNUAL PHYSICAL EXAM: ICD-10-CM

## 2025-02-23 DIAGNOSIS — I10 ESSENTIAL HYPERTENSION: ICD-10-CM

## 2025-02-23 DIAGNOSIS — E78.2 MIXED HYPERLIPIDEMIA: Chronic | ICD-10-CM

## 2025-02-23 DIAGNOSIS — M25.552 CHRONIC LEFT HIP PAIN: ICD-10-CM

## 2025-02-23 DIAGNOSIS — G89.29 CHRONIC LEFT HIP PAIN: ICD-10-CM

## 2025-02-23 DIAGNOSIS — E55.9 VITAMIN D DEFICIENCY: Chronic | ICD-10-CM

## 2025-02-23 DIAGNOSIS — F32.A ANXIETY AND DEPRESSION: Chronic | ICD-10-CM

## 2025-02-23 LAB
25(OH)D3 SERPL-MCNC: 49.5 NG/ML (ref 30–100)
ALBUMIN SERPL BCG-MCNC: 4.3 G/DL (ref 3.5–5)
ALP SERPL-CCNC: 74 U/L (ref 34–104)
ALT SERPL W P-5'-P-CCNC: 15 U/L (ref 7–52)
ANION GAP SERPL CALCULATED.3IONS-SCNC: 5 MMOL/L (ref 4–13)
APTT PPP: 27 SECONDS (ref 23–34)
AST SERPL W P-5'-P-CCNC: 19 U/L (ref 13–39)
BASOPHILS # BLD AUTO: 0.05 THOUSANDS/ÂΜL (ref 0–0.1)
BASOPHILS NFR BLD AUTO: 1 % (ref 0–1)
BILIRUB SERPL-MCNC: 1.14 MG/DL (ref 0.2–1)
BUN SERPL-MCNC: 18 MG/DL (ref 5–25)
CALCIUM SERPL-MCNC: 9.5 MG/DL (ref 8.4–10.2)
CHLORIDE SERPL-SCNC: 104 MMOL/L (ref 96–108)
CHOLEST SERPL-MCNC: 181 MG/DL (ref ?–200)
CO2 SERPL-SCNC: 31 MMOL/L (ref 21–32)
CREAT SERPL-MCNC: 0.91 MG/DL (ref 0.6–1.3)
EOSINOPHIL # BLD AUTO: 0.21 THOUSAND/ÂΜL (ref 0–0.61)
EOSINOPHIL NFR BLD AUTO: 4 % (ref 0–6)
ERYTHROCYTE [DISTWIDTH] IN BLOOD BY AUTOMATED COUNT: 14.1 % (ref 11.6–15.1)
EST. AVERAGE GLUCOSE BLD GHB EST-MCNC: 97 MG/DL
GFR SERPL CREATININE-BSD FRML MDRD: 66 ML/MIN/1.73SQ M
GLUCOSE P FAST SERPL-MCNC: 101 MG/DL (ref 65–99)
HBA1C MFR BLD: 5 %
HCT VFR BLD AUTO: 42.9 % (ref 34.8–46.1)
HDLC SERPL-MCNC: 46 MG/DL
HGB BLD-MCNC: 13.8 G/DL (ref 11.5–15.4)
IMM GRANULOCYTES # BLD AUTO: 0.01 THOUSAND/UL (ref 0–0.2)
IMM GRANULOCYTES NFR BLD AUTO: 0 % (ref 0–2)
INR PPP: 0.9 (ref 0.85–1.19)
LDLC SERPL CALC-MCNC: 117 MG/DL (ref 0–100)
LYMPHOCYTES # BLD AUTO: 1.89 THOUSANDS/ÂΜL (ref 0.6–4.47)
LYMPHOCYTES NFR BLD AUTO: 34 % (ref 14–44)
MCH RBC QN AUTO: 27.6 PG (ref 26.8–34.3)
MCHC RBC AUTO-ENTMCNC: 32.2 G/DL (ref 31.4–37.4)
MCV RBC AUTO: 86 FL (ref 82–98)
MONOCYTES # BLD AUTO: 0.28 THOUSAND/ÂΜL (ref 0.17–1.22)
MONOCYTES NFR BLD AUTO: 5 % (ref 4–12)
NEUTROPHILS # BLD AUTO: 3.19 THOUSANDS/ÂΜL (ref 1.85–7.62)
NEUTS SEG NFR BLD AUTO: 56 % (ref 43–75)
NONHDLC SERPL-MCNC: 135 MG/DL
NRBC BLD AUTO-RTO: 0 /100 WBCS
PLATELET # BLD AUTO: 199 THOUSANDS/UL (ref 149–390)
PMV BLD AUTO: 11.4 FL (ref 8.9–12.7)
POTASSIUM SERPL-SCNC: 4 MMOL/L (ref 3.5–5.3)
PROT SERPL-MCNC: 7 G/DL (ref 6.4–8.4)
PROTHROMBIN TIME: 12.9 SECONDS (ref 12.3–15)
RBC # BLD AUTO: 5 MILLION/UL (ref 3.81–5.12)
SODIUM SERPL-SCNC: 140 MMOL/L (ref 135–147)
TRIGL SERPL-MCNC: 88 MG/DL (ref ?–150)
WBC # BLD AUTO: 5.63 THOUSAND/UL (ref 4.31–10.16)

## 2025-02-23 PROCEDURE — 36415 COLL VENOUS BLD VENIPUNCTURE: CPT

## 2025-02-23 PROCEDURE — 87081 CULTURE SCREEN ONLY: CPT

## 2025-02-23 PROCEDURE — 80053 COMPREHEN METABOLIC PANEL: CPT

## 2025-02-23 PROCEDURE — 85730 THROMBOPLASTIN TIME PARTIAL: CPT

## 2025-02-23 PROCEDURE — 83036 HEMOGLOBIN GLYCOSYLATED A1C: CPT

## 2025-02-23 PROCEDURE — 85025 COMPLETE CBC W/AUTO DIFF WBC: CPT

## 2025-02-23 PROCEDURE — 85610 PROTHROMBIN TIME: CPT

## 2025-02-23 PROCEDURE — 82306 VITAMIN D 25 HYDROXY: CPT

## 2025-02-23 PROCEDURE — 80061 LIPID PANEL: CPT

## 2025-02-24 ENCOUNTER — RESULTS FOLLOW-UP (OUTPATIENT)
Dept: INTERNAL MEDICINE CLINIC | Facility: CLINIC | Age: 65
End: 2025-02-24

## 2025-02-24 PROBLEM — M16.12 PRIMARY OSTEOARTHRITIS OF LEFT HIP: Status: ACTIVE | Noted: 2025-02-24

## 2025-02-24 PROBLEM — I35.0 MILD AORTIC STENOSIS: Status: ACTIVE | Noted: 2025-02-24

## 2025-02-24 PROBLEM — R10.30 LOWER ABDOMINAL PAIN: Status: RESOLVED | Noted: 2021-11-23 | Resolved: 2025-02-24

## 2025-02-24 PROBLEM — Z12.31 ENCOUNTER FOR SCREENING MAMMOGRAM FOR MALIGNANT NEOPLASM OF BREAST: Status: RESOLVED | Noted: 2021-09-08 | Resolved: 2025-02-24

## 2025-02-24 PROBLEM — R21 SKIN RASH: Status: RESOLVED | Noted: 2023-05-02 | Resolved: 2025-02-24

## 2025-02-24 PROBLEM — R21 RASH OF HAND: Status: RESOLVED | Noted: 2022-02-28 | Resolved: 2025-02-24

## 2025-02-24 PROBLEM — M79.652 ACUTE PAIN OF LEFT THIGH: Status: RESOLVED | Noted: 2022-06-02 | Resolved: 2025-02-24

## 2025-02-24 LAB — MRSA NOSE QL CULT: NORMAL

## 2025-02-24 NOTE — PROGRESS NOTES
Internal Medicine Pre-Operative Evaluation:     Reason for Visit: Pre-operative Evaluation for Risk Stratification and Optimization    Patient ID: Cielo Thomason is a 64 y.o. female.     Case: [id number] Date/Time: 03/24/25 1145   Procedure: ARTHROPLASTY HIP TOTAL (Left: Hip)   Anesthesia type: Choice   Diagnosis:      Chronic left hip pain [M25.552, G89.29]      Primary osteoarthritis of left hip [M16.12]   Pre-op diagnosis:      Chronic left hip pain [M25.552, G89.29]      Primary osteoarthritis of left hip [M16.12]   Location:  OR ROOM 05 / [facility]   Surgeons: Arvind Eaton MD         Recommendations to Proceed withSurgery    Patient is considered to be Low risk for Medium risk procedure.     After evaluation and discussion with patient with emphasis that all surgery has some degree of inherent risk it is acknowledged by patient this risk is Acceptable.    Patient is optimized and may proceed with planned procedure.     Assessment    Pre-operative Medical Evaluation for planned surgery  Recommendations as listed in PLAN section below  Contact surgical nurse  navigator with any questions regarding preoperative plan or schedule.      Assessment & Plan  Preoperative clearance    Primary osteoarthritis of left hip  Failed outpatient conservative measures  Electing to undergo surgical procedure as stated above    Essential hypertension  Stable   Refer to PAT instructions regarding medication administration the morning of surgery    Mild aortic stenosis  Recent echo 2024  Valve area gradient 1.72 cm2  Edema of both lower legs  Felt to be lymphedema  Has compression pumps  F/b cardiology as well  Class 2 severe obesity due to excess calories with serious comorbidity and body mass index (BMI) of 39.0 to 39.9 in adult (HCC)  Recommend ongoing attempts at weight loss  Current BMI meets criteria of <40 per MLJ preoperative qualifications             Plan:     1.  Further preoperative workup as follows:   - none no further testing required may proceed with surgery    2. Preoperative Medication Management Review performed by PAT nursing  YES    3. Patient requires further consultation with:   No Consults Required    4. Discharge Planning / Barriers to Discharge  none identified - patients has post discharge therapy plan in place, transportation arranged for discharge day, adequate family support at home to assist with discharge to home.        Subjective:           History of Present Illness:     Cielo Thomason is a 64 y.o. female who presents to the office today for a preoperative consultation at the request of surgeon. The patient understands this is an elective procedure and not emergent. They are electing to undergo planned procedure with an understanding that all surgery has inherent risk. They have worked with their surgeon and failed conservative treatment measures. Today they present for preoperative risk assessment and recommendations for optimization in preparation for surgery.    Pt seen in center for perioperative medicine for upcoming proposed surgery. They have failed previous conservative measures and have elected surgical intervention.     Pt meets presurgical lab and BMI optimization goals.     Pt has a systolic murmur, last echo showed mild AS with valve area gradient 1.72 cm2      Cielo Thomason has an IN HOSPITAL cardiac risk of RCI RISK CLASS I (0 risk factors, risk of major cardiac compl. appr. 0.5%) based on RCRI calculator    Cardiac Risk Estimation: per the Revised Cardiac Risk Index (Circ. 100:1043, 1999),         Pre-op Exam    Previous history of bleeding disorders or clots?: No  Previous Anesthesia reaction?: No  Prolonged steroid use in the last 6 months?: No    Assessment of Cardiac Risk:   - Unstable or severe angina or MI in the last 6 weeks or history of stent placement in the last year?: No   - Decompensated heart failure (e.g. New  "onset heart failure, NYHA  Class IV heart failure, or worsening existing heart failure)?: No  - Significant arrhythmias such as high grade AV block, symptomatic ventricular arrhythmia, newly recognized ventricular tachycardia, supraventricular tachycardia with resting heart rate >100, or symptomatic bradycardia?: No  - Severe heart valve disease including aortic stenosis or symptomatic mitral stenosis?: No      Pre-operative Risk Factors:  Elevated-risk surgery: No    History of cerebrovascular disease: No    History of ischemic heart disease: No  Pre-operative treatment with insulin: No  Pre-operative creatinine >2 mg/dL: No    History of congestive heart failure: No    Duke Activity Status Index (DASI):   DASI Total Score: 23.45  METs: 5.6        ROS: No TIA's or unusual headaches, no dysphagia.  No prolonged cough. No dyspnea or chest pain on exertion.  No abdominal pain, change in bowel habits, black or bloody stools.  No urinary tract or BPH symptoms.  Positive reported pain in arthritic joint. Positive difficulty with gait. No skin rashes or issues.      Objective:    /79 (BP Location: Right arm, Patient Position: Sitting, Cuff Size: Standard)   Pulse 89   Ht 5' 8\" (1.727 m)   Wt 119 kg (263 lb)   LMP  (LMP Unknown)   SpO2 96%   BMI 39.99 kg/m²       General Appearance: no distress, conversive  HEENT: PERRLA, conjuctiva normal; oropharynx clear; mucous membranes moist;   Neck:  Supple, no lymphadenopathy or thyromegaly  Lungs: breath sounds normal, normal respiratory effort, no retractions, expiratory effort normal  CV: normal heart sounds S1/S2, PMI normal   ABD: soft non tender, +obese  EXT: DP pulses intact, no lymphadenopathy, no edema  Skin: normal turgor, normal texture, no rash  Psych: affect normal, mood normal  Neuro: AAOx3        The following portions of the patient's history were reviewed and updated as appropriate: allergies, current medications, past family history, past medical " history, past social history, past surgical history and problem list.     Past History:       Past Medical History:   Diagnosis Date    Allergic rhinitis 2022    Annual physical exam 2024    Anxiety     Arthritis     BMI 40.0-44.9, adult ([facility]) 2021    BMI 50.0-59.9, adult ([facility]) 2021    Cellulitis of left leg 2021    Depression     Eczema 2022    Edema of both lower legs 2021    Edema of both lower legs 2021    Endometrial cancer ([facility]) 2021    Heart murmur     Heart murmur 2024    History of COVID-19 2020    Mild sypmtoms Cough,Tired,diarrhea,sweats, Loss taste and smell    Hyperglycemia 2021    Hyperlipidemia     Hypertension     Left hip pain 2022    Lower abdominal pain 2021    Lumbar radiculopathy 08/15/2024    Mixed hyperlipidemia 2021    Numbness and tingling in left arm     Obesity 2024    Pruritus     Rash of hand 2022    Shortness of breath     Skin lesion     Skin rash 2023    Tinea cruris 2023    Tinea pedis of both feet 2022    Vitamin D deficiency 2021    Past Surgical History:   Procedure Laterality Date    CARPAL TUNNEL RELEASE Bilateral      SECTION      x3     SECTION      CHOLECYSTECTOMY      CHOLECYSTECTOMY LAPAROSCOPIC N/A 2019    Procedure: CHOLECYSTECTOMY LAPAROSCOPIC;  Surgeon: Shayne De Leon MD;  [facility];  Service: General    COLONOSCOPY      CYSTOSCOPY N/A 10/08/2021    Procedure: Cystoscopy;  Surgeon: Adelso Freeman MD;  [facility];  Service: Gynecology Oncology    FL GUIDED NEEDLE PLAC BX/ASP/INJ  2015    FL INJECTION LEFT HIP (NON ARTHROGRAM)  2022    HYSTERECTOMY      JOINT REPLACEMENT  2016    R total HIp    MAMMO (HISTORICAL)  10/11/2018    Advise for yearly mammo screening    OOPHORECTOMY Bilateral     LA HYSTEROSCOPY BX ENDOMETRIUM&/POLYPC W/WO D&C N/A 2016    Procedure: FRACTIONAL  DILATATION AND CURETTAGE (D&C) WITH HYSTEROSOCPY;  Surgeon: Farnaz Romero MD;  [facility];  Service: Gynecology    WV LAPS TOTAL HYSTERECT 250 GM/< W/RMVL TUBE/OVARY N/A 10/08/2021    Procedure: ROBOTIC HYSTERECTOMY, BILATERAL SALPINGOOPHERECTOMY; LEFT EXTERNAL ILIAC SENTINEL LYMPH NODE BIOPSY, RIGHT PELVIC LYMPHADENECTOMY;  Surgeon: Adelso Freeman MD;  [facility];  Service: Gynecology Oncology    REPLACEMENT TOTAL KNEE Right           Social History     Tobacco Use    Smoking status: Never    Smokeless tobacco: Never   Vaping Use    Vaping status: Never Used   Substance Use Topics    Alcohol use: Not Currently    Drug use: Not Currently     Family History   Problem Relation Age of Onset    Hypertension Mother     Hypertension Father     Heart failure Father     Lymphoma Father 62    Diabetes Father         at old age    No Known Problems Sister     No Known Problems Daughter     No Known Problems Maternal Grandmother     No Known Problems Maternal Grandfather     No Known Problems Paternal Grandmother     No Known Problems Paternal Grandfather     Hemophilia Brother     Breast cancer Maternal Aunt 50    Brain cancer Maternal Aunt 48    No Known Problems Maternal Aunt     No Known Problems Son     No Known Problems Son     No Known Problems Son           Allergies:     Allergies   Allergen Reactions    Griseofulvin Hives    Penicillins Hives     She can take cephalosporin without any side effect    Lisinopril Cough    Zithromax [Azithromycin] Headache        Current Medications:     Current Outpatient Medications   Medication Instructions    amLODIPine (NORVASC) 5 mg, Oral, Daily    ascorbic acid (VITAMIN C) 500 mg, Oral, Daily, Start 30 days prior to surgery    busPIRone (BUSPAR) 5 mg, Oral, Daily at bedtime    calcium carbonate (OS-CAMDEN) 600 mg, Daily    citalopram (CELEXA) 20 mg, Oral, Daily at bedtime    clotrimazole-betamethasone (LOTRISONE) 1-0.05 % cream Topical, 2 times daily     "enoxaparin (LOVENOX) 40 mg, Subcutaneous, Daily, Start injections after surgery    ferrous sulfate 324 mg, Oral, Daily before breakfast, Start 30 days prior to surgery    fexofenadine (ALLEGRA) 180 mg, Daily PRN    folic acid (FOLVITE) 1 mg, Oral, Daily, Start 30 days prior to surgery    losartan-hydrochlorothiazide (HYZAAR) 50-12.5 mg per tablet 1 tablet, Oral, Daily    metoprolol succinate (TOPROL-XL) 100 mg, Oral, Daily at bedtime    Multiple Vitamins-Minerals (multivitamin with minerals) tablet 1 tablet, Daily    mupirocin (BACTROBAN) 2 % ointment Topical, 2 times daily, Apply pea size amount to each nostril 2x/day for 5 days prior to procedure    omeprazole (PRILOSEC) 20 mg, As needed    vitamin B-12 (VITAMIN B-12) 1,000 mcg tablet Daily    vitamin C 1,000 mg, Daily    Vitamin D 2,000 Units, Daily           PRE-OP WORKSHEET DATA    Assessment of Pre-Operative Risks     MLJ Quality Hard Stops:    BMI (<40) : Estimated body mass index is 39.99 kg/m² as calculated from the following:    Height as of this encounter: 5' 8\" (1.727 m).    Weight as of this encounter: 119 kg (263 lb).    Hgb ( >11):   Lab Results   Component Value Date    HGB 13.8 02/23/2025    HGB 14.4 08/11/2024    HGB 13.9 07/30/2023       HbA1c (<7.5) :   Lab Results   Component Value Date    HGBA1C 5.0 02/23/2025       GFR (>60) (Less then 45 = Nephrology consult):    Lab Results   Component Value Date    EGFR 66 02/23/2025    EGFR 67 08/11/2024    EGFR 62 03/10/2024            Pre-Op Data Reviewed:       Laboratory Results: I have personally reviewed the pertinent reports    EKG: I personally reviewed and interpreted available tracings in the electronic medical record    Impression    Normal sinus rhythm, no significant abnormalities   Specimen Collected: 06/06/24  3:22 PM       OLD RECORDS: reviewed old records in the chart review section if EHR on day of visit.    Previous cardiopulmonary studies within the past year:  Echocardiogram: yes, "   Interpretation Summary      Left Ventricle: Left ventricular cavity size is normal. Wall thickness is normal. The left ventricular ejection fraction is 60%. Systolic function is normal. Wall motion is normal. Diastolic function is normal.    Right Ventricle: Right ventricular cavity size is normal. Systolic function is normal.    Aortic Valve: The aortic valve cannot be ruled out as bicuspid. The leaflets are mildly calcified. There is mild stenosis. The aortic valve peak velocity is 1.9 m/s. The aortic valve mean gradient is 8.0 mmHg. The aortic valve area is 1.7 cm2.   Lab Results   Component Value Date    LVEF 60 07/05/2024     Cardiac Catheterization: no  Stress Test: no      Time of visit including pre-visit chart review, visit and post-visit coordination of plan and care , review of pre-surgical lab work, preparation and time spent documenting note in electronic medical record, time spent face-to-face in physical examination answering patient questions by care team 35 minutes             [facility]

## 2025-02-24 NOTE — PATIENT INSTRUCTIONS
BEFORE SURGERY    Contact your surgical nurse navigator or surgical provider with any questions regarding preoperative plan or schedule.  Stop all over the counter supplements, herbal, naturopathic  medications for 1 week prior to surgery UNLESS prescribed by your surgeon  Hold NSAIDS (i.e. advil, alleve, motrin, ibuprofen, celebrex) minimum 5 days prior to surgery  Follow presurgical medication instructions provided by preadmission nursing team reviewed during your presurgery phone call  Strategies for optimizing your surgery through breathing exercises, nutrition and physical activity can be found at [email or web address]  Call [phone] with any presurgical concerns or medications questions or use the messaging feature in your [employer] naa to contact your provider    AFTER SURGERY    Recommend using Tylenol ( acetaminophen ) 1000 mg every eight hours during the first week post discharge along with icing the area for 20 mins every 3-4 hours while awake can be helpful in reducing your need for post operative opioid use. This opioid sparing plan can be used along side your surgeons pain plan.  Use stool softener over the counter (colace) daily after surgery during the first 1-2 weeks to avoid post operative constipation issues  If no bowel movement within 3 days after surgery then use over the counter Miralax in addition to your stool softener   If cleared by your surgical team for activity then early and often walking is encouraged and can be important in prevention of post surgical blood clots. Additionally spend as much time out of bed as possible and allowed by your surgical team  Use your incentive spirometer twice per hour in the first seven days after surgery to help prevent post surgery lung complications and infections  It is very important you follow the instructions from your surgeon regarding any medications for after surgery blood clot prevention. Compliance with these medications or interventions is very  important.  Call [phone] with any post discharge concerns or medical issues or use the messaging feature in your [employer] naa to contact your provider

## 2025-03-03 NOTE — PRE-PROCEDURE INSTRUCTIONS
Pre-Surgery Instructions:   Medication Instructions    amLODIPine (NORVASC) 5 mg tablet Take night before surgery    ascorbic acid (VITAMIN C) 500 MG tablet Take night before surgery    busPIRone (BUSPAR) 5 mg tablet Take night before surgery    calcium carbonate (OS-CAMDEN) 600 MG tablet Stop taking 7 days prior to surgery.    Cholecalciferol (Vitamin D) 50 MCG (2000 UT) tablet Take night before surgery    citalopram (CeleXA) 20 mg tablet Take night before surgery    ferrous sulfate 324 (65 Fe) mg Take night before surgery    fexofenadine (ALLEGRA) 180 MG tablet Take night before surgery    folic acid (FOLVITE) 1 mg tablet Take night before surgery    losartan-hydrochlorothiazide (HYZAAR) 50-12.5 mg per tablet Take night before surgery    metoprolol succinate (TOPROL-XL) 100 mg 24 hr tablet Take night before surgery    Multiple Vitamins-Minerals (multivitamin with minerals) tablet Take night before surgery    mupirocin (BACTROBAN) 2 % ointment Take day of surgery.    omeprazole (PriLOSEC) 20 mg delayed release capsule Uses PRN- OK to take day of surgery    vitamin B-12 (VITAMIN B-12) 1,000 mcg tablet Take night before surgery   Medication instructions for day of surgery reviewed. Please take all instructed medications with only a sip of water.       You will receive a call one business day prior to surgery with an arrival time and hospital directions. If your surgery is scheduled on a Monday, the hospital will be calling you on the Friday prior to your surgery. If you have not heard from anyone by 8pm, please call the hospital supervisor through the hospital  at [phone]. ([place] [phone] or [place] [phone]).    Do not eat or drink anything after midnight the night before your surgery, including candy, mints, lifesavers, or chewing gum. Do not drink alcohol 24hrs before your surgery. Try not to smoke at least 24hrs before your surgery.       Follow the pre surgery showering instructions as  listed in the “My Surgical Experience Booklet” or otherwise provided by your surgeon's office. Do not use a blade to shave the surgical area 1 week before surgery. It is okay to use a clean electric clippers up to 24 hours before surgery. Do not apply any lotions, creams, including makeup, cologne, deodorant, or perfumes after showering on the day of your surgery. Do not use dry shampoo, hair spray, hair gel, or any type of hair products.     No contact lenses, eye make-up, or artificial eyelashes. Remove nail polish, including gel polish, and any artificial, gel, or acrylic nails if possible. Remove all jewelry including rings and body piercing jewelry.     Wear causal clothing that is easy to take on and off. Consider your type of surgery.    Keep any valuables, jewelry, piercings at home. Please bring any specially ordered equipment (sling, braces) if indicated.    Arrange for a responsible person to drive you to and from the hospital on the day of your surgery. Please confirm the visitor policy for the day of your procedure when you receive your phone call with an arrival time.     Call the surgeon's office with any new illnesses, exposures, or additional questions prior to surgery.    Please reference your “My Surgical Experience Booklet” for additional information to prepare for your upcoming surgery.     Confirmed patient received showering instructions and skin cleanser from office.    Encouraged patient to begin taking 10 deep breaths 4x daily to prevent post-op pneumonia.

## 2025-03-04 ENCOUNTER — OFFICE VISIT (OUTPATIENT)
Age: 65
End: 2025-03-04
Payer: MEDICARE

## 2025-03-04 VITALS
DIASTOLIC BLOOD PRESSURE: 79 MMHG | BODY MASS INDEX: 39.86 KG/M2 | OXYGEN SATURATION: 96 % | HEART RATE: 89 BPM | SYSTOLIC BLOOD PRESSURE: 129 MMHG | HEIGHT: 68 IN | WEIGHT: 263 LBS

## 2025-03-04 DIAGNOSIS — I35.0 MILD AORTIC STENOSIS: ICD-10-CM

## 2025-03-04 DIAGNOSIS — M25.552 CHRONIC LEFT HIP PAIN: ICD-10-CM

## 2025-03-04 DIAGNOSIS — G89.29 CHRONIC LEFT HIP PAIN: ICD-10-CM

## 2025-03-04 DIAGNOSIS — M16.12 PRIMARY OSTEOARTHRITIS OF LEFT HIP: ICD-10-CM

## 2025-03-04 DIAGNOSIS — E66.01 CLASS 2 SEVERE OBESITY DUE TO EXCESS CALORIES WITH SERIOUS COMORBIDITY AND BODY MASS INDEX (BMI) OF 39.0 TO 39.9 IN ADULT (HCC): ICD-10-CM

## 2025-03-04 DIAGNOSIS — Z01.818 PREOPERATIVE CLEARANCE: Primary | ICD-10-CM

## 2025-03-04 DIAGNOSIS — R60.0 EDEMA OF BOTH LOWER LEGS: ICD-10-CM

## 2025-03-04 DIAGNOSIS — I10 ESSENTIAL HYPERTENSION: ICD-10-CM

## 2025-03-04 DIAGNOSIS — E66.812 CLASS 2 SEVERE OBESITY DUE TO EXCESS CALORIES WITH SERIOUS COMORBIDITY AND BODY MASS INDEX (BMI) OF 39.0 TO 39.9 IN ADULT (HCC): ICD-10-CM

## 2025-03-04 PROBLEM — E66.09 CLASS 2 OBESITY DUE TO EXCESS CALORIES WITH BODY MASS INDEX (BMI) OF 39.0 TO 39.9 IN ADULT: Status: ACTIVE | Noted: 2024-03-25

## 2025-03-04 PROCEDURE — 99214 OFFICE O/P EST MOD 30 MIN: CPT | Performed by: NURSE PRACTITIONER

## 2025-03-05 DIAGNOSIS — M25.552 CHRONIC LEFT HIP PAIN: ICD-10-CM

## 2025-03-05 DIAGNOSIS — G89.29 CHRONIC LEFT HIP PAIN: ICD-10-CM

## 2025-03-05 DIAGNOSIS — M16.12 PRIMARY OSTEOARTHRITIS OF LEFT HIP: ICD-10-CM

## 2025-03-05 RX ORDER — FOLIC ACID 1 MG/1
1 TABLET ORAL DAILY
Qty: 30 TABLET | Refills: 0 | Status: SHIPPED | OUTPATIENT
Start: 2025-03-05

## 2025-03-05 NOTE — PROGRESS NOTES
PT Evaluation     Today's date: 3/10/2025  Patient name: Cielo Thomason  : 1960  MRN: [id number]  Referring provider: Arvind Eaton,*  Dx:   Encounter Diagnosis     ICD-10-CM    1. Chronic left hip pain  M25.552 Ambulatory referral to Physical Therapy    G89.29       2. Primary osteoarthritis of left hip  M16.12 Ambulatory referral to Physical Therapy          Start Time: 745  Stop Time: 830  Total time in clinic (min): 45 minutes    Assessment  Impairments: abnormal gait, abnormal or restricted ROM, abnormal movement, activity intolerance, impaired balance, impaired physical strength, lacks appropriate home exercise program, pain with function, weight-bearing intolerance, poor body mechanics, unable to perform ADL, participation limitations, activity limitations and endurance  Symptom irritability: moderate    Assessment details: Cielo Thomason is a 64 y.o. year old female with primary complaints of L hip pain pre-op L FLETCHER to be performed by Dr. Eaton on 3/24/25. Current deficits include b/l hip ROM, strength, abnormal gait, and difficulty with walking, standing, and ADLs including household tasks. These deficits limit pt's ability to participate in IADLs, household tasks, and community and social recreational activities. Recommend skilled PT to address previously stated impairments to promote PLOF.    Patient treated by EDU Shields under direct PT supervision.        Understanding of Dx/Px/POC: good     Prognosis: good    Goals  ST. Independent with HEP    LTGs:  To be determined at first post-op visit.        Plan  Patient would benefit from: skilled physical therapy and home program  Planned modality interventions: neuromuscular electric stimulation, TENS, unattended electrical stimulation, cryotherapy and thermotherapy: hydrocollator packs    Frequency: 1 visit pre-operatively; post-op frequency to be determined at first post-op visit.  Duration in weeks: 12  Plan of Care  "beginning date: 3/10/2025  Plan of Care expiration date: 2025  Treatment plan discussed with: patient  Plan details: Thank you for referring Cielo Thomason to Physical Therapy at [facility] and for the opportunity to coordinate care.          Subjective Evaluation    History of Present Illness  Mechanism of injury: Cielo Thomason is a 64 y.o. female who presents pre-op L FLETCHER with Dr. Eaton on 3/24/25 with primary complaints of L hip pain. Had a fall in July leading to ER visit on 25. Hx of R FLETCHER & R TKA. Concerned about falling. Describes symptoms as sharp and tight groin and buttock pain. Currently having difficulty with walking, house chores, and ADLs. Has difficulty sleeping secondary to L hip pain. Both wakes up and difficulty finding comfortable position. Denies reports of numbness/tingling in b/l LEs.    Patient Goals  Patient goals for therapy: decreased pain, return to sport/leisure activities, independence with ADLs/IADLs and increased strength    Pain  Current pain ratin  At best pain ratin  At worst pain ratin  Location: L groin  Quality: sharp and tight  Relieving factors: change in position (\"being careful about how I move\")  Aggravating factors: walking, stair climbing and standing (descends stairs backwards)    Social Support  Steps to enter house: yes (R HR)  3  Stairs in house: yes (L HR)   8  Lives in: multiple-level home (bedroom on 2nd floor)  Lives with: spouse    Employment status: working  Treatments  Previous treatment: injection treatment (groin and buttock injections)  Current treatment: physical therapy        Objective     Active Range of Motion   Left Hip   Flexion: 75 (p!) degrees   External rotation (90/90): 30 degrees   Internal rotation (90/90): 10 degrees     Right Hip   Flexion: 90 degrees   External rotation (90/90): 25 degrees   Internal rotation (90/90): 25 degrees     Strength/Myotome Testing     Left Hip   Planes of Motion   Flexion: " "3+  Abduction: 4  Adduction: 4  External rotation: 4-  Internal rotation: 3+    Right Hip   Planes of Motion   Flexion: 4+  Abduction: 4+  Adduction: 4+  External rotation: 4  Internal rotation: 4    Left Knee   Flexion: 5    Right Knee   Flexion: 5    Left Ankle/Foot   Dorsiflexion: 5    Right Ankle/Foot   Dorsiflexion: 5    General Comments:      Hip Comments   3/10/2025:  Pre-op TU\" w/ SPC on R w/ BUE use to aid in standing and sitting              Precautions:   Past Medical History:   Diagnosis Date    Allergic rhinitis 2022    Annual physical exam 2024    Anxiety     Arthritis     BMI 40.0-44.9, adult ([facility]) 2021    BMI 50.0-59.9, adult ([facility]) 2021    Cellulitis of left leg 2021    Depression     Eczema 2022    Edema of both lower legs 2021    Edema of both lower legs 2021    Endometrial cancer ([facility]) 2021    Heart murmur     Heart murmur 2024    History of COVID-19 2020    Mild sypmtoms Cough,Tired,diarrhea,sweats, Loss taste and smell    Hyperglycemia 2021    Hyperlipidemia     Hypertension     Left hip pain 2022    Lower abdominal pain 2021    Lumbar radiculopathy 08/15/2024    Mixed hyperlipidemia 2021    Numbness and tingling in left arm     Obesity 2024    Pruritus     Rash of hand 2022    Shortness of breath     Skin lesion     Skin rash 2023    Tinea cruris 2023    Tinea pedis of both feet 2022    Vitamin D deficiency 2021       * Indicates part of HEP   HEP code: [id number]     Daily Treatment Diary     Manuals 3/10          L hip PROM Nv                                 Ther Ex           Bike  nv         Flexion SLR           Abduction SLR           Supine hip extension bridge on ball           Supine lateral ball roll           Standing hip extension           Ankle pumps* 10x ea          Patient education Done                      Neuro Re-Ed           Quad sets* HEP        "   Glute sets* 10x          Bridges* 10x          Clamshells           NBOS balance           Tandem balance           Tandem walking           Plank           Bird dog                                 Ther Activity           Sit to stand           Goblet squat           Leg press           Heel raises* 10x ea          Toe raises* 10x ea          Forward step ups           Lateral step ups           Deadlift                                  Modalities

## 2025-03-10 ENCOUNTER — ANESTHESIA EVENT (OUTPATIENT)
Age: 65
End: 2025-03-10
Payer: MEDICARE

## 2025-03-10 ENCOUNTER — EVALUATION (OUTPATIENT)
Dept: PHYSICAL THERAPY | Facility: REHABILITATION | Age: 65
End: 2025-03-10
Payer: MEDICARE

## 2025-03-10 DIAGNOSIS — G89.29 CHRONIC LEFT HIP PAIN: Primary | ICD-10-CM

## 2025-03-10 DIAGNOSIS — M25.552 CHRONIC LEFT HIP PAIN: Primary | ICD-10-CM

## 2025-03-10 DIAGNOSIS — M16.12 PRIMARY OSTEOARTHRITIS OF LEFT HIP: ICD-10-CM

## 2025-03-10 PROCEDURE — 97110 THERAPEUTIC EXERCISES: CPT | Performed by: PHYSICAL THERAPIST

## 2025-03-10 PROCEDURE — 97161 PT EVAL LOW COMPLEX 20 MIN: CPT | Performed by: PHYSICAL THERAPIST

## 2025-03-10 PROCEDURE — 97530 THERAPEUTIC ACTIVITIES: CPT | Performed by: PHYSICAL THERAPIST

## 2025-03-11 DIAGNOSIS — F32.A ANXIETY AND DEPRESSION: Chronic | ICD-10-CM

## 2025-03-11 DIAGNOSIS — I10 ESSENTIAL HYPERTENSION: ICD-10-CM

## 2025-03-11 DIAGNOSIS — F41.9 ANXIETY AND DEPRESSION: Chronic | ICD-10-CM

## 2025-03-11 DIAGNOSIS — B37.2 CANDIDIASIS OF SKIN: ICD-10-CM

## 2025-03-12 ENCOUNTER — TELEPHONE (OUTPATIENT)
Age: 65
End: 2025-03-12

## 2025-03-12 NOTE — TELEPHONE ENCOUNTER
Spoke to patient. Confirmed she was up to date on all but 1 task in [employer]. While on the call, I was able to resend the task and patient confirmed she was able to access that task. Her birthdate was correct in her PatientIQ dashboard and it did work while we were on the call.    In addition to this, patient asked me to confirm her insurance on file is Medicare A & B, which I saw as #1 and she also gave me her new [facility]  ([employer]) Member ID which I entered for her.    Lastly, patient had a question on when to stop her Losartan before surgery. I asked our Nurse Navigator, Jud Joyner, to contact the patient with that information.    I gave the patient my name and direct # [phone], should she need to reach out to me for any additional help.

## 2025-03-12 NOTE — TELEPHONE ENCOUNTER
Patient states she received patient iq link to fill our prior to surgery. She is unable to complete due to it saying her  is incorrect. Her correct YOB: 1960. [employer] message sent.

## 2025-03-13 RX ORDER — CITALOPRAM HYDROBROMIDE 20 MG/1
20 TABLET ORAL
Qty: 90 TABLET | Refills: 0 | Status: ON HOLD | OUTPATIENT
Start: 2025-03-13

## 2025-03-13 RX ORDER — METOPROLOL SUCCINATE 100 MG/1
100 TABLET, EXTENDED RELEASE ORAL
Qty: 90 TABLET | Refills: 0 | Status: ON HOLD | OUTPATIENT
Start: 2025-03-13

## 2025-03-13 RX ORDER — CLOTRIMAZOLE AND BETAMETHASONE DIPROPIONATE 10; .64 MG/G; MG/G
CREAM TOPICAL 2 TIMES DAILY
Qty: 45 G | Refills: 0 | Status: ON HOLD | OUTPATIENT
Start: 2025-03-13

## 2025-03-13 RX ORDER — BUSPIRONE HYDROCHLORIDE 5 MG/1
5 TABLET ORAL
Qty: 90 TABLET | Refills: 0 | Status: ON HOLD | OUTPATIENT
Start: 2025-03-13

## 2025-03-13 RX ORDER — LOSARTAN POTASSIUM AND HYDROCHLOROTHIAZIDE 12.5; 5 MG/1; MG/1
1 TABLET ORAL DAILY
Qty: 90 TABLET | Refills: 0 | Status: ON HOLD | OUTPATIENT
Start: 2025-03-13

## 2025-03-13 RX ORDER — AMLODIPINE BESYLATE 5 MG/1
5 TABLET ORAL DAILY
Qty: 90 TABLET | Refills: 0 | Status: ON HOLD | OUTPATIENT
Start: 2025-03-13

## 2025-03-19 DIAGNOSIS — Z96.642 AFTERCARE FOLLOWING LEFT HIP JOINT REPLACEMENT SURGERY: Primary | ICD-10-CM

## 2025-03-19 DIAGNOSIS — Z47.1 AFTERCARE FOLLOWING LEFT HIP JOINT REPLACEMENT SURGERY: Primary | ICD-10-CM

## 2025-03-21 NOTE — DISCHARGE INSTR - AVS FIRST PAGE
Discharge Instructions: Hip replacement with Dr. Eaton    Weight Bearing Status:                                           Weight Bearing as tolerated to left lower extremity with assistive devices.     Be sure to maintain posterior hip precautions: no bending at waist, no crossing legs, on internally rotating surgical leg    Pain Management/Medications  You may resume your usual medications.  You may stop pre-operative vitamins (Folic acid, Ferrous sulfate, and Vitamin C).   Please take the following medications:  Anti-coagulation (blood clot prevention) - Complete Lovenox injections for 28 days.   Pain medication:  Oxycodone 5 m tablet every 6 hours as needed for severe pain  Tizanidine (Muscle relaxer) 2 m tablet up to 3 times a day as needed for mild pain and muscle discomfort  Tylenol 1000 mg: up to three times daily as needed for mild to moderate pain. Do not exceed 3000mg daily   Continue applying ice to your hip on and off for about 20 minutes as needed.  Continue to elevate your operative leg, with ankle above the level of your heart when possible.  If you have questions or pain concerns, please contact the office.   If you need refills of your medications prior to your next office visit, please contact the office.      Showering/Dressing Instructions:   Do not shower until first follow up appointment.  Leave bandage covering surgical incision on until seen in office.   No baths, swimming, or submerging your leg until cleared to do so.      Driving Instructions:  No driving until cleared by Orthopaedic Surgery.    PT/OT:  Continue PT/OT on outpatient basis as directed    Appt Instructions:   Follow up as scheduled on 2025 in [place].   If you need to change or cancel your appointment for any reason, please call the clinic at [phone]    Please contact the office if you experience any of the following:  Excessive bleeding (bleeding through your dressing)  Fever greater than 101 degrees  F after 48 hours (low grade fevers the day or two after surgery are normal)  Persistent nausea or vomiting  Decreased sensation or discoloration of the operative limb  Pain or swelling that is getting worse and not better with medication    Miscellaneous:  Advise use of abduction pillow (pink pillow) for 6 weeks after surgery when sleeping.    Advise against any dental cleanings or procedures for 3 months after surgery.   - If there is a dental emergency, please contact the office for further instructions.

## 2025-03-24 ENCOUNTER — HOSPITAL ENCOUNTER (OUTPATIENT)
Age: 65
Setting detail: OUTPATIENT SURGERY
Discharge: HOME | End: 2025-03-25
Attending: ORTHOPAEDIC SURGERY | Admitting: ORTHOPAEDIC SURGERY
Payer: MEDICARE

## 2025-03-24 ENCOUNTER — ANESTHESIA (OUTPATIENT)
Age: 65
End: 2025-03-24
Payer: MEDICARE

## 2025-03-24 DIAGNOSIS — M25.552 CHRONIC LEFT HIP PAIN: ICD-10-CM

## 2025-03-24 DIAGNOSIS — G89.29 CHRONIC LEFT HIP PAIN: ICD-10-CM

## 2025-03-24 DIAGNOSIS — M16.12 PRIMARY OSTEOARTHRITIS OF LEFT HIP: Primary | ICD-10-CM

## 2025-03-24 PROCEDURE — A6250 SKIN SEAL PROTECT MOISTURIZR: HCPCS | Performed by: ORTHOPAEDIC SURGERY

## 2025-03-24 PROCEDURE — NC001 PR NO CHARGE: Performed by: ORTHOPAEDIC SURGERY

## 2025-03-24 PROCEDURE — C1776 JOINT DEVICE (IMPLANTABLE): HCPCS | Performed by: ORTHOPAEDIC SURGERY

## 2025-03-24 PROCEDURE — 27130 TOTAL HIP ARTHROPLASTY: CPT

## 2025-03-24 PROCEDURE — 27130 TOTAL HIP ARTHROPLASTY: CPT | Performed by: ORTHOPAEDIC SURGERY

## 2025-03-24 PROCEDURE — 97163 PT EVAL HIGH COMPLEX 45 MIN: CPT | Performed by: PHYSICAL THERAPIST

## 2025-03-24 PROCEDURE — 99222 1ST HOSP IP/OBS MODERATE 55: CPT | Performed by: INTERNAL MEDICINE

## 2025-03-24 PROCEDURE — C1713 ANCHOR/SCREW BN/BN,TIS/BN: HCPCS | Performed by: ORTHOPAEDIC SURGERY

## 2025-03-24 DEVICE — PINNACLE POROCOAT ACETABULAR SHELL SECTOR II 54MM OD
Type: IMPLANTABLE DEVICE | Site: HIP | Status: FUNCTIONAL
Brand: PINNACLE POROCOAT

## 2025-03-24 DEVICE — PINNACLE CANCELLOUS BONE SCREW 6.5MM X 35MM
Type: IMPLANTABLE DEVICE | Site: HIP | Status: FUNCTIONAL
Brand: PINNACLE

## 2025-03-24 DEVICE — CORAIL HIP SYSTEM CEMENTLESS FEMORAL STEM 12/14 AMT 135 DEGREES KHO SIZE 14 HA COATED HIGH OFFSET NO COLLAR
Type: IMPLANTABLE DEVICE | Site: HIP | Status: FUNCTIONAL
Brand: CORAIL

## 2025-03-24 DEVICE — PINNACLE HIP SOLUTIONS ALTRX POLYETHYLENE ACETABULAR LINER +4 10 DEGREE 36MM ID 54MM OD
Type: IMPLANTABLE DEVICE | Site: HIP | Status: FUNCTIONAL
Brand: PINNACLE ALTRX

## 2025-03-24 DEVICE — M-SPEC METAL FEMORAL HEAD 12/14 TAPER DIAMETER 36MM +1.5: Type: IMPLANTABLE DEVICE | Site: HIP | Status: FUNCTIONAL

## 2025-03-24 RX ORDER — TIZANIDINE 2 MG/1
2 TABLET ORAL EVERY 8 HOURS PRN
Qty: 60 TABLET | Refills: 0 | Status: SHIPPED | OUTPATIENT
Start: 2025-03-24

## 2025-03-24 RX ORDER — OXYCODONE HYDROCHLORIDE 10 MG/1
10 TABLET ORAL EVERY 4 HOURS PRN
Status: DISCONTINUED | OUTPATIENT
Start: 2025-03-24 | End: 2025-03-25 | Stop reason: HOSPADM

## 2025-03-24 RX ORDER — AMLODIPINE BESYLATE 5 MG/1
5 TABLET ORAL DAILY
Status: DISCONTINUED | OUTPATIENT
Start: 2025-03-24 | End: 2025-03-25 | Stop reason: HOSPADM

## 2025-03-24 RX ORDER — PROPOFOL 10 MG/ML
INJECTION, EMULSION INTRAVENOUS CONTINUOUS PRN
Status: DISCONTINUED | OUTPATIENT
Start: 2025-03-24 | End: 2025-03-24

## 2025-03-24 RX ORDER — MAGNESIUM HYDROXIDE 1200 MG/15ML
LIQUID ORAL AS NEEDED
Status: DISCONTINUED | OUTPATIENT
Start: 2025-03-24 | End: 2025-03-24 | Stop reason: HOSPADM

## 2025-03-24 RX ORDER — FENTANYL CITRATE/PF 50 MCG/ML
50 SYRINGE (ML) INJECTION
Refills: 0 | Status: COMPLETED | OUTPATIENT
Start: 2025-03-24 | End: 2025-03-24

## 2025-03-24 RX ORDER — ACETAMINOPHEN 325 MG/1
975 TABLET ORAL EVERY 6 HOURS PRN
Status: DISCONTINUED | OUTPATIENT
Start: 2025-03-24 | End: 2025-03-25 | Stop reason: HOSPADM

## 2025-03-24 RX ORDER — DOCUSATE SODIUM 100 MG/1
100 CAPSULE, LIQUID FILLED ORAL 2 TIMES DAILY
Status: DISCONTINUED | OUTPATIENT
Start: 2025-03-24 | End: 2025-03-25 | Stop reason: HOSPADM

## 2025-03-24 RX ORDER — MIDAZOLAM HYDROCHLORIDE 2 MG/2ML
INJECTION, SOLUTION INTRAMUSCULAR; INTRAVENOUS AS NEEDED
Status: DISCONTINUED | OUTPATIENT
Start: 2025-03-24 | End: 2025-03-24

## 2025-03-24 RX ORDER — TRANEXAMIC ACID 10 MG/ML
1000 INJECTION, SOLUTION INTRAVENOUS ONCE
Status: COMPLETED | OUTPATIENT
Start: 2025-03-24 | End: 2025-03-24

## 2025-03-24 RX ORDER — ACETAMINOPHEN 10 MG/ML
1000 INJECTION, SOLUTION INTRAVENOUS ONCE
Status: COMPLETED | OUTPATIENT
Start: 2025-03-24 | End: 2025-03-25

## 2025-03-24 RX ORDER — METOCLOPRAMIDE HYDROCHLORIDE 5 MG/ML
10 INJECTION INTRAMUSCULAR; INTRAVENOUS EVERY 4 HOURS PRN
Status: DISCONTINUED | OUTPATIENT
Start: 2025-03-24 | End: 2025-03-24 | Stop reason: HOSPADM

## 2025-03-24 RX ORDER — LORATADINE 10 MG/1
10 TABLET ORAL DAILY
Status: DISCONTINUED | OUTPATIENT
Start: 2025-03-24 | End: 2025-03-25 | Stop reason: HOSPADM

## 2025-03-24 RX ORDER — HYDROMORPHONE HCL/PF 1 MG/ML
0.5 SYRINGE (ML) INJECTION
Status: DISCONTINUED | OUTPATIENT
Start: 2025-03-24 | End: 2025-03-24 | Stop reason: HOSPADM

## 2025-03-24 RX ORDER — METOPROLOL SUCCINATE 25 MG/1
100 TABLET, EXTENDED RELEASE ORAL
Status: DISCONTINUED | OUTPATIENT
Start: 2025-03-24 | End: 2025-03-25 | Stop reason: HOSPADM

## 2025-03-24 RX ORDER — SODIUM CHLORIDE, SODIUM LACTATE, POTASSIUM CHLORIDE, CALCIUM CHLORIDE 600; 310; 30; 20 MG/100ML; MG/100ML; MG/100ML; MG/100ML
125 INJECTION, SOLUTION INTRAVENOUS CONTINUOUS
Status: DISCONTINUED | OUTPATIENT
Start: 2025-03-24 | End: 2025-03-25 | Stop reason: HOSPADM

## 2025-03-24 RX ORDER — OXYCODONE HYDROCHLORIDE 5 MG/1
5 TABLET ORAL EVERY 6 HOURS PRN
Qty: 30 TABLET | Refills: 0 | Status: SHIPPED | OUTPATIENT
Start: 2025-03-24 | End: 2025-04-03

## 2025-03-24 RX ORDER — PHENYLEPHRINE HCL IN 0.9% NACL 1 MG/10 ML
SYRINGE (ML) INTRAVENOUS AS NEEDED
Status: DISCONTINUED | OUTPATIENT
Start: 2025-03-24 | End: 2025-03-24

## 2025-03-24 RX ORDER — ONDANSETRON 2 MG/ML
4 INJECTION INTRAMUSCULAR; INTRAVENOUS EVERY 4 HOURS PRN
Status: COMPLETED | OUTPATIENT
Start: 2025-03-24 | End: 2025-03-24

## 2025-03-24 RX ORDER — ACETAMINOPHEN 500 MG
1000 TABLET ORAL EVERY 6 HOURS PRN
Qty: 90 TABLET | Refills: 0 | Status: SHIPPED | OUTPATIENT
Start: 2025-03-24

## 2025-03-24 RX ORDER — GABAPENTIN 100 MG/1
100 CAPSULE ORAL EVERY 8 HOURS
Status: DISCONTINUED | OUTPATIENT
Start: 2025-03-24 | End: 2025-03-25 | Stop reason: HOSPADM

## 2025-03-24 RX ORDER — HYDROMORPHONE HCL/PF 1 MG/ML
0.5 SYRINGE (ML) INJECTION EVERY 2 HOUR PRN
Status: DISCONTINUED | OUTPATIENT
Start: 2025-03-24 | End: 2025-03-25 | Stop reason: HOSPADM

## 2025-03-24 RX ORDER — ENOXAPARIN SODIUM 100 MG/ML
40 INJECTION SUBCUTANEOUS DAILY
Status: DISCONTINUED | OUTPATIENT
Start: 2025-03-24 | End: 2025-03-25 | Stop reason: HOSPADM

## 2025-03-24 RX ORDER — OXYCODONE HYDROCHLORIDE 5 MG/1
5 TABLET ORAL EVERY 4 HOURS PRN
Status: DISCONTINUED | OUTPATIENT
Start: 2025-03-24 | End: 2025-03-25 | Stop reason: HOSPADM

## 2025-03-24 RX ORDER — PANTOPRAZOLE SODIUM 40 MG/1
40 TABLET, DELAYED RELEASE ORAL
Status: DISCONTINUED | OUTPATIENT
Start: 2025-03-25 | End: 2025-03-25 | Stop reason: HOSPADM

## 2025-03-24 RX ORDER — CALCIUM CARBONATE 500 MG/1
1000 TABLET, CHEWABLE ORAL DAILY PRN
Status: DISCONTINUED | OUTPATIENT
Start: 2025-03-24 | End: 2025-03-25 | Stop reason: HOSPADM

## 2025-03-24 RX ORDER — METHOCARBAMOL 500 MG/1
500 TABLET, FILM COATED ORAL EVERY 6 HOURS SCHEDULED
Status: DISCONTINUED | OUTPATIENT
Start: 2025-03-24 | End: 2025-03-25 | Stop reason: HOSPADM

## 2025-03-24 RX ORDER — CHLORHEXIDINE GLUCONATE ORAL RINSE 1.2 MG/ML
15 SOLUTION DENTAL ONCE
Status: COMPLETED | OUTPATIENT
Start: 2025-03-24 | End: 2025-03-24

## 2025-03-24 RX ORDER — ONDANSETRON 2 MG/ML
4 INJECTION INTRAMUSCULAR; INTRAVENOUS EVERY 6 HOURS PRN
Status: DISCONTINUED | OUTPATIENT
Start: 2025-03-24 | End: 2025-03-25 | Stop reason: HOSPADM

## 2025-03-24 RX ORDER — CEFAZOLIN SODIUM 1 G/50ML
1000 SOLUTION INTRAVENOUS EVERY 8 HOURS
Status: COMPLETED | OUTPATIENT
Start: 2025-03-24 | End: 2025-03-25

## 2025-03-24 RX ORDER — CITALOPRAM HYDROBROMIDE 20 MG/1
20 TABLET ORAL
Status: DISCONTINUED | OUTPATIENT
Start: 2025-03-24 | End: 2025-03-25 | Stop reason: HOSPADM

## 2025-03-24 RX ORDER — ACETAMINOPHEN 10 MG/ML
INJECTION, SOLUTION INTRAVENOUS
Status: COMPLETED
Start: 2025-03-24 | End: 2025-03-24

## 2025-03-24 RX ORDER — BUSPIRONE HYDROCHLORIDE 5 MG/1
5 TABLET ORAL
Status: DISCONTINUED | OUTPATIENT
Start: 2025-03-24 | End: 2025-03-25 | Stop reason: HOSPADM

## 2025-03-24 RX ADMIN — PROPOFOL 100 MCG/KG/MIN: 10 INJECTION, EMULSION INTRAVENOUS at 11:46

## 2025-03-24 RX ADMIN — Medication 100 MCG: at 12:34

## 2025-03-24 RX ADMIN — ENOXAPARIN SODIUM 40 MG: 40 INJECTION SUBCUTANEOUS at 23:00

## 2025-03-24 RX ADMIN — TRANEXAMIC ACID 1000 MG: 10 INJECTION, SOLUTION INTRAVENOUS at 11:57

## 2025-03-24 RX ADMIN — ACETAMINOPHEN 975 MG: 325 TABLET ORAL at 16:51

## 2025-03-24 RX ADMIN — FENTANYL CITRATE 50 MCG: 50 INJECTION INTRAMUSCULAR; INTRAVENOUS at 13:55

## 2025-03-24 RX ADMIN — BUSPIRONE HYDROCHLORIDE 5 MG: 5 TABLET ORAL at 21:18

## 2025-03-24 RX ADMIN — OXYCODONE HYDROCHLORIDE 5 MG: 5 TABLET ORAL at 16:52

## 2025-03-24 RX ADMIN — CHLORHEXIDINE GLUCONATE 15 ML: 1.2 SOLUTION ORAL at 09:03

## 2025-03-24 RX ADMIN — OXYCODONE HYDROCHLORIDE 10 MG: 10 TABLET ORAL at 21:18

## 2025-03-24 RX ADMIN — Medication 100 MCG: at 12:15

## 2025-03-24 RX ADMIN — METHOCARBAMOL 500 MG: 500 TABLET ORAL at 18:19

## 2025-03-24 RX ADMIN — Medication 3000 MG: at 11:50

## 2025-03-24 RX ADMIN — DOCUSATE SODIUM 100 MG: 100 CAPSULE, LIQUID FILLED ORAL at 18:19

## 2025-03-24 RX ADMIN — MEPIVACAINE HYDROCHLORIDE 3.5 ML: 15 INJECTION, SOLUTION EPIDURAL; INFILTRATION at 11:43

## 2025-03-24 RX ADMIN — HYDROMORPHONE HYDROCHLORIDE 0.5 MG: 1 INJECTION, SOLUTION INTRAMUSCULAR; INTRAVENOUS; SUBCUTANEOUS at 14:46

## 2025-03-24 RX ADMIN — MIDAZOLAM 2 MG: 1 INJECTION INTRAMUSCULAR; INTRAVENOUS at 11:31

## 2025-03-24 RX ADMIN — CITALOPRAM HYDROBROMIDE 20 MG: 20 TABLET ORAL at 21:18

## 2025-03-24 RX ADMIN — GABAPENTIN 100 MG: 100 CAPSULE ORAL at 15:36

## 2025-03-24 RX ADMIN — FENTANYL CITRATE 50 MCG: 50 INJECTION INTRAMUSCULAR; INTRAVENOUS at 13:49

## 2025-03-24 RX ADMIN — AMLODIPINE BESYLATE 5 MG: 5 TABLET ORAL at 15:36

## 2025-03-24 RX ADMIN — CEFAZOLIN SODIUM 1000 MG: 1 SOLUTION INTRAVENOUS at 18:20

## 2025-03-24 RX ADMIN — SODIUM CHLORIDE, SODIUM LACTATE, POTASSIUM CHLORIDE, AND CALCIUM CHLORIDE 125 ML/HR: .6; .31; .03; .02 INJECTION, SOLUTION INTRAVENOUS at 15:17

## 2025-03-24 RX ADMIN — GABAPENTIN 100 MG: 100 CAPSULE ORAL at 23:00

## 2025-03-24 RX ADMIN — ONDANSETRON 4 MG: 2 INJECTION INTRAMUSCULAR; INTRAVENOUS at 14:55

## 2025-03-24 RX ADMIN — ACETAMINOPHEN 1000 MG: 10 INJECTION, SOLUTION INTRAVENOUS at 14:38

## 2025-03-24 RX ADMIN — HYDROMORPHONE HYDROCHLORIDE 0.5 MG: 1 INJECTION, SOLUTION INTRAMUSCULAR; INTRAVENOUS; SUBCUTANEOUS at 14:18

## 2025-03-24 RX ADMIN — METHOCARBAMOL 500 MG: 500 TABLET ORAL at 23:00

## 2025-03-24 RX ADMIN — SODIUM CHLORIDE, SODIUM LACTATE, POTASSIUM CHLORIDE, AND CALCIUM CHLORIDE 125 ML/HR: .6; .31; .03; .02 INJECTION, SOLUTION INTRAVENOUS at 09:09

## 2025-03-24 RX ADMIN — ACETAMINOPHEN 1000 MG: 10 INJECTION INTRAVENOUS at 14:38

## 2025-03-24 NOTE — PLAN OF CARE
Problem: PHYSICAL THERAPY ADULT  Goal: Performs mobility at highest level of function for planned discharge setting.  See evaluation for individualized goals.  Description: Treatment/Interventions: Functional transfer training, Elevations, LE strengthening/ROM, Therapeutic exercise, Endurance training, Patient/family training, Bed mobility, Gait training, Spoke to nursing, OT  Equipment Recommended: Walker (pt owns)       See flowsheet documentation for full assessment, interventions and recommendations.  Note: Prognosis: Good  Problem List: Decreased strength, Decreased range of motion, Decreased endurance, Impaired balance, Decreased mobility, Obesity, Orthopedic restrictions, Pain  Assessment: Pt. 64 y.o.female presents for elective surgery. Past medical hx includes heart murmur, HTN, HLD, hx of R FLETCHER, hx of R TKA. Pt admitted for Primary osteoarthritis of left hip w/ Chronic left hip pain (M25.552, G89.29)  Primary osteoarthritis of left hip (M16.12). S/p L elective THR (posterior) POD #0. Pt referred to PT for functional mobility evaluation & D/C planning w/ orders of activity as tolerated. WBAT LLE. Posterior hip precautions with use of abduction pillow. PTA, pt reports being I w/o AD. Personal factors affecting pt at time of IE include: bed/bath on 2nd floor, decreased independence in ADLs/IADLs, steps to enter, and two story home. During evaluation, deficits included dec mobility, balance, ambulation. Required minAx1 for supine to sit, sit to stand, and S for stand to sit, toilet transfers, and ambulation. Pt able to ambulate 10' and 70' with RW in room and unit. Antalgic step to gait with no gross LOB noted. Inc cues required throughout session to maintain posterior hip precautions with good carryover. Pt demonstrated dec endurance and tolerance to activity. Denies reports of dizziness or SOB t/o session. Pt was educated on fall precautions and reinforced w/ good understanding. Pt would benefit from  continued PT to address deficits as defined above and maximize level of independence with functional mobility and safety. Based on pt presentation and impaired function, pt would benefit from level III, (minimum resource intensity) at D/C. The patient's AM-PAC Basic Mobility Inpatient Short Form Raw Score is 20. A Raw score of greater than 16 suggests the patient may benefit from discharge to home. Please also refer to the recommendation of the Physical Therapist for safe discharge planning. Nsg staff to continue to mobilized pt (OOB in chair for all meals & ambulate in room/unit) as tolerated to prevent further decline in function. Nsg notified.  Barriers to Discharge: Inaccessible home environment  Barriers to Discharge Comments: XAVIER  Rehab Resource Intensity Level, PT: III (Minimum Resource Intensity)    See flowsheet documentation for full assessment.

## 2025-03-24 NOTE — ANESTHESIA PREPROCEDURE EVALUATION
"Procedure:  ARTHROPLASTY HIP TOTAL (Left: Hip)    Relevant Problems   CARDIO   (+) Coronary artery calcification   (+) Essential hypertension   (+) Heart murmur   (+) Mild aortic stenosis   (+) Mixed hyperlipidemia      GI/HEPATIC   (+) GE reflux      MUSCULOSKELETAL   (+) Osteoarthritis   (+) Primary osteoarthritis of left hip      NEURO/PSYCH   (+) Anxiety and depression      BMI 40    Lab Results   Component Value Date    WBC 5.63 02/23/2025    HGB 13.8 02/23/2025    HCT 42.9 02/23/2025    MCV 86 02/23/2025     02/23/2025     Lab Results   Component Value Date    K 4.0 02/23/2025    CO2 31 02/23/2025     02/23/2025    BUN 18 02/23/2025    CREATININE 0.91 02/23/2025     Lab Results   Component Value Date    INR 0.90 02/23/2025    PROTIME 12.9 02/23/2025     Lab Results   Component Value Date    PTT 27 02/23/2025       No results found for: \"GLUCOSE\"    Lab Results   Component Value Date    HGBA1C 5.0 02/23/2025       Type and Screen:  A    Physical Exam    Airway    Mallampati score: II  TM Distance: >3 FB  Neck ROM: full     Dental       Cardiovascular      Pulmonary      Other Findings  post-pubertal.      Anesthesia Plan  ASA Score- 3     Anesthesia Type- spinal with ASA Monitors.         Additional Monitors:     Airway Plan:            Plan Factors-Exercise tolerance (METS): >4 METS.    Chart reviewed.   Existing labs reviewed. Patient summary reviewed.                  Induction- intravenous.    Postoperative Plan- Plan for postoperative opioid use.         Informed Consent- Anesthetic plan and risks discussed with patient.  I personally reviewed this patient with the CRNA. Discussed and agreed on the Anesthesia Plan with the CRNA..      NPO Status:  Vitals Value Taken Time   Date of last liquid 03/23/25 03/24/25 0847   Time of last liquid 2130 03/24/25 0847   Date of last solid 03/23/25 03/24/25 0847   Time of last solid 2130 03/24/25 0847         "

## 2025-03-24 NOTE — PROGRESS NOTES
"Progress Note - Internal Medicine   Name: Cielo Thomason 64 y.o. female I MRN: [id number]  Unit/Bed#: [facility] N -01 I Date of Admission: 3/24/2025   Date of Service: 3/24/2025 I Hospital Day: 0     Assessment & Plan  Primary osteoarthritis of left hip  64 year old female with primary OA of left hip now POD 1 s/p left FLETCHER. Hgb stable at 11.8.    Plan:  - WBAT on LLE  - Posterior hip precautions   - Abduction pillow   - DVT ppx  - OOB/ambulate  - PT/OT  - IS use   - Pain and nausea control PRN  - Continue home amlodipine/metoprolol for BP control. If sharp increase in SBP, will use hydralazine   - Continue buspirone   - Anticipate discharge within the next 24-48 hours     24 Hour Events : No acute overnight events   Subjective : Patient with 2 out of 10 pain this morning. She was able to rest comfortably in the bed and ambulate overnight with minimal assist. Patient denies N/V, fevers or chills this morning.     Objective :  Visit Vitals  /75   Pulse 69   Temp 98.4 °F (36.9 °C)   Resp 12   Ht 5' 8\" (1.727 m)   Wt 119 kg (263 lb)   LMP  (LMP Unknown)   SpO2 97%   BMI 39.99 kg/m²   OB Status Hysterectomy   Smoking Status Never   BSA 2.29 m²        Physical Exam  Constitutional:       General: She is not in acute distress.     Appearance: Normal appearance. She is not toxic-appearing or diaphoretic.   Cardiovascular:      Rate and Rhythm: Normal rate and regular rhythm.      Pulses: Normal pulses.      Heart sounds: Normal heart sounds. No murmur heard.     No gallop.   Pulmonary:      Effort: Pulmonary effort is normal. No respiratory distress.      Breath sounds: Normal breath sounds. No wheezing.   Abdominal:      General: Abdomen is flat. There is no distension.      Palpations: Abdomen is soft.      Tenderness: There is no abdominal tenderness. There is no guarding.   Skin:     General: Skin is warm and dry.   Neurological:      General: No focal deficit present.      Mental Status: She is alert and " oriented to person, place, and time.     LLE: Mepilex dressing is clean, dry and intact. Appropriate surrounding ecchymosis. No erythema or drainage. Neurovascularly intact. Toes warm and pink. Strength WNL.       Lab Results: I have reviewed the following results:  Recent Labs     03/25/25  0538   WBC 4.94   HGB 11.8             VTE Pharmacologic Prophylaxis: VTE covered by:  enoxaparin, Subcutaneous     VTE Mechanical Prophylaxis: sequential compression device

## 2025-03-24 NOTE — ASSESSMENT & PLAN NOTE
64 year old female with primary OA of left hip now POD 1 s/p left FLETCHER. Hgb stable at 11.8.    Plan:  - WBAT on LLE  - Posterior hip precautions   - Abduction pillow   - DVT ppx  - OOB/ambulate  - PT/OT  - IS use   - Pain and nausea control PRN  - Continue home amlodipine/metoprolol for BP control. If sharp increase in SBP, will use hydralazine   - Continue buspirone   - Anticipate discharge within the next 24-48 hours

## 2025-03-24 NOTE — H&P
H&P Exam - Orthopedics   Cielo Thomason 64 y.o. female MRN: [id number]      Assessment/Plan   Assessment:  left Hip Osteoarthritis  This adult female who continues to have both pain and dysfunction despite appropriate nonsurgical treatments  Plan:  left posterior Total Hip Arthroplasty.  Patient is familiar with risks, benefits, and alternatives      TOTAL HIP REPLACEMENT INDICATIONS AND RISKS  We had a lengthy discussion with the patient regarding the potential options for treatment.  Based on current presentation, radiographic exam, and lack of sufficient response to nonsurgical management including activity modification, NSAIDs and therapeutic exercise, I would offer treatment in the form of total hip arthroplasty at this time.      The potential risks and benefits were discussed in detail.    While no guarantees can be made, total hip replacement has a very high success rate in terms of relieving a patient's hip pain and returning them to a more active, independent lifestyle for 10-15 years or more. All surgery carries some risk; for hip replacement, the complication rate is low but may include: death (very rare), infection, bleeding requiring transfusion, blood clots in legs traveling to lungs, nerve and/or blood vessel damage, bone fracture, leg length difference, prosthetic hip dislocation, persistent hip pain and/or stiffness, and repeat surgery(ies). The risk of a major complication is generally 1-2 per 1000 cases. Total hip replacement should only be done if conservative treatment has failed. The predicted revision rate is approximately 1% per year; in other words, 90% of hip replacements last 10 years or more, 80% last 20 years or more, and so on, assuming no injury. Additionally, we discussed anesthesia related complications which will be discussed in greater detail with the anesthesia team before surgery. The patient voiced their understanding of the surgical plan and potential complications and  wishes to proceed with surgery.    History of Present Illness   HPI:  Cielo Thomason is a 64 y.o. female who presents with pain in the left hip. Patient is no longer getting adequate relief from non-operative modalities. Patient is continuing to have debilitating pain from their hip, interfering with daily activities and sleep. Patient denies any concerns with infections, new neuropathies, or any acute injuries.     Historical Information  Review Of Systems:   Skin: Normal  Neuro: See HPI  Musculoskeletal: See HPI  14 point review of systems negative except as stated above     Past Medical History:   Past Medical History:   Diagnosis Date    Allergic rhinitis 2022    Annual physical exam 2024    Anxiety     Arthritis     BMI 40.0-44.9, adult ([facility]) 2021    BMI 50.0-59.9, adult ([facility]) 2021    Cellulitis of left leg 2021    Depression     Eczema 2022    Edema of both lower legs 2021    Edema of both lower legs 2021    Endometrial cancer ([facility]) 2021    Heart murmur     Heart murmur 2024    History of COVID-19 2020    Mild sypmtoms Cough,Tired,diarrhea,sweats, Loss taste and smell    Hyperglycemia 2021    Hyperlipidemia     Hypertension     Left hip pain 2022    Lower abdominal pain 2021    Lumbar radiculopathy 08/15/2024    Mixed hyperlipidemia 2021    Numbness and tingling in left arm     Obesity 2024    Pruritus     Rash of hand 2022    Shortness of breath     Skin lesion     Skin rash 2023    Tinea cruris 2023    Tinea pedis of both feet 2022    Vitamin D deficiency 2021       Past Surgical History:   Past Surgical History:   Procedure Laterality Date    CARPAL TUNNEL RELEASE Bilateral      SECTION      x3     SECTION      CHOLECYSTECTOMY      CHOLECYSTECTOMY LAPAROSCOPIC N/A 2019    Procedure: CHOLECYSTECTOMY LAPAROSCOPIC;  Surgeon: Shayne De Leon MD;  [facility];   Service: General    COLONOSCOPY      CYSTOSCOPY N/A 10/08/2021    Procedure: Cystoscopy;  Surgeon: Adelso Freeman MD;  [facility];  Service: Gynecology Oncology    FL GUIDED NEEDLE PLAC BX/ASP/INJ  01/26/2015    FL INJECTION LEFT HIP (NON ARTHROGRAM)  07/11/2022    HYSTERECTOMY  2021    JOINT REPLACEMENT  08/19/2016    R total HIp    MAMMO (HISTORICAL)  10/11/2018    Advise for yearly mammo screening    OOPHORECTOMY Bilateral 2021    [place] HYSTEROSCOPY BX ENDOMETRIUM&/POLYPC W/WO D&C N/A 03/21/2016    Procedure: FRACTIONAL DILATATION AND CURETTAGE (D&C) WITH HYSTEROSOCPY;  Surgeon: Farnaz Romero MD;  [facility];  Service: Gynecology    KY LAPS TOTAL HYSTERECT 250 GM/< W/RMVL TUBE/OVARY N/A 10/08/2021    Procedure: ROBOTIC HYSTERECTOMY, BILATERAL SALPINGOOPHERECTOMY; LEFT EXTERNAL ILIAC SENTINEL LYMPH NODE BIOPSY, RIGHT PELVIC LYMPHADENECTOMY;  Surgeon: Adelso Freeman MD;  [facility];  Service: Gynecology Oncology    REPLACEMENT TOTAL KNEE Right        Family History:  Family history reviewed and non-contributory  Family History   Problem Relation Age of Onset    Hypertension Mother     Hypertension Father     Heart failure Father     Lymphoma Father 62    Diabetes Father         at old age    No Known Problems Sister     No Known Problems Daughter     No Known Problems Maternal Grandmother     No Known Problems Maternal Grandfather     No Known Problems Paternal Grandmother     No Known Problems Paternal Grandfather     Hemophilia Brother     Breast cancer Maternal Aunt 50    Brain cancer Maternal Aunt 48    No Known Problems Maternal Aunt     No Known Problems Son     No Known Problems Son     No Known Problems Son        Social History:  Social History     Socioeconomic History    Marital status: /Civil Union     Spouse name: None    Number of children: None    Years of education: None    Highest education level: None   Occupational History    None   Tobacco Use    Smoking  "status: Never    Smokeless tobacco: Never   Vaping Use    Vaping status: Never Used   Substance and Sexual Activity    Alcohol use: Not Currently    Drug use: Not Currently    Sexual activity: Not Currently     Partners: Male   Other Topics Concern    None   Social History Narrative    Current work/study status: Full-time    Single-family home    Lives with spouse    Private household service  Leans house - Inactive 2/19/2019     Annual dental checkup: sees q6months    Annual eye exam: Sees as needed    Pap smear: By her gyn, Dr. Romero    - As per [employer]     Social Drivers of Health     Financial Resource Strain: Not on file   Food Insecurity: Not on file   Transportation Needs: Not on file   Physical Activity: Not on file   Stress: Not on file   Social Connections: Not on file   Intimate Partner Violence: Not on file   Housing Stability: Not on file       Allergies:   Allergies   Allergen Reactions    Griseofulvin Hives    Penicillins Hives     She can take cephalosporin without any side effect    Lisinopril Cough    Zithromax [Azithromycin] Headache           Labs:  0   Lab Value Date/Time    HCT 42.9 02/23/2025 1152    HCT 46.2 (H) 08/11/2024 1143    HCT 44.9 07/30/2023 1124    HGB 13.8 02/23/2025 1152    HGB 14.4 08/11/2024 1143    HGB 13.9 07/30/2023 1124    INR 0.90 02/23/2025 1152    WBC 5.63 02/23/2025 1152    WBC 5.10 08/11/2024 1143    WBC 5.58 07/30/2023 1124       Meds:    Current Facility-Administered Medications:     ceFAZolin (ANCEF) 3,000 mg in dextrose 5% 100 ml IVPB, 3,000 mg, Intravenous, Once, Arvind Eaton MD    lactated ringers infusion, 125 mL/hr, Intravenous, Continuous, Arvind Eaton MD, Last Rate: 125 mL/hr at 03/24/25 0909, 125 mL/hr at 03/24/25 0909    tranexamic acid (CYKLOKAPRON) 1000-0.7 MG/100ML-% injection 1,000 mg, 1,000 mg, Intravenous, Once, Arvind Eaton MD    Blood Culture:   No results found for: \"BLOODCX\"    Wound Culture:   No results found " "for: \"WOUNDCULT\"    Ins and Outs:  No intake/output data recorded.          Physical Exam  /61   Pulse 68   Temp 98.8 °F (37.1 °C) (Temporal)   Resp 16   Ht 5' 8\" (1.727 m)   Wt 119 kg (263 lb)   LMP  (LMP Unknown)   SpO2 95%   BMI 39.99 kg/m²   /61   Pulse 68   Temp 98.8 °F (37.1 °C) (Temporal)   Resp 16   Ht 5' 8\" (1.727 m)   Wt 119 kg (263 lb)   LMP  (LMP Unknown)   SpO2 95%   BMI 39.99 kg/m²   Gen: No acute distress, resting comfortably in bed  HEENT: Eyes clear, moist mucus membranes, hearing intact  Respiratory: No audible wheezing or stridor  Cardiovascular: Well Perfused peripherally, 2+ distal pulse  Abdomen: nondistended, no peritoneal signs  Ortho Exam: limited hip ROM due to pain and mechanical blocking  Neuro Exam: intact    Lab Results: Reviewed  Imaging: Reviewed   "

## 2025-03-24 NOTE — PHYSICAL THERAPY NOTE
PT EVALUATION    Pt. Name: Cielo Thomason  Pt. Age: 64 y.o.  MRN: [id number]  LENGTH OF STAY: 0    Patient Active Problem List   Diagnosis    Osteoarthritis    Anxiety and depression    Mixed hyperlipidemia    Hyperglycemia    Vitamin D deficiency    GE reflux    Lymphedema    Encounter for follow-up surveillance of endometrial cancer    Family history of cancer    Edema of both lower legs    Tinea pedis of both feet    Essential hypertension    Chronic left hip pain    Eczema    Decreased pedal pulses    Allergic rhinitis    History of endometrial cancer    Candidiasis of skin    Heart murmur    Class 2 obesity due to excess calories with body mass index (BMI) of 39.0 to 39.9 in adult    Coronary artery calcification    Lumbar radiculopathy    Annual physical exam    Primary osteoarthritis of left hip    Mild aortic stenosis       Admitting Diagnoses:   Chronic left hip pain [M25.552, G89.29]  Primary osteoarthritis of left hip [M16.12]    Past Medical History:   Diagnosis Date    Allergic rhinitis 07/07/2022    Annual physical exam 12/16/2024    Anxiety     Arthritis     BMI 40.0-44.9, adult ([facility]) 06/01/2021    BMI 50.0-59.9, adult ([facility]) 06/01/2021    Cellulitis of left leg 08/17/2021    Depression     Eczema 06/02/2022    Edema of both lower legs 01/23/2021    Edema of both lower legs 11/23/2021    Endometrial cancer ([facility]) 09/14/2021    Heart murmur     Heart murmur 03/25/2024    History of COVID-19 11/2020    Mild sypmtoms Cough,Tired,diarrhea,sweats, Loss taste and smell    Hyperglycemia 01/23/2021    Hyperlipidemia     Hypertension     Left hip pain 06/02/2022    Lower abdominal pain 11/23/2021    Lumbar radiculopathy 08/15/2024    Mixed hyperlipidemia 01/23/2021    Numbness and tingling in left arm     Obesity 03/25/2024    Pruritus     Rash of hand 02/28/2022    Shortness of breath     Skin lesion     Skin rash 05/02/2023    Tinea cruris 08/24/2023    Tinea pedis of both feet 02/28/2022    Vitamin  D deficiency 2021       Past Surgical History:   Procedure Laterality Date    CARPAL TUNNEL RELEASE Bilateral      SECTION      x3     SECTION      CHOLECYSTECTOMY      CHOLECYSTECTOMY LAPAROSCOPIC N/A 2019    Procedure: CHOLECYSTECTOMY LAPAROSCOPIC;  Surgeon: Shayne De Leon MD;  [facility];  Service: General    COLONOSCOPY      CYSTOSCOPY N/A 10/08/2021    Procedure: Cystoscopy;  Surgeon: Adelso Freeman MD;  [facility];  Service: Gynecology Oncology    FL GUIDED NEEDLE PLAC BX/ASP/INJ  2015    FL INJECTION LEFT HIP (NON ARTHROGRAM)  2022    HYSTERECTOMY      JOINT REPLACEMENT  2016    R total HIp    MAMMO (HISTORICAL)  10/11/2018    Advise for yearly mammo screening    OOPHORECTOMY Bilateral     CO HYSTEROSCOPY BX ENDOMETRIUM&/POLYPC W/WO D&C N/A 2016    Procedure: FRACTIONAL DILATATION AND CURETTAGE (D&C) WITH HYSTEROSOCPY;  Surgeon: Farnaz Romero MD;  [facility];  Service: Gynecology    CO LAPS TOTAL HYSTERECT 250 GM/< W/RMVL TUBE/OVARY N/A 10/08/2021    Procedure: ROBOTIC HYSTERECTOMY, BILATERAL SALPINGOOPHERECTOMY; LEFT EXTERNAL ILIAC SENTINEL LYMPH NODE BIOPSY, RIGHT PELVIC LYMPHADENECTOMY;  Surgeon: Adelso Freeman MD;  [facility];  Service: Gynecology Oncology    REPLACEMENT TOTAL KNEE Right        Imaging Studies:  No orders to display        25 1625   PT Last Visit   PT Visit Date 25   Note Type   Note type Evaluation   Pain Assessment   Pain Assessment Tool 0-10   Pain Score 5   Pain Location/Orientation Orientation: Left;Location: Hip   Hospital Pain Intervention(s) Cold applied;Repositioned;Ambulation/increased activity;Elevated;Emotional support;Rest   Restrictions/Precautions   Weight Bearing Precautions Per Order Yes   LLE Weight Bearing Per Order WBAT   Braces or Orthoses   (hip abduction pillow)   Other Precautions Chair Alarm;Bed Alarm;WBS;THR;Multiple lines;Fall  Risk;Pain  (posteriorhip precautions)   Home Living   Type of Home House  (Split level)   Home Layout Multi-level;Bed/bath upstairs;Stairs to enter with rails  (2 XAVIER with R hand rail to living room; 8 steps up to bed and only bath)   Bathroom Shower/Tub Walk-in shower   Bathroom Toilet Raised   Bathroom Equipment Grab bars in shower;Built-in shower seat;Grab bars around toilet   Home Equipment Walker;Cane   Additional Comments Pt plans to go up 8 steps to main floor where bed and bath is located at D/C   Prior Function   Level of [place] Independent with ADLs;Independent with functional mobility;Independent with IADLS  (w/o AD)   Lives With Spouse   Receives Help From Family   IADLs Independent with driving;Independent with meal prep;Independent with medication management   Falls in the last 6 months 0   Vocational Full time employment   General   Family/Caregiver Present Yes   Cognition   Overall Cognitive Status WFL   Arousal/Participation Alert   Orientation Level Oriented X4   Following Commands Follows all commands and directions without difficulty   Subjective   Subjective Pt agreeable to PT evaluations.   RUE Assessment   RUE Assessment WFL   LUE Assessment   LUE Assessment WFL   RLE Assessment   RLE Assessment WFL  (4/5 grossly)   LLE Assessment   LLE Assessment X   Strength LLE   L Knee Flexion 3/5   L Knee Extension 3/5   L Ankle Dorsiflexion 4+/5   L Ankle Plantar Flexion 4+/5   Light Touch   RLE Light Touch Grossly intact   LLE Light Touch Grossly intact   Bed Mobility   Supine to Sit 4  Minimal assistance   Additional items Assist x 1;HOB elevated;Bedrails;Increased time required;Verbal cues;LE management   Transfers   Sit to Stand 4  Minimal assistance   Additional items Assist x 1;Bedrails;Increased time required;Verbal cues  (w/ RW)   Stand to Sit 5  Supervision   Additional items Armrests;Increased time required;Verbal cues  (w/ RW)   Toilet transfer 5  Supervision   Additional items Increased  time required;Verbal cues;Commode  (BSC over standard toilet; w/ RW)   Additional Comments Cues for hand placement and maintaining posterior hip precautions with transfers   Ambulation/Elevation   Gait pattern Improper Weight shift;Antalgic;Decreased foot clearance;Decreased L stance;Step through pattern   Gait Assistance 5  Supervision   Additional items Verbal cues   Assistive Device Rolling walker   Distance 10'x1; 70'x1   Ambulation/Elevation Additional Comments cues for turning with RW and maintaining posterior hip precautions   Balance   Static Sitting Good   Dynamic Sitting Fair +   Static Standing Fair  (w/ RW)   Dynamic Standing Fair -  (w/ RW)   Ambulatory Fair -  (w/ RW)   Activity Tolerance   Activity Tolerance Patient tolerated treatment well   Nurse Made Aware RN Rosario   Assessment   Prognosis Good   Problem List Decreased strength;Decreased range of motion;Decreased endurance;Impaired balance;Decreased mobility;Obesity;Orthopedic restrictions;Pain   Assessment Pt. 64 y.o.female presents for elective surgery. Past medical hx includes heart murmur, HTN, HLD, hx of R FLETCHER, hx of R TKA. Pt admitted for Primary osteoarthritis of left hip w/ Chronic left hip pain (M25.552, G89.29)  Primary osteoarthritis of left hip (M16.12). S/p L elective THR (posterior) POD #0. Pt referred to PT for functional mobility evaluation & D/C planning w/ orders of activity as tolerated. WBAT LLE. Posterior hip precautions with use of abduction pillow. PTA, pt reports being I w/o AD. Personal factors affecting pt at time of IE include: bed/bath on 2nd floor, decreased independence in ADLs/IADLs, steps to enter, and two story home. During evaluation, deficits included dec mobility, balance, ambulation. Required minAx1 for supine to sit, sit to stand, and S for stand to sit, toilet transfers, and ambulation. Pt able to ambulate 10' and 70' with RW in room and unit. Antalgic step to gait with no gross LOB noted. Inc cues required  throughout session to maintain posterior hip precautions with good carryover. Pt demonstrated dec endurance and tolerance to activity. Denies reports of dizziness or SOB t/o session. Pt was educated on fall precautions and reinforced w/ good understanding. Pt would benefit from continued PT to address deficits as defined above and maximize level of independence with functional mobility and safety. Based on pt presentation and impaired function, pt would benefit from level III, (minimum resource intensity) at D/C. The patient's AM-PAC Basic Mobility Inpatient Short Form Raw Score is 20. A Raw score of greater than 16 suggests the patient may benefit from discharge to home. Please also refer to the recommendation of the Physical Therapist for safe discharge planning. Nsg staff to continue to mobilized pt (OOB in chair for all meals & ambulate in room/unit) as tolerated to prevent further decline in function. Nsg notified.   Barriers to Discharge Inaccessible home environment   Barriers to Discharge Comments XAVIER   Goals   Patient Goals to use the bathroom   STG Expiration Date 03/31/25   Short Term Goal #1 1) Inc overall LE strength by 1/2 MMT grade to improve functional mobility; 2) Pt will demonstrate improved bed mobility with mod I to dec caregiver burden; 3) Pt will demonstrate improved transfers w/ mod I for inc safety; 4) Pt will be able to amb w/ mod I >150' w/ RW for household distances to inc safety and dec caregiver burden; 5) Pt will be able to navigate stairs with mod I to dec caregiver burden and inc safety with functional mobility; 6) Improve general balance by 1 grade to inc safety; 7) PT for ongoing patient and caregiver education   PT Treatment Day 0   Plan   Treatment/Interventions Functional transfer training;Elevations;LE strengthening/ROM;Therapeutic exercise;Endurance training;Patient/family training;Bed mobility;Gait training;Spoke to nursing;OT   PT Frequency Twice a day  (PRN)   Discharge  Recommendation   Rehab Resource Intensity Level, PT III (Minimum Resource Intensity)   Equipment Recommended Walker  (pt owns)   AM-PAC Basic Mobility Inpatient   Turning in Flat Bed Without Bedrails 4   Lying on Back to Sitting on Edge of Flat Bed Without Bedrails 3   Moving Bed to Chair 4   Standing Up From Chair Using Arms 3   Walk in Room 3   Climb 3-5 Stairs With Railing 3   Basic Mobility Inpatient Raw Score 20   Basic Mobility Standardized Score 43.99   [facility] Highest Level Of Mobility   -HLM Goal 6: Walk 10 steps or more   -HLM Achieved 7: Walk 25 feet or more   End of Consult   Patient Position at End of Consult Bedside chair;Bed/Chair alarm activated;All needs within reach       Hx/personal factors: co-morbidities, inaccessible home, mutliple lines, use of AD, pain, total hip precautions, fall risk, and obesity, coping styles, social background, past experience, behavior pattern, post op precautions  Examination: dec mobility, dec balance, dec endurance, dec amb, risk for falls, pain, assessed body system, balance, endurance, amb, D/C disposition & fall risk, impairements in locomotion, musculoskeletal, balance, endurance, posture, coordination, assessed cognition, impairments in systems including multiple body structures involved; musculoskeletal (ROM, strength, posture, BMI), neuromuscular (balance,locomotion, gait, transfers, motor control and learning, sensation), joint integrity, integumentary (skin integrity, presence of scars or wounds), cardiopulmonary (vitals, edema); activity limitations (difficulties executing an action); participation restrictions (problems associated w involvement in life situations)  Clinical: unpredictable (ongoing medical status, risk for falls, POD #0, and pain mgt)  Complexity: high      Margaret Aviles, PT

## 2025-03-24 NOTE — ASSESSMENT & PLAN NOTE
64 year old female with primary OA of left hip now POD 0 s/p left FLETCHER    Plan:  - WBAT on LLE  - Posterior hip precautions   - Abduction pillow   - DVT ppx  - OOB/ambulate  - PT/OT  - IS use   - Pain and nausea control PRN  - Continue home amlodipine/metoprolol for BP control. If sharp increase in SBP, will use hydralazine   - Continue buspirone   - Anticipate discharge within the next 24-48 hours

## 2025-03-24 NOTE — ANESTHESIA POSTPROCEDURE EVALUATION
Post-Op Assessment Note    CV Status:  Stable    Pain management: adequate       Mental Status:  Alert and awake   Hydration Status:  Euvolemic   PONV Controlled:  Controlled   Airway Patency:  Patent     Post Op Vitals Reviewed: Yes    No anethesia notable event occurred.    Staff: Anesthesiologist           Last Filed PACU Vitals:  Vitals Value Taken Time   Temp     Pulse 58 03/24/25 1300   /53 03/24/25 1255   Resp 16 03/24/25 1300   SpO2 97 % 03/24/25 1300   Vitals shown include unfiled device data.

## 2025-03-24 NOTE — PLAN OF CARE
Problem: PAIN - ADULT  Goal: Verbalizes/displays adequate comfort level or baseline comfort level  Description: Interventions:- Encourage patient to monitor pain and request assistance- Assess pain using appropriate pain scale- Administer analgesics based on type and severity of pain and evaluate response- Implement non-pharmacological measures as appropriate and evaluate response- Consider cultural and social influences on pain and pain management- Notify physician/advanced practitioner if interventions unsuccessful or patient reports new pain  Outcome: Progressing     Problem: INFECTION - ADULT  Goal: Absence or prevention of progression during hospitalization  Description: INTERVENTIONS:- Assess and monitor for signs and symptoms of infection- Monitor lab/diagnostic results- Monitor all insertion sites, i.e. indwelling lines, tubes, and drains- Monitor endotracheal if appropriate and nasal secretions for changes in amount and color- [facility] appropriate cooling/warming therapies per order- Administer medications as ordered- Instruct and encourage patient and family to use good hand hygiene technique- Identify and instruct in appropriate isolation precautions for identified infection/condition  Outcome: Progressing  Goal: Absence of fever/infection during neutropenic period  Description: INTERVENTIONS:- Monitor WBC  Outcome: Progressing     Problem: SAFETY ADULT  Goal: Patient will remain free of falls  Description: INTERVENTIONS:- Educate patient/family on patient safety including physical limitations- Instruct patient to call for assistance with activity - Consult OT/PT to assist with strengthening/mobility - Keep Call bell within reach- Keep bed low and locked with side rails adjusted as appropriate- Keep care items and personal belongings within reach- Initiate and maintain comfort rounds- Make Fall Risk Sign visible to staff- Offer Toileting every 2 Hours, in advance of need- Initiate/Maintain bed alarm-  Obtain necessary fall risk management equipment: - Apply yellow socks and bracelet for high fall risk patients- Consider moving patient to room near nurses station  Outcome: Progressing  Goal: Maintain or return to baseline ADL function  Description: INTERVENTIONS:-  Assess patient's ability to carry out ADLs; assess patient's baseline for ADL function and identify physical deficits which impact ability to perform ADLs (bathing, care of mouth/teeth, toileting, grooming, dressing, etc.)- Assess/evaluate cause of self-care deficits - Assess range of motion- Assess patient's mobility; develop plan if impaired- Assess patient's need for assistive devices and provide as appropriate- Encourage maximum independence but intervene and supervise when necessary- Involve family in performance of ADLs- Assess for home care needs following discharge - Consider OT consult to assist with ADL evaluation and planning for discharge- Provide patient education as appropriate  Outcome: Progressing  Goal: Maintains/Returns to pre admission functional level  Description: INTERVENTIONS:- Perform AM-PAC 6 Click Basic Mobility/ Daily Activity assessment daily.- Set and communicate daily mobility goal to care team and patient/family/caregiver. - Collaborate with rehabilitation services on mobility goals if consulted- Perform Range of Motion 3 times a day.- Reposition patient every 3 hours.- Dangle patient 3 times a day- Stand patient 3 times a day- Ambulate patient 3 times a day- Out of bed to chair 3 times a day - Out of bed for meals 3 times a day- Out of bed for toileting- Record patient progress and toleration of activity level   Outcome: Progressing     Problem: DISCHARGE PLANNING  Goal: Discharge to home or other facility with appropriate resources  Description: INTERVENTIONS:- Identify barriers to discharge w/patient and caregiver- Arrange for needed discharge resources and transportation as appropriate- Identify discharge learning  needs (meds, wound care, etc.)- Arrange for interpretive services to assist at discharge as needed- Refer to Case Management Department for coordinating discharge planning if the patient needs post-hospital services based on physician/advanced practitioner order or complex needs related to functional status, cognitive ability, or social support system  Outcome: Progressing     Problem: Knowledge Deficit  Goal: Patient/family/caregiver demonstrates understanding of disease process, treatment plan, medications, and discharge instructions  Description: Complete learning assessment and assess knowledge base.Interventions:- Provide teaching at level of understanding- Provide teaching via preferred learning methods  Outcome: Progressing

## 2025-03-24 NOTE — OP NOTE
OPERATIVE REPORT  PATIENT NAME: Cielo Thomason  : 1960  MRN: [id number]  Pt Location:  [facility] OR ROOM 04    Surgery Date: 3/24/2025    Surgeons and Role:     * Arvind Eaton MD - Primary     * Iris Luna PA-C - Assisting     Preop Diagnosis:  Chronic left hip pain [M25.552, G89.29]  Primary osteoarthritis of left hip [M16.12]    Post-Op Diagnosis Codes:     * Chronic left hip pain [M25.552, G89.29]     * Primary osteoarthritis of left hip [M16.12]    Procedure(s):  Left - ARTHROPLASTY HIP TOTAL    Specimens:  * No specimens in log *    Estimated Blood Loss:   Minimal    Drains:  Urethral Catheter Non-latex 16 Fr. (Active)   Number of days: 1263       Anesthesia Type:   Choice     Operative Indications:  Chronic left hip pain [M25.552, G89.29]  Primary osteoarthritis of left hip [M16.12]    Operative Findings:  Depuy   Cup-54mm metal   Liner-10 degree lipped poly   Head/neck-32 + 1.5mm metal   Femur-14 HO    Complications:   None      Hip Approach: Posterior    Chronic Narcotic Use:  No      Procedure and Technique:  Following the induction of adequate level of spinal anesthesia, Berry catheter sterilely introduced to this patient's bladder.  Antibiotics administered.  She is then placed in the right lateral cubitus, left side up position.  An axillary roll was placed in the right axilla.  Left hip lateral thigh were then prepped made sterilely.  A posterolateral approach was created in order to gain access to the hip.  Full-thickness flaps were raised when I accessed the tensor fascia.  This was split, expose the deep layer of the hip.  With the hip in internal rotation, the piriformis tendon was identified, transected, and retracted in a posterior fashion.  The remainder the short external rotators were sectioned as well.  A T-type capsulotomy was used up the hip.  The femoral head was then delivered posteriorly.  Utilizing the femoral neck osteotomy guide, the proper femoral cut was then made.   A posterior capsulectomy, anterior capsulotomy were then created in order to circumferentially expose the acetabulum.  Reaming started at 52 and extended 54 mm which point time a Hemisphere bleeding cancellous bone was encountered.  54 trial was inserted and noted to fit well, therefore the 54 mm insert was then hammered into place.  A single screw was then placed with the posterior superior quadrant for distal fixation.  The 10 degree lipped liner was then snapped into position.  The proximal femur was then prepared for insertion of press-fit component.  The box osteotome was used the proximal femur.  Patient's canal sequentially broached.  With a #14 high offset and a 32+1.5 femoral head and neck, it was located, taken the range of motion cuff adequate stable.  The trial components removed if that hip was carefully dislocated.  The insert components were assembled and introduced the patient's left hip in standard fashion.  The hip was once more located, taken through a range of motion with cuff adequate stable.  Satisfied with the extent of surgery, the wounds were flushed with saline and closed.  A Betadine soak initiated.  The piriformis tendon was reapproximated to the greater trochanter number Vicryl suture.  The tensor fascia was then closed with number Vicryl suture.  Subcu tissues were closed with a mixture #1 for deep layer, 2-0 Vicryl for the subcutaneous tissues, and skin staples for the skin.  Sterile dressings were applied.  She was then transferred to recovery in stable condition with plans to include physical therapy for weightbearing to tolerance.  She will require DVT prophylaxis with Lovenox   I was present for the entire procedure.  Please note, there is no qualified orthopedic resident was available assist, the assistance of Iris Luna PA-C was instrumental in the safe execution this patient surgery.  Started the patient position, patient prepped draped, intraoperative assistance, wound  closure, dressing application, patient transfers, all of these were performed under my direct supervision    Patient Disposition:  PACU               SIGNATURE: Arvind Eaton MD  DATE: March 24, 2025  TIME: 12:42 PM

## 2025-03-24 NOTE — ANESTHESIA PROCEDURE NOTES
Spinal Block    Patient location during procedure: OR  Start time: 3/24/2025 11:43 AM  Reason for block: procedure for pain and at surgeon's request  Staffing  Performed by: Nitin Young CRNA  Authorized by: Mirza Duvall MD    Preanesthetic Checklist  Completed: patient identified, IV checked, site marked, risks and benefits discussed, surgical consent, monitors and equipment checked, pre-op evaluation and timeout performed  Spinal Block  Patient position: sitting  Prep: ChloraPrep and site prepped and draped  Patient monitoring: frequent blood pressure checks, continuous pulse ox and heart rate  Approach: midline  Location: L3-4  Needle  Needle type: Pencan   Needle gauge: 24 G  Needle length: 4 in  Assessment  Sensory level: T4  Injection Assessment:  negative aspiration for heme, no paresthesia on injection and positive aspiration for clear CSF.  Post-procedure:  site cleaned

## 2025-03-24 NOTE — CONSULTS
Consultation - Internal Medicine   Name: Cielo Thomason 64 y.o. female I MRN: [id number]  Unit/Bed#: [facility] 2 N -01 I Date of Admission: 3/24/2025   Date of Service: 3/24/2025 I Hospital Day: 0   Inpatient consult to Internal Medicine  Consult performed by: Christian Richey PA-C  Consult ordered by: Iris Luna PA-C        Physician Requesting Evaluation: Arvind Eaton MD   Reason for Evaluation / Principal Problem: Primary osteoarthritis of left hip    Assessment & Plan  Primary osteoarthritis of left hip  64 year old female with primary OA of left hip now POD 0 s/p left FLETCHER    Plan:  - WBAT on LLE  - Posterior hip precautions   - Abduction pillow   - DVT ppx  - OOB/ambulate  - PT/OT  - IS use   - Pain and nausea control PRN  - Continue home amlodipine/metoprolol for BP control. If sharp increase in SBP, will use hydralazine   - Continue buspirone   - Anticipate discharge within the next 24-48 hours   Please contact the SecureChat role for the Internal Medicine service with any questions/concerns.    Code Status: Level 1 - Full Code  Admission Status: INPATIENT   Disposition: Patient requires Med Surg    History of Present Illness   64 year old female with primary OA of left hip now POD 0 s/p left FLETCHER. Patient with a past medical history pertinent for HTN, obesity, and HLD. Upon arrival to floor, patient tolerating pain and diet. Currently, she is comfortable in chair with 2 out of 10 pain located near the incision site. Patient mentions pain worse with movement. She denies any N/V, fevers or chills at this time.     Review of Systems   Constitutional: Negative.    Respiratory: Negative.     Cardiovascular: Negative.    Gastrointestinal: Negative.    Genitourinary: Negative.    Neurological: Negative.      Medical History Review: I have reviewed the patient's PMH, PSH, Social History, Family History, Meds, and Allergies   Historical Information   Past Medical History:   Diagnosis Date    Allergic  rhinitis 2022    Annual physical exam 2024    Anxiety     Arthritis     BMI 40.0-44.9, adult ([facility]) 2021    BMI 50.0-59.9, adult ([facility]) 2021    Cellulitis of left leg 2021    Depression     Eczema 2022    Edema of both lower legs 2021    Edema of both lower legs 2021    Endometrial cancer (HCC) 2021    Heart murmur     Heart murmur 2024    History of COVID-19 2020    Mild sypmtoms Cough,Tired,diarrhea,sweats, Loss taste and smell    Hyperglycemia 2021    Hyperlipidemia     Hypertension     Left hip pain 2022    Lower abdominal pain 2021    Lumbar radiculopathy 08/15/2024    Mixed hyperlipidemia 2021    Numbness and tingling in left arm     Obesity 2024    Pruritus     Rash of hand 2022    Shortness of breath     Skin lesion     Skin rash 2023    Tinea cruris 2023    Tinea pedis of both feet 2022    Vitamin D deficiency 2021     Past Surgical History:   Procedure Laterality Date    CARPAL TUNNEL RELEASE Bilateral      SECTION      x3     SECTION      CHOLECYSTECTOMY      CHOLECYSTECTOMY LAPAROSCOPIC N/A 2019    Procedure: CHOLECYSTECTOMY LAPAROSCOPIC;  Surgeon: Shayne De Leon MD;  Location: [facility];  Service: General    COLONOSCOPY      CYSTOSCOPY N/A 10/08/2021    Procedure: Cystoscopy;  Surgeon: Adelso Freeman MD;  Location: [facility];  Service: Gynecology Oncology    FL GUIDED NEEDLE PLAC BX/ASP/INJ  2015    FL INJECTION LEFT HIP (NON ARTHROGRAM)  2022    HYSTERECTOMY      JOINT REPLACEMENT  2016    R total HIp    MAMMO (HISTORICAL)  10/11/2018    Advise for yearly mammo screening    OOPHORECTOMY Bilateral     NC HYSTEROSCOPY BX ENDOMETRIUM&/POLYPC W/WO D&C N/A 2016    Procedure: FRACTIONAL DILATATION AND CURETTAGE (D&C) WITH HYSTEROSOCPY;  Surgeon: Farnaz Romero MD;  Location: [facility];  Service: Gynecology    NC LAPS TOTAL HYSTERECT  250 GM/< W/RMVL TUBE/OVARY N/A 10/08/2021    Procedure: ROBOTIC HYSTERECTOMY, BILATERAL SALPINGOOPHERECTOMY; LEFT EXTERNAL ILIAC SENTINEL LYMPH NODE BIOPSY, RIGHT PELVIC LYMPHADENECTOMY;  Surgeon: Adelso Freeman MD;  Location: [facility] OR;  Service: Gynecology Oncology    REPLACEMENT TOTAL KNEE Right      Social History     Tobacco Use    Smoking status: Never    Smokeless tobacco: Never   Vaping Use    Vaping status: Never Used   Substance and Sexual Activity    Alcohol use: Not Currently    Drug use: Not Currently    Sexual activity: Not Currently     Partners: Male     E-Cigarette/Vaping    E-Cigarette Use Never User      E-Cigarette/Vaping Substances    Nicotine No     THC No     CBD No     Flavoring No     Other No     Unknown No      Family History   Problem Relation Age of Onset    Hypertension Mother     Hypertension Father     Heart failure Father     Lymphoma Father 62    Diabetes Father         at old age    No Known Problems Sister     No Known Problems Daughter     No Known Problems Maternal Grandmother     No Known Problems Maternal Grandfather     No Known Problems Paternal Grandmother     No Known Problems Paternal Grandfather     Hemophilia Brother     Breast cancer Maternal Aunt 50    Brain cancer Maternal Aunt 48    No Known Problems Maternal Aunt     No Known Problems Son     No Known Problems Son     No Known Problems Son      Social History     Tobacco Use    Smoking status: Never    Smokeless tobacco: Never   Vaping Use    Vaping status: Never Used   Substance and Sexual Activity    Alcohol use: Not Currently    Drug use: Not Currently    Sexual activity: Not Currently     Partners: Male       Current Facility-Administered Medications:     acetaminophen (TYLENOL) tablet 975 mg, Q6H PRN    amLODIPine (NORVASC) tablet 5 mg, Daily    busPIRone (BUSPAR) tablet 5 mg, HS    calcium carbonate (TUMS) chewable tablet 1,000 mg, Daily PRN    ceFAZolin (ANCEF) IVPB (premix in dextrose) 1,000 mg 50 mL,  Q8H, Last Rate: 1,000 mg (03/24/25 1820)    citalopram (CeleXA) tablet 20 mg, HS    docusate sodium (COLACE) capsule 100 mg, BID    enoxaparin (LOVENOX) subcutaneous injection 40 mg, Daily    gabapentin (NEURONTIN) capsule 100 mg, Q8H    HYDROmorphone (DILAUDID) injection 0.5 mg, Q2H PRN    lactated ringers bolus 1,000 mL, Once PRN **AND** lactated ringers bolus 1,000 mL, Once PRN    lactated ringers infusion, Continuous    lactated ringers infusion, Continuous, Last Rate: 125 mL/hr (03/24/25 1303)    lactated ringers infusion, Continuous, Last Rate: 125 mL/hr (03/24/25 1517)    loratadine (CLARITIN) tablet 10 mg, Daily    methocarbamol (ROBAXIN) tablet 500 mg, Q6H DARRIAN    metoprolol succinate (TOPROL-XL) 24 hr tablet 100 mg, HS    ondansetron (ZOFRAN) injection 4 mg, Q6H PRN    oxyCODONE (ROXICODONE) immediate release tablet 10 mg, Q4H PRN    oxyCODONE (ROXICODONE) IR tablet 5 mg, Q4H PRN    [START ON 3/25/2025] pantoprazole (PROTONIX) EC tablet 40 mg, Early Morning    sodium chloride 0.9 % bolus 1,000 mL, Once PRN **AND** sodium chloride 0.9 % bolus 1,000 mL, Once PRN  Prior to Admission Medications   Prescriptions Last Dose Informant Patient Reported? Taking?   Ascorbic Acid (vitamin C) 1000 MG tablet 3/23/2025 Evening Self Yes Yes   Sig: Take 1,000 mg by mouth daily   Cholecalciferol (Vitamin D) 50 MCG (2000 UT) tablet 3/23/2025 Evening Self Yes Yes   Sig: Take 2,000 Units by mouth daily   Multiple Vitamins-Minerals (multivitamin with minerals) tablet 3/23/2025 Evening Self Yes Yes   Sig: Take 1 tablet by mouth daily   amLODIPine (NORVASC) 5 mg tablet 3/23/2025 Evening  No Yes   Sig: Take 1 tablet (5 mg total) by mouth daily   ascorbic acid (VITAMIN C) 500 MG tablet   No Yes   Sig: Take 1 tablet (500 mg total) by mouth daily Start 30 days prior to surgery   busPIRone (BUSPAR) 5 mg tablet 3/23/2025 Evening  No Yes   Sig: Take 1 tablet (5 mg total) by mouth daily at bedtime   calcium carbonate (OS-CAMDEN) 600 MG  tablet 3/17/2025 Self Yes No   Sig: Take 600 mg by mouth daily   citalopram (CeleXA) 20 mg tablet 3/23/2025 Evening  No Yes   Sig: Take 1 tablet (20 mg total) by mouth daily at bedtime   clotrimazole-betamethasone (LOTRISONE) 1-0.05 % cream   No No   Sig: Apply topically 2 (two) times a day   enoxaparin (LOVENOX) 40 mg/0.4 mL   No No   Sig: Inject 0.4 mL (40 mg total) under the skin daily for 28 days Start injections after surgery   ferrous sulfate 324 (65 Fe) mg 3/23/2025 Evening  No Yes   Sig: Take 1 tablet (324 mg total) by mouth daily before breakfast Start 30 days prior to surgery   fexofenadine (ALLEGRA) 180 MG tablet More than a month Self Yes No   Sig: Take 180 mg by mouth daily as needed   folic acid (FOLVITE) 1 mg tablet 3/23/2025 Evening  No Yes   Sig: TAKE 1 TABLET (1 MG TOTAL) BY MOUTH DAILY START 30 DAYS PRIOR TO SURGERY   losartan-hydrochlorothiazide (HYZAAR) 50-12.5 mg per tablet 3/23/2025 Evening  No Yes   Sig: Take 1 tablet by mouth daily   metoprolol succinate (TOPROL-XL) 100 mg 24 hr tablet 3/23/2025 Evening  No Yes   Sig: Take 1 tablet (100 mg total) by mouth daily at bedtime   mupirocin (BACTROBAN) 2 % ointment   No Yes   Sig: Apply topically 2 (two) times a day Apply pea size amount to each nostril 2x/day for 5 days prior to procedure   omeprazole (PriLOSEC) 20 mg delayed release capsule More than a month Self Yes No   Sig: Take 20 mg by mouth as needed   vitamin B-12 (VITAMIN B-12) 1,000 mcg tablet 3/23/2025 Evening Self Yes Yes   Sig: Take by mouth daily      Facility-Administered Medications: None     Griseofulvin, Penicillins, Lisinopril, and Zithromax [azithromycin]    Objective :  Temp:  [97.5 °F (36.4 °C)-98.8 °F (37.1 °C)] 98.4 °F (36.9 °C)  HR:  [50-69] 69  BP: (108-149)/(53-97) 148/68  Resp:  [12-23] 12  SpO2:  [89 %-100 %] 95 %  O2 Device: None (Room air)  Nasal Cannula O2 Flow Rate (L/min):  [2 L/min-3 L/min] 2 L/min    Intake & Output:  I/O         03/22 0701  03/23 0700 03/23  "0701  03/24 0700 03/24 0701  03/25 0700    I.V. (mL/kg)   1930 (16.2)    Total Intake(mL/kg)   1930 (16.2)    Urine (mL/kg/hr)   200    Total Output   200    Net   +1730                 Weights:   IBW (Ideal Body Weight): 63.9 kg    Body mass index is 39.99 kg/m².  Weight (last 2 days)       Date/Time Weight    03/24/25 0847 119 (263)          Physical Exam  Constitutional:       General: She is not in acute distress.     Appearance: Normal appearance. She is not ill-appearing, toxic-appearing or diaphoretic.   Cardiovascular:      Rate and Rhythm: Normal rate.      Pulses: Normal pulses.      Heart sounds: Normal heart sounds. No murmur heard.     No gallop.   Pulmonary:      Effort: Pulmonary effort is normal. No respiratory distress.      Breath sounds: Normal breath sounds. No wheezing.   Abdominal:      General: Abdomen is flat. There is no distension.      Palpations: Abdomen is soft.      Tenderness: There is no abdominal tenderness. There is no guarding.   Skin:     General: Skin is warm and dry.      Coloration: Skin is not jaundiced.      Findings: No bruising.   Neurological:      General: No focal deficit present.      Mental Status: She is alert and oriented to person, place, and time.     LLE: Mepilex dressing is clean, dry and intact. Appropriate surrounding ecchymosis. No erythema or drainage. Neurovascularly intact. Toes warm and pink. Strength WNL.         Lab Results: I have reviewed the following results:  No results for input(s): \"WBC\", \"HGB\", \"HCT\", \"PLT\", \"BANDSPCT\", \"SODIUM\", \"K\", \"CL\", \"CO2\", \"BUN\", \"CREATININE\", \"GLUC\", \"CAIONIZED\", \"MG\", \"PHOS\", \"AST\", \"ALT\", \"ALB\", \"TBILI\", \"DBILI\", \"ALKPHOS\", \"PTT\", \"INR\", \"HSTNI0\", \"HSTNI2\", \"BNP\", \"LACTICACID\" in the last 72 hours.  Micro:  Lab Results   Component Value Date    URINECX 70,000-79,000 cfu/ml 03/01/2019       Currently Ordered Meds:   Current Facility-Administered Medications:     acetaminophen (TYLENOL) tablet 975 mg, Q6H PRN    " "amLODIPine (NORVASC) tablet 5 mg, Daily    busPIRone (BUSPAR) tablet 5 mg, HS    calcium carbonate (TUMS) chewable tablet 1,000 mg, Daily PRN    ceFAZolin (ANCEF) IVPB (premix in dextrose) 1,000 mg 50 mL, Q8H    citalopram (CeleXA) tablet 20 mg, HS    docusate sodium (COLACE) capsule 100 mg, BID    enoxaparin (LOVENOX) subcutaneous injection 40 mg, Daily    gabapentin (NEURONTIN) capsule 100 mg, Q8H    HYDROmorphone (DILAUDID) injection 0.5 mg, Q2H PRN    lactated ringers bolus 1,000 mL, Once PRN **AND** lactated ringers bolus 1,000 mL, Once PRN    lactated ringers infusion, Continuous    lactated ringers infusion, Continuous, Last Rate: 125 mL/hr (03/24/25 1303)    lactated ringers infusion, Continuous, Last Rate: 125 mL/hr (03/24/25 1517)    loratadine (CLARITIN) tablet 10 mg, Daily    methocarbamol (ROBAXIN) tablet 500 mg, Q6H DARRIAN    metoprolol succinate (TOPROL-XL) 24 hr tablet 100 mg, HS    ondansetron (ZOFRAN) injection 4 mg, Q6H PRN    oxyCODONE (ROXICODONE) immediate release tablet 10 mg, Q4H PRN    oxyCODONE (ROXICODONE) IR tablet 5 mg, Q4H PRN    [START ON 3/25/2025] pantoprazole (PROTONIX) EC tablet 40 mg, Early Morning    sodium chloride 0.9 % bolus 1,000 mL, Once PRN **AND** sodium chloride 0.9 % bolus 1,000 mL, Once PRN  VTE Pharmacologic Prophylaxis: VTE covered by:  enoxaparin, Subcutaneous     VTE Mechanical Prophylaxis: sequential compression device    Administrative Statements   I have spent a total time of 30 minutes in caring for this patient on the day of the visit/encounter including Counseling / Coordination of care, Documenting in the medical record, Reviewing/placing orders in the medical record (including tests, medications, and/or procedures), Obtaining or reviewing history  , and Communicating with other healthcare professionals .  Portions of the record may have been created with voice recognition software.  Occasional wrong word or \"sound a like\" substitutions may have occurred due to " the inherent limitations of voice recognition software.  Read the chart carefully and recognize, using context, where substitutions have occurred.  ==  Christian Richey PA-C  [facility]

## 2025-03-25 VITALS
OXYGEN SATURATION: 93 % | WEIGHT: 263 LBS | TEMPERATURE: 98.8 F | RESPIRATION RATE: 12 BRPM | BODY MASS INDEX: 39.86 KG/M2 | HEIGHT: 68 IN | SYSTOLIC BLOOD PRESSURE: 121 MMHG | DIASTOLIC BLOOD PRESSURE: 62 MMHG | HEART RATE: 91 BPM

## 2025-03-25 LAB
ANION GAP SERPL CALCULATED.3IONS-SCNC: 6 MMOL/L (ref 4–13)
BUN SERPL-MCNC: 14 MG/DL (ref 5–25)
CALCIUM SERPL-MCNC: 8.7 MG/DL (ref 8.4–10.2)
CHLORIDE SERPL-SCNC: 101 MMOL/L (ref 96–108)
CO2 SERPL-SCNC: 31 MMOL/L (ref 21–32)
CREAT SERPL-MCNC: 0.89 MG/DL (ref 0.6–1.3)
ERYTHROCYTE [DISTWIDTH] IN BLOOD BY AUTOMATED COUNT: 14.1 % (ref 11.6–15.1)
GFR SERPL CREATININE-BSD FRML MDRD: 68 ML/MIN/1.73SQ M
GLUCOSE SERPL-MCNC: 128 MG/DL (ref 65–140)
HCT VFR BLD AUTO: 36.8 % (ref 34.8–46.1)
HGB BLD-MCNC: 11.8 G/DL (ref 11.5–15.4)
MCH RBC QN AUTO: 28.1 PG (ref 26.8–34.3)
MCHC RBC AUTO-ENTMCNC: 32.1 G/DL (ref 31.4–37.4)
MCV RBC AUTO: 88 FL (ref 82–98)
PLATELET # BLD AUTO: 157 THOUSANDS/UL (ref 149–390)
PMV BLD AUTO: 11.5 FL (ref 8.9–12.7)
POTASSIUM SERPL-SCNC: 4.3 MMOL/L (ref 3.5–5.3)
RBC # BLD AUTO: 4.2 MILLION/UL (ref 3.81–5.12)
SODIUM SERPL-SCNC: 138 MMOL/L (ref 135–147)
WBC # BLD AUTO: 4.94 THOUSAND/UL (ref 4.31–10.16)

## 2025-03-25 PROCEDURE — 80048 BASIC METABOLIC PNL TOTAL CA: CPT

## 2025-03-25 PROCEDURE — 99024 POSTOP FOLLOW-UP VISIT: CPT | Performed by: PHYSICIAN ASSISTANT

## 2025-03-25 PROCEDURE — 97116 GAIT TRAINING THERAPY: CPT | Performed by: PHYSICAL THERAPIST

## 2025-03-25 PROCEDURE — 97167 OT EVAL HIGH COMPLEX 60 MIN: CPT

## 2025-03-25 PROCEDURE — 99232 SBSQ HOSP IP/OBS MODERATE 35: CPT | Performed by: INTERNAL MEDICINE

## 2025-03-25 PROCEDURE — 85027 COMPLETE CBC AUTOMATED: CPT

## 2025-03-25 PROCEDURE — 97530 THERAPEUTIC ACTIVITIES: CPT | Performed by: PHYSICAL THERAPIST

## 2025-03-25 RX ADMIN — PANTOPRAZOLE SODIUM 40 MG: 40 TABLET, DELAYED RELEASE ORAL at 05:23

## 2025-03-25 RX ADMIN — DOCUSATE SODIUM 100 MG: 100 CAPSULE, LIQUID FILLED ORAL at 08:49

## 2025-03-25 RX ADMIN — ENOXAPARIN SODIUM 40 MG: 40 INJECTION SUBCUTANEOUS at 08:45

## 2025-03-25 RX ADMIN — CEFAZOLIN SODIUM 1000 MG: 1 SOLUTION INTRAVENOUS at 03:11

## 2025-03-25 RX ADMIN — GABAPENTIN 100 MG: 100 CAPSULE ORAL at 08:49

## 2025-03-25 RX ADMIN — METHOCARBAMOL 500 MG: 500 TABLET ORAL at 05:23

## 2025-03-25 RX ADMIN — OXYCODONE HYDROCHLORIDE 5 MG: 5 TABLET ORAL at 06:46

## 2025-03-25 RX ADMIN — OXYCODONE HYDROCHLORIDE 5 MG: 5 TABLET ORAL at 11:08

## 2025-03-25 RX ADMIN — ACETAMINOPHEN 975 MG: 325 TABLET ORAL at 06:46

## 2025-03-25 NOTE — PLAN OF CARE
Problem: PAIN - ADULT  Goal: Verbalizes/displays adequate comfort level or baseline comfort level  Description: Interventions:- Encourage patient to monitor pain and request assistance- Assess pain using appropriate pain scale- Administer analgesics based on type and severity of pain and evaluate response- Implement non-pharmacological measures as appropriate and evaluate response- Consider cultural and social influences on pain and pain management- Notify physician/advanced practitioner if interventions unsuccessful or patient reports new pain  Outcome: Progressing     Problem: INFECTION - ADULT  Goal: Absence or prevention of progression during hospitalization  Description: INTERVENTIONS:- Assess and monitor for signs and symptoms of infection- Monitor lab/diagnostic results- Monitor all insertion sites, i.e. indwelling lines, tubes, and drains- Monitor endotracheal if appropriate and nasal secretions for changes in amount and color- [place] appropriate cooling/warming therapies per order- Administer medications as ordered- Instruct and encourage patient and family to use good hand hygiene technique- Identify and instruct in appropriate isolation precautions for identified infection/condition  Outcome: Progressing  Goal: Absence of fever/infection during neutropenic period  Description: INTERVENTIONS:- Monitor WBC  Outcome: Progressing     Problem: SAFETY ADULT  Goal: Patient will remain free of falls  Description: INTERVENTIONS:- Educate patient/family on patient safety including physical limitations- Instruct patient to call for assistance with activity - Consult OT/PT to assist with strengthening/mobility - Keep Call bell within reach- Keep bed low and locked with side rails adjusted as appropriate- Keep care items and personal belongings within reach- Initiate and maintain comfort rounds- Make Fall Risk Sign visible to staff- Offer Toileting every 2 Hours, in advance of need- Initiate/Maintain bed alarm-  Obtain necessary fall risk management equipment: - Apply yellow socks and bracelet for high fall risk patients- Consider moving patient to room near nurses station  Outcome: Progressing  Goal: Maintain or return to baseline ADL function  Description: INTERVENTIONS:-  Assess patient's ability to carry out ADLs; assess patient's baseline for ADL function and identify physical deficits which impact ability to perform ADLs (bathing, care of mouth/teeth, toileting, grooming, dressing, etc.)- Assess/evaluate cause of self-care deficits - Assess range of motion- Assess patient's mobility; develop plan if impaired- Assess patient's need for assistive devices and provide as appropriate- Encourage maximum independence but intervene and supervise when necessary- Involve family in performance of ADLs- Assess for home care needs following discharge - Consider OT consult to assist with ADL evaluation and planning for discharge- Provide patient education as appropriate  Outcome: Progressing  Goal: Maintains/Returns to pre admission functional level  Description: INTERVENTIONS:- Perform AM-PAC 6 Click Basic Mobility/ Daily Activity assessment daily.- Set and communicate daily mobility goal to care team and patient/family/caregiver. - Collaborate with rehabilitation services on mobility goals if consulted- Perform Range of Motion 3 times a day.- Reposition patient every 3 hours.- Dangle patient 3 times a day- Stand patient 3 times a day- Ambulate patient 3 times a day- Out of bed to chair 3 times a day - Out of bed for meals 3 times a day- Out of bed for toileting- Record patient progress and toleration of activity level   Outcome: Progressing     Problem: DISCHARGE PLANNING  Goal: Discharge to home or other facility with appropriate resources  Description: INTERVENTIONS:- Identify barriers to discharge w/patient and caregiver- Arrange for needed discharge resources and transportation as appropriate- Identify discharge learning  needs (meds, wound care, etc.)- Arrange for interpretive services to assist at discharge as needed- Refer to Case Management Department for coordinating discharge planning if the patient needs post-hospital services based on physician/advanced practitioner order or complex needs related to functional status, cognitive ability, or social support system  Outcome: Progressing     Problem: Knowledge Deficit  Goal: Patient/family/caregiver demonstrates understanding of disease process, treatment plan, medications, and discharge instructions  Description: Complete learning assessment and assess knowledge base.Interventions:- Provide teaching at level of understanding- Provide teaching via preferred learning methods  Outcome: Progressing

## 2025-03-25 NOTE — PLAN OF CARE
Problem: PHYSICAL THERAPY ADULT  Goal: Performs mobility at highest level of function for planned discharge setting.  See evaluation for individualized goals.  Description: Treatment/Interventions: Functional transfer training, Elevations, LE strengthening/ROM, Therapeutic exercise, Endurance training, Patient/family training, Bed mobility, Gait training, Spoke to nursing, OT  Equipment Recommended: Walker (pt owns)       See flowsheet documentation for full assessment, interventions and recommendations.  3/25/2025 1526 by Margaret Aviles PT  Outcome: Adequate for Discharge  Note: Prognosis: Good  Problem List: Decreased strength, Decreased range of motion, Decreased endurance, Impaired balance, Decreased mobility, Obesity, Orthopedic restrictions, Pain  Assessment: Patient was seen today per POC. Overall, pt demonstrated improved mobility and inc tolerance to activity. Pain 5/10 in L hip. Required S for sit<>stand, ambulation, and stair navigation. Pt able to ambulate 300' and 30' with RW and S in unit. Antalgic step through gait with no gross LOB noted. Pt able to perform nonreciprocal stair navigation with unilateral hand rail and SPC to simulate home setup. Reviewed HEP and provided handout. Assisted with lower and upper body dressing in seated position. Reviewed posterior hip precautions with good understanding. All questions and concerns addressed at this time. No reports of dizziness t/o session. Will continue to see pt per POC as tolerated. From PT standpoint continued recommendation for level III, (minimum resource intensity) at D/C when medically cleared based current function. The patient's AM-PAC Basic Mobility Inpatient Short Form Raw Score is 24. A Raw score of greater than 16 suggests the patient may benefit from discharge to home. Please also refer to the recommendation of the Physical Therapist for safe discharge planning. [facility] staff to continue to mobilized pt (OOB in chair for all meals &  ambulate in room/unit) as tolerated to prevent further decline in function. Nsg staff notified. From PT stand point, pt cleared functionally for D/C when medically appropriate.  Barriers to Discharge: None  Barriers to Discharge Comments: From PT stand point, pt cleared functionally for D/C when medically appropriate.  Rehab Resource Intensity Level, PT: III (Minimum Resource Intensity)    See flowsheet documentation for full assessment.

## 2025-03-25 NOTE — PLAN OF CARE
Problem: OCCUPATIONAL THERAPY ADULT  Goal: Performs self-care activities at highest level of function for planned discharge setting.  See evaluation for individualized goals.  Description: Treatment Interventions: ADL retraining, Continued evaluation, Endurance training          See flowsheet documentation for full assessment, interventions and recommendations.   Outcome: Progressing  Note: Limitation: Decreased high-level ADLs, Decreased ADL status, Decreased endurance  Prognosis: Good  Assessment: Pt is a 64 y.o. female seen for OT evaluation s/p admit to [facility] on 3/24/2025 w/ Primary osteoarthritis of left hip via posterior approach. Pt currently with posterior hip precautions, WBAT. See medical history above for extensive list of comorbidities affecting pt's functional performance at time of assessment. Personal factors affecting Pt at time of IE include:health management . Upon evaluation: Pt requires supervision with RW for functional ambulation including bathroom mobility and negotiation of small spaces, supervision for ADL transfers.  Pt educated re: hand placement and body mechanics, demonstrated good understanding and application to functional tasks. Pt educated re: hip precautions and modifications for ADLs including long handled AE, dressing/thread surgical LE first through undergarments and pants, positioning for joint integrity. Demonstrated use of sock aid. Pt declined hip kit at this time, states she has an older sock aide at home and assistance as needed. Pt requires supervision for ADLs in standing, able to complete oral care and hygiene standing at the sink with good tolerance and stability. Pt's primary barrier(s) at this time: decreased ROM and precautions as expected post op. The following deficits impact occupational performance: decreased ROM, decreased tolerance, increased pain, and orthopedic restrictions. Pt to benefit from 1-2 more skilled OT sessions while in the hospital to address  deficits as defined above and maximize level of functional independence w ADL's and functional mobility. Occupational performance areas to address include: toilet hygiene, dressing, health maintenance, functional mobility, and clothing management. At this time, Pt demonstrates ability to complete functional tasks as well as understanding of precautions in order to discharge home. From OT standpoint, recommendation at time of d/c would be minimum resource intensity.       Rehab Resource Intensity Level, OT: III (Minimum Resource Intensity)

## 2025-03-25 NOTE — PROGRESS NOTES
"Progress Note - Orthopedics   Name: Cielo Thomason 64 y.o. female I MRN: [id number]  Unit/Bed#: [facility] 2 N -01 I Date of Admission: 3/24/2025   Date of Service: 3/25/2025 I Hospital Day: 0     Assessment & Plan  Primary osteoarthritis of left hip  S/p left total hip arthroplasty  Weightbearing as tolerated left lower extremity  Posterior hip precautions  Abduction pillow in place while in bed in and in chair  DVT prophylaxis with Lovenox  PT/OT for ambulation training  Analgesics for pain  Hemoglobin was stable at 11.8 this morning  Discharge planning      Subjective   64 y.o.female s/p left total hip arthroplasty.  No acute events, no new complaints. Pain well controlled. Denies fevers, chills, CP, SOB, N/V, numbness or tingling. Patient reports no issues with urination or bowel movements. Patient states she does have mild discomfort over the lateral aspect of her hip.    Objective :  Temp:  [97.5 °F (36.4 °C)-98.8 °F (37.1 °C)] 98.4 °F (36.9 °C)  HR:  [50-69] 69  BP: (108-154)/(53-97) 154/75  Resp:  [12-23] 12  SpO2:  [89 %-100 %] 97 %  O2 Device: None (Room air)  Nasal Cannula O2 Flow Rate (L/min):  [2 L/min-3 L/min] 2 L/min    Physical Exam  Musculoskeletal: Left hip  Skin clean and dry. No erythema or ecchymosis.  Dressing is intact with no evidence of strikethrough  Mild tenderness to palpation over the lateral aspect of the hip  Motor intact to +FHL/EHL, +ankle dorsi/plantar flexion  Sensation intact to saphenous, sural, tibial, superficial peroneal nerve, and deep peroneal  2+ DP pulse  No calf swelling or tenderness to palpation      Lab Results: I have reviewed the following results:  Recent Labs     03/25/25  0538   WBC 4.94   HGB 11.8   HCT 36.8      BUN 14   CREATININE 0.89     Blood Culture:  No results found for: \"BLOODCX\"  Wound Culture: No results found for: \"WOUNDCULT\"    "

## 2025-03-25 NOTE — PHYSICAL THERAPY NOTE
PT PROGRESS NOTE    Name: Cielo Thomason  AGE: 64 y.o.  MRN: [id number]  LENGTH OF STAY: 0     03/25/25 1043   PT Last Visit   PT Visit Date 03/25/25   Note Type   Note Type Treatment   Pain Assessment   Pain Assessment Tool 0-10   Pain Score 5   Pain Location/Orientation Orientation: Left;Location: Hip   Hospital Pain Intervention(s) Cold applied;Repositioned;Ambulation/increased activity;Elevated;Emotional support;Rest   Restrictions/Precautions   Weight Bearing Precautions Per Order Yes   LLE Weight Bearing Per Order WBAT   Braces or Orthoses   (hip abduction pillow)   Other Precautions THR;Fall Risk;Pain  (posterior hip precautions)   General   Chart Reviewed Yes   Response to Previous Treatment Patient with no complaints from previous session.   Family/Caregiver Present No   Cognition   Overall Cognitive Status WFL   Arousal/Participation Alert   Attention Within functional limits   Orientation Level Oriented X4   Following Commands Follows all commands and directions without difficulty   Subjective   Subjective I feel good sitting here.   Bed Mobility   Additional Comments Pt greeted OOB in chair.   Transfers   Sit to Stand 5  Supervision   Additional items Armrests;Verbal cues  (w/ RW)   Stand to Sit 5  Supervision   Additional items Armrests;Verbal cues  (w/ RW)   Additional Comments cues for hand placement   Ambulation/Elevation   Gait pattern Improper Weight shift;Antalgic;Decreased foot clearance;Decreased L stance;Step through pattern   Gait Assistance 5  Supervision   Additional items Verbal cues   Assistive Device Rolling walker   Distance 30'x1; 300'x1   Stair Management Assistance 5  Supervision   Additional items Verbal cues   Stair Management Technique One rail R;Step to pattern;Foreward;Nonreciprocal;With cane   Number of Stairs 5  (x2 stair trainers)   Balance   Static Sitting Normal   Dynamic Sitting Good   Static Standing Fair +  (w/ RW)   Dynamic Standing Fair  (w/ RW)   Ambulatory  Fair  (w/ RW)   Activity Tolerance   Activity Tolerance Patient tolerated treatment well   Nurse Made Aware RN Rosario   Exercises   THR   (Reviewed HEP and provided handout.)   Assessment   Prognosis Good   Problem List Decreased strength;Decreased range of motion;Decreased endurance;Impaired balance;Decreased mobility;Obesity;Orthopedic restrictions;Pain   Assessment Patient was seen today per POC. Overall, pt demonstrated improved mobility and inc tolerance to activity. Pain 5/10 in L hip. Required S for sit<>stand, ambulation, and stair navigation. Pt able to ambulate 300' and 30' with RW and S in unit. Antalgic step through gait with no gross LOB noted. Pt able to perform nonreciprocal stair navigation with unilateral hand rail and SPC to simulate home setup. Reviewed HEP and provided handout. Assisted with lower and upper body dressing in seated position. Reviewed posterior hip precautions with good understanding. All questions and concerns addressed at this time. No reports of dizziness t/o session. Will continue to see pt per POC as tolerated. From PT standpoint continued recommendation for level III, (minimum resource intensity) at D/C when medically cleared based current function. The patient's AM-PAC Basic Mobility Inpatient Short Form Raw Score is 24. A Raw score of greater than 16 suggests the patient may benefit from discharge to home. Please also refer to the recommendation of the Physical Therapist for safe discharge planning. Nsg staff to continue to mobilized pt (OOB in chair for all meals & ambulate in room/unit) as tolerated to prevent further decline in function. Nsg staff notified. From PT stand point, pt cleared functionally for D/C when medically appropriate.   Barriers to Discharge None   Barriers to Discharge Comments From PT stand point, pt cleared functionally for D/C when medically appropriate.   Goals   Patient Goals to go home   STG Expiration Date 03/31/25   PT Treatment Day 1   Plan    Treatment/Interventions Functional transfer training;LE strengthening/ROM;Elevations;Therapeutic exercise;Endurance training;Patient/family training;Bed mobility;Gait training;Spoke to nursing;OT   Progress Progressing toward goals   PT Frequency 5-7x/wk   Discharge Recommendation   Rehab Resource Intensity Level, PT III (Minimum Resource Intensity)   AM-PAC Basic Mobility Inpatient   Turning in Flat Bed Without Bedrails 4   Lying on Back to Sitting on Edge of Flat Bed Without Bedrails 4   Moving Bed to Chair 4   Standing Up From Chair Using Arms 4   Walk in Room 4   Climb 3-5 Stairs With Railing 4   Basic Mobility Inpatient Raw Score 24   Basic Mobility Standardized Score 57.68   [facility] Highest Level Of Mobility   JH-HLM Goal 8: Walk 250 feet or more   JH-HLM Achieved 8: Walk 250 feet ot more   Education   Education Provided Home exercise program;Mobility training;Precautions for total hip arthroplasty (FLETCHER);Assistive device   Patient Demonstrates acceptance/verbal understanding   End of Consult   Patient Position at End of Consult Bedside chair;All needs within reach     Margaret Aviles, PT

## 2025-03-25 NOTE — PLAN OF CARE
Problem: PAIN - ADULT  Goal: Verbalizes/displays adequate comfort level or baseline comfort level  Description: Interventions:- Encourage patient to monitor pain and request assistance- Assess pain using appropriate pain scale- Administer analgesics based on type and severity of pain and evaluate response- Implement non-pharmacological measures as appropriate and evaluate response- Consider cultural and social influences on pain and pain management- Notify physician/advanced practitioner if interventions unsuccessful or patient reports new pain  Outcome: Progressing     Problem: INFECTION - ADULT  Goal: Absence or prevention of progression during hospitalization  Description: INTERVENTIONS:- Assess and monitor for signs and symptoms of infection- Monitor lab/diagnostic results- Monitor all insertion sites, i.e. indwelling lines, tubes, and drains- Monitor endotracheal if appropriate and nasal secretions for changes in amount and color- [place] appropriate cooling/warming therapies per order- Administer medications as ordered- Instruct and encourage patient and family to use good hand hygiene technique- Identify and instruct in appropriate isolation precautions for identified infection/condition  Outcome: Progressing  Goal: Absence of fever/infection during neutropenic period  Description: INTERVENTIONS:- Monitor WBC  Outcome: Progressing     Problem: SAFETY ADULT  Goal: Patient will remain free of falls  Description: INTERVENTIONS:  - Educate patient/family on patient safety including physical limitations  - Instruct patient to call for assistance with activity   - Consult OT/PT to assist with strengthening/mobility   - Keep Call bell within reach  - Keep bed low and locked with side rails adjusted as appropriate  - Keep care items and personal belongings within reach  - Initiate and maintain comfort rounds  - Make Fall Risk Sign visible to staff  - Offer Toileting every 2 Hours, in advance of need  -  Initiate/Maintain bed/chair alarm  - Obtain necessary fall risk management equipment: walker   - Apply yellow socks and bracelet for high fall risk patients  - Consider moving patient to room near nurses station  Outcome: Progressing  Goal: Maintain or return to baseline ADL function  Description: INTERVENTIONS:-  Assess patient's ability to carry out ADLs; assess patient's baseline for ADL function and identify physical deficits which impact ability to perform ADLs (bathing, care of mouth/teeth, toileting, grooming, dressing, etc.)- Assess/evaluate cause of self-care deficits - Assess range of motion- Assess patient's mobility; develop plan if impaired- Assess patient's need for assistive devices and provide as appropriate- Encourage maximum independence but intervene and supervise when necessary- Involve family in performance of ADLs- Assess for home care needs following discharge - Consider OT consult to assist with ADL evaluation and planning for discharge- Provide patient education as appropriate  Outcome: Progressing  Goal: Maintains/Returns to pre admission functional level  Description: INTERVENTIONS:  - Perform AM-PAC 6 Click Basic Mobility/ Daily Activity assessment daily.  - Set and communicate daily mobility goal to care team and patient/family/caregiver.   - Collaborate with rehabilitation services on mobility goals if consulted  - Perform Range of Motion 4 times a day.  - Reposition patient every 2 hours.  - Dangle patient 3 times a day  - Stand patient 3 times a day  - Ambulate patient 3 times a day  - Out of bed to chair 3 times a day   - Out of bed for meals 3 times a day  - Out of bed for toileting  - Record patient progress and toleration of activity level   Outcome: Progressing     Problem: DISCHARGE PLANNING  Goal: Discharge to home or other facility with appropriate resources  Description: INTERVENTIONS:- Identify barriers to discharge w/patient and caregiver- Arrange for needed discharge  resources and transportation as appropriate- Identify discharge learning needs (meds, wound care, etc.)- Arrange for interpretive services to assist at discharge as needed- Refer to Case Management Department for coordinating discharge planning if the patient needs post-hospital services based on physician/advanced practitioner order or complex needs related to functional status, cognitive ability, or social support system  Outcome: Progressing     Problem: Knowledge Deficit  Goal: Patient/family/caregiver demonstrates understanding of disease process, treatment plan, medications, and discharge instructions  Description: Complete learning assessment and assess knowledge base.Interventions:- Provide teaching at level of understanding- Provide teaching via preferred learning methods  Outcome: Progressing

## 2025-03-25 NOTE — OCCUPATIONAL THERAPY NOTE
Occupational Therapy Evaluation     Patient Name: Cielo Thomason  Today's Date: 3/25/2025  Problem List  Principal Problem:    Primary osteoarthritis of left hip    Past Medical History  Past Medical History:   Diagnosis Date    Allergic rhinitis 2022    Annual physical exam 2024    Anxiety     Arthritis     BMI 40.0-44.9, adult ([facility]) 2021    BMI 50.0-59.9, adult ([facility]) 2021    Cellulitis of left leg 2021    Depression     Eczema 2022    Edema of both lower legs 2021    Edema of both lower legs 2021    Endometrial cancer ([facility]) 2021    Heart murmur     Heart murmur 2024    History of COVID-19 2020    Mild sypmtoms Cough,Tired,diarrhea,sweats, Loss taste and smell    Hyperglycemia 2021    Hyperlipidemia     Hypertension     Left hip pain 2022    Lower abdominal pain 2021    Lumbar radiculopathy 08/15/2024    Mixed hyperlipidemia 2021    Numbness and tingling in left arm     Obesity 2024    Pruritus     Rash of hand 2022    Shortness of breath     Skin lesion     Skin rash 2023    Tinea cruris 2023    Tinea pedis of both feet 2022    Vitamin D deficiency 2021     Past Surgical History  Past Surgical History:   Procedure Laterality Date    CARPAL TUNNEL RELEASE Bilateral      SECTION      x3     SECTION      CHOLECYSTECTOMY      CHOLECYSTECTOMY LAPAROSCOPIC N/A 2019    Procedure: CHOLECYSTECTOMY LAPAROSCOPIC;  Surgeon: Shayne De Leon MD;  [facility];  Service: General    COLONOSCOPY      CYSTOSCOPY N/A 10/08/2021    Procedure: Cystoscopy;  Surgeon: Adelso Freeman MD;  Location: [facility];  Service: Gynecology Oncology    FL GUIDED NEEDLE PLAC BX/ASP/INJ  2015    FL INJECTION LEFT HIP (NON ARTHROGRAM)  2022    HYSTERECTOMY      JOINT REPLACEMENT  2016    R total HIp    MAMMO (HISTORICAL)  10/11/2018    Advise for yearly mammo screening     OOPHORECTOMY Bilateral 2021    OR HYSTEROSCOPY BX ENDOMETRIUM&/POLYPC W/WO D&C N/A 03/21/2016    Procedure: FRACTIONAL DILATATION AND CURETTAGE (D&C) WITH HYSTEROSOCPY;  Surgeon: Farnaz Romero MD;  [facility]: [facility];  Service: Gynecology    OR LAPS TOTAL HYSTERECT 250 GM/< W/RMVL TUBE/OVARY N/A 10/08/2021    Procedure: ROBOTIC HYSTERECTOMY, BILATERAL SALPINGOOPHERECTOMY; LEFT EXTERNAL ILIAC SENTINEL LYMPH NODE BIOPSY, RIGHT PELVIC LYMPHADENECTOMY;  Surgeon: Adelso Freeman MD;  Location: [facility];  Service: Gynecology Oncology    REPLACEMENT TOTAL KNEE Right            03/25/25 0920   OT Last Visit   OT Visit Date 03/25/25   Note Type   Note type Evaluation   Pain Assessment   Pain Assessment Tool 0-10   Pain Score 4   Pain Location/Orientation Orientation: Left;Location: Leg;Location: Hip   Pain Onset/Description Onset: Ongoing   Hospital Pain Intervention(s) Cold applied;Repositioned   Restrictions/Precautions   Weight Bearing Precautions Per Order Yes   LLE Weight Bearing Per Order WBAT   Braces or Orthoses   (abd wedge)   Other Precautions THR;WBS;Pain;Multiple lines;Chair Alarm;Fall Risk   Home Living   Type of Home House   Home Layout Multi-level;Bed/bath upstairs;Stairs to enter with rails   Bathroom Shower/Tub Walk-in shower   Bathroom Toilet Raised   Bathroom Equipment Grab bars in shower   Bathroom Accessibility Accessible via walker   Home Equipment Walker;Cane;Sock aid   Prior Function   Level of [place] Independent with ADLs;Independent with functional mobility;Independent with IADLS   Lives With Spouse   Receives Help From Family   IADLs Independent with driving;Independent with meal prep;Independent with medication management   Falls in the last 6 months 0   Vocational Full time employment   ADL   Grooming Assistance 5  Supervision/Setup   Grooming Deficit Wash/dry face;Wash/dry hands;Teeth care   UB Bathing Assistance 5  Supervision/Setup   LB Bathing Assistance 4  Minimal Assistance    UB Dressing Assistance 5  Supervision/Setup   LB Dressing Assistance 4  Minimal Assistance   Toileting Assistance  5  Supervision/Setup   Toileting Deficit Clothing management up;Clothing management down;Perineal hygiene  (raised toilet)   Transfers   Sit to Stand 5  Supervision   Stand to Sit 5  Supervision   Toilet transfer 5  Supervision   Additional items Raised toilet seat   Functional Mobility   Functional Mobility 5  Supervision   Additional items Rolling walker   Balance   Static Sitting Good   Dynamic Sitting Fair +   Static Standing Fair   Dynamic Standing Fair   Ambulatory Fair   Activity Tolerance   Activity Tolerance Patient tolerated treatment well   RUE Assessment   RUE Assessment WFL   LUE Assessment   LUE Assessment WFL   Hand Function   Gross Motor Coordination Functional   Cognition   Overall Cognitive Status WFL   Assessment   Limitation Decreased high-level ADLs;Decreased ADL status;Decreased endurance   Prognosis Good   Assessment   Pt is a 64 y.o. female seen for OT evaluation s/p admit to  [facility] on 3/24/2025 w/ Primary osteoarthritis of left hip via posterior approach. Pt currently with posterior hip precautions, WBAT. See medical history above for extensive list of comorbidities affecting pt's functional performance at time of assessment. Personal factors affecting Pt at time of IE include:health management . Upon evaluation: Pt requires supervision with RW for functional ambulation including bathroom mobility and negotiation of small spaces, supervision for ADL transfers.  Pt educated re: hand placement and body mechanics, demonstrated good understanding and application to functional tasks. Pt educated re: hip precautions and modifications for ADLs including long handled AE, dressing/thread surgical LE first through undergarments and pants, positioning for joint integrity. Demonstrated use of sock aid. Pt declined hip kit at this time, states she has an older sock aide at home and  assistance as needed. Pt requires supervision for ADLs in standing, able to complete oral care and hygiene standing at the sink with good tolerance and stability. BP stable throughout- 124/60 seated prior to activity, 133/57 after. Pt's primary barrier(s) at this time: decreased ROM and precautions as expected post op. The following deficits impact occupational performance: decreased ROM, decreased tolerance, increased pain, and orthopedic restrictions. Pt to benefit from 1-2 more skilled OT sessions while in the hospital to address deficits as defined above and maximize level of functional independence w ADL's and functional mobility. Occupational performance areas to address include: toilet hygiene, dressing, health maintenance, functional mobility, and clothing management. At this time, Pt demonstrates ability to complete functional tasks as well as understanding of precautions in order to discharge home. From OT standpoint, recommendation at time of d/c would be minimum resource intensity.       Goals   STG Time Frame   (1-2 more sessions)   Short Term Goals Pt will demonstrate 100% compliance to 3/3 posterior hip precautions during functional tasks without verbal cues.   Pt will complete ADL transfers Samreen.   Pt will complete functional ambulation Samreen.   Pt will tolerate > 10 minutes of standing activity without seated rest break and good standing balance.    Pt will complete toilet hygiene Samreen.      Plan   Treatment Interventions ADL retraining;Continued evaluation;Endurance training   Goal Expiration Date 04/01/25   OT Frequency 1-2x/wk   Discharge Recommendation   Rehab Resource Intensity Level, OT III (Minimum Resource Intensity)   AM-PAC Daily Activity Inpatient   Lower Body Dressing 3   Bathing 3   Toileting 3   Upper Body Dressing 4   Grooming 4   Eating 4   Daily Activity Raw Score 21   Daily Activity Standardized Score (Calc for Raw Score >=11) 44.27    From OT standpoint, recommendation at time of  d/c would be home. Patients with a standardized score greater than 39.4 are likely to benefit from discharge to home. Please refer to the recommendation of the Occupational Therapist for safe discharge planning.

## 2025-03-25 NOTE — ASSESSMENT & PLAN NOTE
S/p left total hip arthroplasty  Weightbearing as tolerated left lower extremity  Posterior hip precautions  Abduction pillow in place while in bed in and in chair  DVT prophylaxis with Lovenox  PT/OT for ambulation training  Analgesics for pain  Hemoglobin was stable at 11.8 this morning  Discharge planning

## 2025-03-26 ENCOUNTER — TELEPHONE (OUTPATIENT)
Dept: OBGYN CLINIC | Facility: HOSPITAL | Age: 65
End: 2025-03-26

## 2025-03-26 NOTE — TELEPHONE ENCOUNTER
Patient contacted for a postoperative follow up assessment. Patient states current pain level of a 2/10 when sitting and 5/10 when walking with RW.  Patient denies increase in swelling and dressing is Dressing C/D/I Patient isicing the site regularly. NN educated patient on post-op bruising, swelling, and icing. PT 3/27 at 5:30PM.     We reviewed patients AVS medication list. Patient is taking Tylenol 1000mg every 8 hours, Oxycodone 5mg every 6 hours, Lovenox injections daily, and Tizanidine HCl 2mg every 8 hours. Patient has not had a BM but is passing gas.       Patient denies nausea, vomiting, abdominal pain, chest pain, shortness of breath, fever, dizziness, and calf pain. Patient confirmed post-op appointment with surgeon on 4/1 at 8:30AM .Patient does not have any other questions or concerns at this time. Pt was encouraged to call with any questions, concerns or issues.

## 2025-03-27 ENCOUNTER — OFFICE VISIT (OUTPATIENT)
Dept: PHYSICAL THERAPY | Facility: REHABILITATION | Age: 65
End: 2025-03-27
Payer: MEDICARE

## 2025-03-27 DIAGNOSIS — Z96.642 HISTORY OF LEFT HIP REPLACEMENT: ICD-10-CM

## 2025-03-27 DIAGNOSIS — M16.12 PRIMARY OSTEOARTHRITIS OF LEFT HIP: ICD-10-CM

## 2025-03-27 DIAGNOSIS — M25.552 CHRONIC LEFT HIP PAIN: Primary | ICD-10-CM

## 2025-03-27 DIAGNOSIS — G89.29 CHRONIC LEFT HIP PAIN: Primary | ICD-10-CM

## 2025-03-27 PROCEDURE — 97140 MANUAL THERAPY 1/> REGIONS: CPT | Performed by: PHYSICAL THERAPIST

## 2025-03-27 PROCEDURE — 97164 PT RE-EVAL EST PLAN CARE: CPT | Performed by: PHYSICAL THERAPIST

## 2025-03-27 PROCEDURE — 97112 NEUROMUSCULAR REEDUCATION: CPT | Performed by: PHYSICAL THERAPIST

## 2025-03-27 NOTE — PROGRESS NOTES
PT Re-Evaluation     Today's date: 3/27/2025  Patient name: Cielo Thomason  : 1960  MRN: [id number]  Referring provider: Arvind Eaton,*  Dx:   Encounter Diagnosis     ICD-10-CM    1. Chronic left hip pain  M25.552     G89.29       2. Primary osteoarthritis of left hip  M16.12       3. History of left hip replacement  Z96.642             Start Time: 1715  Stop Time: 1800  Total time in clinic (min): 45 minutes    Assessment  Impairments: abnormal gait, abnormal or restricted ROM, abnormal movement, activity intolerance, impaired balance, impaired physical strength, lacks appropriate home exercise program, pain with function, weight-bearing intolerance, poor posture , poor body mechanics, unable to perform ADL, participation limitations, activity limitations and endurance  Symptom irritability: moderate    Assessment details: Cielo Thomason is a 64 y.o. year old female with primary complaints of L hip pain post-op L FLETCHER performed by Dr. Eaton on 3/24/25. Current deficits include decreased hip A/PROM, decreased L hip girdle strength, impaired gait, and decreased activity tolerance. These deficits limit her ability to participate in I/ADLs, household tasks, and community and social recreational activities. Signs and symptoms are as anticipated following surgery. Recommend skilled PT to address previously stated impairments to promote PLOF.    Patient treated by EDU Shields under direct PT supervision.        Understanding of Dx/Px/POC: good     Prognosis: good    Goals  Short-term (6 weeks)  1.  Pain at worst decrease from 5/10 to 3/10 to improve QoL.  2. L LE strength increase to 3+/5 to improve activity tolerance.   3. Improve TUG time from 26 sec. to 20 sec. to demonstrate progress towards normative values.     Long-term (12 weeks):  1. L hip ROM WFL to improve activity tolerance.   2. Global LE strength increase to 4+/5 to improve activity tolerance.   3. Able to walk for at least 30  min. without pain to improve activity tolerance.  4. Able to ascend/descend 1 flight of stairs without compensation to promote improved household ambulation.  5. Patient is able to return to PLOF.  6. Improve TUG time from 26 sec. to 12 sec. to demonstrate progress towards normative values.  7. Independent with HEP.          Plan  Patient would benefit from: skilled physical therapy and home program  Planned modality interventions: neuromuscular electric stimulation, TENS, unattended electrical stimulation, cryotherapy and thermotherapy: hydrocollator packs    Planned therapy interventions: abdominal trunk stabilization, activity modification, ADL retraining, balance, balance/weight bearing training, behavior modification, body mechanics training, breathing training, patient/caregiver education, muscle pump exercises, nerve gliding, neuromuscular re-education, motor coordination training, manual therapy, kinesiology taping, joint mobilization, IASTM, postural training, self care, functional ROM exercises, flexibility, gait training, graded activity, graded exercise, therapeutic training, transfer training, therapeutic exercise, therapeutic activities, stretching, strengthening, graded motor, home exercise program, IADL retraining and López taping    Frequency: 2x week  Duration in weeks: 12  Plan of Care beginning date: 3/27/2025  Plan of Care expiration date: 6/19/2025  Treatment plan discussed with: patient  Plan details: Thank you for referring Cielo Thomason to Physical Therapy at [facility] and for the opportunity to coordinate care.          Subjective Evaluation    History of Present Illness  Date of surgery: 3/24/2025  Mechanism of injury: surgery  Mechanism of injury: 03/27/25:  Cielo Thomason is a 64 y.o. female patient presenting s/p left FLETCHER with Dr. Eaton on 3/24/25. Pain is relieved or reduced with medication. Cielo is currently having difficulty with walking, stairs (descending>  ascending) negotiation with non-reciprocal gait pattern, standing from a chair, fatigue, and all typical day-to-day activities. Pt would like to return to being able to walk without AD, IADLs, safe step negotiation, walking longer distances, and household chores. Next follow up with the physician is 2025.      03/10/2025:  Cielo Thomason is a 64 y.o. female who presents pre-op L FLETCHER with Dr. Eaton on 3/24/25 with primary complaints of L hip pain. Had a fall in July leading to ER visit on 25. Hx of R FLETCHER & R TKA. Concerned about falling. Describes symptoms as sharp and tight groin and buttock pain. Currently having difficulty with walking, house chores, and ADLs. Has difficulty sleeping secondary to L hip pain. Both wakes up and difficulty finding comfortable position. Denies reports of numbness/tingling in b/l LEs.    Patient Goals  Patient goals for therapy: decreased pain, return to sport/leisure activities, independence with ADLs/IADLs, increased strength and improved balance    Pain  Current pain ratin  At best pain ratin  At worst pain ratin  Location: L hip  Quality: burning  Relieving factors: medications  Aggravating factors: walking, stair climbing and standing    Social Support  Steps to enter house: yes (R HR)  3  Stairs in house: yes (L HR)   8  Lives in: multiple-level home (bedroom on 2nd floor)  Lives with: spouse    Employment status: working  Treatments  Previous treatment: injection treatment (groin and buttock injections)  Current treatment: physical therapy        Objective     Active Range of Motion     Right Hip   Flexion: 90 degrees   External rotation (90/90): 25 degrees   Internal rotation (90/90): 25 degrees     Passive Range of Motion   Left Hip   Flexion: 85 degrees     Strength/Myotome Testing     Right Hip   Planes of Motion   Flexion: 4+  Abduction: 4+  Adduction: 4+  External rotation: 4  Internal rotation: 4    Left Knee   Flexion: 5    Right Knee  "  Flexion: 5    Left Ankle/Foot   Dorsiflexion: 5    Right Ankle/Foot   Dorsiflexion: 5    Additional Strength Details  L hip strength testing deferred secondary to post-op status; will assess when appropriate.     General Comments:      Hip Comments   3/27/2025:  Post-op TU\" w/ RW and R UE use to aid in standing and sitting, chair w/ 2 foam boost  Gait: initial step to pattern with RW progressing to step through pattern with RW      3/10/2025:  Pre-op TU\" w/ SPC on R w/ BUE use to aid in standing and sitting             Precautions:   Past Medical History:   Diagnosis Date    Allergic rhinitis 2022    Annual physical exam 2024    Anxiety     Arthritis     BMI 40.0-44.9, adult ([facility]) 2021    BMI 50.0-59.9, adult ([facility]) 2021    Cellulitis of left leg 2021    Depression     Eczema 2022    Edema of both lower legs 2021    Edema of both lower legs 2021    Endometrial cancer ([facility]) 2021    Heart murmur     Heart murmur 2024    History of COVID-19 2020    Mild sypmtoms Cough,Tired,diarrhea,sweats, Loss taste and smell    Hyperglycemia 2021    Hyperlipidemia     Hypertension     Left hip pain 2022    Lower abdominal pain 2021    Lumbar radiculopathy 08/15/2024    Mixed hyperlipidemia 2021    Numbness and tingling in left arm     Obesity 2024    Pruritus     Rash of hand 2022    Shortness of breath     Skin lesion     Skin rash 2023    Tinea cruris 2023    Tinea pedis of both feet 2022    Vitamin D deficiency 2021       * Indicates part of HEP   HEP code: [id number]     Daily Treatment Diary     Manuals 3/10 3/27         L hip PROM Nv  FLX                               Ther Ex           Bike  nv         Heel slides  2x10x5\"         LAQ  10x5\"         Flexion SLR           Abduction SLR           Standing hip extension  nv         Standing hp abduction  nv         Ankle pumps* 10x ea        " "  Patient education Done  done                    Neuro Re-Ed           Quad sets* HEP 10x5\"         Glute sets* 10x 2x10x5\"         Bridges* 10x 2x10         Clamshells           NBOS balance           Tandem balance           Tandem walking           Plank           Bird dog                                 Ther Activity           Sit to stand           Goblet squat           Leg press           Heel raises* 10x ea          Toe raises* 10x ea          Forward step ups           Lateral step ups           Deadlift                                  Modalities                                          "

## 2025-04-01 ENCOUNTER — OFFICE VISIT (OUTPATIENT)
Dept: OBGYN CLINIC | Facility: HOSPITAL | Age: 65
End: 2025-04-01

## 2025-04-01 ENCOUNTER — TELEPHONE (OUTPATIENT)
Age: 65
End: 2025-04-01

## 2025-04-01 ENCOUNTER — HOSPITAL ENCOUNTER (OUTPATIENT)
Dept: RADIOLOGY | Facility: HOSPITAL | Age: 65
Discharge: HOME | End: 2025-04-01
Attending: ORTHOPAEDIC SURGERY
Payer: MEDICARE

## 2025-04-01 ENCOUNTER — OFFICE VISIT (OUTPATIENT)
Dept: PHYSICAL THERAPY | Facility: REHABILITATION | Age: 65
End: 2025-04-01
Payer: MEDICARE

## 2025-04-01 VITALS — HEIGHT: 68 IN | BODY MASS INDEX: 39.99 KG/M2

## 2025-04-01 DIAGNOSIS — M25.552 CHRONIC LEFT HIP PAIN: Primary | ICD-10-CM

## 2025-04-01 DIAGNOSIS — Z47.1 AFTERCARE FOLLOWING LEFT HIP JOINT REPLACEMENT SURGERY: ICD-10-CM

## 2025-04-01 DIAGNOSIS — M16.12 PRIMARY OSTEOARTHRITIS OF LEFT HIP: ICD-10-CM

## 2025-04-01 DIAGNOSIS — Z96.642 AFTERCARE FOLLOWING LEFT HIP JOINT REPLACEMENT SURGERY: Primary | ICD-10-CM

## 2025-04-01 DIAGNOSIS — M25.552 CHRONIC LEFT HIP PAIN: ICD-10-CM

## 2025-04-01 DIAGNOSIS — Z47.1 AFTERCARE FOLLOWING LEFT HIP JOINT REPLACEMENT SURGERY: Primary | ICD-10-CM

## 2025-04-01 DIAGNOSIS — Z96.642 HISTORY OF LEFT HIP REPLACEMENT: ICD-10-CM

## 2025-04-01 DIAGNOSIS — G89.29 CHRONIC LEFT HIP PAIN: ICD-10-CM

## 2025-04-01 DIAGNOSIS — Z96.642 AFTERCARE FOLLOWING LEFT HIP JOINT REPLACEMENT SURGERY: ICD-10-CM

## 2025-04-01 DIAGNOSIS — G89.29 CHRONIC LEFT HIP PAIN: Primary | ICD-10-CM

## 2025-04-01 PROCEDURE — 97110 THERAPEUTIC EXERCISES: CPT | Performed by: PHYSICAL THERAPIST

## 2025-04-01 PROCEDURE — 73502 X-RAY EXAM HIP UNI 2-3 VIEWS: CPT

## 2025-04-01 PROCEDURE — 99024 POSTOP FOLLOW-UP VISIT: CPT | Performed by: ORTHOPAEDIC SURGERY

## 2025-04-01 PROCEDURE — 97140 MANUAL THERAPY 1/> REGIONS: CPT | Performed by: PHYSICAL THERAPIST

## 2025-04-01 RX ORDER — ENOXAPARIN SODIUM 100 MG/ML
40 INJECTION SUBCUTANEOUS DAILY
Qty: 11.2 ML | Refills: 0 | Status: SHIPPED | OUTPATIENT
Start: 2025-04-01 | End: 2025-04-29

## 2025-04-01 NOTE — PROGRESS NOTES
Daily Note     Today's date: 2025  Patient name: Cielo Thomason  : 1960  MRN: [id number]  Referring provider: Arvind Eaton,*  Dx:   Encounter Diagnosis     ICD-10-CM    1. Chronic left hip pain  M25.552     G89.29       2. Primary osteoarthritis of left hip  M16.12       3. History of left hip replacement  Z96.642           Start Time: 1730  Stop Time:   Total time in clinic (min): 43 minutes    Subjective: Cielo reports some soreness today.       Objective: See treatment diary below      Assessment: Tolerated treatment well. Reported some improvement in stiffness after recumbent bike today. Responded well to addition of AAROM SLR flexion today. Patient demonstrated appropriate level of challenge and muscular fatigue throughout session and noted good status at end of visit. Updated and reviewed HEP with patient; patient verbalized and demonstrated understanding of all exercises. Patient demonstrated fatigue post treatment, exhibited good technique with therapeutic exercises, and would benefit from continued PT.    Patient treated by EDU Shields under direct PT supervision.      Plan: Continue per plan of care.      Precautions: L FLETCHER posterior approach (3/24)  Past Medical History:   Diagnosis Date    Allergic rhinitis 2022    Annual physical exam 2024    Anxiety     Arthritis     BMI 40.0-44.9, adult ([facility]) 2021    BMI 50.0-59.9, adult ([facility]) 2021    Cellulitis of left leg 2021    Depression     Eczema 2022    Edema of both lower legs 2021    Edema of both lower legs 2021    Endometrial cancer ([facility]) 2021    Heart murmur     Heart murmur 2024    History of COVID-19 2020    Mild sypmtoms Cough,Tired,diarrhea,sweats, Loss taste and smell    Hyperglycemia 2021    Hyperlipidemia     Hypertension     Left hip pain 2022    Lower abdominal pain 2021    Lumbar radiculopathy 08/15/2024    Mixed hyperlipidemia  "01/23/2021    Numbness and tingling in left arm     Obesity 03/25/2024    Pruritus     Rash of hand 02/28/2022    Shortness of breath     Skin lesion     Skin rash 05/02/2023    Tinea cruris 08/24/2023    Tinea pedis of both feet 02/28/2022    Vitamin D deficiency 01/23/2021       * Indicates part of HEP   HEP code: [id number]     Daily Treatment Diary     Manuals 3/10 3/27 4/1        L hip PROM Nv  FLX FLX, ER, ABD                              Ther Ex           Bike  nv 5' lvl 0         Heel slides  2x10x5\" 2x10x5\"        LAQ  10x5\" 2x10x5\"        Flexion SLR   2x5 AAROM        Abduction SLR           Standing hip extension  nv 2x10         Standing hip abduction  nv 2x10        Ankle pumps* 10x ea          Patient education Done  done                    Neuro Re-Ed           Quad sets* HEP 10x5\" 10x5\"        Glute sets* 10x 2x10x5\" 10x5\"        Bridges* 10x 2x10 2x10        Clamshells           NBOS balance           Tandem balance           Tandem walking           Plank           Bird dog                                 Ther Activity           Sit to stand   nv        Goblet squat           Leg press           Heel raises* 10x ea          Toe raises* 10x ea          Forward step ups           Lateral step ups           Deadlift                                  Modalities                                          "

## 2025-04-01 NOTE — PROGRESS NOTES
Encounter Provider: Arvind Eaton MD   Encounter Date: 04/01/25  Encounter department: [facility] [place]         ASSESSMENT & PLAN:  Assessment & Plan  Aftercare following left hip joint replacement surgery  8 days s/p left FLETCHER, 3/24/2025  The patient is doing well   She should continue daily Lovenox, pain medications as needed and current physical therapy regimen.    The patient can shower letting soapy water over incision, yet should not to submerge the incision, and then pat dry.    The patient should follow up in one week              To do next visit:  Return in about 1 week (around 4/8/2025).    Scribe Attestation      I,:  Jus Rubalcava MA am acting as a scribe while in the presence of the attending physician.:       I,:  Arvind Eaton MD personally performed the services described in this documentation    as scribed in my presence.:             ____________________________________________________  CHIEF COMPLAINT:  Chief Complaint   Patient presents with    Left Hip - Post-op         SUBJECTIVE:  Cielo Thomason is a 64 y.o. female who presents 8 days s/p left FLETCHER, 3/24/2025.  The patient is doing well.  Today she complains of generalized left  pain.  She does participate in physical therapy.  She does use Lovenox daily.  She does use oxycodone, Tylenol and Tizanidine for pain control.  She denies fever, chills or shortness of breath.             PAST MEDICAL HISTORY:  Past Medical History:   Diagnosis Date    Allergic rhinitis 07/07/2022    Annual physical exam 12/16/2024    Anxiety     Arthritis     BMI 40.0-44.9, adult ([facility]) 06/01/2021    BMI 50.0-59.9, adult ([facility]) 06/01/2021    Cellulitis of left leg 08/17/2021    Depression     Eczema 06/02/2022    Edema of both lower legs 01/23/2021    Edema of both lower legs 11/23/2021    Endometrial cancer (HCC) 09/14/2021    Heart murmur     Heart murmur 03/25/2024    History of COVID-19 11/2020    Mild sypmtoms  Cough,Tired,diarrhea,sweats, Loss taste and smell    Hyperglycemia 2021    Hyperlipidemia     Hypertension     Left hip pain 2022    Lower abdominal pain 2021    Lumbar radiculopathy 08/15/2024    Mixed hyperlipidemia 2021    Numbness and tingling in left arm     Obesity 2024    Pruritus     Rash of hand 2022    Shortness of breath     Skin lesion     Skin rash 2023    Tinea cruris 2023    Tinea pedis of both feet 2022    Vitamin D deficiency 2021       PAST SURGICAL HISTORY:  Past Surgical History:   Procedure Laterality Date    CARPAL TUNNEL RELEASE Bilateral      SECTION      x3     SECTION      CHOLECYSTECTOMY      CHOLECYSTECTOMY LAPAROSCOPIC N/A 2019    Procedure: CHOLECYSTECTOMY LAPAROSCOPIC;  Surgeon: Shayne De Leon MD;  Location: [facility];  Service: General    COLONOSCOPY      CYSTOSCOPY N/A 10/08/2021    Procedure: Cystoscopy;  Surgeon: Adelso Freeman MD;  Location: [facility];  Service: Gynecology Oncology    FL GUIDED NEEDLE PLAC BX/ASP/INJ  2015    FL INJECTION LEFT HIP (NON ARTHROGRAM)  2022    HYSTERECTOMY      JOINT REPLACEMENT  2016    R total HIp    MAMMO (HISTORICAL)  10/11/2018    Advise for yearly mammo screening    OOPHORECTOMY Bilateral     MO ARTHRP ACETBLR/PROX FEM PROSTC AGRFT/ALGRFT Left 3/24/2025    Procedure: ARTHROPLASTY HIP TOTAL;  Surgeon: Arvind Eaton MD;  Location: [facility];  Service: Orthopedics    MO HYSTEROSCOPY BX ENDOMETRIUM&/POLYPC W/WO D&C N/A 2016    Procedure: FRACTIONAL DILATATION AND CURETTAGE (D&C) WITH HYSTEROSOCPY;  Surgeon: Farnaz Romero MD;  Location: [facility];  Service: Gynecology    MO LAPS TOTAL HYSTERECT 250 GM/< W/RMVL TUBE/OVARY N/A 10/08/2021    Procedure: ROBOTIC HYSTERECTOMY, BILATERAL SALPINGOOPHERECTOMY; LEFT EXTERNAL ILIAC SENTINEL LYMPH NODE BIOPSY, RIGHT PELVIC LYMPHADENECTOMY;  Surgeon: Adelso Freeman MD;  Location: [facility];  Service: Gynecology Oncology    REPLACEMENT TOTAL KNEE Right        FAMILY HISTORY:  Family History   Problem Relation Age of Onset    Hypertension Mother     Hypertension Father     Heart failure Father     Lymphoma Father 62    Diabetes Father         at old age    No Known Problems Sister     No Known Problems Daughter     No Known Problems Maternal Grandmother     No Known Problems Maternal Grandfather     No Known Problems Paternal Grandmother     No Known Problems Paternal Grandfather     Hemophilia Brother     Breast cancer Maternal Aunt 50    Brain cancer Maternal Aunt 48    No Known Problems Maternal Aunt     No Known Problems Son     No Known Problems Son     No Known Problems Son        SOCIAL HISTORY:  Social History     Tobacco Use    Smoking status: Never    Smokeless tobacco: Never   Vaping Use    Vaping status: Never Used   Substance Use Topics    Alcohol use: Not Currently    Drug use: Not Currently       MEDICATIONS:    Current Outpatient Medications:     acetaminophen (TYLENOL) 500 mg tablet, Take 2 tablets (1,000 mg total) by mouth every 6 (six) hours as needed for mild pain, Disp: 90 tablet, Rfl: 0    amLODIPine (NORVASC) 5 mg tablet, Take 1 tablet (5 mg total) by mouth daily, Disp: 90 tablet, Rfl: 0    busPIRone (BUSPAR) 5 mg tablet, Take 1 tablet (5 mg total) by mouth daily at bedtime, Disp: 90 tablet, Rfl: 0    calcium carbonate (OS-CAMDEN) 600 MG tablet, Take 600 mg by mouth daily, Disp: , Rfl:     Cholecalciferol (Vitamin D) 50 MCG (2000 UT) tablet, Take 2,000 Units by mouth daily, Disp: , Rfl:     citalopram (CeleXA) 20 mg tablet, Take 1 tablet (20 mg total) by mouth daily at bedtime, Disp: 90 tablet, Rfl: 0    clotrimazole-betamethasone (LOTRISONE) 1-0.05 % cream, Apply topically 2 (two) times a day, Disp: 45 g, Rfl: 0    enoxaparin (LOVENOX) 40 mg/0.4 mL, Inject 0.4 mL (40 mg total) under the skin daily for 28 days Start injections after surgery, Disp: 11.2 mL, Rfl: 0     "fexofenadine (ALLEGRA) 180 MG tablet, Take 180 mg by mouth daily as needed, Disp: , Rfl:     losartan-hydrochlorothiazide (HYZAAR) 50-12.5 mg per tablet, Take 1 tablet by mouth daily, Disp: 90 tablet, Rfl: 0    metoprolol succinate (TOPROL-XL) 100 mg 24 hr tablet, Take 1 tablet (100 mg total) by mouth daily at bedtime, Disp: 90 tablet, Rfl: 0    Multiple Vitamins-Minerals (multivitamin with minerals) tablet, Take 1 tablet by mouth daily, Disp: , Rfl:     omeprazole (PriLOSEC) 20 mg delayed release capsule, Take 20 mg by mouth as needed, Disp: , Rfl:     oxyCODONE (Roxicodone) 5 immediate release tablet, Take 1 tablet (5 mg total) by mouth every 6 (six) hours as needed for moderate pain for up to 10 days Max Daily Amount: 20 mg, Disp: 30 tablet, Rfl: 0    tiZANidine (ZANAFLEX) 2 mg tablet, Take 1 tablet (2 mg total) by mouth every 8 (eight) hours as needed for muscle spasms, Disp: 60 tablet, Rfl: 0    vitamin B-12 (VITAMIN B-12) 1,000 mcg tablet, Take by mouth daily, Disp: , Rfl:     ALLERGIES:  Allergies   Allergen Reactions    Griseofulvin Hives    Penicillins Hives     She can take cephalosporin without any side effect    Lisinopril Cough    Zithromax [Azithromycin] Headache       LABS:  HgA1c:   Lab Results   Component Value Date    HGBA1C 5.0 02/23/2025     BMP:   Lab Results   Component Value Date    CALCIUM 8.7 03/25/2025    K 4.3 03/25/2025    CO2 31 03/25/2025     03/25/2025    BUN 14 03/25/2025    CREATININE 0.89 03/25/2025     CBC: No components found for: \"CBC\"    _____________________________________________________  PHYSICAL EXAMINATION:  Vital signs: Ht 5' 8\" (1.727 m)   LMP  (LMP Unknown)   BMI 39.99 kg/m²   General: No acute distress, awake and alert  Psychiatric: Mood and affect appear appropriate  HEENT: Trachea Midline, No torticollis, no apparent facial trauma  Cardiovascular: No audible murmurs; Extremities appear perfused  Pulmonary: No audible wheezing or stridor  Skin: No open " "lesions; see further details (if any) below    Ortho Exam:  Left hip:  Posterior incision clean dry and intact  Staples well approximated   Appropriate warmth and swelling  Good arc of motion with no pain  Patient sits comfortably in chair with hip flexed at 90 degrees  Patient stands from seated position without assistance  Calf compartments soft and supple  Sensation intact  Toes are warm sensate and mobile          _____________________________________________________  STUDIES REVIEWED:    The attending physician has personally reviewed the pertinent films in PACS and interpretation is as follows:  Left hip x-ray:  Well aligned prosthesis with no acute changes.      PROCEDURES PERFORMED:  Procedures      None Preformed       Portions of the record may have been created with voice recognition software.  Occasional wrong word or \"sound a like\" substitutions may have occurred due to the inherent limitations of voice recognition software.  Read the chart carefully and recognize, using context, where substitutions have occurred.        "

## 2025-04-01 NOTE — TELEPHONE ENCOUNTER
Caller: Patient    Doctor: Dr. Eaton    Reason for call: Patient calling stating that she didn't received enough of the Lovenox.  She is reporting that she only received 12.  Patient asking if the remainder can be sent to the pharmacy below.     [id number] [place], [place] - [address] Formerly [facility], [facility] [phone]       Call back:   [phone]

## 2025-04-03 ENCOUNTER — OFFICE VISIT (OUTPATIENT)
Dept: PHYSICAL THERAPY | Facility: REHABILITATION | Age: 65
End: 2025-04-03
Payer: MEDICARE

## 2025-04-03 DIAGNOSIS — M25.552 CHRONIC LEFT HIP PAIN: Primary | ICD-10-CM

## 2025-04-03 DIAGNOSIS — G89.29 CHRONIC LEFT HIP PAIN: Primary | ICD-10-CM

## 2025-04-03 DIAGNOSIS — M16.12 PRIMARY OSTEOARTHRITIS OF LEFT HIP: ICD-10-CM

## 2025-04-03 DIAGNOSIS — Z96.642 HISTORY OF LEFT HIP REPLACEMENT: ICD-10-CM

## 2025-04-03 PROCEDURE — 97110 THERAPEUTIC EXERCISES: CPT | Performed by: PHYSICAL THERAPIST

## 2025-04-03 PROCEDURE — 97530 THERAPEUTIC ACTIVITIES: CPT | Performed by: PHYSICAL THERAPIST

## 2025-04-03 NOTE — PROGRESS NOTES
Daily Note     Today's date: 4/3/2025  Patient name: Cielo Thomason  : 1960  MRN: [id number]  Referring provider: Arvind Eaton,*  Dx:   Encounter Diagnosis     ICD-10-CM    1. Chronic left hip pain  M25.552     G89.29       2. Primary osteoarthritis of left hip  M16.12       3. History of left hip replacement  Z96.642           Start Time: 1730  Stop Time: 1815  Total time in clinic (min): 45 minutes    Subjective: Cielo reports muscle soreness after last visit. Feels more tired today because she already did her HEP today before PT visit.       Objective: See treatment diary below      Assessment: Tolerated treatment well. Responded well to progression of exercises and addition of forward step ups today. Required less assistance for SLR flexion today and demonstrated increased activity tolerance today. Patient demonstrated appropriate level of challenge and muscular fatigue throughout session and noted good status at end of visit. Patient demonstrated fatigue post treatment, exhibited good technique with therapeutic exercises, and would benefit from continued PT.    Patient treated by EDU Shields under direct PT supervision.      Plan: Continue per plan of care.      Precautions: L FLETCHER posterior approach (3/24) (no flexion past 90, IR, adduction)  Past Medical History:   Diagnosis Date    Allergic rhinitis 2022    Annual physical exam 2024    Anxiety     Arthritis     BMI 40.0-44.9, adult ([facility]) 2021    BMI 50.0-59.9, adult ([facility]) 2021    Cellulitis of left leg 2021    Depression     Eczema 2022    Edema of both lower legs 2021    Edema of both lower legs 2021    Endometrial cancer (HCC) 2021    Heart murmur     Heart murmur 2024    History of COVID-19 2020    Mild sypmtoms Cough,Tired,diarrhea,sweats, Loss taste and smell    Hyperglycemia 2021    Hyperlipidemia     Hypertension     Left hip pain 2022    Lower  "abdominal pain 11/23/2021    Lumbar radiculopathy 08/15/2024    Mixed hyperlipidemia 01/23/2021    Numbness and tingling in left arm     Obesity 03/25/2024    Pruritus     Rash of hand 02/28/2022    Shortness of breath     Skin lesion     Skin rash 05/02/2023    Tinea cruris 08/24/2023    Tinea pedis of both feet 02/28/2022    Vitamin D deficiency 01/23/2021       * Indicates part of HEP   HEP code: [id number]     Daily Treatment Diary     Manuals 3/10 3/27 4/1 4/03       L hip PROM Nv  FLX FLX, ER, ABD FLX,ER,ABD                             Ther Ex           Bike (ROM)  nv 5' lvl 0  5' lvl 0       Heel slides  2x10x5\" 2x10x5\" 2x10x5\"       LAQ  10x5\" 2x10x5\" 2x10x5\"       Flexion SLR   2x5 AAROM 3x5 AAROM       Abduction SLR           Standing hip extension  nv 2x10  3x10        Standing hip abduction  nv 2x10 3x10       Ankle pumps* 10x ea          Patient education Done  done  done                  Neuro Re-Ed           Quad sets* HEP 10x5\" 10x5\"        Glute sets* 10x 2x10x5\" 10x5\"        Bridges* 10x 2x10 2x10 3x10       Clamshells           NBOS balance           Tandem balance           Tandem walking           Plank           Bird dog                                 Ther Activity           Sit to stand   nv 2x10 w/ 2 foam       Goblet squat           Leg press           Heel raises* 10x ea          Toe raises* 10x ea          Forward step ups    4\" step up/back 2x10 ea       Lateral step ups           Deadlift                                  Modalities                                              1:1 from 530 PM - 608 PM    "

## 2025-04-08 ENCOUNTER — OFFICE VISIT (OUTPATIENT)
Dept: PHYSICAL THERAPY | Facility: REHABILITATION | Age: 65
End: 2025-04-08
Payer: MEDICARE

## 2025-04-08 ENCOUNTER — OFFICE VISIT (OUTPATIENT)
Dept: OBGYN CLINIC | Facility: HOSPITAL | Age: 65
End: 2025-04-08

## 2025-04-08 ENCOUNTER — RA CDI HCC (OUTPATIENT)
Dept: OTHER | Facility: HOSPITAL | Age: 65
End: 2025-04-08

## 2025-04-08 VITALS — BODY MASS INDEX: 39.99 KG/M2 | HEIGHT: 68 IN

## 2025-04-08 DIAGNOSIS — Z96.642 HISTORY OF LEFT HIP REPLACEMENT: ICD-10-CM

## 2025-04-08 DIAGNOSIS — M25.552 CHRONIC LEFT HIP PAIN: Primary | ICD-10-CM

## 2025-04-08 DIAGNOSIS — Z47.1 AFTERCARE FOLLOWING LEFT HIP JOINT REPLACEMENT SURGERY: Primary | ICD-10-CM

## 2025-04-08 DIAGNOSIS — G89.29 CHRONIC LEFT HIP PAIN: Primary | ICD-10-CM

## 2025-04-08 DIAGNOSIS — M16.12 PRIMARY OSTEOARTHRITIS OF LEFT HIP: ICD-10-CM

## 2025-04-08 DIAGNOSIS — Z96.642 AFTERCARE FOLLOWING LEFT HIP JOINT REPLACEMENT SURGERY: Primary | ICD-10-CM

## 2025-04-08 PROCEDURE — 97110 THERAPEUTIC EXERCISES: CPT | Performed by: PHYSICAL THERAPIST

## 2025-04-08 PROCEDURE — 99024 POSTOP FOLLOW-UP VISIT: CPT | Performed by: ORTHOPAEDIC SURGERY

## 2025-04-08 PROCEDURE — 97530 THERAPEUTIC ACTIVITIES: CPT | Performed by: PHYSICAL THERAPIST

## 2025-04-08 NOTE — PROGRESS NOTES
Daily Note     Today's date: 2025  Patient name: Cielo Thomason  : 1960  MRN: [id number]  Referring provider: Arvind Eaton,*  Dx:   Encounter Diagnosis     ICD-10-CM    1. Chronic left hip pain  M25.552     G89.29       2. Primary osteoarthritis of left hip  M16.12       3. History of left hip replacement  Z96.642           Start Time: 1718  Stop Time: 1804  Total time in clinic (min): 46 minutes    Subjective: Cielo reports L quad tightness/pain. F/u visit with surgeon went well today.       Objective: See treatment diary below      Assessment: Tolerated treatment well. Continuing to progress well with activity tolerance and functional strength and mobility. Patient demonstrated appropriate level of challenge and muscular fatigue throughout session and noted good status at end of visit. Patient demonstrated fatigue post treatment, exhibited good technique with therapeutic exercises, and would benefit from continued PT.    Patient treated by EDU Shields under direct PT supervision.      Plan: Continue per plan of care.      Precautions: L FLETCHER posterior approach (3/24) (no flexion past 90, IR, adduction)  Past Medical History:   Diagnosis Date    Allergic rhinitis 2022    Annual physical exam 2024    Anxiety     Arthritis     BMI 40.0-44.9, adult ([facility]) 2021    BMI 50.0-59.9, adult ([facility]) 2021    Cellulitis of left leg 2021    Depression     Eczema 2022    Edema of both lower legs 2021    Edema of both lower legs 2021    Endometrial cancer ([facility]) 2021    Heart murmur     Heart murmur 2024    History of COVID-19 2020    Mild sypmtoms Cough,Tired,diarrhea,sweats, Loss taste and smell    Hyperglycemia 2021    Hyperlipidemia     Hypertension     Left hip pain 2022    Lower abdominal pain 2021    Lumbar radiculopathy 08/15/2024    Mixed hyperlipidemia 2021    Numbness and tingling in left arm     Obesity  "03/25/2024    Pruritus     Rash of hand 02/28/2022    Shortness of breath     Skin lesion     Skin rash 05/02/2023    Tinea cruris 08/24/2023    Tinea pedis of both feet 02/28/2022    Vitamin D deficiency 01/23/2021       * Indicates part of HEP   HEP code: [id number]     Daily Treatment Diary     Manuals 3/10 3/27 4/1 4/03 4/08      L hip PROM Nv  FLX FLX, ER, ABD FLX,ER,ABD FLX,ER,ABD                            Ther Ex           Bike (ROM)  nv 5' lvl 0  5' lvl 0 5' lvl 0      Heel slides  2x10x5\" 2x10x5\" 2x10x5\" 2x10x5\"      Quad stretch     20\"x5 seated      Self-massage w/ roller L quad     2'       LAQ  10x5\" 2x10x5\" 2x10x5\" dc      Flexion SLR   2x5 AAROM 3x5 AAROM 3x8       Abduction SLR           Standing hip extension  nv 2x10  3x10  3x12 ea elbows propped on hi/low       Standing hip abduction  nv 2x10 3x10 3x12 ea elbows propped on hi/lo      Ankle pumps* 10x ea          Patient education Done  done  done done                 Neuro Re-Ed           Quad sets* HEP 10x5\" 10x5\"        Glute sets* 10x 2x10x5\" 10x5\"        Bridges* 10x 2x10 2x10 3x10 3x12      Clamshells            NBOS balance           Tandem balance           Tandem walking           Plank           Bird dog                                 Ther Activity           Sit to stand   nv 2x10 w/ 2 foam 3x10 w/ 2 foam       Goblet squat           Leg press           Heel raises* 10x ea          Toe raises* 10x ea          Forward step ups    4\" step up/back 2x10 ea 6\" step up/back 2x10 ea      Lateral step ups           Deadlift                                  Modalities                                          1:1 from 545 PM - 604 PM LR PT    "

## 2025-04-08 NOTE — PROGRESS NOTES
Name: Cielo Thomason      : 1960       MRN: [id number]   Encounter Provider: Arvind Eaton MD   Encounter Date: 25  Encounter department: [facility]     ASSESSMENT & PLAN:  Assessment & Plan  Aftercare following left hip joint replacement surgery  Continue physical therapy   Continue weight bearing activities as tolerated   Continue pain medications as needed   Continue lovenox as prescribed    Staples removed in office today, incision cleaned, steri-strips applied   Follow up in 4 weeks for 6 week post-op appointment              To do next visit:  Return in about 4 weeks (around 2025) for 6 week post-op appointment.    _____________________________________________________  CHIEF COMPLAINT:  Chief Complaint   Patient presents with   • Left Hip - Post-op         SUBJECTIVE:  Cielo Thomason is a 64 y.o. female who presents 2 weeks status post left total hip arthroplasty.  She is doing well.  She admits to mild pain of the left hip, and complains of stiffness today.  She continues to take Tylenol, muscle relaxer, and oxycodone at night has needed.  Patient continues to work with physical therapy 2 times a week and is making good progress.  She is ambulating well with SPC today.  She continues to take Lovenox daily.      PAST MEDICAL HISTORY:  Past Medical History:   Diagnosis Date   • Allergic rhinitis 2022   • Annual physical exam 2024   • Anxiety    • Arthritis    • BMI 40.0-44.9, adult ([facility]) 2021   • BMI 50.0-59.9, adult ([facility]) 2021   • Cellulitis of left leg 2021   • Depression    • Eczema 2022   • Edema of both lower legs 2021   • Edema of both lower legs 2021   • Endometrial cancer ([facility]) 2021   • Heart murmur    • Heart murmur 2024   • History of COVID-19 2020    Mild sypmtoms Cough,Tired,diarrhea,sweats, Loss taste and smell   • Hyperglycemia 2021   • Hyperlipidemia    •  Hypertension    • Left hip pain 2022   • Lower abdominal pain 2021   • Lumbar radiculopathy 08/15/2024   • Mixed hyperlipidemia 2021   • Numbness and tingling in left arm    • Obesity 2024   • Pruritus    • Rash of hand 2022   • Shortness of breath    • Skin lesion    • Skin rash 2023   • Tinea cruris 2023   • Tinea pedis of both feet 2022   • Vitamin D deficiency 2021       PAST SURGICAL HISTORY:  Past Surgical History:   Procedure Laterality Date   • CARPAL TUNNEL RELEASE Bilateral    •  SECTION      x3   •  SECTION     • CHOLECYSTECTOMY     • CHOLECYSTECTOMY LAPAROSCOPIC N/A 2019    Procedure: CHOLECYSTECTOMY LAPAROSCOPIC;  Surgeon: Shayne De Leon MD;  [facility];  Service: General   • COLONOSCOPY     • CYSTOSCOPY N/A 10/08/2021    Procedure: Cystoscopy;  Surgeon: Adelso Freeman MD;  [facility];  Service: Gynecology Oncology   • FL GUIDED NEEDLE PLAC BX/ASP/INJ  2015   • FL INJECTION LEFT HIP (NON ARTHROGRAM)  2022   • HYSTERECTOMY     • JOINT REPLACEMENT  2016    R total HIp   • MAMMO (HISTORICAL)  10/11/2018    Advise for yearly mammo screening   • OOPHORECTOMY Bilateral    • MN ARTHRP ACETBLR/PROX FEM PROSTC AGRFT/ALGRFT Left 3/24/2025    Procedure: ARTHROPLASTY HIP TOTAL;  Surgeon: Arvind Eaton MD;  [facility];  Service: Orthopedics   • MN HYSTEROSCOPY BX ENDOMETRIUM&/POLYPC W/WO D&C N/A 2016    Procedure: FRACTIONAL DILATATION AND CURETTAGE (D&C) WITH HYSTEROSOCPY;  Surgeon: Farnaz Romero MD;  [facility];  Service: Gynecology   • MN LAPS TOTAL HYSTERECT 250 GM/< W/RMVL TUBE/OVARY N/A 10/08/2021    Procedure: ROBOTIC HYSTERECTOMY, BILATERAL SALPINGOOPHERECTOMY; LEFT EXTERNAL ILIAC SENTINEL LYMPH NODE BIOPSY, RIGHT PELVIC LYMPHADENECTOMY;  Surgeon: Adelso Freeman MD;  [facility];  Service: Gynecology Oncology   • REPLACEMENT TOTAL KNEE  Right        FAMILY HISTORY:  Family History   Problem Relation Age of Onset   • Hypertension Mother    • Hypertension Father    • Heart failure Father    • Lymphoma Father 62   • Diabetes Father         at old age   • No Known Problems Sister    • No Known Problems Daughter    • No Known Problems Maternal Grandmother    • No Known Problems Maternal Grandfather    • No Known Problems Paternal Grandmother    • No Known Problems Paternal Grandfather    • Hemophilia Brother    • Breast cancer Maternal Aunt 50   • Brain cancer Maternal Aunt 48   • No Known Problems Maternal Aunt    • No Known Problems Son    • No Known Problems Son    • No Known Problems Son        SOCIAL HISTORY:  Social History     Tobacco Use   • Smoking status: Never   • Smokeless tobacco: Never   Vaping Use   • Vaping status: Never Used   Substance Use Topics   • Alcohol use: Not Currently   • Drug use: Not Currently       MEDICATIONS:    Current Outpatient Medications:   •  acetaminophen (TYLENOL) 500 mg tablet, Take 2 tablets (1,000 mg total) by mouth every 6 (six) hours as needed for mild pain, Disp: 90 tablet, Rfl: 0  •  amLODIPine (NORVASC) 5 mg tablet, Take 1 tablet (5 mg total) by mouth daily, Disp: 90 tablet, Rfl: 0  •  busPIRone (BUSPAR) 5 mg tablet, Take 1 tablet (5 mg total) by mouth daily at bedtime, Disp: 90 tablet, Rfl: 0  •  calcium carbonate (OS-CAMDEN) 600 MG tablet, Take 600 mg by mouth daily, Disp: , Rfl:   •  Cholecalciferol (Vitamin D) 50 MCG (2000 UT) tablet, Take 2,000 Units by mouth daily, Disp: , Rfl:   •  citalopram (CeleXA) 20 mg tablet, Take 1 tablet (20 mg total) by mouth daily at bedtime, Disp: 90 tablet, Rfl: 0  •  clotrimazole-betamethasone (LOTRISONE) 1-0.05 % cream, Apply topically 2 (two) times a day, Disp: 45 g, Rfl: 0  •  enoxaparin (LOVENOX) 40 mg/0.4 mL, Inject 0.4 mL (40 mg total) under the skin daily for 28 days Start injections after surgery, Disp: 11.2 mL, Rfl: 0  •  fexofenadine (ALLEGRA) 180 MG tablet, Take  "180 mg by mouth daily as needed, Disp: , Rfl:   •  losartan-hydrochlorothiazide (HYZAAR) 50-12.5 mg per tablet, Take 1 tablet by mouth daily, Disp: 90 tablet, Rfl: 0  •  metoprolol succinate (TOPROL-XL) 100 mg 24 hr tablet, Take 1 tablet (100 mg total) by mouth daily at bedtime, Disp: 90 tablet, Rfl: 0  •  Multiple Vitamins-Minerals (multivitamin with minerals) tablet, Take 1 tablet by mouth daily, Disp: , Rfl:   •  omeprazole (PriLOSEC) 20 mg delayed release capsule, Take 20 mg by mouth as needed, Disp: , Rfl:   •  tiZANidine (ZANAFLEX) 2 mg tablet, Take 1 tablet (2 mg total) by mouth every 8 (eight) hours as needed for muscle spasms, Disp: 60 tablet, Rfl: 0  •  vitamin B-12 (VITAMIN B-12) 1,000 mcg tablet, Take by mouth daily, Disp: , Rfl:     ALLERGIES:  Allergies   Allergen Reactions   • Griseofulvin Hives   • Penicillins Hives     She can take cephalosporin without any side effect   • Lisinopril Cough   • Zithromax [Azithromycin] Headache       LABS:  HgA1c:   Lab Results   Component Value Date    HGBA1C 5.0 02/23/2025     BMP:   Lab Results   Component Value Date    CALCIUM 8.7 03/25/2025    K 4.3 03/25/2025    CO2 31 03/25/2025     03/25/2025    BUN 14 03/25/2025    CREATININE 0.89 03/25/2025     CBC: No components found for: \"CBC\"    _____________________________________________________  PHYSICAL EXAMINATION:  Vital signs: Ht 5' 8\" (1.727 m)   LMP  (LMP Unknown)   BMI 39.99 kg/m²   General: No acute distress, awake and alert  Psychiatric: Mood and affect appear appropriate  HEENT: Trachea Midline, No torticollis, no apparent facial trauma  Cardiovascular: No audible murmurs; Extremities appear perfused  Pulmonary: No audible wheezing or stridor  Skin: No open lesions; see further details (if any) below    MUSCULOSKELETAL EXAMINATION:  Ortho Exam  Left hip:  Posterior incision clean dry and intact  Staples well approximated; removed in office today  Incision cleaned, steri-strips applied  No " ecchymosis present  Mild erythema present at staple insertion sites   Appropriate warmth and swelling  Good arc of motion with no pain  Patient sits comfortably in chair with hip flexed at 90 degrees  Patient stands from seated position without assistance  Calf compartments soft and supple  Sensation intact  Toes are warm sensate and mobile     ___________________________________________________  STUDIES REVIEWED:  I personally reviewed the images obtained in office today and my independent interpretation is as follows:    None performed      PROCEDURES PERFORMED:    None preformed       Iris Luna PA-C

## 2025-04-08 NOTE — PROGRESS NOTES
Encounter Provider: Arvind Eaton MD   Encounter Date: 04/08/25  Encounter department: [facility]         ASSESSMENT & PLAN:  Assessment & Plan  Aftercare following left hip joint replacement surgery            To do next visit:  Return in about 4 weeks (around 5/6/2025) for 6 week post-op appointment.    Scribe Attestation    I,:   am acting as a scribe while in the presence of the attending physician.:       I,:   personally performed the services described in this documentation    as scribed in my presence.:           ____________________________________________________  CHIEF COMPLAINT:  Chief Complaint   Patient presents with   • Left Hip - Post-op         SUBJECTIVE:  Cielo Thomason is a 64 y.o. female who presents ***           PAST MEDICAL HISTORY:  Past Medical History:   Diagnosis Date   • Allergic rhinitis 07/07/2022   • Annual physical exam 12/16/2024   • Anxiety    • Arthritis    • BMI 40.0-44.9, adult ([facility]) 06/01/2021   • BMI 50.0-59.9, adult ([facility]) 06/01/2021   • Cellulitis of left leg 08/17/2021   • Depression    • Eczema 06/02/2022   • Edema of both lower legs 01/23/2021   • Edema of both lower legs 11/23/2021   • Endometrial cancer ([facility]) 09/14/2021   • Heart murmur    • Heart murmur 03/25/2024   • History of COVID-19 11/2020    Mild sypmtoms Cough,Tired,diarrhea,sweats, Loss taste and smell   • Hyperglycemia 01/23/2021   • Hyperlipidemia    • Hypertension    • Left hip pain 06/02/2022   • Lower abdominal pain 11/23/2021   • Lumbar radiculopathy 08/15/2024   • Mixed hyperlipidemia 01/23/2021   • Numbness and tingling in left arm    • Obesity 03/25/2024   • Pruritus    • Rash of hand 02/28/2022   • Shortness of breath    • Skin lesion    • Skin rash 05/02/2023   • Tinea cruris 08/24/2023   • Tinea pedis of both feet 02/28/2022   • Vitamin D deficiency 01/23/2021       PAST SURGICAL HISTORY:  Past Surgical History:   Procedure Laterality Date   • CARPAL  TUNNEL RELEASE Bilateral    •  SECTION      x3   •  SECTION     • CHOLECYSTECTOMY     • CHOLECYSTECTOMY LAPAROSCOPIC N/A 2019    Procedure: CHOLECYSTECTOMY LAPAROSCOPIC;  Surgeon: Shayne De Leon MD;  [facility];  Service: General   • COLONOSCOPY     • CYSTOSCOPY N/A 10/08/2021    Procedure: Cystoscopy;  Surgeon: Adelso Freeman MD;  [facility];  Service: Gynecology Oncology   • FL GUIDED NEEDLE PLAC BX/ASP/INJ  2015   • FL INJECTION LEFT HIP (NON ARTHROGRAM)  2022   • HYSTERECTOMY     • JOINT REPLACEMENT  2016    R total HIp   • MAMMO (HISTORICAL)  10/11/2018    Advise for yearly mammo screening   • OOPHORECTOMY Bilateral    • SD ARTHRP ACETBLR/PROX FEM PROSTC AGRFT/ALGRFT Left 3/24/2025    Procedure: ARTHROPLASTY HIP TOTAL;  Surgeon: Arvind Eaton MD;  [facility];  Service: Orthopedics   • SD HYSTEROSCOPY BX ENDOMETRIUM&/POLYPC W/WO D&C N/A 2016    Procedure: FRACTIONAL DILATATION AND CURETTAGE (D&C) WITH HYSTEROSOCPY;  Surgeon: Farnaz Romero MD;  [facility];  Service: Gynecology   • SD LAPS TOTAL HYSTERECT 250 GM/< W/RMVL TUBE/OVARY N/A 10/08/2021    Procedure: ROBOTIC HYSTERECTOMY, BILATERAL SALPINGOOPHERECTOMY; LEFT EXTERNAL ILIAC SENTINEL LYMPH NODE BIOPSY, RIGHT PELVIC LYMPHADENECTOMY;  Surgeon: Adelso Freeman MD;  [facility];  Service: Gynecology Oncology   • REPLACEMENT TOTAL KNEE Right        FAMILY HISTORY:  Family History   Problem Relation Age of Onset   • Hypertension Mother    • Hypertension Father    • Heart failure Father    • Lymphoma Father 62   • Diabetes Father         at old age   • No Known Problems Sister    • No Known Problems Daughter    • No Known Problems Maternal Grandmother    • No Known Problems Maternal Grandfather    • No Known Problems Paternal Grandmother    • No Known Problems Paternal Grandfather    • Hemophilia Brother    • Breast cancer Maternal Aunt 50   • Brain  cancer Maternal Aunt 48   • No Known Problems Maternal Aunt    • No Known Problems Son    • No Known Problems Son    • No Known Problems Son        SOCIAL HISTORY:  Social History     Tobacco Use   • Smoking status: Never   • Smokeless tobacco: Never   Vaping Use   • Vaping status: Never Used   Substance Use Topics   • Alcohol use: Not Currently   • Drug use: Not Currently       MEDICATIONS:    Current Outpatient Medications:   •  acetaminophen (TYLENOL) 500 mg tablet, Take 2 tablets (1,000 mg total) by mouth every 6 (six) hours as needed for mild pain, Disp: 90 tablet, Rfl: 0  •  amLODIPine (NORVASC) 5 mg tablet, Take 1 tablet (5 mg total) by mouth daily, Disp: 90 tablet, Rfl: 0  •  busPIRone (BUSPAR) 5 mg tablet, Take 1 tablet (5 mg total) by mouth daily at bedtime, Disp: 90 tablet, Rfl: 0  •  calcium carbonate (OS-CAMDEN) 600 MG tablet, Take 600 mg by mouth daily, Disp: , Rfl:   •  Cholecalciferol (Vitamin D) 50 MCG (2000 UT) tablet, Take 2,000 Units by mouth daily, Disp: , Rfl:   •  citalopram (CeleXA) 20 mg tablet, Take 1 tablet (20 mg total) by mouth daily at bedtime, Disp: 90 tablet, Rfl: 0  •  clotrimazole-betamethasone (LOTRISONE) 1-0.05 % cream, Apply topically 2 (two) times a day, Disp: 45 g, Rfl: 0  •  enoxaparin (LOVENOX) 40 mg/0.4 mL, Inject 0.4 mL (40 mg total) under the skin daily for 28 days Start injections after surgery, Disp: 11.2 mL, Rfl: 0  •  fexofenadine (ALLEGRA) 180 MG tablet, Take 180 mg by mouth daily as needed, Disp: , Rfl:   •  losartan-hydrochlorothiazide (HYZAAR) 50-12.5 mg per tablet, Take 1 tablet by mouth daily, Disp: 90 tablet, Rfl: 0  •  metoprolol succinate (TOPROL-XL) 100 mg 24 hr tablet, Take 1 tablet (100 mg total) by mouth daily at bedtime, Disp: 90 tablet, Rfl: 0  •  Multiple Vitamins-Minerals (multivitamin with minerals) tablet, Take 1 tablet by mouth daily, Disp: , Rfl:   •  omeprazole (PriLOSEC) 20 mg delayed release capsule, Take 20 mg by mouth as needed, Disp: , Rfl:   •   "tiZANidine (ZANAFLEX) 2 mg tablet, Take 1 tablet (2 mg total) by mouth every 8 (eight) hours as needed for muscle spasms, Disp: 60 tablet, Rfl: 0  •  vitamin B-12 (VITAMIN B-12) 1,000 mcg tablet, Take by mouth daily, Disp: , Rfl:     ALLERGIES:  Allergies   Allergen Reactions   • Griseofulvin Hives   • Penicillins Hives     She can take cephalosporin without any side effect   • Lisinopril Cough   • Zithromax [Azithromycin] Headache       LABS:  HgA1c:   Lab Results   Component Value Date    HGBA1C 5.0 02/23/2025     BMP:   Lab Results   Component Value Date    CALCIUM 8.7 03/25/2025    K 4.3 03/25/2025    CO2 31 03/25/2025     03/25/2025    BUN 14 03/25/2025    CREATININE 0.89 03/25/2025     CBC: No components found for: \"CBC\"    _____________________________________________________  PHYSICAL EXAMINATION:  Vital signs: Ht 5' 8\" (1.727 m)   LMP  (LMP Unknown)   BMI 39.99 kg/m²   General: No acute distress, awake and alert  Psychiatric: Mood and affect appear appropriate  HEENT: Trachea Midline, No torticollis, no apparent facial trauma  Cardiovascular: No audible murmurs; Extremities appear perfused  Pulmonary: No audible wheezing or stridor  Skin: No open lesions; see further details (if any) below    Ortho Exam:  ***        _____________________________________________________  STUDIES REVIEWED:    None performed ***     The attending physician has personally reviewed the pertinent films in PACS and interpretation is as follows:  ***    PROCEDURES PERFORMED:  Procedures      None Preformed       Portions of the record may have been created with voice recognition software.  Occasional wrong word or \"sound a like\" substitutions may have occurred due to the inherent limitations of voice recognition software.  Read the chart carefully and recognize, using context, where substitutions have occurred.        "

## 2025-04-10 ENCOUNTER — OFFICE VISIT (OUTPATIENT)
Dept: PHYSICAL THERAPY | Facility: REHABILITATION | Age: 65
End: 2025-04-10
Attending: ORTHOPAEDIC SURGERY
Payer: MEDICARE

## 2025-04-10 DIAGNOSIS — I10 ESSENTIAL HYPERTENSION: ICD-10-CM

## 2025-04-10 DIAGNOSIS — M16.12 PRIMARY OSTEOARTHRITIS OF LEFT HIP: ICD-10-CM

## 2025-04-10 DIAGNOSIS — M25.552 CHRONIC LEFT HIP PAIN: Primary | ICD-10-CM

## 2025-04-10 DIAGNOSIS — G89.29 CHRONIC LEFT HIP PAIN: Primary | ICD-10-CM

## 2025-04-10 DIAGNOSIS — Z96.642 HISTORY OF LEFT HIP REPLACEMENT: ICD-10-CM

## 2025-04-10 PROCEDURE — 97530 THERAPEUTIC ACTIVITIES: CPT | Performed by: PHYSICAL THERAPIST

## 2025-04-10 PROCEDURE — 97112 NEUROMUSCULAR REEDUCATION: CPT | Performed by: PHYSICAL THERAPIST

## 2025-04-10 PROCEDURE — 97110 THERAPEUTIC EXERCISES: CPT | Performed by: PHYSICAL THERAPIST

## 2025-04-10 NOTE — PROGRESS NOTES
Daily Note     Today's date: 4/10/2025  Patient name: Cielo Thomason  : 1960  MRN: [id number]  Referring provider: Arvind Eaton,*  Dx:   Encounter Diagnosis     ICD-10-CM    1. Chronic left hip pain  M25.552     G89.29       2. Primary osteoarthritis of left hip  M16.12       3. History of left hip replacement  Z96.642           Start Time: 1715          Subjective: Cielo reports she's doing well at start of session. Was able to drive to today's session.       Objective: See treatment diary below      Assessment: Tolerated treatment well. Patient demonstrated appropriate level of challenge and muscular fatigue throughout session and noted good status at end of visit. Patient demonstrated fatigue post treatment, exhibited good technique with therapeutic exercises, and would benefit from continued PT.      Plan: Continue per plan of care.  Progress treatment as tolerated.       Precautions: L FLETCHER posterior approach (3/24) (no flexion past 90, IR, adduction)  Past Medical History:   Diagnosis Date    Allergic rhinitis 2022    Annual physical exam 2024    Anxiety     Arthritis     BMI 40.0-44.9, adult ([facility]) 2021    BMI 50.0-59.9, adult ([facility]) 2021    Cellulitis of left leg 2021    Depression     Eczema 2022    Edema of both lower legs 2021    Edema of both lower legs 2021    Endometrial cancer ([facility]) 2021    Heart murmur     Heart murmur 2024    History of COVID-19 2020    Mild sypmtoms Cough,Tired,diarrhea,sweats, Loss taste and smell    Hyperglycemia 2021    Hyperlipidemia     Hypertension     Left hip pain 2022    Lower abdominal pain 2021    Lumbar radiculopathy 08/15/2024    Mixed hyperlipidemia 2021    Numbness and tingling in left arm     Obesity 2024    Pruritus     Rash of hand 2022    Shortness of breath     Skin lesion     Skin rash 2023    Tinea cruris 2023    Tinea pedis of both  "feet 02/28/2022    Vitamin D deficiency 01/23/2021       * Indicates part of HEP   HEP code: [id number]     Daily Treatment Diary     Manuals 3/10 3/27 4/1 4/03 4/08 4/10     L hip PROM Nv  FLX FLX, ER, ABD FLX,ER,ABD FLX,ER,ABD FLX,ER, ABD                           Ther Ex           Bike (ROM)  nv 5' lvl 0  5' lvl 0 5' lvl 0 5' lvl      Heel slides  2x10x5\" 2x10x5\" 2x10x5\" 2x10x5\" 2x10x5\"     Quad stretch     20\"x5 seated 20x5\"     Self-massage w/ roller L quad     2'  2'     LAQ  10x5\" 2x10x5\" 2x10x5\" dc      Flexion SLR   2x5 AAROM 3x5 AAROM 3x8  3x10 ea     Abduction SLR           Standing hip extension  nv 2x10  3x10  3x12 ea elbows propped on hi/lo 3x12 ea elbows propped on hi/lo     Standing hip abduction  nv 2x10 3x10 3x12 ea elbows propped on hi/lo 3x12 ea elbows propped on hi/lo     Ankle pumps* 10x ea          Patient education Done  done  done done                 Neuro Re-Ed           Quad sets* HEP 10x5\" 10x5\"        Glute sets* 10x 2x10x5\" 10x5\"        Bridges* 10x 2x10 2x10 3x10 3x12 3x12     Clamshells       Supine GTB 3x10     NBOS balance           Tandem balance           Tandem walking           Plank           Bird dog                                 Ther Activity           Sit to stand   nv 2x10 w/ 2 foam 3x10 w/ 2 foam  3x10 w/ 2 foam     Goblet squat           Leg press           Heel raises* 10x ea          Toe raises* 10x ea          Forward step ups    4\" step up/back 2x10 ea 6\" step up/back 2x10 ea 4\" step up/over 2x10 ea     Lateral step ups           Deadlift                                  Modalities                                            "

## 2025-04-11 DIAGNOSIS — M16.12 PRIMARY OSTEOARTHRITIS OF LEFT HIP: ICD-10-CM

## 2025-04-11 RX ORDER — TIZANIDINE 2 MG/1
TABLET ORAL
Qty: 60 TABLET | Refills: 0 | Status: SHIPPED | OUTPATIENT
Start: 2025-04-11

## 2025-04-11 RX ORDER — AMLODIPINE BESYLATE 5 MG/1
5 TABLET ORAL DAILY
Qty: 90 TABLET | Refills: 0 | OUTPATIENT
Start: 2025-04-11

## 2025-04-14 ENCOUNTER — OFFICE VISIT (OUTPATIENT)
Dept: INTERNAL MEDICINE CLINIC | Facility: CLINIC | Age: 65
End: 2025-04-14
Payer: MEDICARE

## 2025-04-14 VITALS
DIASTOLIC BLOOD PRESSURE: 68 MMHG | HEIGHT: 68 IN | HEART RATE: 78 BPM | WEIGHT: 265 LBS | OXYGEN SATURATION: 96 % | TEMPERATURE: 98.2 F | SYSTOLIC BLOOD PRESSURE: 124 MMHG | BODY MASS INDEX: 40.16 KG/M2 | RESPIRATION RATE: 16 BRPM

## 2025-04-14 DIAGNOSIS — Z96.642 STATUS POST LEFT HIP REPLACEMENT: ICD-10-CM

## 2025-04-14 DIAGNOSIS — F32.A ANXIETY AND DEPRESSION: Chronic | ICD-10-CM

## 2025-04-14 DIAGNOSIS — F41.9 ANXIETY AND DEPRESSION: Chronic | ICD-10-CM

## 2025-04-14 DIAGNOSIS — K21.9 GASTROESOPHAGEAL REFLUX DISEASE WITHOUT ESOPHAGITIS: ICD-10-CM

## 2025-04-14 DIAGNOSIS — I89.0 LYMPHEDEMA: ICD-10-CM

## 2025-04-14 DIAGNOSIS — J30.89 SEASONAL ALLERGIC RHINITIS DUE TO OTHER ALLERGIC TRIGGER: ICD-10-CM

## 2025-04-14 DIAGNOSIS — R01.1 HEART MURMUR: ICD-10-CM

## 2025-04-14 DIAGNOSIS — E78.2 MIXED HYPERLIPIDEMIA: Chronic | ICD-10-CM

## 2025-04-14 DIAGNOSIS — I10 ESSENTIAL HYPERTENSION: Primary | ICD-10-CM

## 2025-04-14 DIAGNOSIS — E66.01 MORBID OBESITY WITH BMI OF 40.0-44.9, ADULT (HCC): ICD-10-CM

## 2025-04-14 PROCEDURE — 99213 OFFICE O/P EST LOW 20 MIN: CPT | Performed by: INTERNAL MEDICINE

## 2025-04-14 PROCEDURE — G2211 COMPLEX E/M VISIT ADD ON: HCPCS | Performed by: INTERNAL MEDICINE

## 2025-04-14 RX ORDER — AMLODIPINE BESYLATE 5 MG/1
5 TABLET ORAL DAILY
Qty: 90 TABLET | Refills: 1 | Status: SHIPPED | OUTPATIENT
Start: 2025-04-14

## 2025-04-14 NOTE — ASSESSMENT & PLAN NOTE
She has a heart murmur aortic area.  Had echocardiogram revealed ejection fraction 60% mild aortic stenosis.  She was seen by cardiologist.

## 2025-04-14 NOTE — ASSESSMENT & PLAN NOTE
Symptoms are well-controlled on present medications citalopram as well as buspirone and she would like to continue both medications as prescribed as it is effective and has no side effect.

## 2025-04-14 NOTE — ASSESSMENT & PLAN NOTE
Patient states since she lost weight her edema of legs is also got better.  She did not use the lymphedema pump.

## 2025-04-14 NOTE — ASSESSMENT & PLAN NOTE
She underwent recently left hip replacement.  Completed physical therapy.  She is ambulating well with a cane.  Still she is on Lovenox subcu as prescribed by orthopedic.

## 2025-04-14 NOTE — PATIENT INSTRUCTIONS
Patient was advised to continue present medications. discussed with the patient medications and laboratory data in detail.  Follow-up with me in 3 months or as advised.  If any blood test was ordered please do 1 week prior to next appointment unless advise to get earlier.  If you have any questions please call the office [phone]

## 2025-04-14 NOTE — ASSESSMENT & PLAN NOTE
Blood pressure well-controlled advised to continue present medications and low-salt diet  Orders:  •  amLODIPine (NORVASC) 5 mg tablet; Take 1 tablet (5 mg total) by mouth daily

## 2025-04-14 NOTE — PROGRESS NOTES
Name: Cielo Thomason      : 1960      MRN: [id number]  Encounter Provider: Myriam Araujo MD  Encounter Date: 2025   Encounter department: [facility] INTERNAL MEDICINE  :  Assessment & Plan  Essential hypertension  Blood pressure well-controlled advised to continue present medications and low-salt diet  Orders:  •  amLODIPine (NORVASC) 5 mg tablet; Take 1 tablet (5 mg total) by mouth daily    Seasonal allergic rhinitis due to other allergic trigger  She takes fexofenadine as needed for allergic rhinitis.       Gastroesophageal reflux disease without esophagitis  She takes omeprazole as needed and has been watching her diet very closely       Anxiety and depression  Symptoms are well-controlled on present medications citalopram as well as buspirone and she would like to continue both medications as prescribed as it is effective and has no side effect.         Mixed hyperlipidemia  Cholesterol 181, triglyceride 88, HDL 46, .  Patient has been watching her diet for cholesterol carbs as well as losing weight.  Will observe and follow.       Lymphedema  Patient states since she lost weight her edema of legs is also got better.  She did not use the lymphedema pump.       Morbid obesity with BMI of 40.0-44.9, adult (HCC)  Patient has been losing weight she lost more than 30 pound weights on isogenic diet.    Heart murmur  She has a heart murmur aortic area.  Had echocardiogram revealed ejection fraction 60% mild aortic stenosis.  She was seen by cardiologist.       Status post left hip replacement  She underwent recently left hip replacement.  Completed physical therapy.  She is ambulating well with a cane.  Still she is on Lovenox subcu as prescribed by orthopedic.              History of Present Illness   HPI  64-year-old white female patient present to follow-up her medical problems.  She recently underwent a left hip replacement surgery.      Current Outpatient  Medications:   •  acetaminophen (TYLENOL) 500 mg tablet, Take 2 tablets (1,000 mg total) by mouth every 6 (six) hours as needed for mild pain, Disp: 90 tablet, Rfl: 0  •  amLODIPine (NORVASC) 5 mg tablet, Take 1 tablet (5 mg total) by mouth daily, Disp: 90 tablet, Rfl: 1  •  busPIRone (BUSPAR) 5 mg tablet, Take 1 tablet (5 mg total) by mouth daily at bedtime, Disp: 90 tablet, Rfl: 0  •  calcium carbonate (OS-CAMDEN) 600 MG tablet, Take 600 mg by mouth daily, Disp: , Rfl:   •  Cholecalciferol (Vitamin D) 50 MCG (2000 UT) tablet, Take 2,000 Units by mouth daily, Disp: , Rfl:   •  citalopram (CeleXA) 20 mg tablet, Take 1 tablet (20 mg total) by mouth daily at bedtime, Disp: 90 tablet, Rfl: 0  •  clotrimazole-betamethasone (LOTRISONE) 1-0.05 % cream, Apply topically 2 (two) times a day, Disp: 45 g, Rfl: 0  •  enoxaparin (LOVENOX) 40 mg/0.4 mL, Inject 0.4 mL (40 mg total) under the skin daily for 28 days Start injections after surgery, Disp: 11.2 mL, Rfl: 0  •  fexofenadine (ALLEGRA) 180 MG tablet, Take 180 mg by mouth daily as needed, Disp: , Rfl:   •  losartan-hydrochlorothiazide (HYZAAR) 50-12.5 mg per tablet, Take 1 tablet by mouth daily, Disp: 90 tablet, Rfl: 0  •  metoprolol succinate (TOPROL-XL) 100 mg 24 hr tablet, Take 1 tablet (100 mg total) by mouth daily at bedtime, Disp: 90 tablet, Rfl: 0  •  Multiple Vitamins-Minerals (multivitamin with minerals) tablet, Take 1 tablet by mouth daily, Disp: , Rfl:   •  omeprazole (PriLOSEC) 20 mg delayed release capsule, Take 20 mg by mouth as needed, Disp: , Rfl:   •  tiZANidine (ZANAFLEX) 2 mg tablet, TAKE 1 TABLET(2 MG) BY MOUTH EVERY 8 HOURS AS NEEDED FOR MUSCLE SPASMS, Disp: 60 tablet, Rfl: 0  •  vitamin B-12 (VITAMIN B-12) 1,000 mcg tablet, Take by mouth daily, Disp: , Rfl:      Past Medical History:   Diagnosis Date   • Allergic rhinitis 07/07/2022   • Annual physical exam 12/16/2024   • Anxiety    • Arthritis    • BMI 40.0-44.9, adult ([facility]) 06/01/2021   • BMI 50.0-59.9,  adult (HCC) 06/01/2021   • Cellulitis of left leg 08/17/2021   • Depression    • Eczema 06/02/2022   • Edema of both lower legs 01/23/2021   • Edema of both lower legs 11/23/2021   • Endometrial cancer (HCC) 09/14/2021   • Heart murmur    • Heart murmur 03/25/2024   • History of COVID-19 11/2020    Mild sypmtoms Cough,Tired,diarrhea,sweats, Loss taste and smell   • Hyperglycemia 01/23/2021   • Hyperlipidemia    • Hypertension    • Left hip pain 06/02/2022   • Lower abdominal pain 11/23/2021   • Lumbar radiculopathy 08/15/2024   • Mixed hyperlipidemia 01/23/2021   • Numbness and tingling in left arm    • Obesity 03/25/2024   • Pruritus    • Rash of hand 02/28/2022   • Shortness of breath    • Skin lesion    • Skin rash 05/02/2023   • Status post left hip replacement 04/14/2025   • Tinea cruris 08/24/2023   • Tinea pedis of both feet 02/28/2022   • Vitamin D deficiency 01/23/2021      Review of Systems   Constitutional:  Negative for chills and fever.   HENT:  Negative for congestion, ear pain, hearing loss, nosebleeds, sinus pain, sore throat and trouble swallowing.    Eyes:  Negative for discharge, redness and visual disturbance.   Respiratory:  Negative for cough, chest tightness and shortness of breath.    Cardiovascular:  Negative for chest pain and palpitations.   Gastrointestinal:  Negative for abdominal pain, blood in stool, constipation, diarrhea, nausea and vomiting.   Genitourinary:  Negative for dysuria, flank pain and hematuria.   Musculoskeletal:  Positive for arthralgias (Has some left hip pain and discomfort but much better since she had a left hip replacement.). Negative for neck pain.   Skin:  Negative for rash.   Neurological:  Negative for dizziness, speech difficulty, weakness and headaches.   Hematological:  Does not bruise/bleed easily.   Psychiatric/Behavioral:  Negative for agitation and behavioral problems.          Objective   /68 (BP Location: Left arm, Patient Position: Sitting, Cuff  "Size: Large)   Pulse 78   Temp 98.2 °F (36.8 °C) (Temporal)   Resp 16   Ht 5' 8\" (1.727 m)   Wt 120 kg (265 lb)   LMP  (LMP Unknown)   SpO2 96%   BMI 40.29 kg/m²      Physical Exam  Vitals and nursing note reviewed.   Constitutional:       General: She is not in acute distress.     Appearance: She is well-developed. She is obese.   HENT:      Head: Normocephalic and atraumatic.      Right Ear: External ear normal.      Left Ear: External ear normal.      Nose: Nose normal.      Mouth/Throat:      Pharynx: Oropharynx is clear.   Eyes:      General: No scleral icterus.        Right eye: No discharge.         Left eye: No discharge.      Extraocular Movements: Extraocular movements intact.      Conjunctiva/sclera: Conjunctivae normal.   Cardiovascular:      Rate and Rhythm: Normal rate and regular rhythm.      Pulses: Normal pulses.      Heart sounds: Murmur heard.   Pulmonary:      Effort: Pulmonary effort is normal. No respiratory distress.      Breath sounds: Normal breath sounds. No wheezing, rhonchi or rales.   Abdominal:      General: Bowel sounds are normal.      Palpations: Abdomen is soft.      Tenderness: There is no abdominal tenderness.   Musculoskeletal:         General: No swelling. Normal range of motion.      Cervical back: Normal range of motion and neck supple.      Right lower leg: Edema (Has a nonpitting edema but better) present.      Left lower leg: Edema (Has a nonpitting edema but better) present.      Comments: She is ambulating well with cane.   Skin:     General: Skin is warm and dry.   Neurological:      General: No focal deficit present.      Mental Status: She is alert and oriented to person, place, and time.   Psychiatric:         Mood and Affect: Mood normal.         Behavior: Behavior normal.           "

## 2025-04-14 NOTE — ASSESSMENT & PLAN NOTE
Cholesterol 181, triglyceride 88, HDL 46, .  Patient has been watching her diet for cholesterol carbs as well as losing weight.  Will observe and follow.

## 2025-04-15 ENCOUNTER — OFFICE VISIT (OUTPATIENT)
Dept: PHYSICAL THERAPY | Facility: REHABILITATION | Age: 65
End: 2025-04-15
Attending: ORTHOPAEDIC SURGERY
Payer: MEDICARE

## 2025-04-15 DIAGNOSIS — Z96.642 HISTORY OF LEFT HIP REPLACEMENT: ICD-10-CM

## 2025-04-15 DIAGNOSIS — M25.552 CHRONIC LEFT HIP PAIN: Primary | ICD-10-CM

## 2025-04-15 DIAGNOSIS — G89.29 CHRONIC LEFT HIP PAIN: Primary | ICD-10-CM

## 2025-04-15 DIAGNOSIS — M16.12 PRIMARY OSTEOARTHRITIS OF LEFT HIP: ICD-10-CM

## 2025-04-15 PROCEDURE — 97112 NEUROMUSCULAR REEDUCATION: CPT | Performed by: PHYSICAL THERAPIST

## 2025-04-15 PROCEDURE — 97110 THERAPEUTIC EXERCISES: CPT | Performed by: PHYSICAL THERAPIST

## 2025-04-15 PROCEDURE — 97530 THERAPEUTIC ACTIVITIES: CPT | Performed by: PHYSICAL THERAPIST

## 2025-04-15 NOTE — PROGRESS NOTES
Daily Note     Today's date: 4/15/2025  Patient name: Cielo Thomason  : 1960  MRN: [id number]  Referring provider: Arvind Eaton,*  Dx:   Encounter Diagnosis     ICD-10-CM    1. Chronic left hip pain  M25.552     G89.29       2. Primary osteoarthritis of left hip  M16.12       3. History of left hip replacement  Z96.642           Start Time: 1707  Stop Time: 1750  Total time in clinic (min): 43 minutes    Subjective: Cielo reports pinching in L groin today. Notes good compliance with HEP.       Objective: See treatment diary below      Assessment: Tolerated treatment well. Responded well to progression of exercises today. Patient demonstrated appropriate level of challenge and muscular fatigue throughout session and noted good status at end of visit. Patient demonstrated fatigue post treatment, exhibited good technique with therapeutic exercises, and would benefit from continued PT.    Patient treated by EDU Shields under direct PT supervision.        Plan: Continue per plan of care.      Precautions: L FLETCHRE posterior approach (3/24) (no flexion past 90, IR, adduction)  Past Medical History:   Diagnosis Date    Allergic rhinitis 2022    Annual physical exam 2024    Anxiety     Arthritis     BMI 40.0-44.9, adult ([facility]) 2021    BMI 50.0-59.9, adult ([facility]) 2021    Cellulitis of left leg 2021    Depression     Eczema 2022    Edema of both lower legs 2021    Edema of both lower legs 2021    Endometrial cancer ([facility]) 2021    Heart murmur     Heart murmur 2024    History of COVID-19 2020    Mild sypmtoms Cough,Tired,diarrhea,sweats, Loss taste and smell    Hyperglycemia 2021    Hyperlipidemia     Hypertension     Left hip pain 2022    Lower abdominal pain 2021    Lumbar radiculopathy 08/15/2024    Mixed hyperlipidemia 2021    Numbness and tingling in left arm     Obesity 2024    Pruritus     Rash of hand  "02/28/2022    Shortness of breath     Skin lesion     Skin rash 05/02/2023    Tinea cruris 08/24/2023    Tinea pedis of both feet 02/28/2022    Vitamin D deficiency 01/23/2021       * Indicates part of HEP   HEP code: [id number]     Daily Treatment Diary     Manuals 3/10 3/27 4/1 4/03 4/08 4/10 4/15    L hip PROM Nv  FLX FLX, ER, ABD FLX,ER,ABD FLX,ER,ABD FLX,ER, ABD ER, ABD                          Ther Ex           Bike (ROM)  nv 5' lvl 0  5' lvl 0 5' lvl 0 5' lvl  5' lvl 1     Heel slides  2x10x5\" 2x10x5\" 2x10x5\" 2x10x5\" 2x10x5\" 2x10x5\"    Quad stretch     20\"x5 seated 20x5\" 20x5\" seated    Self-massage w/ roller L quad     2'  2' 2'    LAQ  10x5\" 2x10x5\" 2x10x5\" dc      Flexion SLR   2x5 AAROM 3x5 AAROM 3x8  3x10 ea 3x12 ea    Abduction SLR           Standing hip extension  nv 2x10  3x10  3x12 ea elbows propped on hi/lo 3x12 ea elbows propped on hi/lo nv    Standing hip abduction  nv 2x10 3x10 3x12 ea elbows propped on hi/lo 3x12 ea elbows propped on hi/lo nv    Ankle pumps* 10x ea          Patient education Done  done  done done                 Neuro Re-Ed           Quad sets* HEP 10x5\" 10x5\"        Glute sets* 10x 2x10x5\" 10x5\"        Bridges* 10x 2x10 2x10 3x10 3x12 3x12 3x15    Clamshells       Supine GTB 3x10 Supine GTB 3x10    NBOS balance           Tandem balance           Tandem walking           Plank           Bird dog                                 Ther Activity           Sit to stand   nv 2x10 w/ 2 foam 3x10 w/ 2 foam  3x10 w/ 2 foam 3x10 w/ 2 foam    Goblet squat           Leg press       nv    Heel raises* 10x ea          Toe raises* 10x ea          Forward step ups    4\" step up/back 2x10 ea 6\" step up/back 2x10 ea 4\" step up/over 2x10 ea 4\" step up/over 2x10 ea    Lateral step ups       nv     Deadlift                                  Modalities                                              "

## 2025-04-17 ENCOUNTER — OFFICE VISIT (OUTPATIENT)
Dept: PHYSICAL THERAPY | Facility: REHABILITATION | Age: 65
End: 2025-04-17
Payer: MEDICARE

## 2025-04-17 DIAGNOSIS — M16.12 PRIMARY OSTEOARTHRITIS OF LEFT HIP: ICD-10-CM

## 2025-04-17 DIAGNOSIS — Z96.642 HISTORY OF LEFT HIP REPLACEMENT: ICD-10-CM

## 2025-04-17 DIAGNOSIS — G89.29 CHRONIC LEFT HIP PAIN: Primary | ICD-10-CM

## 2025-04-17 DIAGNOSIS — M25.552 CHRONIC LEFT HIP PAIN: Primary | ICD-10-CM

## 2025-04-17 PROCEDURE — 97112 NEUROMUSCULAR REEDUCATION: CPT | Performed by: PHYSICAL THERAPIST

## 2025-04-17 PROCEDURE — 97530 THERAPEUTIC ACTIVITIES: CPT | Performed by: PHYSICAL THERAPIST

## 2025-04-17 PROCEDURE — 97110 THERAPEUTIC EXERCISES: CPT | Performed by: PHYSICAL THERAPIST

## 2025-04-22 ENCOUNTER — OFFICE VISIT (OUTPATIENT)
Dept: PHYSICAL THERAPY | Facility: REHABILITATION | Age: 65
End: 2025-04-22
Payer: MEDICARE

## 2025-04-22 DIAGNOSIS — M16.12 PRIMARY OSTEOARTHRITIS OF LEFT HIP: ICD-10-CM

## 2025-04-22 DIAGNOSIS — Z96.642 HISTORY OF LEFT HIP REPLACEMENT: ICD-10-CM

## 2025-04-22 DIAGNOSIS — M25.552 CHRONIC LEFT HIP PAIN: Primary | ICD-10-CM

## 2025-04-22 DIAGNOSIS — G89.29 CHRONIC LEFT HIP PAIN: Primary | ICD-10-CM

## 2025-04-22 PROCEDURE — 97112 NEUROMUSCULAR REEDUCATION: CPT | Performed by: PHYSICAL THERAPIST

## 2025-04-22 PROCEDURE — 97110 THERAPEUTIC EXERCISES: CPT | Performed by: PHYSICAL THERAPIST

## 2025-04-22 PROCEDURE — 97530 THERAPEUTIC ACTIVITIES: CPT | Performed by: PHYSICAL THERAPIST

## 2025-04-22 NOTE — PROGRESS NOTES
Daily Note     Today's date: 2025  Patient name: Cielo Thomason  : 1960  MRN: [id number]  Referring provider: Arvind Eaton,*  Dx:   Encounter Diagnosis     ICD-10-CM    1. Chronic left hip pain  M25.552     G89.29       2. Primary osteoarthritis of left hip  M16.12       3. History of left hip replacement  Z96.642           Start Time: 1730  Stop Time: 1835  Total time in clinic (min): 65 minutes    Subjective: Cielo offers no new complaints.       Objective: See treatment diary below      Assessment: Tolerated treatment well. Responded well to progression of exercises today. Decreased compensatory vaulting noted during forward step ups this session compared to previous sessions. Patient demonstrated appropriate level of challenge and muscular fatigue throughout session and noted good status at end of visit. Patient demonstrated fatigue post treatment, exhibited good technique with therapeutic exercises, and would benefit from continued PT.    Patient treated by EDU Shields under direct PT supervision.      Plan: Continue per plan of care.      Precautions: L FLETCHER posterior approach (3/24) (no flexion past 90, IR, adduction)  Past Medical History:   Diagnosis Date    Allergic rhinitis 2022    Annual physical exam 2024    Anxiety     Arthritis     BMI 40.0-44.9, adult ([facility]) 2021    BMI 50.0-59.9, adult ([facility]) 2021    Cellulitis of left leg 2021    Depression     Eczema 2022    Edema of both lower legs 2021    Edema of both lower legs 2021    Endometrial cancer ([facility]) 2021    Heart murmur     Heart murmur 2024    History of COVID-19 2020    Mild sypmtoms Cough,Tired,diarrhea,sweats, Loss taste and smell    Hyperglycemia 2021    Hyperlipidemia     Hypertension     Left hip pain 2022    Lower abdominal pain 2021    Lumbar radiculopathy 08/15/2024    Mixed hyperlipidemia 2021    Numbness and tingling in left  "arm     Obesity 03/25/2024    Pruritus     Rash of hand 02/28/2022    Shortness of breath     Skin lesion     Skin rash 05/02/2023    Tinea cruris 08/24/2023    Tinea pedis of both feet 02/28/2022    Vitamin D deficiency 01/23/2021       * Indicates part of HEP   HEP code: [id number]     Daily Treatment Diary     Manuals 4/22  4/1 4/03 4/08 4/10 4/15 4/17   L hip PROM ER, ABD  FLX, ER, ABD FLX,ER,ABD FLX,ER,ABD FLX,ER, ABD ER, ABD ER, ABD                         Ther Ex           Bike (ROM) 5' lvl 1  5' lvl 0  5' lvl 0 5' lvl 0 5' lvl  5' lvl 1  5' lvl 1    Heel slides 2x10x5\"  2x10x5\" 2x10x5\" 2x10x5\" 2x10x5\" 2x10x5\" 2x10x5\"   Quad stretch 20x5\" seated    20\"x5 seated 20x5\" 20x5\" seated 20x5\" seated   Self-massage w/ roller L quad 2'    2'  2' 2' 2'   LAQ   2x10x5\" 2x10x5\" dc      Flexion SLR 3x12 ea  2x5 AAROM 3x5 AAROM 3x8  3x10 ea 3x12 ea 3x12 ea   Abduction SLR           Standing hip extension 3x15 ea elbows propped on hi/lo  2x10  3x10  3x12 ea elbows propped on hi/lo 3x12 ea elbows propped on hi/lo nv nv   Standing hip abduction nv  2x10 3x10 3x12 ea elbows propped on hi/lo 3x12 ea elbows propped on hi/lo nv nv   Ankle pumps*           Patient education    done done                 Neuro Re-Ed           Quad sets*   10x5\"        Glute sets*   10x5\"        Bridges* 3x15  2x10 3x10 3x12 3x12 3x15 3x15   Clamshells Supine BTB 3x10       Supine GTB 3x10 Supine GTB 3x10 Supine BTB 3x10    NBOS balance           Tandem balance           Tandem walking           Plank           Bird dog                                 Ther Activity           Sit to stand 3x10 w/ 2 foam  nv 2x10 w/ 2 foam 3x10 w/ 2 foam  3x10 w/ 2 foam 3x10 w/ 2 foam 3x10 w/ 2 foam   Goblet squat           Leg press 95# DL  3x10 P12S3      nv 95# DL  3x10 P12S3   Heel raises*           Toe raises*           Forward step ups 4\" step up/over 2x10 ea   4\" step up/back 2x10 ea 6\" step up/back 2x10 ea 4\" step up/over 2x10 ea 4\" step up/over 2x10 ea 4\" step " up/over 2x10 ea   Lateral step ups       nv  nv   Deadlift                                  Modalities           L hip CP  10' seated post       10' seated post                               1:1 from 539 PM - 617 PM

## 2025-04-24 ENCOUNTER — OFFICE VISIT (OUTPATIENT)
Dept: PHYSICAL THERAPY | Facility: REHABILITATION | Age: 65
End: 2025-04-24
Payer: MEDICARE

## 2025-04-24 DIAGNOSIS — M25.552 CHRONIC LEFT HIP PAIN: Primary | ICD-10-CM

## 2025-04-24 DIAGNOSIS — M16.12 PRIMARY OSTEOARTHRITIS OF LEFT HIP: ICD-10-CM

## 2025-04-24 DIAGNOSIS — G89.29 CHRONIC LEFT HIP PAIN: Primary | ICD-10-CM

## 2025-04-24 DIAGNOSIS — Z96.642 HISTORY OF LEFT HIP REPLACEMENT: ICD-10-CM

## 2025-04-24 PROCEDURE — 97112 NEUROMUSCULAR REEDUCATION: CPT | Performed by: PHYSICAL THERAPIST

## 2025-04-24 PROCEDURE — 97530 THERAPEUTIC ACTIVITIES: CPT | Performed by: PHYSICAL THERAPIST

## 2025-04-24 PROCEDURE — 97110 THERAPEUTIC EXERCISES: CPT | Performed by: PHYSICAL THERAPIST

## 2025-04-24 NOTE — PROGRESS NOTES
Daily Note     Today's date: 2025  Patient name: Cielo Thomason  : 1960  MRN: [id number]  Referring provider: Arvind Eaton,*  Dx:   Encounter Diagnosis     ICD-10-CM    1. Chronic left hip pain  M25.552     G89.29       2. Primary osteoarthritis of left hip  M16.12       3. History of left hip replacement  Z96.642           Start Time: 1730  Stop Time: 182  Total time in clinic (min): 56 minutes    Subjective: Cielo offers no new complaints today. No significant soreness after last session.      Objective: See treatment diary below      Assessment: Tolerated treatment well. Responded well to progression of exercises today. Required some cueing for decreasing tempo and maintaining knee extension during SLR flexion but improved with cueing. Patient demonstrated appropriate level of challenge and muscular fatigue throughout session and noted good status at end of visit. Patient demonstrated fatigue post treatment, exhibited good technique with therapeutic exercises, and would benefit from continued PT.    Patient treated by EDU Shields under direct PT supervision.        Plan: Continue per plan of care.      Precautions: L FLETCHER posterior approach (3/24) (no flexion past 90, IR, adduction)  Past Medical History:   Diagnosis Date    Allergic rhinitis 2022    Annual physical exam 2024    Anxiety     Arthritis     BMI 40.0-44.9, adult ([facility]) 2021    BMI 50.0-59.9, adult ([facility]) 2021    Cellulitis of left leg 2021    Depression     Eczema 2022    Edema of both lower legs 2021    Edema of both lower legs 2021    Endometrial cancer ([facility]) 2021    Heart murmur     Heart murmur 2024    History of COVID-19 2020    Mild sypmtoms Cough,Tired,diarrhea,sweats, Loss taste and smell    Hyperglycemia 2021    Hyperlipidemia     Hypertension     Left hip pain 2022    Lower abdominal pain 2021    Lumbar radiculopathy 08/15/2024     "Mixed hyperlipidemia 01/23/2021    Numbness and tingling in left arm     Obesity 03/25/2024    Pruritus     Rash of hand 02/28/2022    Shortness of breath     Skin lesion     Skin rash 05/02/2023    Tinea cruris 08/24/2023    Tinea pedis of both feet 02/28/2022    Vitamin D deficiency 01/23/2021       * Indicates part of HEP   HEP code: [id number]     Daily Treatment Diary     Manuals 4/22 4/24  4/03 4/08 4/10 4/15 4/17   L hip PROM ER, ABD ER, ABD  FLX,ER,ABD FLX,ER,ABD FLX,ER, ABD ER, ABD ER, ABD                         Ther Ex           Bike (ROM) 5' lvl 1 5' lvl 1  5' lvl 0 5' lvl 0 5' lvl  5' lvl 1  5' lvl 1    Heel slides 2x10x5\" 2x10x5\"  2x10x5\" 2x10x5\" 2x10x5\" 2x10x5\" 2x10x5\"   Quad stretch 20x5\" seated    20\"x5 seated 20x5\" 20x5\" seated 20x5\" seated   Self-massage w/ roller L quad 2'    2'  2' 2' 2'   LAQ    2x10x5\" dc      Flexion SLR 3x12 ea 3x15 ea  3x5 AAROM 3x8  3x10 ea 3x12 ea 3x12 ea   Abduction SLR           Standing hip extension 3x15 ea elbows propped on hi/lo 3x15 ea elbows propped w/ stool on table  3x10  3x12 ea elbows propped on hi/lo 3x12 ea elbows propped on hi/lo nv nv   Standing hip abduction nv 3x12 ea elbows propped on hi/lo  3x10 3x12 ea elbows propped on hi/lo 3x12 ea elbows propped on hi/lo nv nv   Ankle pumps*           Patient education    done done                 Neuro Re-Ed           Quad sets*           Glute sets*           Bridges* 3x15 3x15  3x10 3x12 3x12 3x15 3x15   Clamshells Supine BTB 3x10  Supine BTB 3x12     Supine GTB 3x10 Supine GTB 3x10 Supine BTB 3x10    NBOS balance           Tandem balance           Tandem walking           Plank           Bird dog                                 Ther Activity           Sit to stand 3x10 w/ 2 foam 3x10 w/ 2 foam  2x10 w/ 2 foam 3x10 w/ 2 foam  3x10 w/ 2 foam 3x10 w/ 2 foam 3x10 w/ 2 foam   Goblet squat           Leg press 95# DL  3x10 P12S3 95# DL  3x10 P12S3, inc nv     nv 95# DL  3x10 P12S3   Heel raises*           Toe raises*    " "       Forward step ups 4\" step up/over 2x10 ea 6\" step up/over 2x10 ea  4\" step up/back 2x10 ea 6\" step up/back 2x10 ea 4\" step up/over 2x10 ea 4\" step up/over 2x10 ea 4\" step up/over 2x10 ea   Lateral step ups       nv  nv   Deadlift                                  Modalities           L hip CP  10' seated post 10' seated post      10' seated post                                 "

## 2025-04-29 ENCOUNTER — OFFICE VISIT (OUTPATIENT)
Dept: PHYSICAL THERAPY | Facility: REHABILITATION | Age: 65
End: 2025-04-29
Attending: ORTHOPAEDIC SURGERY
Payer: MEDICARE

## 2025-04-29 DIAGNOSIS — Z96.642 HISTORY OF LEFT HIP REPLACEMENT: ICD-10-CM

## 2025-04-29 DIAGNOSIS — M16.12 PRIMARY OSTEOARTHRITIS OF LEFT HIP: ICD-10-CM

## 2025-04-29 DIAGNOSIS — G89.29 CHRONIC LEFT HIP PAIN: Primary | ICD-10-CM

## 2025-04-29 DIAGNOSIS — M25.552 CHRONIC LEFT HIP PAIN: Primary | ICD-10-CM

## 2025-04-29 PROCEDURE — 97140 MANUAL THERAPY 1/> REGIONS: CPT | Performed by: PHYSICAL THERAPIST

## 2025-04-29 PROCEDURE — 97530 THERAPEUTIC ACTIVITIES: CPT | Performed by: PHYSICAL THERAPIST

## 2025-04-29 PROCEDURE — 97110 THERAPEUTIC EXERCISES: CPT | Performed by: PHYSICAL THERAPIST

## 2025-04-29 NOTE — PROGRESS NOTES
Daily Note     Today's date: 2025  Patient name: Cielo Thomason  : 1960  MRN: [id number]  Referring provider: Arvind Eaton,*  Dx:   Encounter Diagnosis     ICD-10-CM    1. Chronic left hip pain  M25.552     G89.29       2. Primary osteoarthritis of left hip  M16.12       3. History of left hip replacement  Z96.642           Start Time: 08  Stop Time: 09  Total time in clinic (min): 55 minutes    Subjective: Cielo reports doing a lot of cleaning yesterday and her legs feel tired today.       Objective: See treatment diary below      Assessment: Tolerated treatment well. Noted L groin pain with flexion SLR this session but pain did not worsen with increased reps. Patient demonstrated appropriate level of challenge and muscular fatigue throughout session and noted good status at end of visit. Patient demonstrated fatigue post treatment, exhibited good technique with therapeutic exercises, and would benefit from continued PT.      Plan: Continue per plan of care.  Progress treatment as tolerated.       Precautions: L FLETCHER posterior approach (3/24) (no flexion past 90, IR, adduction)  Past Medical History:   Diagnosis Date    Allergic rhinitis 2022    Annual physical exam 2024    Anxiety     Arthritis     BMI 40.0-44.9, adult ([facility]) 2021    BMI 50.0-59.9, adult ([facility]) 2021    Cellulitis of left leg 2021    Depression     Eczema 2022    Edema of both lower legs 2021    Edema of both lower legs 2021    Endometrial cancer ([facility]) 2021    Heart murmur     Heart murmur 2024    History of COVID-19 2020    Mild sypmtoms Cough,Tired,diarrhea,sweats, Loss taste and smell    Hyperglycemia 2021    Hyperlipidemia     Hypertension     Left hip pain 2022    Lower abdominal pain 2021    Lumbar radiculopathy 08/15/2024    Mixed hyperlipidemia 2021    Numbness and tingling in left arm     Obesity 2024    Pruritus      "Rash of hand 02/28/2022    Shortness of breath     Skin lesion     Skin rash 05/02/2023    Tinea cruris 08/24/2023    Tinea pedis of both feet 02/28/2022    Vitamin D deficiency 01/23/2021       * Indicates part of HEP   HEP code: [id number]     Daily Treatment Diary     Manuals 4/22 4/24 4/29  4/08 4/10 4/15 4/17   L hip PROM ER, ABD ER, ABD done  FLX,ER,ABD FLX,ER, ABD ER, ABD ER, ABD                         Ther Ex           Bike (ROM) 5' lvl 1 5' lvl 1 5' lvl 1  5' lvl 0 5' lvl  5' lvl 1  5' lvl 1    Heel slides 2x10x5\" 2x10x5\"   2x10x5\" 2x10x5\" 2x10x5\" 2x10x5\"   Quad stretch 20x5\" seated  Prone 10x10\" L  20\"x5 seated 20x5\" 20x5\" seated 20x5\" seated   Self-massage w/ roller L quad 2'    2'  2' 2' 2'   LAQ     dc      Flexion SLR 3x12 ea 3x15 ea 3x15 ea  3x8  3x10 ea 3x12 ea 3x12 ea   Abduction SLR           Standing hip extension 3x15 ea elbows propped on hi/lo 3x15 ea elbows propped w/ stool on table 3x15 ea elbows propped w/ stool on table  3x12 ea elbows propped on hi/lo 3x12 ea elbows propped on hi/lo nv nv   Standing hip abduction nv 3x12 ea elbows propped on hi/lo 3x12 ea elbows propped w/ stool on table  3x12 ea elbows propped on hi/lo 3x12 ea elbows propped on hi/lo nv nv   Ankle pumps*           Patient education     done                 Neuro Re-Ed           Quad sets*           Glute sets*           Bridges* 3x15 3x15 3x10 10#  3x12 3x12 3x15 3x15   Clamshells Supine BTB 3x10  Supine BTB 3x12     Supine GTB 3x10 Supine GTB 3x10 Supine BTB 3x10    NBOS balance           Tandem balance           Tandem walking           Plank           Bird dog                                 Ther Activity           Sit to stand 3x10 w/ 2 foam 3x10 w/ 2 foam   3x10 w/ 2 foam  3x10 w/ 2 foam 3x10 w/ 2 foam 3x10 w/ 2 foam   Goblet squat           Leg press 95# DL  3x10 P12S3 95# DL  3x10 P12S3, inc nv 95# DL 3x12 P12S3    nv 95# DL  3x10 P12S3   Heel raises*           Toe raises*           Forward step ups 4\" step up/over " "2x10 ea 6\" step up/over 2x10 ea 6\" step up/over 2x10 ea  6\" step up/back 2x10 ea 4\" step up/over 2x10 ea 4\" step up/over 2x10 ea 4\" step up/over 2x10 ea   Lateral step ups       nv  nv   Deadlift                                  Modalities           L hip CP  10' seated post 10' seated post 10' seated     10' seated post                                   "

## 2025-05-01 ENCOUNTER — EVALUATION (OUTPATIENT)
Dept: PHYSICAL THERAPY | Facility: REHABILITATION | Age: 65
End: 2025-05-01
Attending: ORTHOPAEDIC SURGERY
Payer: MEDICARE

## 2025-05-01 DIAGNOSIS — M25.552 CHRONIC LEFT HIP PAIN: Primary | ICD-10-CM

## 2025-05-01 DIAGNOSIS — M16.12 PRIMARY OSTEOARTHRITIS OF LEFT HIP: ICD-10-CM

## 2025-05-01 DIAGNOSIS — G89.29 CHRONIC LEFT HIP PAIN: Primary | ICD-10-CM

## 2025-05-01 DIAGNOSIS — Z96.642 HISTORY OF LEFT HIP REPLACEMENT: ICD-10-CM

## 2025-05-01 PROCEDURE — 97110 THERAPEUTIC EXERCISES: CPT | Performed by: PHYSICAL THERAPIST

## 2025-05-01 NOTE — PROGRESS NOTES
PT Re-Evaluation     Today's date: 2025  Patient name: Cielo Thomason  : 1960  MRN: [id number]  Referring provider: Arvind Eaton,*  Dx:   Encounter Diagnosis     ICD-10-CM    1. Chronic left hip pain  M25.552     G89.29       2. Primary osteoarthritis of left hip  M16.12       3. History of left hip replacement  Z96.642               Start Time: 1730  Stop Time:   Total time in clinic (min): 55 minutes    Assessment  Impairments: abnormal gait, abnormal or restricted ROM, abnormal movement, activity intolerance, impaired balance, impaired physical strength, lacks appropriate home exercise program, pain with function, weight-bearing intolerance, poor posture , poor body mechanics, unable to perform ADL, participation limitations, activity limitations and endurance  Symptom irritability: low    Assessment details: Per patient report and clinical findings, Cielo Thomason has made good progress since starting skilled physical therapy services. Cielo has been compliant with PT POC and HEP since initial evaluation. To this date, Cielo Thomason has completed 11 visits of skilled physical therapy post-operatively. Cielo demonstrates improvements in objective data however, continues to be limited compared to her prior level of function. Remaining deficits include decreased L hip ROM, decreased L hip girdle strength, impaired gait, and decreased activity tolerance. Recommend continued skilled physical therapy services to address remaining deficits to promote progress towards her prior level of function.          Understanding of Dx/Px/POC: good     Prognosis: good    Goals  Short-term (6 weeks)  1.  Pain at worst decrease from 5/10 to 3/10 to improve QoL. - met  2. L LE strength increase to 3+/5 to improve activity tolerance. - met  3. Improve TUG time from 26 sec. to 20 sec. to demonstrate progress towards normative values. - met     Long-term (12 weeks):  1. L hip ROM WFL to improve  activity tolerance. - progressing  2. Global LE strength increase to 4+/5 to improve activity tolerance. - progressing  3. Able to walk for at least 30 min. without pain to improve activity tolerance. - progressing  4. Able to ascend/descend 1 flight of stairs without compensation to promote improved household ambulation. - progressing  5. Patient is able to return to PLOF. - progressing  6. Improve TUG time from 26 sec. to 12 sec. to demonstrate progress towards normative values. - met  7. Independent with HEP. - progressing          Plan  Patient would benefit from: skilled physical therapy and home program  Planned modality interventions: neuromuscular electric stimulation, TENS, unattended electrical stimulation, cryotherapy and thermotherapy: hydrocollator packs    Planned therapy interventions: abdominal trunk stabilization, activity modification, ADL retraining, balance, balance/weight bearing training, behavior modification, body mechanics training, breathing training, patient/caregiver education, muscle pump exercises, nerve gliding, neuromuscular re-education, motor coordination training, manual therapy, kinesiology taping, joint mobilization, IASTM, postural training, self care, functional ROM exercises, flexibility, gait training, graded activity, graded exercise, therapeutic training, transfer training, therapeutic exercise, therapeutic activities, stretching, strengthening, graded motor, home exercise program, IADL retraining and López taping    Frequency: 2x week  Duration in weeks: 6  Plan of Care beginning date: 5/1/2025  Plan of Care expiration date: 6/12/2025  Treatment plan discussed with: patient  Plan details: Thank you for referring Cielo Thomason to Physical Therapy at [facility] and for the opportunity to coordinate care.          Subjective Evaluation    History of Present Illness  Date of surgery: 3/24/2025  Mechanism of injury: surgery  Mechanism of injury:   05/01/25:  Cielo Padgett  Paddy reports feeling she has made 65% progress since IE. Cielo reports improvements in ability to lift her leg, ability to get in/out of bed, walking, going up/down steps, standing, and doing her exercises. Despite improvements, Cielo reports continued difficulty with going up steps, going down steps, carrying things down steps, prolonged walking.      25:  Cielo Thomason is a 64 y.o. female patient presenting s/p left FLETCHER with Dr. Eaton on 3/24/25. Pain is relieved or reduced with medication. Cielo is currently having difficulty with walking, stairs (descending> ascending) negotiation with non-reciprocal gait pattern, standing from a chair, fatigue, and all typical day-to-day activities. Pt would like to return to being able to walk without AD, IADLs, safe step negotiation, walking longer distances, and household chores. Next follow up with the physician is 2025.      03/10/2025:  Cielo Thomason is a 64 y.o. female who presents pre-op L FLETCHER with Dr. Eaton on 3/24/25 with primary complaints of L hip pain. Had a fall in July leading to ER visit on 25. Hx of R FLETCHER & R TKA. Concerned about falling. Describes symptoms as sharp and tight groin and buttock pain. Currently having difficulty with walking, house chores, and ADLs. Has difficulty sleeping secondary to L hip pain. Both wakes up and difficulty finding comfortable position. Denies reports of numbness/tingling in b/l LEs.    Patient Goals  Patient goals for therapy: decreased pain, return to sport/leisure activities, independence with ADLs/IADLs, increased strength and improved balance    Pain  Current pain ratin  At best pain ratin  At worst pain ratin  Location: L hip  Relieving factors: medications  Aggravating factors: walking, stair climbing and standing    Social Support  Steps to enter house: yes (R HR)  3  Stairs in house: yes (L HR)   8  Lives in: multiple-level home (bedroom on 2nd floor)  Lives with:  "spouse    Employment status: working  Treatments  Previous treatment: injection treatment (groin and buttock injections)  Current treatment: physical therapy        Objective     Active Range of Motion   Left Hip   Flexion: 90 degrees     Right Hip   Flexion: 90 degrees   External rotation (90/90): 25 degrees   Internal rotation (90/90): 25 degrees     Passive Range of Motion   Left Hip   Abduction: 25 degrees     Strength/Myotome Testing     Left Hip   Planes of Motion   Flexion: 4+  Abduction: 4  Adduction: 4+  External rotation: 4 (tested in neutral)  Internal rotation: 4 (tested in neutral)    Right Hip   Planes of Motion   Flexion: 4+  Abduction: 5  Adduction: 5  External rotation: 5  Internal rotation: 5    Left Ankle/Foot   Dorsiflexion: 5    Right Ankle/Foot   Dorsiflexion: 5    General Comments:      Hip Comments   2025:  Post-op TU\" w/ SPC on R and use of RUE to sit<>stand from chair, 9\" w/out AD and use of RUE to sit<>stand from chair  Gait: ambulates with SPC on R, decreased L stance time compared to R    3/27/2025:  Post-op TU\" w/ RW and R UE use to aid in standing and sitting, chair w/ 2 foam boost  Gait: initial step to pattern with RW progressing to step through pattern with RW    3/10/2025:  Pre-op TU\" w/ SPC on R w/ BUE use to aid in standing and sitting             Precautions:   Past Medical History:   Diagnosis Date    Allergic rhinitis 2022    Annual physical exam 2024    Anxiety     Arthritis     BMI 40.0-44.9, adult ([facility]) 2021    BMI 50.0-59.9, adult ([facility]) 2021    Cellulitis of left leg 2021    Depression     Eczema 2022    Edema of both lower legs 2021    Edema of both lower legs 2021    Endometrial cancer ([facility]) 2021    Heart murmur     Heart murmur 2024    History of COVID-19 2020    Mild sypmtoms Cough,Tired,diarrhea,sweats, Loss taste and smell    Hyperglycemia 2021    Hyperlipidemia     Hypertension  " "   Left hip pain 06/02/2022    Lower abdominal pain 11/23/2021    Lumbar radiculopathy 08/15/2024    Mixed hyperlipidemia 01/23/2021    Numbness and tingling in left arm     Obesity 03/25/2024    Pruritus     Rash of hand 02/28/2022    Shortness of breath     Skin lesion     Skin rash 05/02/2023    Status post left hip replacement 04/14/2025    Tinea cruris 08/24/2023    Tinea pedis of both feet 02/28/2022    Vitamin D deficiency 01/23/2021       * Indicates part of HEP   HEP code: [id number]     Daily Treatment Diary     Manuals 4/22 4/24 4/29 5/1  4/10 4/15 4/17   L hip PROM ER, ABD ER, ABD done   FLX,ER, ABD ER, ABD ER, ABD                         Ther Ex           Bike (ROM) 5' lvl 1 5' lvl 1 5' lvl 1 5' lvl 1  5' lvl  5' lvl 1  5' lvl 1    Heel slides 2x10x5\" 2x10x5\"    2x10x5\" 2x10x5\" 2x10x5\"   Quad stretch 20x5\" seated  Prone 10x10\" L Prone 10x10\" L  20x5\" 20x5\" seated 20x5\" seated   Self-massage w/ roller L quad 2'     2' 2' 2'   LAQ           Flexion SLR 3x12 ea 3x15 ea 3x15 ea 3x15  3x10 ea 3x12 ea 3x12 ea   Abduction SLR           Standing hip extension 3x15 ea elbows propped on hi/lo 3x15 ea elbows propped w/ stool on table 3x15 ea elbows propped w/ stool on table 3x15 ea elbows propped on table  3x12 ea elbows propped on hi/lo nv nv   Standing hip abduction nv 3x12 ea elbows propped on hi/lo 3x12 ea elbows propped w/ stool on table 3x15 ea elbows propped  on table  3x12 ea elbows propped on hi/lo nv nv   Ankle pumps*           Patient education                      Neuro Re-Ed           Quad sets*           Glute sets*           Bridges* 3x15 3x15 3x10 10# 3x10 10#  3x12 3x15 3x15   Clamshells Supine BTB 3x10  Supine BTB 3x12    Supine GTB 3x10 Supine GTB 3x10 Supine BTB 3x10    NBOS balance           Tandem balance           Tandem walking           Plank           Bird dog                                 Ther Activity           Sit to stand 3x10 w/ 2 foam 3x10 w/ 2 foam    3x10 w/ 2 foam 3x10 w/ 2 foam " "3x10 w/ 2 foam   Goblet squat           Leg press 95# DL  3x10 P12S3 95# DL  3x10 P12S3, inc nv 95# DL 3x12 P12S3 95# DL 3x12 P12S3   nv 95# DL  3x10 P12S3   Heel raises*           Toe raises*           Forward step ups 4\" step up/over 2x10 ea 6\" step up/over 2x10 ea 6\" step up/over 2x10 ea   4\" step up/over 2x10 ea 4\" step up/over 2x10 ea 4\" step up/over 2x10 ea   Lateral step ups       nv  nv   Deadlift                                  Modalities           L hip CP  10' seated post 10' seated post 10' seated 10' seated    10' seated post                       "

## 2025-05-06 ENCOUNTER — OFFICE VISIT (OUTPATIENT)
Dept: OBGYN CLINIC | Facility: HOSPITAL | Age: 65
End: 2025-05-06
Payer: MEDICARE

## 2025-05-06 ENCOUNTER — OFFICE VISIT (OUTPATIENT)
Dept: PHYSICAL THERAPY | Facility: REHABILITATION | Age: 65
End: 2025-05-06
Payer: MEDICARE

## 2025-05-06 VITALS — BODY MASS INDEX: 40.47 KG/M2 | HEIGHT: 68 IN | WEIGHT: 267 LBS

## 2025-05-06 DIAGNOSIS — G89.29 CHRONIC PAIN OF RIGHT KNEE: ICD-10-CM

## 2025-05-06 DIAGNOSIS — M16.12 PRIMARY OSTEOARTHRITIS OF LEFT HIP: ICD-10-CM

## 2025-05-06 DIAGNOSIS — M25.552 CHRONIC LEFT HIP PAIN: Primary | ICD-10-CM

## 2025-05-06 DIAGNOSIS — Z96.642 HISTORY OF LEFT HIP REPLACEMENT: ICD-10-CM

## 2025-05-06 DIAGNOSIS — M25.561 CHRONIC PAIN OF RIGHT KNEE: ICD-10-CM

## 2025-05-06 DIAGNOSIS — G89.29 CHRONIC LEFT HIP PAIN: Primary | ICD-10-CM

## 2025-05-06 DIAGNOSIS — Z47.89 ORTHOPEDIC AFTERCARE: Primary | ICD-10-CM

## 2025-05-06 DIAGNOSIS — Z96.651 HISTORY OF RIGHT KNEE JOINT REPLACEMENT: ICD-10-CM

## 2025-05-06 PROCEDURE — 97110 THERAPEUTIC EXERCISES: CPT | Performed by: PHYSICAL THERAPIST

## 2025-05-06 PROCEDURE — 97530 THERAPEUTIC ACTIVITIES: CPT | Performed by: PHYSICAL THERAPIST

## 2025-05-06 PROCEDURE — 99213 OFFICE O/P EST LOW 20 MIN: CPT | Performed by: ORTHOPAEDIC SURGERY

## 2025-05-06 NOTE — PROGRESS NOTES
Name: Cielo Thomason      : 1960       MRN: [id number]   Encounter Provider: Arvind Eaton MD   Encounter Date: 25  Encounter department: [facility] [place]     ASSESSMENT & PLAN:  Assessment & Plan  Orthopedic aftercare  6 weeks following left total hip replacement, this patient has a well c clinical exam.  Should continue to focus on restoration of strength of the left hip musculature, and office follow-up in 6 weeks time with x-rays 2 views left hip upon arrival would be appropriate       History of right knee joint replacement  This patient has onset of chronic pain in the right knee that underwent replacement 13 years ago.  She has good motion in the absence of an effusion.  We can get x-rays in 6 weeks when we see her back       Chronic pain of right knee              To do next visit:  No follow-ups on file.    _____________________________________________________  CHIEF COMPLAINT:  Chief Complaint   Patient presents with    Left Hip - Post-op         SUBJECTIVE:  Cielo Thomason is a 64 y.o. female who presents 6 weeks following left total hip replacement.  She describes ongoing relief for the preprocedure weightbearing pain in the left hip she been experiencing.  She does admit onset of pain and mechanical noise from her right knee that underwent replacement 13 years ago        PAST MEDICAL HISTORY:  Past Medical History:   Diagnosis Date    Allergic rhinitis 2022    Annual physical exam 2024    Anxiety     Arthritis     BMI 40.0-44.9, adult ([facility]) 2021    BMI 50.0-59.9, adult ([facility]) 2021    Cellulitis of left leg 2021    Depression     Eczema 2022    Edema of both lower legs 2021    Edema of both lower legs 2021    Endometrial cancer ([facility]) 2021    Heart murmur     Heart murmur 2024    History of COVID-19 2020    Mild sypmtoms Cough,Tired,diarrhea,sweats, Loss taste and smell     Hyperglycemia 2021    Hyperlipidemia     Hypertension     Left hip pain 2022    Lower abdominal pain 2021    Lumbar radiculopathy 08/15/2024    Mixed hyperlipidemia 2021    Numbness and tingling in left arm     Obesity 2024    Pruritus     Rash of hand 2022    Shortness of breath     Skin lesion     Skin rash 2023    Status post left hip replacement 2025    Tinea cruris 2023    Tinea pedis of both feet 2022    Vitamin D deficiency 2021       PAST SURGICAL HISTORY:  Past Surgical History:   Procedure Laterality Date    CARPAL TUNNEL RELEASE Bilateral      SECTION      x3     SECTION      CHOLECYSTECTOMY      CHOLECYSTECTOMY LAPAROSCOPIC N/A 2019    Procedure: CHOLECYSTECTOMY LAPAROSCOPIC;  Surgeon: Shayne De Leon MD;  Location: [facility];  Service: General    COLONOSCOPY      CYSTOSCOPY N/A 10/08/2021    Procedure: Cystoscopy;  Surgeon: Adelso Freeman MD;  Location: [facility];  Service: Gynecology Oncology    FL GUIDED NEEDLE PLAC BX/ASP/INJ  2015    FL INJECTION LEFT HIP (NON ARTHROGRAM)  2022    HYSTERECTOMY      JOINT REPLACEMENT  2016    R total HIp    MAMMO (HISTORICAL)  10/11/2018    Advise for yearly mammo screening    OOPHORECTOMY Bilateral     NV ARTHRP ACETBLR/PROX FEM PROSTC AGRFT/ALGRFT Left 3/24/2025    Procedure: ARTHROPLASTY HIP TOTAL;  Surgeon: Arvind Eaton MD;  [facility]: [facility];  Service: Orthopedics    NV HYSTEROSCOPY BX ENDOMETRIUM&/POLYPC W/WO D&C N/A 2016    Procedure: FRACTIONAL DILATATION AND CURETTAGE (D&C) WITH HYSTEROSOCPY;  Surgeon: Farnaz Romero MD;  Location: [facility];  Service: Gynecology    NV LAPS TOTAL HYSTERECT 250 GM/< W/RMVL TUBE/OVARY N/A 10/08/2021    Procedure: ROBOTIC HYSTERECTOMY, BILATERAL SALPINGOOPHERECTOMY; LEFT EXTERNAL ILIAC SENTINEL LYMPH NODE BIOPSY, RIGHT PELVIC LYMPHADENECTOMY;  Surgeon: Adelso Freeman MD;  Location: [facility];  Service: Gynecology Oncology    REPLACEMENT TOTAL KNEE Right        FAMILY HISTORY:  Family History   Problem Relation Age of Onset    Hypertension Mother     Hypertension Father     Heart failure Father     Lymphoma Father 62    Diabetes Father         at old age    No Known Problems Sister     No Known Problems Daughter     No Known Problems Maternal Grandmother     No Known Problems Maternal Grandfather     No Known Problems Paternal Grandmother     No Known Problems Paternal Grandfather     Hemophilia Brother     Breast cancer Maternal Aunt 50    Brain cancer Maternal Aunt 48    No Known Problems Maternal Aunt     No Known Problems Son     No Known Problems Son     No Known Problems Son        SOCIAL HISTORY:  Social History     Tobacco Use    Smoking status: Never    Smokeless tobacco: Never   Vaping Use    Vaping status: Never Used   Substance Use Topics    Alcohol use: Not Currently    Drug use: Not Currently       MEDICATIONS:    Current Outpatient Medications:     acetaminophen (TYLENOL) 500 mg tablet, Take 2 tablets (1,000 mg total) by mouth every 6 (six) hours as needed for mild pain, Disp: 90 tablet, Rfl: 0    amLODIPine (NORVASC) 5 mg tablet, Take 1 tablet (5 mg total) by mouth daily, Disp: 90 tablet, Rfl: 1    busPIRone (BUSPAR) 5 mg tablet, Take 1 tablet (5 mg total) by mouth daily at bedtime, Disp: 90 tablet, Rfl: 0    calcium carbonate (OS-CAMDEN) 600 MG tablet, Take 600 mg by mouth daily, Disp: , Rfl:     Cholecalciferol (Vitamin D) 50 MCG (2000 UT) tablet, Take 2,000 Units by mouth daily, Disp: , Rfl:     citalopram (CeleXA) 20 mg tablet, Take 1 tablet (20 mg total) by mouth daily at bedtime, Disp: 90 tablet, Rfl: 0    clotrimazole-betamethasone (LOTRISONE) 1-0.05 % cream, Apply topically 2 (two) times a day, Disp: 45 g, Rfl: 0    fexofenadine (ALLEGRA) 180 MG tablet, Take 180 mg by mouth daily as needed, Disp: , Rfl:     losartan-hydrochlorothiazide (HYZAAR) 50-12.5 mg per tablet, Take 1 tablet  "by mouth daily, Disp: 90 tablet, Rfl: 0    metoprolol succinate (TOPROL-XL) 100 mg 24 hr tablet, Take 1 tablet (100 mg total) by mouth daily at bedtime, Disp: 90 tablet, Rfl: 0    Multiple Vitamins-Minerals (multivitamin with minerals) tablet, Take 1 tablet by mouth daily, Disp: , Rfl:     omeprazole (PriLOSEC) 20 mg delayed release capsule, Take 20 mg by mouth as needed, Disp: , Rfl:     vitamin B-12 (VITAMIN B-12) 1,000 mcg tablet, Take by mouth daily, Disp: , Rfl:     ALLERGIES:  Allergies   Allergen Reactions    Griseofulvin Hives    Penicillins Hives     She can take cephalosporin without any side effect    Lisinopril Cough    Zithromax [Azithromycin] Headache       LABS:  HgA1c:   Lab Results   Component Value Date    HGBA1C 5.0 02/23/2025     BMP:   Lab Results   Component Value Date    CALCIUM 8.7 03/25/2025    K 4.3 03/25/2025    CO2 31 03/25/2025     03/25/2025    BUN 14 03/25/2025    CREATININE 0.89 03/25/2025     CBC: No components found for: \"CBC\"    _____________________________________________________  PHYSICAL EXAMINATION:  Vital signs: Ht 5' 8\" (1.727 m)   Wt 121 kg (267 lb)   LMP  (LMP Unknown)   BMI 40.60 kg/m²   General: No acute distress, awake and alert  Psychiatric: Mood and affect appear appropriate  HEENT: Trachea Midline, No torticollis, no apparent facial trauma  Cardiovascular: No audible murmurs; Extremities appear perfused  Pulmonary: No audible wheezing or stridor  Skin: No open lesions; see further details (if any) below    MUSCULOSKELETAL EXAMINATION:  Ortho Exam  Gait pattern is minimal antalgic despite use of a single-point cane.  Left hip is a healed postop scar.  There is good left hip motion without groin or thigh pain  Right knee is a healed anterior scar.  The knee is neutral to alignment.  There is no effusion.  Extends well flexes well there is no mid flexion valgus instability    ___________________________________________________  STUDIES REVIEWED:  I personally " reviewed the images obtained in office today and my independent interpretation is as follows:    None performed      PROCEDURES PERFORMED:    Procedures    None preformed       Arvind Eaton MD

## 2025-05-06 NOTE — ASSESSMENT & PLAN NOTE
This patient has onset of chronic pain in the right knee that underwent replacement 13 years ago.  She has good motion in the absence of an effusion.  We can get x-rays in 6 weeks when we see her back

## 2025-05-06 NOTE — PROGRESS NOTES
Daily Note     Today's date: 2025  Patient name: Cielo Thomason  : 1960  MRN: [id number]  Referring provider: Arvind Eaton,*  Dx:   Encounter Diagnosis     ICD-10-CM    1. Chronic left hip pain  M25.552     G89.29       2. Primary osteoarthritis of left hip  M16.12       3. History of left hip replacement  Z96.642           Start Time: 172  Stop Time:   Total time in clinic (min): 60 minutes    Subjective: Cielo reports her follow up with the surgeon went well; reports she no longer needs to follow posterior hip precautions. States her hip and back have been sore from doing a lot of cleaning at home.       Objective: See treatment diary below      Assessment: Tolerated treatment well. Patient demonstrated appropriate level of challenge and muscular fatigue throughout session and noted good status at end of visit. Patient demonstrated fatigue post treatment, exhibited good technique with therapeutic exercises, and would benefit from continued PT.      Plan: Continue per plan of care.  Progress treatment as tolerated.       Precautions:   Past Medical History:   Diagnosis Date    Allergic rhinitis 2022    Annual physical exam 2024    Anxiety     Arthritis     BMI 40.0-44.9, adult ([facility]) 2021    BMI 50.0-59.9, adult ([facility]) 2021    Cellulitis of left leg 2021    Depression     Eczema 2022    Edema of both lower legs 2021    Edema of both lower legs 2021    Endometrial cancer ([facility]) 2021    Heart murmur     Heart murmur 2024    History of COVID-19 2020    Mild sypmtoms Cough,Tired,diarrhea,sweats, Loss taste and smell    Hyperglycemia 2021    Hyperlipidemia     Hypertension     Left hip pain 2022    Lower abdominal pain 2021    Lumbar radiculopathy 08/15/2024    Mixed hyperlipidemia 2021    Numbness and tingling in left arm     Obesity 2024    Pruritus     Rash of hand 2022    Shortness of breath  "    Skin lesion     Skin rash 05/02/2023    Status post left hip replacement 04/14/2025    Tinea cruris 08/24/2023    Tinea pedis of both feet 02/28/2022    Vitamin D deficiency 01/23/2021       * Indicates part of HEP   HEP code: [id number]     Daily Treatment Diary     Manuals 4/22 4/24 4/29 5/1 5/6  4/15 4/17   L hip PROM ER, ABD ER, ABD done    ER, ABD ER, ABD                         Ther Ex           Bike (ROM) 5' lvl 1 5' lvl 1 5' lvl 1 5' lvl 1 5' lvl 1  5' lvl 1  5' lvl 1    Heel slides 2x10x5\" 2x10x5\"     2x10x5\" 2x10x5\"   Quad stretch 20x5\" seated  Prone 10x10\" L Prone 10x10\" L Prone 10x10\" L  20x5\" seated 20x5\" seated   Self-massage w/ roller L quad 2'      2' 2'   LAQ           Flexion SLR 3x12 ea 3x15 ea 3x15 ea 3x15 3x15 ea  3x12 ea 3x12 ea   Abduction SLR           Standing hip extension 3x15 ea elbows propped on hi/lo 3x15 ea elbows propped w/ stool on table 3x15 ea elbows propped w/ stool on table 3x15 ea elbows propped on table 3x15  nv nv   Standing hip abduction nv 3x12 ea elbows propped on hi/lo 3x12 ea elbows propped w/ stool on table 3x15 ea elbows propped  on table 3x15  nv nv   Ankle pumps*           Patient education                      Neuro Re-Ed           Quad sets*           Glute sets*           Bridges* 3x15 3x15 3x10 10# 3x10 10# 3x12 10#  3x15 3x15   Clamshells Supine BTB 3x10  Supine BTB 3x12     Supine GTB 3x10 Supine BTB 3x10    NBOS balance           Tandem balance           Tandem walking           Plank           Bird dog                                 Ther Activity           Sit to stand 3x10 w/ 2 foam 3x10 w/ 2 foam   2x10  3x10 w/ 2 foam 3x10 w/ 2 foam   Goblet squat           Leg press 95# DL  3x10 P12S3 95# DL  3x10 P12S3, inc nv 95# DL 3x12 P12S3 95# DL 3x12 P12S3 95# DL 3x12 P11S3  nv 95# DL  3x10 P12S3   Heel raises*           Toe raises*           Forward step ups 4\" step up/over 2x10 ea 6\" step up/over 2x10 ea 6\" step up/over 2x10 ea    4\" step up/over 2x10 ea 4\" " step up/over 2x10 ea   Lateral step ups       nv  nv   Deadlift                                  Modalities           L hip CP  10' seated post 10' seated post 10' seated 10' seated 10' seated   10' seated post

## 2025-05-06 NOTE — ASSESSMENT & PLAN NOTE
6 weeks following left total hip replacement, this patient has a well c clinical exam.  Should continue to focus on restoration of strength of the left hip musculature, and office follow-up in 6 weeks time with x-rays 2 views left hip upon arrival would be appropriate

## 2025-05-08 ENCOUNTER — OFFICE VISIT (OUTPATIENT)
Dept: PHYSICAL THERAPY | Facility: REHABILITATION | Age: 65
End: 2025-05-08
Attending: ORTHOPAEDIC SURGERY
Payer: MEDICARE

## 2025-05-08 DIAGNOSIS — Z96.642 HISTORY OF LEFT HIP REPLACEMENT: ICD-10-CM

## 2025-05-08 DIAGNOSIS — G89.29 CHRONIC LEFT HIP PAIN: Primary | ICD-10-CM

## 2025-05-08 DIAGNOSIS — M25.552 CHRONIC LEFT HIP PAIN: Primary | ICD-10-CM

## 2025-05-08 DIAGNOSIS — M16.12 PRIMARY OSTEOARTHRITIS OF LEFT HIP: ICD-10-CM

## 2025-05-08 PROCEDURE — 97530 THERAPEUTIC ACTIVITIES: CPT | Performed by: PHYSICAL THERAPIST

## 2025-05-08 PROCEDURE — 97110 THERAPEUTIC EXERCISES: CPT | Performed by: PHYSICAL THERAPIST

## 2025-05-08 NOTE — PROGRESS NOTES
Daily Note     Today's date: 2025  Patient name: Cielo Thomason  : 1960  MRN: [id number]  Referring provider: Arvind Eaton,*  Dx:   Encounter Diagnosis     ICD-10-CM    1. Chronic left hip pain  M25.552     G89.29       2. Primary osteoarthritis of left hip  M16.12       3. History of left hip replacement  Z96.642           Start Time: 0850  Stop Time: 944  Total time in clinic (min): 54 minutes    Subjective: Cielo reports some soreness in the legs this morning from doing sit to stands yesterday; otherwise no complaints at start of session.       Objective: See treatment diary below      Assessment: Tolerated treatment well. Able to increase step height for forward step ups with some difficulty controlling eccentric phase but noted improvement with increased reps. Patient demonstrated appropriate level of challenge and muscular fatigue throughout session and noted good status at end of visit. Patient demonstrated fatigue post treatment, exhibited good technique with therapeutic exercises, and would benefit from continued PT.      Plan: Continue per plan of care.  Progress treatment as tolerated.       Precautions:   Past Medical History:   Diagnosis Date    Allergic rhinitis 2022    Annual physical exam 2024    Anxiety     Arthritis     BMI 40.0-44.9, adult ([facility]) 2021    BMI 50.0-59.9, adult ([facility]) 2021    Cellulitis of left leg 2021    Depression     Eczema 2022    Edema of both lower legs 2021    Edema of both lower legs 2021    Endometrial cancer ([facility]) 2021    Heart murmur     Heart murmur 2024    History of COVID-19 2020    Mild sypmtoms Cough,Tired,diarrhea,sweats, Loss taste and smell    Hyperglycemia 2021    Hyperlipidemia     Hypertension     Left hip pain 2022    Lower abdominal pain 2021    Lumbar radiculopathy 08/15/2024    Mixed hyperlipidemia 2021    Numbness and tingling in left arm      "Obesity 03/25/2024    Pruritus     Rash of hand 02/28/2022    Shortness of breath     Skin lesion     Skin rash 05/02/2023    Status post left hip replacement 04/14/2025    Tinea cruris 08/24/2023    Tinea pedis of both feet 02/28/2022    Vitamin D deficiency 01/23/2021       * Indicates part of HEP   HEP code: [id number]     Daily Treatment Diary     Manuals 4/22 4/24 4/29 5/1 5/6 5/8 4/17   L hip PROM ER, ABD ER, ABD done     ER, ABD                         Ther Ex           Bike (ROM) 5' lvl 1 5' lvl 1 5' lvl 1 5' lvl 1 5' lvl 1 5' lvl 1  5' lvl 1    Heel slides 2x10x5\" 2x10x5\"      2x10x5\"   Quad stretch 20x5\" seated  Prone 10x10\" L Prone 10x10\" L Prone 10x10\" L Prone 10x01\" L  20x5\" seated   Self-massage w/ roller L quad 2'       2'   LAQ           Flexion SLR 3x12 ea 3x15 ea 3x15 ea 3x15 3x15 ea 3x15 ea  3x12 ea   Abduction SLR           Standing hip extension 3x15 ea elbows propped on hi/lo 3x15 ea elbows propped w/ stool on table 3x15 ea elbows propped w/ stool on table 3x15 ea elbows propped on table 3x15 3x15  nv   Standing hip abduction nv 3x12 ea elbows propped on hi/lo 3x12 ea elbows propped w/ stool on table 3x15 ea elbows propped  on table 3x15 3x15  nv   Ankle pumps*           Patient education                      Neuro Re-Ed           Quad sets*           Glute sets*           Bridges* 3x15 3x15 3x10 10# 3x10 10# 3x12 10# 3x10 12#  3x15   Clamshells Supine BTB 3x10  Supine BTB 3x12      Supine BTB 3x10    NBOS balance           Tandem balance           Tandem walking           Plank           Bird dog                                 Ther Activity           Sit to stand 3x10 w/ 2 foam 3x10 w/ 2 foam   2x10 2x10  3x10 w/ 2 foam   Goblet squat           Leg press 95# DL  3x10 P12S3 95# DL  3x10 P12S3, inc nv 95# DL 3x12 P12S3 95# DL 3x12 P12S3 95# DL 3x12 P11S3 95# DL 3x12 P11S3  95# DL  3x10 P12S3   Heel raises*           Toe raises*           Forward step ups 4\" step up/over 2x10 ea 6\" step up/over " "2x10 ea 6\" step up/over 2x10 ea   6\" step up/over 1x10 ea, 8\" step up/over 1x10 ea  4\" step up/over 2x10 ea   Lateral step ups        nv   Deadlift                                  Modalities           L hip CP  10' seated post 10' seated post 10' seated 10' seated 10' seated 10' seated  10' seated post                         1:1 from 910 AM - 936 AM    "

## 2025-05-13 ENCOUNTER — OFFICE VISIT (OUTPATIENT)
Dept: PHYSICAL THERAPY | Facility: REHABILITATION | Age: 65
End: 2025-05-13
Attending: ORTHOPAEDIC SURGERY
Payer: MEDICARE

## 2025-05-13 DIAGNOSIS — Z96.642 HISTORY OF LEFT HIP REPLACEMENT: ICD-10-CM

## 2025-05-13 DIAGNOSIS — M16.12 PRIMARY OSTEOARTHRITIS OF LEFT HIP: ICD-10-CM

## 2025-05-13 DIAGNOSIS — G89.29 CHRONIC LEFT HIP PAIN: Primary | ICD-10-CM

## 2025-05-13 DIAGNOSIS — M25.552 CHRONIC LEFT HIP PAIN: Primary | ICD-10-CM

## 2025-05-13 PROCEDURE — 97110 THERAPEUTIC EXERCISES: CPT | Performed by: PHYSICAL THERAPIST

## 2025-05-13 PROCEDURE — 97530 THERAPEUTIC ACTIVITIES: CPT | Performed by: PHYSICAL THERAPIST

## 2025-05-13 NOTE — PROGRESS NOTES
Daily Note     Today's date: 2025  Patient name: Cielo Thomason  : 1960  MRN: [id number]  Referring provider: Arvind Eaton,*  Dx:   Encounter Diagnosis     ICD-10-CM    1. Chronic left hip pain  M25.552     G89.29       2. Primary osteoarthritis of left hip  M16.12       3. History of left hip replacement  Z96.642                Subjective: Pt reports that she is more tired today from the rain.       Objective: See treatment diary below      Assessment: Tolerated treatment well. Patient demonstrated fatigue post treatment, exhibited good technique with therapeutic exercises, and would benefit from continued PT. She was able to progress to the 8 in steps for both steps of stepping up and over this visit for more functional strengthening. She continued to have difficulty with eccentric control during STS. Continue to progress as able.       Plan: Continue per plan of care.  Progress treatment as tolerated.       Precautions:   Past Medical History:   Diagnosis Date    Allergic rhinitis 2022    Annual physical exam 2024    Anxiety     Arthritis     BMI 40.0-44.9, adult ([facility]) 2021    BMI 50.0-59.9, adult ([facility]) 2021    Cellulitis of left leg 2021    Depression     Eczema 2022    Edema of both lower legs 2021    Edema of both lower legs 2021    Endometrial cancer ([facility]) 2021    Heart murmur     Heart murmur 2024    History of COVID-19 2020    Mild sypmtoms Cough,Tired,diarrhea,sweats, Loss taste and smell    Hyperglycemia 2021    Hyperlipidemia     Hypertension     Left hip pain 2022    Lower abdominal pain 2021    Lumbar radiculopathy 08/15/2024    Mixed hyperlipidemia 2021    Numbness and tingling in left arm     Obesity 2024    Pruritus     Rash of hand 2022    Shortness of breath     Skin lesion     Skin rash 2023    Status post left hip replacement 2025    Tinea cruris 2023     "Tinea pedis of both feet 02/28/2022    Vitamin D deficiency 01/23/2021       * Indicates part of HEP   HEP code: [id number]     Daily Treatment Diary     Manuals 4/22 4/24 4/29 5/1 5/6 5/8 5/13    L hip PROM ER, ABD ER, ABD done                              Ther Ex           Bike (ROM) 5' lvl 1 5' lvl 1 5' lvl 1 5' lvl 1 5' lvl 1 5' lvl 1 5' lvl 1    Heel slides 2x10x5\" 2x10x5\"         Quad stretch 20x5\" seated  Prone 10x10\" L Prone 10x10\" L Prone 10x10\" L Prone 10x01\" L Prone 10x10\" L    Self-massage w/ roller L quad 2'          LAQ           Flexion SLR 3x12 ea 3x15 ea 3x15 ea 3x15 3x15 ea 3x15 ea 3x15 ea    Abduction SLR           Standing hip extension 3x15 ea elbows propped on hi/lo 3x15 ea elbows propped w/ stool on table 3x15 ea elbows propped w/ stool on table 3x15 ea elbows propped on table 3x15 3x15 3x15 ea    Standing hip abduction nv 3x12 ea elbows propped on hi/lo 3x12 ea elbows propped w/ stool on table 3x15 ea elbows propped  on table 3x15 3x15 3x15 ea    Ankle pumps*           Patient education                      Neuro Re-Ed           Quad sets*           Glute sets*           Bridges* 3x15 3x15 3x10 10# 3x10 10# 3x12 10# 3x10 12# 3x10 12#    Clamshells Supine BTB 3x10  Supine BTB 3x12         NBOS balance           Tandem balance           Tandem walking           Plank           Bird dog                                 Ther Activity           Sit to stand 3x10 w/ 2 foam 3x10 w/ 2 foam   2x10 2x10 2x10    Goblet squat           Leg press 95# DL  3x10 P12S3 95# DL  3x10 P12S3, inc nv 95# DL 3x12 P12S3 95# DL 3x12 P12S3 95# DL 3x12 P11S3 95# DL 3x12 P11S3 95# DL 3x12 P11S3    Heel raises*           Toe raises*           Forward step ups 4\" step up/over 2x10 ea 6\" step up/over 2x10 ea 6\" step up/over 2x10 ea   6\" step up/over 1x10 ea, 8\" step up/over 1x10 ea 8\" 2x10    Lateral step ups           Deadlift                                  Modalities           L hip CP  10' seated post 10' seated post 10' " seated 10' seated 10' seated 10' seated 10' seated

## 2025-05-15 ENCOUNTER — OFFICE VISIT (OUTPATIENT)
Dept: PHYSICAL THERAPY | Facility: REHABILITATION | Age: 65
End: 2025-05-15
Attending: ORTHOPAEDIC SURGERY
Payer: MEDICARE

## 2025-05-15 DIAGNOSIS — M16.12 PRIMARY OSTEOARTHRITIS OF LEFT HIP: ICD-10-CM

## 2025-05-15 DIAGNOSIS — M25.552 CHRONIC LEFT HIP PAIN: Primary | ICD-10-CM

## 2025-05-15 DIAGNOSIS — G89.29 CHRONIC LEFT HIP PAIN: Primary | ICD-10-CM

## 2025-05-15 DIAGNOSIS — Z96.642 HISTORY OF LEFT HIP REPLACEMENT: ICD-10-CM

## 2025-05-15 PROCEDURE — 97110 THERAPEUTIC EXERCISES: CPT | Performed by: PHYSICAL THERAPIST

## 2025-05-15 PROCEDURE — 97530 THERAPEUTIC ACTIVITIES: CPT | Performed by: PHYSICAL THERAPIST

## 2025-05-15 NOTE — PROGRESS NOTES
Daily Note     Today's date: 5/15/2025  Patient name: Cielo Thomason  : 1960  MRN: [id number]  Referring provider: Arvind Eaton,*  Dx:   Encounter Diagnosis     ICD-10-CM    1. Chronic left hip pain  M25.552     G89.29       2. Primary osteoarthritis of left hip  M16.12       3. History of left hip replacement  Z96.642           Start Time: 0930  Stop Time: 1024  Total time in clinic (min): 54 minutes    Subjective: Cielo offers no new complaints at start of session.       Objective: See treatment diary below      Assessment: Tolerated treatment well. Good response in progression of weight for leg press and introduction of tandem balance for improved LE strengthening. Patient demonstrated appropriate level of challenge and muscular fatigue throughout session and noted good status at end of visit. Patient demonstrated fatigue post treatment, exhibited good technique with therapeutic exercises, and would benefit from continued PT.      Plan: Continue per plan of care.  Progress treatment as tolerated.       Precautions:   Past Medical History:   Diagnosis Date    Allergic rhinitis 2022    Annual physical exam 2024    Anxiety     Arthritis     BMI 40.0-44.9, adult ([facility]) 2021    BMI 50.0-59.9, adult ([facility]) 2021    Cellulitis of left leg 2021    Depression     Eczema 2022    Edema of both lower legs 2021    Edema of both lower legs 2021    Endometrial cancer ([facility]) 2021    Heart murmur     Heart murmur 2024    History of COVID-19 2020    Mild sypmtoms Cough,Tired,diarrhea,sweats, Loss taste and smell    Hyperglycemia 2021    Hyperlipidemia     Hypertension     Left hip pain 2022    Lower abdominal pain 2021    Lumbar radiculopathy 08/15/2024    Mixed hyperlipidemia 2021    Numbness and tingling in left arm     Obesity 2024    Pruritus     Rash of hand 2022    Shortness of breath     Skin lesion     Skin  "rash 05/02/2023    Status post left hip replacement 04/14/2025    Tinea cruris 08/24/2023    Tinea pedis of both feet 02/28/2022    Vitamin D deficiency 01/23/2021       * Indicates part of HEP   HEP code: [id number]     Daily Treatment Diary     Manuals 4/22 4/24 4/29 5/1 5/6 5/8 5/13 5/15   L hip PROM ER, ABD ER, ABD done                              Ther Ex           Bike (ROM) 5' lvl 1 5' lvl 1 5' lvl 1 5' lvl 1 5' lvl 1 5' lvl 1 5' lvl 1 5' lvl 3   Heel slides 2x10x5\" 2x10x5\"         Quad stretch 20x5\" seated  Prone 10x10\" L Prone 10x10\" L Prone 10x10\" L Prone 10x01\" L Prone 10x10\" L Prone 10x10\" L   Self-massage w/ roller L quad 2'          LAQ           Flexion SLR 3x12 ea 3x15 ea 3x15 ea 3x15 3x15 ea 3x15 ea 3x15 ea 3x15 ea   Abduction SLR           Standing hip extension 3x15 ea elbows propped on hi/lo 3x15 ea elbows propped w/ stool on table 3x15 ea elbows propped w/ stool on table 3x15 ea elbows propped on table 3x15 3x15 3x15 ea 3x15 ea   Standing hip abduction nv 3x12 ea elbows propped on hi/lo 3x12 ea elbows propped w/ stool on table 3x15 ea elbows propped  on table 3x15 3x15 3x15 ea 3x15 ea   Ankle pumps*           Patient education                      Neuro Re-Ed           Quad sets*           Glute sets*           Bridges* 3x15 3x15 3x10 10# 3x10 10# 3x12 10# 3x10 12# 3x10 12# 3x10 15#   Clamshells Supine BTB 3x10  Supine BTB 3x12         NBOS balance           Tandem balance        :30x2 ea floor EO   Tandem walking           Plank           Bird dog                                 Ther Activity           Sit to stand 3x10 w/ 2 foam 3x10 w/ 2 foam   2x10 2x10 2x10 dc   Goblet squat           Leg press 95# DL  3x10 P12S3 95# DL  3x10 P12S3, inc nv 95# DL 3x12 P12S3 95# DL 3x12 P12S3 95# DL 3x12 P11S3 95# DL 3x12 P11S3 95# DL 3x12 P11S3 115# DL 3x12 P10S3   Heel raises*           Toe raises*           Forward step ups 4\" step up/over 2x10 ea 6\" step up/over 2x10 ea 6\" step up/over 2x10 ea   6\" step " "up/over 1x10 ea, 8\" step up/over 1x10 ea 8\" 2x10 8\" step up/over 2x10 ea   Lateral step ups           Deadlift                                  Modalities           L hip CP  10' seated post 10' seated post 10' seated 10' seated 10' seated 10' seated 10' seated                              "

## 2025-05-20 ENCOUNTER — OFFICE VISIT (OUTPATIENT)
Dept: PHYSICAL THERAPY | Facility: REHABILITATION | Age: 65
End: 2025-05-20
Attending: ORTHOPAEDIC SURGERY
Payer: MEDICARE

## 2025-05-20 DIAGNOSIS — M16.12 PRIMARY OSTEOARTHRITIS OF LEFT HIP: ICD-10-CM

## 2025-05-20 DIAGNOSIS — G89.29 CHRONIC LEFT HIP PAIN: Primary | ICD-10-CM

## 2025-05-20 DIAGNOSIS — Z96.642 HISTORY OF LEFT HIP REPLACEMENT: ICD-10-CM

## 2025-05-20 DIAGNOSIS — M25.552 CHRONIC LEFT HIP PAIN: Primary | ICD-10-CM

## 2025-05-20 PROCEDURE — 97110 THERAPEUTIC EXERCISES: CPT | Performed by: PHYSICAL THERAPIST

## 2025-05-20 PROCEDURE — 97112 NEUROMUSCULAR REEDUCATION: CPT | Performed by: PHYSICAL THERAPIST

## 2025-05-20 NOTE — PROGRESS NOTES
Daily Note     Today's date: 2025  Patient name: Cielo Thomason  : 1960  MRN: [id number]  Referring provider: Arvind Eaton,*  Dx:   Encounter Diagnosis     ICD-10-CM    1. Chronic left hip pain  M25.552     G89.29       2. Primary osteoarthritis of left hip  M16.12       3. History of left hip replacement  Z96.642           Start Time: 930  Stop Time: 1025  Total time in clinic (min): 55 minutes    Subjective: Cielo offers no new complaints at start of session.       Objective: See treatment diary below      Assessment: Tolerated treatment well. Patient demonstrated appropriate level of challenge and muscular fatigue throughout session and noted good status at end of visit. Patient demonstrated fatigue post treatment, exhibited good technique with therapeutic exercises, and would benefit from continued PT.      Plan: Continue per plan of care.  Progress treatment as tolerated.       Precautions:   Past Medical History:   Diagnosis Date    Allergic rhinitis 2022    Annual physical exam 2024    Anxiety     Arthritis     BMI 40.0-44.9, adult ([facility]) 2021    BMI 50.0-59.9, adult ([facility]) 2021    Cellulitis of left leg 2021    Depression     Eczema 2022    Edema of both lower legs 2021    Edema of both lower legs 2021    Endometrial cancer ([facility]) 2021    Heart murmur     Heart murmur 2024    History of COVID-19 2020    Mild sypmtoms Cough,Tired,diarrhea,sweats, Loss taste and smell    Hyperglycemia 2021    Hyperlipidemia     Hypertension     Left hip pain 2022    Lower abdominal pain 2021    Lumbar radiculopathy 08/15/2024    Mixed hyperlipidemia 2021    Numbness and tingling in left arm     Obesity 2024    Pruritus     Rash of hand 2022    Shortness of breath     Skin lesion     Skin rash 2023    Status post left hip replacement 2025    Tinea cruris 2023    Tinea pedis of both feet  "02/28/2022    Vitamin D deficiency 01/23/2021       * Indicates part of HEP   HEP code: [id number]     Daily Treatment Diary     Manuals 5/20 4/29 5/1 5/6 5/8 5/13 5/15   L hip PROM   done                              Ther Ex           Bike (ROM) 5' lvl 2  5' lvl 1 5' lvl 1 5' lvl 1 5' lvl 1 5' lvl 1 5' lvl 3   Heel slides           Quad stretch Prone 10x10\" L  Prone 10x10\" L Prone 10x10\" L Prone 10x10\" L Prone 10x01\" L Prone 10x10\" L Prone 10x10\" L   Self-massage w/ roller L quad           LAQ           Flexion SLR 3x15 ea  3x15 ea 3x15 3x15 ea 3x15 ea 3x15 ea 3x15 ea   Abduction SLR           Standing hip extension 3x15 ea  3x15 ea elbows propped w/ stool on table 3x15 ea elbows propped on table 3x15 3x15 3x15 ea 3x15 ea   Standing hip abduction 3x15 ea  3x12 ea elbows propped w/ stool on table 3x15 ea elbows propped  on table 3x15 3x15 3x15 ea 3x15 ea   Ankle pumps*           Patient education                      Neuro Re-Ed           Quad sets*           Glute sets*           Bridges* 3x12 15#  3x10 10# 3x10 10# 3x12 10# 3x10 12# 3x10 12# 3x10 15#   Clamshells 3x10 L          NBOS balance           Tandem balance :30x2 ea floor EO       :30x2 ea floor EO   Tandem walking           Plank           Bird dog                                 Ther Activity           Sit to stand     2x10 2x10 2x10 dc   Goblet squat           Leg press 115# DL 3x12 P9S3  95# DL 3x12 P12S3 95# DL 3x12 P12S3 95# DL 3x12 P11S3 95# DL 3x12 P11S3 95# DL 3x12 P11S3 115# DL 3x12 P10S3   Heel raises*           Toe raises*           Forward step ups 8\" step up/over 20x ea  6\" step up/over 2x10 ea   6\" step up/over 1x10 ea, 8\" step up/over 1x10 ea 8\" 2x10 8\" step up/over 2x10 ea   Lateral step ups           Deadlift                                  Modalities           L hip CP    10' seated 10' seated 10' seated 10' seated 10' seated                                "

## 2025-05-22 ENCOUNTER — OFFICE VISIT (OUTPATIENT)
Dept: PHYSICAL THERAPY | Facility: REHABILITATION | Age: 65
End: 2025-05-22
Attending: ORTHOPAEDIC SURGERY
Payer: MEDICARE

## 2025-05-22 DIAGNOSIS — G89.29 CHRONIC LEFT HIP PAIN: Primary | ICD-10-CM

## 2025-05-22 DIAGNOSIS — M25.552 CHRONIC LEFT HIP PAIN: Primary | ICD-10-CM

## 2025-05-22 DIAGNOSIS — Z96.642 HISTORY OF LEFT HIP REPLACEMENT: ICD-10-CM

## 2025-05-22 DIAGNOSIS — M16.12 PRIMARY OSTEOARTHRITIS OF LEFT HIP: ICD-10-CM

## 2025-05-22 PROCEDURE — 97112 NEUROMUSCULAR REEDUCATION: CPT | Performed by: PHYSICAL THERAPIST

## 2025-05-22 PROCEDURE — 97110 THERAPEUTIC EXERCISES: CPT | Performed by: PHYSICAL THERAPIST

## 2025-05-22 NOTE — PROGRESS NOTES
Daily Note     Today's date: 2025  Patient name: Cielo Thomason  : 1960  MRN: [id number]  Referring provider: Arvind Eaton,*  Dx:   Encounter Diagnosis     ICD-10-CM    1. Chronic left hip pain  M25.552     G89.29       2. Primary osteoarthritis of left hip  M16.12       3. History of left hip replacement  Z96.642           Start Time: 930  Stop Time: 1025  Total time in clinic (min): 55 minutes    Subjective: Cielo reports she's doing well at start of session.      Objective: See treatment diary below      Assessment: Tolerated treatment well. Patient demonstrated appropriate level of challenge and muscular fatigue throughout session and noted good status at end of visit. Patient demonstrated fatigue post treatment, exhibited good technique with therapeutic exercises, and would benefit from continued PT.      Plan: Continue per plan of care.  Progress treatment as tolerated.       Precautions:   Past Medical History:   Diagnosis Date    Allergic rhinitis 2022    Annual physical exam 2024    Anxiety     Arthritis     BMI 40.0-44.9, adult ([facility]) 2021    BMI 50.0-59.9, adult ([facility]) 2021    Cellulitis of left leg 2021    Depression     Eczema 2022    Edema of both lower legs 2021    Edema of both lower legs 2021    Endometrial cancer ([facility]) 2021    Heart murmur     Heart murmur 2024    History of COVID-19 2020    Mild sypmtoms Cough,Tired,diarrhea,sweats, Loss taste and smell    Hyperglycemia 2021    Hyperlipidemia     Hypertension     Left hip pain 2022    Lower abdominal pain 2021    Lumbar radiculopathy 08/15/2024    Mixed hyperlipidemia 2021    Numbness and tingling in left arm     Obesity 2024    Pruritus     Rash of hand 2022    Shortness of breath     Skin lesion     Skin rash 2023    Status post left hip replacement 2025    Tinea cruris 2023    Tinea pedis of both feet  "02/28/2022    Vitamin D deficiency 01/23/2021       * Indicates part of HEP   HEP code: [id number]     Daily Treatment Diary     Manuals 5/20 5/22 5/1 5/6 5/8 5/13 5/15   L hip PROM                                 Ther Ex           Bike (ROM) 5' lvl 2 5' lvl 1  5' lvl 1 5' lvl 1 5' lvl 1 5' lvl 1 5' lvl 3   Heel slides           Quad stretch Prone 10x10\" L Prone 10x10\" L  Prone 10x10\" L Prone 10x10\" L Prone 10x01\" L Prone 10x10\" L Prone 10x10\" L   Self-massage w/ roller L quad           LAQ           Flexion SLR 3x15 ea 3x15 ea  3x15 3x15 ea 3x15 ea 3x15 ea 3x15 ea   Abduction SLR           Standing hip extension 3x15 ea 3x10 ea PiTB  3x15 ea elbows propped on table 3x15 3x15 3x15 ea 3x15 ea   Standing hip abduction 3x15 ea 3x10 ea PiTB  3x15 ea elbows propped  on table 3x15 3x15 3x15 ea 3x15 ea   Ankle pumps*           Patient education                      Neuro Re-Ed           Quad sets*           Glute sets*           Bridges* 3x12 15# 3x15 15#  3x10 10# 3x12 10# 3x10 12# 3x10 12# 3x10 15#   Clamshells 3x10 L 3x10 L         NBOS balance           Tandem balance :30x2 ea floor EO       :30x2 ea floor EO   Tandem walking           Plank           Bird dog                                 Ther Activity           Sit to stand     2x10 2x10 2x10 dc   Goblet squat           Leg press 115# DL 3x12 P9S3 115# DL 3x12 P9S3  95# DL 3x12 P12S3 95# DL 3x12 P11S3 95# DL 3x12 P11S3 95# DL 3x12 P11S3 115# DL 3x12 P10S3   Heel raises*           Toe raises*           Forward step ups 8\" step up/over 20x ea     6\" step up/over 1x10 ea, 8\" step up/over 1x10 ea 8\" 2x10 8\" step up/over 2x10 ea   Lateral step ups           Deadlift                                  Modalities           L hip CP   10' seated  10' seated 10' seated 10' seated 10' seated                        1:1 from 930 AM - 1011 AM    "

## 2025-05-27 ENCOUNTER — OFFICE VISIT (OUTPATIENT)
Dept: PHYSICAL THERAPY | Facility: REHABILITATION | Age: 65
End: 2025-05-27
Attending: ORTHOPAEDIC SURGERY
Payer: MEDICARE

## 2025-05-27 DIAGNOSIS — M25.552 CHRONIC LEFT HIP PAIN: Primary | ICD-10-CM

## 2025-05-27 DIAGNOSIS — Z96.642 HISTORY OF LEFT HIP REPLACEMENT: ICD-10-CM

## 2025-05-27 DIAGNOSIS — G89.29 CHRONIC LEFT HIP PAIN: Primary | ICD-10-CM

## 2025-05-27 DIAGNOSIS — M16.12 PRIMARY OSTEOARTHRITIS OF LEFT HIP: ICD-10-CM

## 2025-05-27 PROCEDURE — 97110 THERAPEUTIC EXERCISES: CPT | Performed by: PHYSICAL THERAPIST

## 2025-05-27 NOTE — PROGRESS NOTES
Daily Note     Today's date: 2025  Patient name: Cielo Thomason  : 1960  MRN: [id number]  Referring provider: Arvind Eaton,*  Dx:   Encounter Diagnosis     ICD-10-CM    1. Chronic left hip pain  M25.552     G89.29       2. Primary osteoarthritis of left hip  M16.12       3. History of left hip replacement  Z96.642           Start Time: 1100  Stop Time: 1145  Total time in clinic (min): 45 minutes    Subjective: Cielo offers no new complaints since previous session.       Objective: See treatment diary below      Assessment: Tolerated treatment well. Patient demonstrated appropriate level of challenge and muscular fatigue throughout session and noted good status at end of visit. Patient demonstrated fatigue post treatment, exhibited good technique with therapeutic exercises, and would benefit from continued PT.      Plan: Continue per plan of care.  Progress treatment as tolerated.       Precautions:   Past Medical History:   Diagnosis Date    Allergic rhinitis 2022    Annual physical exam 2024    Anxiety     Arthritis     BMI 40.0-44.9, adult ([facility]) 2021    BMI 50.0-59.9, adult ([facility]) 2021    Cellulitis of left leg 2021    Depression     Eczema 2022    Edema of both lower legs 2021    Edema of both lower legs 2021    Endometrial cancer ([facility]) 2021    Heart murmur     Heart murmur 2024    History of COVID-19 2020    Mild sypmtoms Cough,Tired,diarrhea,sweats, Loss taste and smell    Hyperglycemia 2021    Hyperlipidemia     Hypertension     Left hip pain 2022    Lower abdominal pain 2021    Lumbar radiculopathy 08/15/2024    Mixed hyperlipidemia 2021    Numbness and tingling in left arm     Obesity 2024    Pruritus     Rash of hand 2022    Shortness of breath     Skin lesion     Skin rash 2023    Status post left hip replacement 2025    Tinea cruris 2023    Tinea pedis of both  "feet 02/28/2022    Vitamin D deficiency 01/23/2021       * Indicates part of HEP   HEP code: [id number]     Daily Treatment Diary     Manuals 5/20 5/22 5/27 5/6 5/8 5/13 5/15   L hip PROM                                 Ther Ex           Bike (ROM) 5' lvl 2 5' lvl 1 5' lvl 4  5' lvl 1 5' lvl 1 5' lvl 1 5' lvl 3   Heel slides           Quad stretch Prone 10x10\" L Prone 10x10\" L Prone 10x10\" L  Prone 10x10\" L Prone 10x01\" L Prone 10x10\" L Prone 10x10\" L   Self-massage w/ roller L quad           LAQ           Flexion SLR 3x15 ea 3x15 ea 3x15 ea  3x15 ea 3x15 ea 3x15 ea 3x15 ea   Abduction SLR           Standing hip extension 3x15 ea 3x10 ea PiTB 3x10 ea PiTB  3x15 3x15 3x15 ea 3x15 ea   Standing hip abduction 3x15 ea 3x10 ea PiTB 3x10 ea PiTB  3x15 3x15 3x15 ea 3x15 ea   Ankle pumps*           Patient education                      Neuro Re-Ed           Quad sets*           Glute sets*           Bridges* 3x12 15# 3x15 15# 3x12 20#  3x12 10# 3x10 12# 3x10 12# 3x10 15#   Clamshells 3x10 L 3x10 L 3x12 L        NBOS balance           Tandem balance :30x2 ea floor EO       :30x2 ea floor EO   Tandem walking           Plank           Bird dog                                 Ther Activity           Sit to stand     2x10 2x10 2x10 dc   Goblet squat           Leg press 115# DL 3x12 P9S3 115# DL 3x12 P9S3 115# FL 3x15 P9S3 inc wt nv  95# DL 3x12 P11S3 95# DL 3x12 P11S3 95# DL 3x12 P11S3 115# DL 3x12 P10S3   Heel raises*           Toe raises*           Forward step ups 8\" step up/over 20x ea     6\" step up/over 1x10 ea, 8\" step up/over 1x10 ea 8\" 2x10 8\" step up/over 2x10 ea   Lateral step ups           Deadlift                                  Modalities           L hip CP   10' seated 10' seated   10' seated 10' seated 10' seated                          "

## 2025-05-29 ENCOUNTER — OFFICE VISIT (OUTPATIENT)
Dept: PHYSICAL THERAPY | Facility: REHABILITATION | Age: 65
End: 2025-05-29
Attending: ORTHOPAEDIC SURGERY
Payer: MEDICARE

## 2025-05-29 DIAGNOSIS — G89.29 CHRONIC LEFT HIP PAIN: Primary | ICD-10-CM

## 2025-05-29 DIAGNOSIS — M25.552 CHRONIC LEFT HIP PAIN: Primary | ICD-10-CM

## 2025-05-29 DIAGNOSIS — M16.12 PRIMARY OSTEOARTHRITIS OF LEFT HIP: ICD-10-CM

## 2025-05-29 PROCEDURE — 97530 THERAPEUTIC ACTIVITIES: CPT | Performed by: PHYSICAL THERAPIST

## 2025-05-29 PROCEDURE — 97110 THERAPEUTIC EXERCISES: CPT | Performed by: PHYSICAL THERAPIST

## 2025-05-29 PROCEDURE — 97112 NEUROMUSCULAR REEDUCATION: CPT | Performed by: PHYSICAL THERAPIST

## 2025-05-29 NOTE — PROGRESS NOTES
Daily Note     Today's date: 2025  Patient name: Cielo Thomason  : 1960  MRN: [id number]  Referring provider: Arvind Eaton,*  Dx:   Encounter Diagnosis     ICD-10-CM    1. Chronic left hip pain  M25.552     G89.29       2. Primary osteoarthritis of left hip  M16.12           Start Time: 1100  Stop Time: 1150  Total time in clinic (min): 50 minutes    Subjective: Cielo offers no new complaints since last session.       Objective: See treatment diary below      Assessment: Tolerated treatment well. Patient demonstrated appropriate level of challenge and muscular fatigue throughout session and noted good status at end of visit. Patient demonstrated fatigue post treatment, exhibited good technique with therapeutic exercises, and would benefit from continued PT.      Plan: Continue per plan of care.  Progress treatment as tolerated.       Precautions:   Past Medical History:   Diagnosis Date    Allergic rhinitis 2022    Annual physical exam 2024    Anxiety     Arthritis     BMI 40.0-44.9, adult ([facility]) 2021    BMI 50.0-59.9, adult ([facility]) 2021    Cellulitis of left leg 2021    Depression     Eczema 2022    Edema of both lower legs 2021    Edema of both lower legs 2021    Endometrial cancer ([facility]) 2021    Heart murmur     Heart murmur 2024    History of COVID-19 2020    Mild sypmtoms Cough,Tired,diarrhea,sweats, Loss taste and smell    Hyperglycemia 2021    Hyperlipidemia     Hypertension     Left hip pain 2022    Lower abdominal pain 2021    Lumbar radiculopathy 08/15/2024    Mixed hyperlipidemia 2021    Numbness and tingling in left arm     Obesity 2024    Pruritus     Rash of hand 2022    Shortness of breath     Skin lesion     Skin rash 2023    Status post left hip replacement 2025    Tinea cruris 2023    Tinea pedis of both feet 2022    Vitamin D deficiency 2021  "      * Indicates part of HEP   HEP code: [id number]     Daily Treatment Diary     Manuals 5/20 5/22 5/27 5/29  5/8 5/13 5/15   L hip PROM                                 Ther Ex           Bike (ROM) 5' lvl 2 5' lvl 1 5' lvl 4 5' lvl 4  5' lvl 1 5' lvl 1 5' lvl 3   Heel slides           Quad stretch Prone 10x10\" L Prone 10x10\" L Prone 10x10\" L Prone 10x10\" L  Prone 10x01\" L Prone 10x10\" L Prone 10x10\" L   Self-massage w/ roller L quad           LAQ           Flexion SLR 3x15 ea 3x15 ea 3x15 ea 3x15 ea  3x15 ea 3x15 ea 3x15 ea   Abduction SLR           Standing hip extension 3x15 ea 3x10 ea PiTB 3x10 ea PiTB 3x10 ea PiTB  3x15 3x15 ea 3x15 ea   Standing hip abduction 3x15 ea 3x10 ea PiTB 3x10 ea PiTB 3x10 ea PiTB  3x15 3x15 ea 3x15 ea   Ankle pumps*           Patient education                      Neuro Re-Ed           Quad sets*           Glute sets*           Bridges* 3x12 15# 3x15 15# 3x12 20# 3x12 20#  3x10 12# 3x10 12# 3x10 15#   Clamshells 3x10 L 3x10 L 3x12 L 3x10 L PiTB       NBOS balance           Tandem balance :30x2 ea floor EO       :30x2 ea floor EO   Tandem walking           Plank           Bird dog                                 Ther Activity           Sit to stand      2x10 2x10 dc   Goblet squat           Leg press 115# DL 3x12 P9S3 115# DL 3x12 P9S3 115# FL 3x15 P9S3 inc wt nv 135# DL 3x10 P9S3  95# DL 3x12 P11S3 95# DL 3x12 P11S3 115# DL 3x12 P10S3   Heel raises*           Toe raises*           Forward step ups 8\" step up/over 20x ea     6\" step up/over 1x10 ea, 8\" step up/over 1x10 ea 8\" 2x10 8\" step up/over 2x10 ea   Lateral step ups    6\" step 2x10 ea       Deadlift                                  Modalities           L hip CP   10' seated 10' seated  10' seated  10' seated 10' seated                            "

## 2025-06-02 DIAGNOSIS — Z47.1 AFTERCARE FOLLOWING LEFT HIP JOINT REPLACEMENT SURGERY: Primary | ICD-10-CM

## 2025-06-02 DIAGNOSIS — Z96.642 AFTERCARE FOLLOWING LEFT HIP JOINT REPLACEMENT SURGERY: Primary | ICD-10-CM

## 2025-06-03 ENCOUNTER — OFFICE VISIT (OUTPATIENT)
Dept: PHYSICAL THERAPY | Facility: REHABILITATION | Age: 65
End: 2025-06-03
Attending: ORTHOPAEDIC SURGERY
Payer: MEDICARE

## 2025-06-03 DIAGNOSIS — G89.29 CHRONIC LEFT HIP PAIN: Primary | ICD-10-CM

## 2025-06-03 DIAGNOSIS — Z96.642 HISTORY OF LEFT HIP REPLACEMENT: ICD-10-CM

## 2025-06-03 DIAGNOSIS — M25.552 CHRONIC LEFT HIP PAIN: Primary | ICD-10-CM

## 2025-06-03 DIAGNOSIS — M16.12 PRIMARY OSTEOARTHRITIS OF LEFT HIP: ICD-10-CM

## 2025-06-03 PROCEDURE — 97530 THERAPEUTIC ACTIVITIES: CPT | Performed by: PHYSICAL THERAPIST

## 2025-06-03 PROCEDURE — 97112 NEUROMUSCULAR REEDUCATION: CPT | Performed by: PHYSICAL THERAPIST

## 2025-06-03 PROCEDURE — 97110 THERAPEUTIC EXERCISES: CPT | Performed by: PHYSICAL THERAPIST

## 2025-06-03 NOTE — PROGRESS NOTES
Daily Note     Today's date: 6/3/2025  Patient name: Cielo Thomason  : 1960  MRN: [id number]  Referring provider: Arvind Eaton,*  Dx:   Encounter Diagnosis     ICD-10-CM    1. Chronic left hip pain  M25.552     G89.29       2. Primary osteoarthritis of left hip  M16.12       3. History of left hip replacement  Z96.642           Start Time: 934  Stop Time: 102  Total time in clinic (min): 48 minutes    Subjective: Cielo reports she's doing well at start of session but is eager for the hip to feel back to normal.       Objective: See treatment diary below      Assessment: Tolerated treatment well. Patient demonstrated appropriate level of challenge and muscular fatigue throughout session and noted good status at end of visit. Patient demonstrated fatigue post treatment, exhibited good technique with therapeutic exercises, and would benefit from continued PT.      Plan: Continue per plan of care.  Progress treatment as tolerated.       Precautions:   Past Medical History:   Diagnosis Date    Allergic rhinitis 2022    Annual physical exam 2024    Anxiety     Arthritis     BMI 40.0-44.9, adult ([facility]) 2021    BMI 50.0-59.9, adult ([facility]) 2021    Cellulitis of left leg 2021    Depression     Eczema 2022    Edema of both lower legs 2021    Edema of both lower legs 2021    Endometrial cancer ([facility]) 2021    Heart murmur     Heart murmur 2024    History of COVID-19 2020    Mild sypmtoms Cough,Tired,diarrhea,sweats, Loss taste and smell    Hyperglycemia 2021    Hyperlipidemia     Hypertension     Left hip pain 2022    Lower abdominal pain 2021    Lumbar radiculopathy 08/15/2024    Mixed hyperlipidemia 2021    Numbness and tingling in left arm     Obesity 2024    Pruritus     Rash of hand 2022    Shortness of breath     Skin lesion     Skin rash 2023    Status post left hip replacement 2025    Tinea  "cruris 08/24/2023    Tinea pedis of both feet 02/28/2022    Vitamin D deficiency 01/23/2021       * Indicates part of HEP   HEP code: [id number]     Daily Treatment Diary     Manuals 5/20 5/22 5/27 5/29 6/3  5/13 5/15   L hip PROM                                 Ther Ex           Bike (ROM) 5' lvl 2 5' lvl 1 5' lvl 4 5' lvl 4 5' lvl 5  5' lvl 1 5' lvl 3   Heel slides           Quad stretch Prone 10x10\" L Prone 10x10\" L Prone 10x10\" L Prone 10x10\" L Prone 10x10\" L  Prone 10x10\" L Prone 10x10\" L   Self-massage w/ roller L quad           LAQ           Flexion SLR 3x15 ea 3x15 ea 3x15 ea 3x15 ea 3x15 ea  3x15 ea 3x15 ea   Abduction SLR           Standing hip extension 3x15 ea 3x10 ea PiTB 3x10 ea PiTB 3x10 ea PiTB 3x12 ea PiTB  3x15 ea 3x15 ea   Standing hip abduction 3x15 ea 3x10 ea PiTB 3x10 ea PiTB 3x10 ea PiTB 3x12 ea PiTB  3x15 ea 3x15 ea   Ankle pumps*           Patient education                      Neuro Re-Ed           Quad sets*           Glute sets*           Bridges* 3x12 15# 3x15 15# 3x12 20# 3x12 20# 3x12 20#   3x10 12# 3x10 15#   Clamshells 3x10 L 3x10 L 3x12 L 3x10 L PiTB 3x10 L PiTB      NBOS balance           Tandem balance :30x2 ea floor EO       :30x2 ea floor EO   Tandem walking           Plank           Bird dog                                 Ther Activity           Sit to stand       2x10 dc   Goblet squat           Leg press 115# DL 3x12 P9S3 115# DL 3x12 P9S3 115# FL 3x15 P9S3 inc wt nv 135# DL 3x10 P9S3 135# DL 3x10 P9S3  95# DL 3x12 P11S3 115# DL 3x12 P10S3   Heel raises*           Toe raises*           Forward step ups 8\" step up/over 20x ea      8\" 2x10 8\" step up/over 2x10 ea   Lateral step ups    6\" step 2x10 ea 6\" step 2x10 ea      Deadlift                                  Modalities           L hip CP   10' seated 10' seated  10' seated 10' seated  10' seated                              "

## 2025-06-05 ENCOUNTER — APPOINTMENT (OUTPATIENT)
Dept: PHYSICAL THERAPY | Facility: REHABILITATION | Age: 65
End: 2025-06-05
Attending: ORTHOPAEDIC SURGERY
Payer: MEDICARE

## 2025-06-06 DIAGNOSIS — F41.9 ANXIETY AND DEPRESSION: Chronic | ICD-10-CM

## 2025-06-06 DIAGNOSIS — F32.A ANXIETY AND DEPRESSION: Chronic | ICD-10-CM

## 2025-06-06 RX ORDER — BUSPIRONE HYDROCHLORIDE 5 MG/1
5 TABLET ORAL
Qty: 90 TABLET | Refills: 0 | Status: SHIPPED | OUTPATIENT
Start: 2025-06-06

## 2025-06-08 DIAGNOSIS — I10 ESSENTIAL HYPERTENSION: ICD-10-CM

## 2025-06-08 RX ORDER — METOPROLOL SUCCINATE 100 MG/1
TABLET, EXTENDED RELEASE ORAL
Qty: 90 TABLET | Refills: 1 | Status: SHIPPED | OUTPATIENT
Start: 2025-06-08

## 2025-06-08 RX ORDER — LOSARTAN POTASSIUM AND HYDROCHLOROTHIAZIDE 12.5; 5 MG/1; MG/1
1 TABLET ORAL DAILY
Qty: 90 TABLET | Refills: 1 | Status: SHIPPED | OUTPATIENT
Start: 2025-06-08

## 2025-06-10 ENCOUNTER — OFFICE VISIT (OUTPATIENT)
Dept: PHYSICAL THERAPY | Facility: REHABILITATION | Age: 65
End: 2025-06-10
Attending: ORTHOPAEDIC SURGERY
Payer: MEDICARE

## 2025-06-10 DIAGNOSIS — M25.552 CHRONIC LEFT HIP PAIN: Primary | ICD-10-CM

## 2025-06-10 DIAGNOSIS — M16.12 PRIMARY OSTEOARTHRITIS OF LEFT HIP: ICD-10-CM

## 2025-06-10 DIAGNOSIS — Z96.642 HISTORY OF LEFT HIP REPLACEMENT: ICD-10-CM

## 2025-06-10 DIAGNOSIS — G89.29 CHRONIC LEFT HIP PAIN: Primary | ICD-10-CM

## 2025-06-10 PROCEDURE — 97112 NEUROMUSCULAR REEDUCATION: CPT | Performed by: PHYSICAL THERAPIST

## 2025-06-10 PROCEDURE — 97110 THERAPEUTIC EXERCISES: CPT | Performed by: PHYSICAL THERAPIST

## 2025-06-10 PROCEDURE — 97530 THERAPEUTIC ACTIVITIES: CPT | Performed by: PHYSICAL THERAPIST

## 2025-06-10 NOTE — PROGRESS NOTES
Daily Note     Today's date: 6/10/2025  Patient name: Cielo Thomason  : 1960  MRN: [id number]  Referring provider: Arvind Eaton,*  Dx:   Encounter Diagnosis     ICD-10-CM    1. Chronic left hip pain  M25.552     G89.29       2. Primary osteoarthritis of left hip  M16.12       3. History of left hip replacement  Z96.642           Start Time: 930  Stop Time: 1020  Total time in clinic (min): 50 minutes    Subjective: Cielo reports doing some extra walking since last session for which she used her cane because of the distance. Noted some soreness with the increased activity.       Objective: See treatment diary below      Assessment: Tolerated treatment well. Patient demonstrated appropriate level of challenge and muscular fatigue throughout session and noted good status at end of visit. Patient demonstrated fatigue post treatment, exhibited good technique with therapeutic exercises, and would benefit from continued PT.      Plan: Continue per plan of care.  Progress treatment as tolerated.       Precautions:   Past Medical History:   Diagnosis Date    Allergic rhinitis 2022    Annual physical exam 2024    Anxiety     Arthritis     BMI 40.0-44.9, adult ([facility]) 2021    BMI 50.0-59.9, adult ([facility]) 2021    Cellulitis of left leg 2021    Depression     Eczema 2022    Edema of both lower legs 2021    Edema of both lower legs 2021    Endometrial cancer ([facility]) 2021    Heart murmur     Heart murmur 2024    History of COVID-19 2020    Mild sypmtoms Cough,Tired,diarrhea,sweats, Loss taste and smell    Hyperglycemia 2021    Hyperlipidemia     Hypertension     Left hip pain 2022    Lower abdominal pain 2021    Lumbar radiculopathy 08/15/2024    Mixed hyperlipidemia 2021    Numbness and tingling in left arm     Obesity 2024    Pruritus     Rash of hand 2022    Shortness of breath     Skin lesion     Skin rash  "05/02/2023    Status post left hip replacement 04/14/2025    Tinea cruris 08/24/2023    Tinea pedis of both feet 02/28/2022    Vitamin D deficiency 01/23/2021       * Indicates part of HEP   HEP code: [id number]     Daily Treatment Diary     Manuals 5/20 5/22 5/27 5/29 6/3 6/10  5/15   L hip PROM                                 Ther Ex           Bike (ROM) 5' lvl 2 5' lvl 1 5' lvl 4 5' lvl 4 5' lvl 5 5' lvl 5  5' lvl 3   Heel slides           Quad stretch Prone 10x10\" L Prone 10x10\" L Prone 10x10\" L Prone 10x10\" L Prone 10x10\" L Prone 10x10\" L  Prone 10x10\" L   Self-massage w/ roller L quad           LAQ           Flexion SLR 3x15 ea 3x15 ea 3x15 ea 3x15 ea 3x15 ea 3x15 ea  3x15 ea   Abduction SLR           Standing hip extension 3x15 ea 3x10 ea PiTB 3x10 ea PiTB 3x10 ea PiTB 3x12 ea PiTB 3x12 ea PiTB  3x15 ea   Standing hip abduction 3x15 ea 3x10 ea PiTB 3x10 ea PiTB 3x10 ea PiTB 3x12 ea PiTB 3x12 ea PiTB  3x15 ea   Ankle pumps*           Patient education                      Neuro Re-Ed           Quad sets*           Glute sets*           Bridges* 3x12 15# 3x15 15# 3x12 20# 3x12 20# 3x12 20#  3x15 20#  3x10 15#   Clamshells 3x10 L 3x10 L 3x12 L 3x10 L PiTB 3x10 L PiTB 3x12 L PiTB     NBOS balance           Tandem balance :30x2 ea floor EO     :30x3 ea floor EO  :30x2 ea floor EO   Tandem walking           Plank           Bird dog                                 Ther Activity           Sit to stand        dc   Goblet squat           Leg press 115# DL 3x12 P9S3 115# DL 3x12 P9S3 115# FL 3x15 P9S3 inc wt nv 135# DL 3x10 P9S3 135# DL 3x10 P9S3 135# DL 3x12 P8S3  115# DL 3x12 P10S3   Heel raises*           Toe raises*           Forward step ups 8\" step up/over 20x ea       8\" step up/over 2x10 ea   Lateral step ups    6\" step 2x10 ea 6\" step 2x10 ea 6\" step 2x12 ea     Deadlift                                  Modalities           L hip CP   10' seated 10' seated  10' seated 10' seated 10' seated              "

## 2025-06-11 DIAGNOSIS — F32.A ANXIETY AND DEPRESSION: Chronic | ICD-10-CM

## 2025-06-11 DIAGNOSIS — F41.9 ANXIETY AND DEPRESSION: Chronic | ICD-10-CM

## 2025-06-11 RX ORDER — CITALOPRAM HYDROBROMIDE 20 MG/1
20 TABLET ORAL
Qty: 90 TABLET | Refills: 1 | Status: SHIPPED | OUTPATIENT
Start: 2025-06-11

## 2025-06-12 ENCOUNTER — EVALUATION (OUTPATIENT)
Dept: PHYSICAL THERAPY | Facility: REHABILITATION | Age: 65
End: 2025-06-12
Attending: ORTHOPAEDIC SURGERY
Payer: MEDICARE

## 2025-06-12 DIAGNOSIS — M25.552 CHRONIC LEFT HIP PAIN: Primary | ICD-10-CM

## 2025-06-12 DIAGNOSIS — M16.12 PRIMARY OSTEOARTHRITIS OF LEFT HIP: ICD-10-CM

## 2025-06-12 DIAGNOSIS — G89.29 CHRONIC LEFT HIP PAIN: Primary | ICD-10-CM

## 2025-06-12 DIAGNOSIS — Z96.642 HISTORY OF LEFT HIP REPLACEMENT: ICD-10-CM

## 2025-06-12 PROCEDURE — 97110 THERAPEUTIC EXERCISES: CPT | Performed by: PHYSICAL THERAPIST

## 2025-06-12 PROCEDURE — 97112 NEUROMUSCULAR REEDUCATION: CPT | Performed by: PHYSICAL THERAPIST

## 2025-06-12 PROCEDURE — 97530 THERAPEUTIC ACTIVITIES: CPT | Performed by: PHYSICAL THERAPIST

## 2025-06-12 NOTE — PROGRESS NOTES
PT Re-Evaluation     Today's date: 2025  Patient name: Cielo Thomason  : 1960  MRN: [id number]  Referring provider: Arvind Eaton,*  Dx:   Encounter Diagnosis     ICD-10-CM    1. Chronic left hip pain  M25.552     G89.29       2. Primary osteoarthritis of left hip  M16.12       3. History of left hip replacement  Z96.642               Start Time: 930  Stop Time:   Total time in clinic (min): 65 minutes    Assessment  Impairments: abnormal gait, abnormal or restricted ROM, abnormal movement, activity intolerance, impaired balance, impaired physical strength, lacks appropriate home exercise program, pain with function, weight-bearing intolerance, poor posture , poor body mechanics, unable to perform ADL, participation limitations, activity limitations and endurance  Symptom irritability: low    Assessment details: Per patient report and clinical findings, Cielo Thomason has made good progress since starting skilled physical therapy services. Cielo has been compliant with PT POC and HEP since initial evaluation. To this date, Cielo Thomason has completed 22 visits of skilled physical therapy post-operatively. Cielo demonstrates improvements in objective data however, continues to be limited compared to her prior level of function. At this time, recommend one additional visit of continued skilled physical therapy services to ensure continued improved status and transition to independent HEP.            Understanding of Dx/Px/POC: good     Prognosis: good    Goals  Short-term (6 weeks)  1.  Pain at worst decrease from 5/10 to 3/10 to improve QoL. - met  2. L LE strength increase to 3+/5 to improve activity tolerance. - met  3. Improve TUG time from 26 sec. to 20 sec. to demonstrate progress towards normative values. - met     Long-term (12 weeks):  1. L hip ROM WFL to improve activity tolerance. - progressing  2. Global LE strength increase to 4+/5 to improve activity tolerance. -  met  3. Able to walk for at least 30 min. without pain to improve activity tolerance. - met  4. Able to ascend/descend 1 flight of stairs without compensation to promote improved household ambulation. - met   5. Patient is able to return to PLOF. - met  6. Improve TUG time from 26 sec. to 12 sec. to demonstrate progress towards normative values. - met  7. Independent with HEP. - met          Plan  Patient would benefit from: skilled physical therapy and home program  Planned modality interventions: neuromuscular electric stimulation, TENS, unattended electrical stimulation, cryotherapy and thermotherapy: hydrocollator packs    Planned therapy interventions: abdominal trunk stabilization, activity modification, ADL retraining, balance, balance/weight bearing training, behavior modification, body mechanics training, breathing training, patient/caregiver education, muscle pump exercises, nerve gliding, neuromuscular re-education, motor coordination training, manual therapy, kinesiology taping, joint mobilization, IASTM, postural training, self care, functional ROM exercises, flexibility, gait training, graded activity, graded exercise, therapeutic training, transfer training, therapeutic exercise, therapeutic activities, stretching, strengthening, graded motor, home exercise program, IADL retraining and López taping    Frequency: 1-2x week  Duration in weeks: 2  Plan of Care beginning date: 6/12/2025  Plan of Care expiration date: 6/26/2025  Treatment plan discussed with: patient  Plan details: Thank you for referring Cielo Thomason to Physical Therapy at [facility] and for the opportunity to coordinate care.          Subjective Evaluation    History of Present Illness  Date of surgery: 3/24/2025  Mechanism of injury: surgery  Mechanism of injury:   06/12/25:  Cielo Thomason reports feeling she has made 70-80% progress since IE. Cielo reports improvements in ability to clean her home, increased activity  tolerance overall, walking, stair negotiation. Despite improvements, Cielo reports continued difficulty with navigating stair and walking due to R knee pain. Does report being cautious with bending movements due to her hip.    25:  Cielo Thomason reports feeling she has made 65% progress since IE. Cielo reports improvements in ability to lift her leg, ability to get in/out of bed, walking, going up/down steps, standing, and doing her exercises. Despite improvements, Cielo reports continued difficulty with going up steps, going down steps, carrying things down steps, prolonged walking.    25:  Cielo Thomason is a 64 y.o. female patient presenting s/p left FLETCHER with Dr. Eaton on 3/24/25. Pain is relieved or reduced with medication. Cielo is currently having difficulty with walking, stairs (descending> ascending) negotiation with non-reciprocal gait pattern, standing from a chair, fatigue, and all typical day-to-day activities. Pt would like to return to being able to walk without AD, IADLs, safe step negotiation, walking longer distances, and household chores. Next follow up with the physician is 2025.      03/10/2025:  Cielo Thomason is a 64 y.o. female who presents pre-op L FLETCHER with Dr. Eaton on 3/24/25 with primary complaints of L hip pain. Had a fall in July leading to ER visit on 25. Hx of R FLETCHER & R TKA. Concerned about falling. Describes symptoms as sharp and tight groin and buttock pain. Currently having difficulty with walking, house chores, and ADLs. Has difficulty sleeping secondary to L hip pain. Both wakes up and difficulty finding comfortable position. Denies reports of numbness/tingling in b/l LEs.    Patient Goals  Patient goals for therapy: decreased pain, return to sport/leisure activities, independence with ADLs/IADLs, increased strength and improved balance    Pain  Current pain ratin  At best pain ratin  At worst pain ratin  Location: L hip  Relieving  "factors: medications  Aggravating factors: walking, stair climbing and standing    Social Support  Steps to enter house: yes (R HR)  3  Stairs in house: yes (L HR)   8  Lives in: multiple-level home (bedroom on 2nd floor)  Lives with: spouse    Employment status: working  Treatments  Previous treatment: injection treatment (groin and buttock injections)  Current treatment: physical therapy        Objective     Active Range of Motion   Left Hip   Flexion: 105 degrees   External rotation (90/90): 10 degrees   Internal rotation (90/90): 25 degrees     Right Hip   Flexion: 115 degrees   External rotation (90/90): 25 degrees   Internal rotation (90/90): 25 degrees     Passive Range of Motion   Left Hip   Abduction: 25 degrees     Strength/Myotome Testing     Left Hip   Planes of Motion   Flexion: 5  Abduction: 5  Adduction: 5  External rotation: 4+  Internal rotation: 5    Right Hip   Planes of Motion   Flexion: 5  Abduction: 5  Adduction: 5  External rotation: 5  Internal rotation: 5    Left Ankle/Foot   Dorsiflexion: 5    Right Ankle/Foot   Dorsiflexion: 5    General Comments:      Hip Comments   2025:  Post-op TU\" w/ SPC on R and use of RUE to sit<>stand from chair, 9\" w/out AD and use of RUE to sit<>stand from chair  Gait: ambulates with SPC on R, decreased L stance time compared to R    3/27/2025:  Post-op TU\" w/ RW and R UE use to aid in standing and sitting, chair w/ 2 foam boost  Gait: initial step to pattern with RW progressing to step through pattern with RW    3/10/2025:  Pre-op TU\" w/ SPC on R w/ BUE use to aid in standing and sitting             Precautions:   Past Medical History:   Diagnosis Date    Allergic rhinitis 2022    Annual physical exam 2024    Anxiety     Arthritis     BMI 40.0-44.9, adult ([facility]) 2021    BMI 50.0-59.9, adult ([facility]) 2021    Cellulitis of left leg 2021    Depression     Eczema 2022    Edema of both lower legs 2021    Edema " "of both lower legs 11/23/2021    Endometrial cancer (HCC) 09/14/2021    Heart murmur     Heart murmur 03/25/2024    History of COVID-19 11/2020    Mild sypmtoms Cough,Tired,diarrhea,sweats, Loss taste and smell    Hyperglycemia 01/23/2021    Hyperlipidemia     Hypertension     Left hip pain 06/02/2022    Lower abdominal pain 11/23/2021    Lumbar radiculopathy 08/15/2024    Mixed hyperlipidemia 01/23/2021    Numbness and tingling in left arm     Obesity 03/25/2024    Pruritus     Rash of hand 02/28/2022    Shortness of breath     Skin lesion     Skin rash 05/02/2023    Status post left hip replacement 04/14/2025    Tinea cruris 08/24/2023    Tinea pedis of both feet 02/28/2022    Vitamin D deficiency 01/23/2021       * Indicates part of HEP   HEP code: [id number]     Daily Treatment Diary     Manuals 5/20 5/22 5/27 5/29 6/3 6/10 6/12    L hip PROM                                 Ther Ex           Bike (ROM) 5' lvl 2 5' lvl 1 5' lvl 4 5' lvl 4 5' lvl 5 5' lvl 5 5' lvl 5    Heel slides           Quad stretch Prone 10x10\" L Prone 10x10\" L Prone 10x10\" L Prone 10x10\" L Prone 10x10\" L Prone 10x10\" L Prone 10x10\" L    Self-massage w/ roller L quad           LAQ           Flexion SLR 3x15 ea 3x15 ea 3x15 ea 3x15 ea 3x15 ea 3x15 ea 3x15 ea    Abduction SLR           Standing hip extension 3x15 ea 3x10 ea PiTB 3x10 ea PiTB 3x10 ea PiTB 3x12 ea PiTB 3x12 ea PiTB 3x12 ea PiTB    Standing hip abduction 3x15 ea 3x10 ea PiTB 3x10 ea PiTB 3x10 ea PiTB 3x12 ea PiTB 3x12 ea PiTB 3x12 ea PiTB    Ankle pumps*           Patient education                      Neuro Re-Ed           Quad sets*           Glute sets*           Bridges* 3x12 15# 3x15 15# 3x12 20# 3x12 20# 3x12 20#  3x15 20# 3x15 20#    Clamshells 3x10 L 3x10 L 3x12 L 3x10 L PiTB 3x10 L PiTB 3x12 L PiTB 3x12 L PiTB    NBOS balance           Tandem balance :30x2 ea floor EO     :30x3 ea floor EO :30x4 ea floor EO    Tandem walking           Plank           Bird dog                " "                 Ther Activity           Sit to stand           Goblet squat           Leg press 115# DL 3x12 P9S3 115# DL 3x12 P9S3 115# FL 3x15 P9S3 inc wt nv 135# DL 3x10 P9S3 135# DL 3x10 P9S3 135# DL 3x12 P8S3 135# DL 3x12 P8S3    Heel raises*           Toe raises*           Forward step ups 8\" step up/over 20x ea          Lateral step ups    6\" step 2x10 ea 6\" step 2x10 ea 6\" step 2x12 ea 6\" step 2x12 ea    Deadlift                                  Modalities           L hip CP   10' seated 10' seated  10' seated 10' seated 10' seated 10' seated                        "

## 2025-06-17 ENCOUNTER — HOSPITAL ENCOUNTER (OUTPATIENT)
Dept: RADIOLOGY | Facility: HOSPITAL | Age: 65
Discharge: HOME | End: 2025-06-17
Attending: ORTHOPAEDIC SURGERY
Payer: MEDICARE

## 2025-06-17 ENCOUNTER — OFFICE VISIT (OUTPATIENT)
Dept: OBGYN CLINIC | Facility: HOSPITAL | Age: 65
End: 2025-06-17
Payer: MEDICARE

## 2025-06-17 VITALS — BODY MASS INDEX: 40.6 KG/M2 | HEIGHT: 68 IN

## 2025-06-17 DIAGNOSIS — M25.561 RIGHT KNEE PAIN, UNSPECIFIED CHRONICITY: Primary | ICD-10-CM

## 2025-06-17 DIAGNOSIS — Z47.1 AFTERCARE FOLLOWING LEFT HIP JOINT REPLACEMENT SURGERY: ICD-10-CM

## 2025-06-17 DIAGNOSIS — Z96.642 AFTERCARE FOLLOWING LEFT HIP JOINT REPLACEMENT SURGERY: ICD-10-CM

## 2025-06-17 DIAGNOSIS — M70.51 PES ANSERINUS BURSITIS OF RIGHT KNEE: ICD-10-CM

## 2025-06-17 DIAGNOSIS — M25.561 RIGHT KNEE PAIN, UNSPECIFIED CHRONICITY: ICD-10-CM

## 2025-06-17 PROCEDURE — 99213 OFFICE O/P EST LOW 20 MIN: CPT | Performed by: ORTHOPAEDIC SURGERY

## 2025-06-17 PROCEDURE — 73560 X-RAY EXAM OF KNEE 1 OR 2: CPT

## 2025-06-17 PROCEDURE — 20610 DRAIN/INJ JOINT/BURSA W/O US: CPT | Performed by: ORTHOPAEDIC SURGERY

## 2025-06-17 PROCEDURE — 73502 X-RAY EXAM HIP UNI 2-3 VIEWS: CPT

## 2025-06-17 RX ORDER — BETAMETHASONE SODIUM PHOSPHATE AND BETAMETHASONE ACETATE 3; 3 MG/ML; MG/ML
12 INJECTION, SUSPENSION INTRA-ARTICULAR; INTRALESIONAL; INTRAMUSCULAR; SOFT TISSUE
Status: COMPLETED | OUTPATIENT
Start: 2025-06-17 | End: 2025-06-17

## 2025-06-17 RX ORDER — LIDOCAINE HYDROCHLORIDE 10 MG/ML
4 INJECTION, SOLUTION INFILTRATION; PERINEURAL
Status: COMPLETED | OUTPATIENT
Start: 2025-06-17 | End: 2025-06-17

## 2025-06-17 RX ADMIN — LIDOCAINE HYDROCHLORIDE 4 ML: 10 INJECTION, SOLUTION INFILTRATION; PERINEURAL at 13:45

## 2025-06-17 RX ADMIN — BETAMETHASONE SODIUM PHOSPHATE AND BETAMETHASONE ACETATE 12 MG: 3; 3 INJECTION, SUSPENSION INTRA-ARTICULAR; INTRALESIONAL; INTRAMUSCULAR; SOFT TISSUE at 13:45

## 2025-06-17 NOTE — PROGRESS NOTES
Encounter Provider: Arvind Eaton MD   Encounter Date: 06/17/25  Encounter department: [facility] [place]         ASSESSMENT & PLAN:  Assessment & Plan  Right knee pain, unspecified chronicity  Pes anserinus bursitis of right knee  The patient was provided with right pes anserine steroid injection.  The patient tolerated the procedure well.      Aftercare following left hip joint replacement surgery  3 months s/p left FLETCHER, 3/24/2025  She is doing well.    She is encouraged to remain active including HEP.    The patient is counseled on prophylactic antibiotic use prior to dental visits.    She should follow up in 3 months       Other orders    Large joint arthrocentesis      To do next visit:  Return in about 3 months (around 9/17/2025) for re-check with x-rays.    Scribe Attestation      I,:  Jus Rubalcava MA am acting as a scribe while in the presence of the attending physician.:       I,:  Arvind Eaton MD personally performed the services described in this documentation    as scribed in my presence.:             ____________________________________________________  CHIEF COMPLAINT:  Chief Complaint   Patient presents with    Left Hip - Post-op    Right Knee - Pain    Left Knee - Pain         SUBJECTIVE:  Cielo Thomason is a 64 y.o. female who presents 3 months s/p left FLETCHER, 3/24/2025.  She is doing well.  Today she complains of right knee pain.   Weight bearing aggravates while rest alleviates.   She continues physical therapy with benefit.  She will use Advil for pain.  He denies fever, chills or shortness of breath.            PAST MEDICAL HISTORY:  Past Medical History[1]    PAST SURGICAL HISTORY:  Past Surgical History[2]    FAMILY HISTORY:  Family History[3]    SOCIAL HISTORY:  Social History[4]    MEDICATIONS:  Current Medications[5]    ALLERGIES:  Allergies[6]    LABS:  HgA1c:   Lab Results   Component Value Date    HGBA1C 5.0 02/23/2025     BMP:   Lab  "Results   Component Value Date    CALCIUM 8.7 03/25/2025    K 4.3 03/25/2025    CO2 31 03/25/2025     03/25/2025    BUN 14 03/25/2025    CREATININE 0.89 03/25/2025     CBC: No components found for: \"CBC\"    _____________________________________________________  PHYSICAL EXAMINATION:  Vital signs: Ht 5' 8\" (1.727 m)   LMP  (LMP Unknown)   BMI 40.60 kg/m²   General: No acute distress, awake and alert  Psychiatric: Mood and affect appear appropriate  HEENT: Trachea Midline, No torticollis, no apparent facial trauma  Cardiovascular: No audible murmurs; Extremities appear perfused  Pulmonary: No audible wheezing or stridor  Skin: No open lesions; see further details (if any) below    Ortho Exam:  Left hip:  Well healed posterior incision  Good arc of motion with no pain  Patient sits comfortably in chair with hip flexed at 90 degrees  Patient stands from seated position without assistance  Calf compartments soft and supple  Sensation intact  Toes are warm sensate and mobile    Right knee:  TTP over pes anserine  Well healed anterior incision  No erythema and no ecchymosis  Stable to varus and valgus stress  Extensor mechanism intact  Extension 0  Flexion 120  Calf compartments soft and supple  Sensation intact  Toes are warm sensate and mobile          _____________________________________________________  STUDIES REVIEWED:    The attending physician has personally reviewed the pertinent films in [facility] and interpretation is as follows:  Left hip  x-ray:  Well aligned prosthesis with no acute changes.      Right knee x-ray:  Well positioned total knee arthroplasty    Left knee x-ray:  moderate tricompartmental arthritic changes.        PROCEDURES PERFORMED:  Large joint arthrocentesis: R pes anserine bursa    Performed by: Arvind Eaton MD  Authorized by: Arvind Eaton MD    Universal Protocol:  Consent: Verbal consent obtained  Risks and benefits: risks, benefits and alternatives were " "discussed  Consent given by: patient  Time out: Immediately prior to procedure a \"time out\" was called to verify the correct patient, procedure, equipment, support staff and site/side marked as required.  Timeout called at: 6/17/2025 2:20 PM.  Patient understanding: patient states understanding of the procedure being performed  Patient consent: the patient's understanding of the procedure matches consent given  Site marked: the operative site was marked  Patient identity confirmed: verbally with patient  Supporting Documentation  Indications: pain     Is this a Visco injection? NoProcedure Details  Location: knee - R pes anserine bursa  Preparation: Patient was prepped and draped in the usual sterile fashion  Needle size: 22 G  Ultrasound guidance: no  Approach: anterolateral  Medications administered: 12 mg betamethasone acetate-betamethasone sodium phosphate 6 (3-3) mg/mL; 4 mL lidocaine 1 %    Patient tolerance: patient tolerated the procedure well with no immediate complications  Dressing:  Sterile dressing applied              Portions of the record may have been created with voice recognition software.  Occasional wrong word or \"sound a like\" substitutions may have occurred due to the inherent limitations of voice recognition software.  Read the chart carefully and recognize, using context, where substitutions have occurred.             [1]   Past Medical History:  Diagnosis Date    Allergic rhinitis 07/07/2022    Annual physical exam 12/16/2024    Anxiety     Arthritis     BMI 40.0-44.9, adult ([facility]) 06/01/2021    BMI 50.0-59.9, adult ([facility]) 06/01/2021    Cellulitis of left leg 08/17/2021    Depression     Eczema 06/02/2022    Edema of both lower legs 01/23/2021    Edema of both lower legs 11/23/2021    Endometrial cancer ([facility]) 09/14/2021    Heart murmur     Heart murmur 03/25/2024    History of COVID-19 11/2020    Mild sypmtoms Cough,Tired,diarrhea,sweats, Loss taste and smell    Hyperglycemia 01/23/2021    " Hyperlipidemia     Hypertension     Left hip pain 2022    Lower abdominal pain 2021    Lumbar radiculopathy 08/15/2024    Mixed hyperlipidemia 2021    Numbness and tingling in left arm     Obesity 2024    Pruritus     Rash of hand 2022    Shortness of breath     Skin lesion     Skin rash 2023    Status post left hip replacement 2025    Tinea cruris 2023    Tinea pedis of both feet 2022    Vitamin D deficiency 2021   [2]   Past Surgical History:  Procedure Laterality Date    CARPAL TUNNEL RELEASE Bilateral      SECTION      x3     SECTION      CHOLECYSTECTOMY      CHOLECYSTECTOMY LAPAROSCOPIC N/A 2019    Procedure: CHOLECYSTECTOMY LAPAROSCOPIC;  Surgeon: Shayne De Leon MD;  Location: [facility];  Service: General    COLONOSCOPY      CYSTOSCOPY N/A 10/08/2021    Procedure: Cystoscopy;  Surgeon: Adelso Freeman MD;  Location: [facility];  Service: Gynecology Oncology    FL GUIDED NEEDLE PLAC BX/ASP/INJ  2015    FL INJECTION LEFT HIP (NON ARTHROGRAM)  2022    HYSTERECTOMY      JOINT REPLACEMENT  2016    R total HIp    MAMMO (HISTORICAL)  10/11/2018    Advise for yearly mammo screening    OOPHORECTOMY Bilateral     TX ARTHRP ACETBLR/PROX FEM PROSTC AGRFT/ALGRFT Left 3/24/2025    Procedure: ARTHROPLASTY HIP TOTAL;  Surgeon: Arvind Eaton MD;  [facility]: [facility];  Service: Orthopedics    TX HYSTEROSCOPY BX ENDOMETRIUM&/POLYPC W/WO D&C N/A 2016    Procedure: FRACTIONAL DILATATION AND CURETTAGE (D&C) WITH HYSTEROSOCPY;  Surgeon: Farnaz Romero MD;  Location: [facility];  Service: Gynecology    TX LAPS TOTAL HYSTERECT 250 GM/< W/RMVL TUBE/OVARY N/A 10/08/2021    Procedure: ROBOTIC HYSTERECTOMY, BILATERAL SALPINGOOPHERECTOMY; LEFT EXTERNAL ILIAC SENTINEL LYMPH NODE BIOPSY, RIGHT PELVIC LYMPHADENECTOMY;  Surgeon: Adelso Freeman MD;  Location: [facility];  Service: Gynecology Oncology    REPLACEMENT  TOTAL KNEE Right    [3]   Family History  Problem Relation Name Age of Onset    Hypertension Mother      Hypertension Father      Heart failure Father      Lymphoma Father  62    Diabetes Father          at old age    No Known Problems Sister      No Known Problems Daughter      No Known Problems Maternal Grandmother      No Known Problems Maternal Grandfather      No Known Problems Paternal Grandmother      No Known Problems Paternal Grandfather      Hemophilia Brother      Breast cancer Maternal Aunt  50    Brain cancer Maternal Aunt  48    No Known Problems Maternal Aunt      No Known Problems Son      No Known Problems Son      No Known Problems Son     [4]   Social History  Tobacco Use    Smoking status: Never    Smokeless tobacco: Never   Vaping Use    Vaping status: Never Used   Substance Use Topics    Alcohol use: Not Currently    Drug use: Not Currently   [5]   Current Outpatient Medications:     acetaminophen (TYLENOL) 500 mg tablet, Take 2 tablets (1,000 mg total) by mouth every 6 (six) hours as needed for mild pain, Disp: 90 tablet, Rfl: 0    amLODIPine (NORVASC) 5 mg tablet, Take 1 tablet (5 mg total) by mouth daily, Disp: 90 tablet, Rfl: 1    busPIRone (BUSPAR) 5 mg tablet, TAKE 1 TABLET(5 MG) BY MOUTH DAILY AT BEDTIME, Disp: 90 tablet, Rfl: 0    calcium carbonate (OS-CAMDEN) 600 MG tablet, Take 600 mg by mouth daily, Disp: , Rfl:     Cholecalciferol (Vitamin D) 50 MCG (2000 UT) tablet, Take 2,000 Units by mouth daily, Disp: , Rfl:     citalopram (CeleXA) 20 mg tablet, TAKE 1 TABLET(20 MG) BY MOUTH DAILY AT BEDTIME, Disp: 90 tablet, Rfl: 1    clotrimazole-betamethasone (LOTRISONE) 1-0.05 % cream, Apply topically 2 (two) times a day, Disp: 45 g, Rfl: 0    losartan-hydrochlorothiazide (HYZAAR) 50-12.5 mg per tablet, TAKE 1 TABLET BY MOUTH DAILY, Disp: 90 tablet, Rfl: 1    metoprolol succinate (TOPROL-XL) 100 mg 24 hr tablet, TAKE 1 TABLET(100 MG) BY MOUTH DAILY AT BEDTIME, Disp: 90 tablet, Rfl: 1     Multiple Vitamins-Minerals (multivitamin with minerals) tablet, Take 1 tablet by mouth daily, Disp: , Rfl:     omeprazole (PriLOSEC) 20 mg delayed release capsule, Take 20 mg by mouth as needed, Disp: , Rfl:     vitamin B-12 (VITAMIN B-12) 1,000 mcg tablet, Take by mouth daily, Disp: , Rfl:   [6]   Allergies  Allergen Reactions    Griseofulvin Hives    Penicillins Hives     She can take cephalosporin without any side effect    Lisinopril Cough    Zithromax [Azithromycin] Headache

## 2025-06-19 ENCOUNTER — OFFICE VISIT (OUTPATIENT)
Dept: PHYSICAL THERAPY | Facility: REHABILITATION | Age: 65
End: 2025-06-19
Attending: ORTHOPAEDIC SURGERY
Payer: MEDICARE

## 2025-06-19 DIAGNOSIS — M16.12 PRIMARY OSTEOARTHRITIS OF LEFT HIP: ICD-10-CM

## 2025-06-19 DIAGNOSIS — Z96.642 HISTORY OF LEFT HIP REPLACEMENT: ICD-10-CM

## 2025-06-19 DIAGNOSIS — M25.552 CHRONIC LEFT HIP PAIN: Primary | ICD-10-CM

## 2025-06-19 DIAGNOSIS — G89.29 CHRONIC LEFT HIP PAIN: Primary | ICD-10-CM

## 2025-06-19 PROCEDURE — 97110 THERAPEUTIC EXERCISES: CPT | Performed by: PHYSICAL THERAPIST

## 2025-06-19 PROCEDURE — 97112 NEUROMUSCULAR REEDUCATION: CPT | Performed by: PHYSICAL THERAPIST

## 2025-06-19 NOTE — PROGRESS NOTES
Daily Note / Discharge Note    Today's date: 2025  Patient name: Cielo Thomason  : 1960  MRN: [id number]  Referring provider: Arvind Eaton,*  Dx:   Encounter Diagnosis     ICD-10-CM    1. Chronic left hip pain  M25.552     G89.29       2. Primary osteoarthritis of left hip  M16.12       3. History of left hip replacement  Z96.642           Start Time: 930  Stop Time: 1020  Total time in clinic (min): 50 minutes    Subjective: Cielo reports her knee is feeling much better after receiving an injection when she followed up with orthopedic. No complaints regarding the hip.       Objective: See treatment diary below      Assessment: Tolerated treatment well. Re-evaluation performed last session; updated measurements not taken this session as a result. Patient demonstrated appropriate level of challenge and muscular fatigue throughout session and noted good status at end of visit. Patient demonstrated fatigue post treatment, exhibited good technique with therapeutic exercises, and would benefit from continued PT.      Plan: Transition to independent HEP.     Precautions:   Past Medical History:   Diagnosis Date    Allergic rhinitis 2022    Annual physical exam 2024    Anxiety     Arthritis     BMI 40.0-44.9, adult ([facility]) 2021    BMI 50.0-59.9, adult ([facility]) 2021    Cellulitis of left leg 2021    Depression     Eczema 2022    Edema of both lower legs 2021    Edema of both lower legs 2021    Endometrial cancer (HCC) 2021    Heart murmur     Heart murmur 2024    History of COVID-19 2020    Mild sypmtoms Cough,Tired,diarrhea,sweats, Loss taste and smell    Hyperglycemia 2021    Hyperlipidemia     Hypertension     Left hip pain 2022    Lower abdominal pain 2021    Lumbar radiculopathy 08/15/2024    Mixed hyperlipidemia 2021    Numbness and tingling in left arm     Obesity 2024    Pruritus     Rash of hand  "02/28/2022    Shortness of breath     Skin lesion     Skin rash 05/02/2023    Status post left hip replacement 04/14/2025    Tinea cruris 08/24/2023    Tinea pedis of both feet 02/28/2022    Vitamin D deficiency 01/23/2021       * Indicates part of HEP   HEP code: [id number]     Daily Treatment Diary     Manuals 5/20 5/22 5/27 5/29 6/3 6/10 6/12 6/19   L hip PROM                                 Ther Ex           Bike (ROM) 5' lvl 2 5' lvl 1 5' lvl 4 5' lvl 4 5' lvl 5 5' lvl 5 5' lvl 5 5' lvl 1   Heel slides           Quad stretch Prone 10x10\" L Prone 10x10\" L Prone 10x10\" L Prone 10x10\" L Prone 10x10\" L Prone 10x10\" L Prone 10x10\" L Prone 10x10\" L   Self-massage w/ roller L quad           LAQ           Flexion SLR 3x15 ea 3x15 ea 3x15 ea 3x15 ea 3x15 ea 3x15 ea 3x15 ea 3x15 ea   Abduction SLR           Standing hip extension 3x15 ea 3x10 ea PiTB 3x10 ea PiTB 3x10 ea PiTB 3x12 ea PiTB 3x12 ea PiTB 3x12 ea PiTB 3x12 ea PiTB   Standing hip abduction 3x15 ea 3x10 ea PiTB 3x10 ea PiTB 3x10 ea PiTB 3x12 ea PiTB 3x12 ea PiTB 3x12 ea PiTB 3x12 ea PiTB   Ankle pumps*           Patient education                      Neuro Re-Ed           Quad sets*           Glute sets*           Bridges* 3x12 15# 3x15 15# 3x12 20# 3x12 20# 3x12 20#  3x15 20# 3x15 20# 3x15 20#   Clamshells 3x10 L 3x10 L 3x12 L 3x10 L PiTB 3x10 L PiTB 3x12 L PiTB 3x12 L PiTB 3x15 L PiTB   NBOS balance           Tandem balance :30x2 ea floor EO     :30x3 ea floor EO :30x4 ea floor EO :45x4 ea floor EO   Tandem walking           Plank           Bird dog                                 Ther Activity           Sit to stand           Goblet squat           Leg press 115# DL 3x12 P9S3 115# DL 3x12 P9S3 115# FL 3x15 P9S3 inc wt nv 135# DL 3x10 P9S3 135# DL 3x10 P9S3 135# DL 3x12 P8S3 135# DL 3x12 P8S3 135# DL 3x12 P8S3   Heel raises*           Toe raises*           Forward step ups 8\" step up/over 20x ea          Lateral step ups    6\" step 2x10 ea 6\" step 2x10 ea 6\" " "step 2x12 ea 6\" step 2x12 ea    Deadlift                                  Modalities           L hip CP   10' seated 10' seated  10' seated 10' seated 10' seated 10' seated 10' seated                       "

## 2025-07-01 DIAGNOSIS — I10 ESSENTIAL HYPERTENSION: ICD-10-CM

## 2025-07-01 NOTE — TELEPHONE ENCOUNTER
Patient needs   Requested Prescriptions     Pending Prescriptions Disp Refills    amLODIPine (NORVASC) 5 mg tablet 90 tablet 1     Sig: Take 1 tablet (5 mg total) by mouth daily     Sent to [facility] in [place]

## 2025-07-02 RX ORDER — AMLODIPINE BESYLATE 5 MG/1
5 TABLET ORAL DAILY
Qty: 90 TABLET | Refills: 1 | Status: SHIPPED | OUTPATIENT
Start: 2025-07-02

## 2025-07-22 ENCOUNTER — RA CDI HCC (OUTPATIENT)
Dept: OTHER | Facility: HOSPITAL | Age: 65
End: 2025-07-22

## 2025-07-28 ENCOUNTER — OFFICE VISIT (OUTPATIENT)
Dept: INTERNAL MEDICINE CLINIC | Facility: CLINIC | Age: 65
End: 2025-07-28
Payer: MEDICARE

## 2025-07-28 VITALS
OXYGEN SATURATION: 98 % | HEART RATE: 97 BPM | BODY MASS INDEX: 41.94 KG/M2 | SYSTOLIC BLOOD PRESSURE: 120 MMHG | HEIGHT: 67 IN | WEIGHT: 267.2 LBS | DIASTOLIC BLOOD PRESSURE: 76 MMHG | TEMPERATURE: 97 F

## 2025-07-28 DIAGNOSIS — I35.0 MILD AORTIC STENOSIS: ICD-10-CM

## 2025-07-28 DIAGNOSIS — B37.2 CANDIDIASIS OF SKIN: ICD-10-CM

## 2025-07-28 DIAGNOSIS — F32.A ANXIETY AND DEPRESSION: Chronic | ICD-10-CM

## 2025-07-28 DIAGNOSIS — L98.9 SKIN LESION: ICD-10-CM

## 2025-07-28 DIAGNOSIS — E78.2 MIXED HYPERLIPIDEMIA: Chronic | ICD-10-CM

## 2025-07-28 DIAGNOSIS — M25.511 CHRONIC PAIN OF BOTH SHOULDERS: ICD-10-CM

## 2025-07-28 DIAGNOSIS — I10 ESSENTIAL HYPERTENSION: ICD-10-CM

## 2025-07-28 DIAGNOSIS — K21.9 GASTROESOPHAGEAL REFLUX DISEASE WITHOUT ESOPHAGITIS: ICD-10-CM

## 2025-07-28 DIAGNOSIS — F41.9 ANXIETY AND DEPRESSION: Chronic | ICD-10-CM

## 2025-07-28 DIAGNOSIS — G89.29 CHRONIC PAIN OF BOTH SHOULDERS: ICD-10-CM

## 2025-07-28 DIAGNOSIS — Z00.00 MEDICARE ANNUAL WELLNESS VISIT, INITIAL: Primary | ICD-10-CM

## 2025-07-28 DIAGNOSIS — M25.512 CHRONIC PAIN OF BOTH SHOULDERS: ICD-10-CM

## 2025-07-28 DIAGNOSIS — E66.01 MORBID OBESITY WITH BMI OF 40.0-44.9, ADULT (HCC): ICD-10-CM

## 2025-07-28 PROBLEM — R21 SKIN RASH: Status: ACTIVE | Noted: 2025-07-28

## 2025-07-28 PROCEDURE — G0402 INITIAL PREVENTIVE EXAM: HCPCS | Performed by: INTERNAL MEDICINE

## 2025-07-28 RX ORDER — IBUPROFEN 200 MG
TABLET ORAL EVERY 6 HOURS PRN
COMMUNITY

## 2025-07-28 RX ORDER — CLOTRIMAZOLE AND BETAMETHASONE DIPROPIONATE 10; .64 MG/G; MG/G
CREAM TOPICAL 2 TIMES DAILY
Qty: 45 G | Refills: 0 | Status: SHIPPED | OUTPATIENT
Start: 2025-07-28

## 2025-08-05 DIAGNOSIS — F41.9 ANXIETY AND DEPRESSION: Chronic | ICD-10-CM

## 2025-08-05 DIAGNOSIS — F32.A ANXIETY AND DEPRESSION: Chronic | ICD-10-CM

## 2025-08-07 RX ORDER — CITALOPRAM HYDROBROMIDE 20 MG/1
20 TABLET ORAL
Qty: 90 TABLET | Refills: 1 | OUTPATIENT
Start: 2025-08-07

## 2025-08-11 ENCOUNTER — OFFICE VISIT (OUTPATIENT)
Dept: OBGYN CLINIC | Facility: CLINIC | Age: 65
End: 2025-08-11
Payer: MEDICARE

## 2025-08-11 ENCOUNTER — HOSPITAL ENCOUNTER (OUTPATIENT)
Dept: RADIOLOGY | Facility: HOSPITAL | Age: 65
Discharge: HOME | End: 2025-08-11
Attending: ORTHOPAEDIC SURGERY
Payer: MEDICARE

## 2025-08-27 PROBLEM — Z00.00 MEDICARE ANNUAL WELLNESS VISIT, INITIAL: Status: RESOLVED | Noted: 2025-07-28 | Resolved: 2025-08-27

## (undated) DEVICE — SUT VICRYL PLUS 1 CTB-1 36 IN VCPB947H

## (undated) DEVICE — DRAPE EQUIPMENT RF WAND

## (undated) DEVICE — PENCIL ELECTROSURG E-Z CLEAN -0035H

## (undated) DEVICE — TROCAR: Brand: KII FIOS FIRST ENTRY

## (undated) DEVICE — CANNULA SEAL

## (undated) DEVICE — GLOVE INDICATOR PI UNDERGLOVE SZ 8.5 BLUE

## (undated) DEVICE — VIAL DECANTER

## (undated) DEVICE — HOOD: Brand: T7PLUS

## (undated) DEVICE — SUT MONOCRYL 4-0 PS-2 27 IN Y426H

## (undated) DEVICE — TISSUE RETRIEVAL SYSTEM: Brand: INZII RETRIEVAL SYSTEM

## (undated) DEVICE — TOWEL SURG XR DETECT GREEN STRL RFD

## (undated) DEVICE — CHLORAPREP HI-LITE 26ML ORANGE

## (undated) DEVICE — SUT STRATAFIX SPIRAL 2-0 CT-1 30 CM SXPP1B410

## (undated) DEVICE — KIT, BETHLEHEM ROBOTIC PROST: Brand: CARDINAL HEALTH

## (undated) DEVICE — ANTI-FOG SOLUTION WITH FOAM PAD: Brand: DEVON

## (undated) DEVICE — ALLENTOWN LAP CHOLE APP PACK: Brand: CARDINAL HEALTH

## (undated) DEVICE — EXIDINE 4 PCT

## (undated) DEVICE — GLOVE INDICATOR PI UNDERGLOVE SZ 7.5 BLUE

## (undated) DEVICE — GLOVE INDICATOR PI UNDERGLOVE SZ 8 BLUE

## (undated) DEVICE — SCD SEQUENTIAL COMPRESSION COMFORT SLEEVE MEDIUM KNEE LENGTH: Brand: KENDALL SCD

## (undated) DEVICE — MAYO STAND COVER: Brand: CONVERTORS

## (undated) DEVICE — GLOVE INDICATOR PI UNDERGLOVE SZ 7 BLUE

## (undated) DEVICE — ADHESIVE SKIN HIGH VISCOSITY EXOFIN 1ML

## (undated) DEVICE — GLOVE INDICATOR PI UNDERGLOVE SZ 9 BLUE

## (undated) DEVICE — BLADE INTREX LRG BONE OSCILLATING

## (undated) DEVICE — ARM DRAPE

## (undated) DEVICE — GLOVE PI ULTRA TOUCH SZ.7.0

## (undated) DEVICE — GLOVE INDICATOR PI UNDERGLOVE SZ 6.5 BLUE

## (undated) DEVICE — OCCLUDER COLPO-PNEUMO

## (undated) DEVICE — TROCAR APPPLE 5MM EXTENDED LENGTH

## (undated) DEVICE — GLOVE SRG BIOGEL ECLIPSE 7

## (undated) DEVICE — NEEDLE COUNTER LG W/RULER

## (undated) DEVICE — HOOD WITH PEEL AWAY FACE SHIELD: Brand: T7PLUS

## (undated) DEVICE — ABDUCTION PILLOW FOAM POSITIONER: Brand: CARDINAL HEALTH

## (undated) DEVICE — GLOVE PI ULTRA TOUCH SZ.6.5

## (undated) DEVICE — ASTOUND STANDARD SURGICAL GOWN, XL: Brand: CONVERTORS

## (undated) DEVICE — LARGE NEEDLE DRIVER: Brand: ENDOWRIST

## (undated) DEVICE — ADHESIVE SKN CLSR HISTOACRYL FLEX 0.5ML LF

## (undated) DEVICE — PAD GROUNDING ADULT

## (undated) DEVICE — TRAY FOLEY 16FR URIMETER SILICONE SURESTEP

## (undated) DEVICE — GLOVE SRG BIOGEL ORTHOPEDIC 8

## (undated) DEVICE — 3000CC GUARDIAN II: Brand: GUARDIAN

## (undated) DEVICE — SUT VICRYL 0 CT-1 36 IN J946H

## (undated) DEVICE — STERILE SURGICAL LUBRICANT,  TUBE: Brand: SURGILUBE

## (undated) DEVICE — WET SKIN PREP TRAY: Brand: MEDLINE INDUSTRIES, INC.

## (undated) DEVICE — GLOVE SRG BIOGEL 8

## (undated) DEVICE — STOCKINETTE REGULAR

## (undated) DEVICE — BETHLEHEM TOTAL HIP, KIT: Brand: CARDINAL HEALTH

## (undated) DEVICE — SYRINGE 10ML LL

## (undated) DEVICE — LUBRICANT INST ELECTROLUBE ANTISTK WO PAD

## (undated) DEVICE — TUBING SUCTION 5MM X 12 FT

## (undated) DEVICE — GLOVE PI ULTRA TOUCH SZ.8.5

## (undated) DEVICE — SPECIMEN TRAP: Brand: ARGYLE

## (undated) DEVICE — INTENDED FOR TISSUE SEPARATION, AND OTHER PROCEDURES THAT REQUIRE A SHARP SURGICAL BLADE TO PUNCTURE OR CUT.: Brand: BARD-PARKER SAFETY BLADES SIZE 11, STERILE

## (undated) DEVICE — LIGAMAX 5 MM ENDOSCOPIC MULTIPLE CLIP APPLIER: Brand: LIGAMAX

## (undated) DEVICE — UTERINE MANIPULATOR RUMI 6.7 X 10 CM

## (undated) DEVICE — DRESSING MEPILEX AG BORDER POST-OP 4 X 12 IN

## (undated) DEVICE — IRRIG ENDO FLO TUBING

## (undated) DEVICE — GLOVE PI ULTRA TOUCH SZ 6

## (undated) DEVICE — GLOVE SRG BIOGEL 6.5

## (undated) DEVICE — PROXIMATE PLUS MD MULTI-DIRECTIONAL RELEASE SKIN STAPLERS CONTAINS 35 STAINLESS STEEL STAPLES APPROXIMATE CLOSED DIMENSIONS: 6.9MM X 3.9MM WIDE: Brand: PROXIMATE

## (undated) DEVICE — SYRINGE 50ML LL

## (undated) DEVICE — NEEDLE 25G X 1 1/2

## (undated) DEVICE — ENDOPOUCH RETRIEVER SPECIMEN RETRIEVAL BAGS: Brand: ENDOPOUCH RETRIEVER

## (undated) DEVICE — UNDYED BRAIDED (POLYGLACTIN 910), SYNTHETIC ABSORBABLE SUTURE: Brand: COATED VICRYL

## (undated) DEVICE — COLUMN DRAPE

## (undated) DEVICE — MEDI-VAC TUBING CONNECTOR 6-IN-1 STRAIGHT: Brand: CARDINAL HEALTH

## (undated) DEVICE — PREMIUM DRY TRAY LF: Brand: MEDLINE INDUSTRIES, INC.

## (undated) DEVICE — [HIGH FLOW INSUFFLATOR,  DO NOT USE IF PACKAGE IS DAMAGED,  KEEP DRY,  KEEP AWAY FROM SUNLIGHT,  PROTECT FROM HEAT AND RADIOACTIVE SOURCES.]: Brand: PNEUMOSURE

## (undated) DEVICE — SUT VICRYL PLUS 2-0 CTB-1 27 IN VCPB259H

## (undated) DEVICE — GROUNDING PAD RFL

## (undated) DEVICE — VESSEL SEALER EXTEND: Brand: ENDOWRIST

## (undated) DEVICE — LIGHT HANDLE COVER SLEEVE DISP BLUE STELLAR

## (undated) DEVICE — STIRRUP STRAP ADULT DISP

## (undated) DEVICE — GLOVE SRG BIOGEL 7.5

## (undated) DEVICE — TIP COVER ACCESSORY

## (undated) DEVICE — HANDPIECE SET WITH RETRACTABLE COAXIAL FAN SPRAY TIP AND SUCTION TUBE: Brand: INTERPULSE

## (undated) DEVICE — PROGRASP FORCEPS: Brand: ENDOWRIST

## (undated) DEVICE — CAPIT HIP MOP -POLY CEMEMTED

## (undated) DEVICE — TRAY FOLEY 16FR URIMETER SURESTEP

## (undated) DEVICE — NEEDLE 22 G X 1 1/2 SAFETY

## (undated) DEVICE — ELECTRODE LAP J HOOK SPLIT STEM E-Z CLEAN 33CM -0021S

## (undated) DEVICE — SYRINGE 10ML LL CONTROL TOP

## (undated) DEVICE — MONOPOLAR CURVED SCISSORS: Brand: ENDOWRIST